# Patient Record
Sex: MALE | Race: WHITE | NOT HISPANIC OR LATINO | Employment: OTHER | URBAN - METROPOLITAN AREA
[De-identification: names, ages, dates, MRNs, and addresses within clinical notes are randomized per-mention and may not be internally consistent; named-entity substitution may affect disease eponyms.]

---

## 2017-01-19 ENCOUNTER — LAB VISIT (OUTPATIENT)
Dept: LAB | Facility: HOSPITAL | Age: 67
End: 2017-01-19
Attending: INTERNAL MEDICINE
Payer: COMMERCIAL

## 2017-01-19 DIAGNOSIS — N18.2 CHRONIC KIDNEY DISEASE, STAGE II (MILD): Primary | ICD-10-CM

## 2017-01-19 DIAGNOSIS — Z79.899 ENCOUNTER FOR LONG-TERM (CURRENT) USE OF OTHER MEDICATIONS: ICD-10-CM

## 2017-01-19 LAB
ALBUMIN SERPL BCP-MCNC: 3.6 G/DL
ANION GAP SERPL CALC-SCNC: 13 MMOL/L
BASOPHILS # BLD AUTO: 0.04 K/UL
BASOPHILS NFR BLD: 0.7 %
BUN SERPL-MCNC: 17 MG/DL
CALCIUM SERPL-MCNC: 9.1 MG/DL
CHLORIDE SERPL-SCNC: 108 MMOL/L
CO2 SERPL-SCNC: 18 MMOL/L
CREAT SERPL-MCNC: 1.6 MG/DL
DIFFERENTIAL METHOD: NORMAL
EOSINOPHIL # BLD AUTO: 0.1 K/UL
EOSINOPHIL NFR BLD: 1.9 %
ERYTHROCYTE [DISTWIDTH] IN BLOOD BY AUTOMATED COUNT: 14.4 %
EST. GFR  (AFRICAN AMERICAN): 50.1 ML/MIN/1.73 M^2
EST. GFR  (NON AFRICAN AMERICAN): 43.3 ML/MIN/1.73 M^2
GLUCOSE SERPL-MCNC: 96 MG/DL
HCT VFR BLD AUTO: 46.4 %
HGB BLD-MCNC: 15.3 G/DL
LYMPHOCYTES # BLD AUTO: 1.6 K/UL
LYMPHOCYTES NFR BLD: 28.4 %
MAGNESIUM SERPL-MCNC: 1.7 MG/DL
MCH RBC QN AUTO: 29.9 PG
MCHC RBC AUTO-ENTMCNC: 33 %
MCV RBC AUTO: 91 FL
MONOCYTES # BLD AUTO: 0.3 K/UL
MONOCYTES NFR BLD: 5.6 %
NEUTROPHILS # BLD AUTO: 3.6 K/UL
NEUTROPHILS NFR BLD: 63.4 %
PHOSPHATE SERPL-MCNC: 2.6 MG/DL
PLATELET # BLD AUTO: 158 K/UL
PMV BLD AUTO: 12.8 FL
POTASSIUM SERPL-SCNC: 4.4 MMOL/L
RBC # BLD AUTO: 5.12 M/UL
SODIUM SERPL-SCNC: 139 MMOL/L
WBC # BLD AUTO: 5.71 K/UL

## 2017-01-19 PROCEDURE — 36415 COLL VENOUS BLD VENIPUNCTURE: CPT | Mod: PO

## 2017-01-19 PROCEDURE — 85025 COMPLETE CBC W/AUTO DIFF WBC: CPT

## 2017-01-19 PROCEDURE — 80069 RENAL FUNCTION PANEL: CPT

## 2017-01-19 PROCEDURE — 83735 ASSAY OF MAGNESIUM: CPT

## 2017-01-20 ENCOUNTER — TELEPHONE (OUTPATIENT)
Dept: FAMILY MEDICINE | Facility: CLINIC | Age: 67
End: 2017-01-20

## 2017-04-18 NOTE — TELEPHONE ENCOUNTER
contact patient, looks like patient had labs in January but has not had his physical since 2015. Has not had his blood pressure checked here since that time and he really should  make an appointment

## 2017-05-08 ENCOUNTER — LAB VISIT (OUTPATIENT)
Dept: LAB | Facility: HOSPITAL | Age: 67
End: 2017-05-08
Attending: INTERNAL MEDICINE
Payer: COMMERCIAL

## 2017-05-08 DIAGNOSIS — Z79.899 ENCOUNTER FOR LONG-TERM (CURRENT) USE OF OTHER MEDICATIONS: Primary | ICD-10-CM

## 2017-05-08 DIAGNOSIS — N18.2 CHRONIC RENAL DISEASE, STAGE II: ICD-10-CM

## 2017-05-08 LAB
ALBUMIN SERPL BCP-MCNC: 3.6 G/DL
ANION GAP SERPL CALC-SCNC: 9 MMOL/L
BUN SERPL-MCNC: 22 MG/DL
CALCIUM SERPL-MCNC: 9.2 MG/DL
CHLORIDE SERPL-SCNC: 109 MMOL/L
CO2 SERPL-SCNC: 22 MMOL/L
CREAT SERPL-MCNC: 1.4 MG/DL
EST. GFR  (AFRICAN AMERICAN): 58.8 ML/MIN/1.73 M^2
EST. GFR  (NON AFRICAN AMERICAN): 50.9 ML/MIN/1.73 M^2
GLUCOSE SERPL-MCNC: 100 MG/DL
MAGNESIUM SERPL-MCNC: 1.7 MG/DL
PHOSPHATE SERPL-MCNC: 3.3 MG/DL
POTASSIUM SERPL-SCNC: 4.2 MMOL/L
SODIUM SERPL-SCNC: 140 MMOL/L

## 2017-05-08 PROCEDURE — 80069 RENAL FUNCTION PANEL: CPT

## 2017-05-08 PROCEDURE — 83735 ASSAY OF MAGNESIUM: CPT

## 2017-05-08 PROCEDURE — 36415 COLL VENOUS BLD VENIPUNCTURE: CPT | Mod: PO

## 2017-05-29 ENCOUNTER — PATIENT OUTREACH (OUTPATIENT)
Dept: ADMINISTRATIVE | Facility: HOSPITAL | Age: 67
End: 2017-05-29

## 2017-05-29 NOTE — PROGRESS NOTES
Sent a letter per mail for patient to call back and schedule appointment for labs, office visit, and update health maintenance.

## 2017-06-06 ENCOUNTER — TELEPHONE (OUTPATIENT)
Dept: FAMILY MEDICINE | Facility: CLINIC | Age: 67
End: 2017-06-06

## 2017-06-07 DIAGNOSIS — Z11.59 NEED FOR HEPATITIS C SCREENING TEST: Primary | ICD-10-CM

## 2017-06-07 NOTE — PROGRESS NOTES
Patient called back after receiving letter to schedule for fasting labs, office visit with Dr. You, and update health maintenance.    Patient scheduled for fasting labs 6/14/2017 at [facility] and office visit with Dr. You 6/21/2017 asked patient to arrive at 1040 for a 1100 appointment.  Confirmed dates/times and facilities for appointments.  Sent appointment reminder letters.

## 2017-06-07 NOTE — ADDENDUM NOTE
Encounter addended by: Vicotrina Smith LPN on: 6/7/2017 12:20 PM<BR>    Actions taken: Contacts section saved, Sign clinical note

## 2017-06-14 ENCOUNTER — LAB VISIT (OUTPATIENT)
Dept: LAB | Facility: HOSPITAL | Age: 67
End: 2017-06-14
Attending: INTERNAL MEDICINE
Payer: COMMERCIAL

## 2017-06-14 DIAGNOSIS — Z11.59 NEED FOR HEPATITIS C SCREENING TEST: ICD-10-CM

## 2017-06-14 PROCEDURE — 36415 COLL VENOUS BLD VENIPUNCTURE: CPT | Mod: PO

## 2017-06-14 PROCEDURE — 86803 HEPATITIS C AB TEST: CPT

## 2017-06-15 LAB — HCV AB SERPL QL IA: NEGATIVE

## 2017-06-21 ENCOUNTER — OFFICE VISIT (OUTPATIENT)
Dept: FAMILY MEDICINE | Facility: CLINIC | Age: 67
End: 2017-06-21
Payer: COMMERCIAL

## 2017-06-21 VITALS
TEMPERATURE: 99 F | DIASTOLIC BLOOD PRESSURE: 76 MMHG | WEIGHT: 217.13 LBS | SYSTOLIC BLOOD PRESSURE: 130 MMHG | HEART RATE: 65 BPM | BODY MASS INDEX: 32.91 KG/M2 | OXYGEN SATURATION: 97 % | HEIGHT: 68 IN

## 2017-06-21 DIAGNOSIS — N52.9 ERECTILE DYSFUNCTION, UNSPECIFIED ERECTILE DYSFUNCTION TYPE: ICD-10-CM

## 2017-06-21 DIAGNOSIS — I25.10 CORONARY ARTERY DISEASE INVOLVING NATIVE CORONARY ARTERY OF NATIVE HEART WITHOUT ANGINA PECTORIS: ICD-10-CM

## 2017-06-21 DIAGNOSIS — Z12.5 SCREENING FOR PROSTATE CANCER: ICD-10-CM

## 2017-06-21 DIAGNOSIS — Z00.00 ROUTINE MEDICAL EXAM: Primary | ICD-10-CM

## 2017-06-21 DIAGNOSIS — E78.6 HIGH-DENSITY LIPOPROTEIN DEFICIENCY: ICD-10-CM

## 2017-06-21 DIAGNOSIS — N18.30 CHRONIC KIDNEY DISEASE, STAGE III (MODERATE): ICD-10-CM

## 2017-06-21 DIAGNOSIS — I73.9 PVD (PERIPHERAL VASCULAR DISEASE): ICD-10-CM

## 2017-06-21 DIAGNOSIS — I10 ESSENTIAL HYPERTENSION: ICD-10-CM

## 2017-06-21 DIAGNOSIS — R79.89 ABNORMAL THYROID BLOOD TEST: ICD-10-CM

## 2017-06-21 DIAGNOSIS — F52.4 PREMATURE EJACULATION: ICD-10-CM

## 2017-06-21 PROCEDURE — 99999 PR PBB SHADOW E&M-EST. PATIENT-LVL III: CPT | Mod: PBBFAC,,, | Performed by: INTERNAL MEDICINE

## 2017-06-21 PROCEDURE — 1159F MED LIST DOCD IN RCRD: CPT | Mod: S$GLB,,, | Performed by: INTERNAL MEDICINE

## 2017-06-21 PROCEDURE — 1126F AMNT PAIN NOTED NONE PRSNT: CPT | Mod: S$GLB,,, | Performed by: INTERNAL MEDICINE

## 2017-06-21 PROCEDURE — 90670 PCV13 VACCINE IM: CPT | Mod: S$GLB,,, | Performed by: INTERNAL MEDICINE

## 2017-06-21 PROCEDURE — 99214 OFFICE O/P EST MOD 30 MIN: CPT | Mod: 25,S$GLB,, | Performed by: INTERNAL MEDICINE

## 2017-06-21 PROCEDURE — 99397 PER PM REEVAL EST PAT 65+ YR: CPT | Mod: 25,S$GLB,, | Performed by: INTERNAL MEDICINE

## 2017-06-21 PROCEDURE — 90471 IMMUNIZATION ADMIN: CPT | Mod: S$GLB,,, | Performed by: INTERNAL MEDICINE

## 2017-06-21 RX ORDER — TADALAFIL 20 MG/1
20 TABLET ORAL
Qty: 3 TABLET | Refills: 11 | Status: SHIPPED | OUTPATIENT
Start: 2017-06-21 | End: 2018-08-29

## 2017-06-21 RX ORDER — SILDENAFIL CITRATE 20 MG/1
TABLET ORAL
Qty: 30 TABLET | Refills: 11 | Status: SHIPPED | OUTPATIENT
Start: 2017-06-21 | End: 2018-08-29

## 2017-06-21 RX ORDER — PAROXETINE 10 MG/1
TABLET, FILM COATED ORAL
Qty: 50 TABLET | Refills: 11 | Status: SHIPPED | OUTPATIENT
Start: 2017-06-21 | End: 2018-08-29

## 2017-06-21 RX ORDER — NEBIVOLOL HYDROCHLORIDE 5 MG/1
5 TABLET ORAL DAILY
COMMUNITY
Start: 2017-06-20 | End: 2021-05-27
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
## 2017-06-21 NOTE — PROGRESS NOTES
Pneumococcal vaccine 13 given to patient & tolerated well.  Pt advised to wait 15 minutes for monitoring.

## 2017-07-13 ENCOUNTER — LAB VISIT (OUTPATIENT)
Dept: LAB | Facility: HOSPITAL | Age: 67
End: 2017-07-13
Attending: INTERNAL MEDICINE
Payer: COMMERCIAL

## 2017-07-13 DIAGNOSIS — I10 HTN (HYPERTENSION): Primary | ICD-10-CM

## 2017-07-13 DIAGNOSIS — I25.10 CAD (CORONARY ARTERY DISEASE): ICD-10-CM

## 2017-07-13 LAB
ALBUMIN SERPL BCP-MCNC: 3.6 G/DL
ALP SERPL-CCNC: 53 U/L
ALT SERPL W/O P-5'-P-CCNC: 21 U/L
ANION GAP SERPL CALC-SCNC: 9 MMOL/L
AST SERPL-CCNC: 21 U/L
BILIRUB SERPL-MCNC: 0.4 MG/DL
BUN SERPL-MCNC: 18 MG/DL
CALCIUM SERPL-MCNC: 9.3 MG/DL
CHLORIDE SERPL-SCNC: 107 MMOL/L
CHOLEST/HDLC SERPL: 5.3 {RATIO}
CO2 SERPL-SCNC: 21 MMOL/L
CREAT SERPL-MCNC: 1.3 MG/DL
EST. GFR  (AFRICAN AMERICAN): >60 ML/MIN/1.73 M^2
EST. GFR  (NON AFRICAN AMERICAN): 55.7 ML/MIN/1.73 M^2
GLUCOSE SERPL-MCNC: 91 MG/DL
HDL/CHOLESTEROL RATIO: 19 %
HDLC SERPL-MCNC: 137 MG/DL
HDLC SERPL-MCNC: 26 MG/DL
LDLC SERPL CALC-MCNC: 78.6 MG/DL
NONHDLC SERPL-MCNC: 111 MG/DL
POTASSIUM SERPL-SCNC: 4.2 MMOL/L
PROT SERPL-MCNC: 7.2 G/DL
SODIUM SERPL-SCNC: 137 MMOL/L
TRIGL SERPL-MCNC: 162 MG/DL
TSH SERPL DL<=0.005 MIU/L-ACNC: 2.8 UIU/ML

## 2017-07-13 PROCEDURE — 84443 ASSAY THYROID STIM HORMONE: CPT

## 2017-07-13 PROCEDURE — 36415 COLL VENOUS BLD VENIPUNCTURE: CPT | Mod: PO

## 2017-07-13 PROCEDURE — 80061 LIPID PANEL: CPT

## 2017-07-13 PROCEDURE — 80053 COMPREHEN METABOLIC PANEL: CPT

## 2017-11-10 ENCOUNTER — LAB VISIT (OUTPATIENT)
Dept: LAB | Facility: HOSPITAL | Age: 67
End: 2017-11-10
Attending: INTERNAL MEDICINE
Payer: COMMERCIAL

## 2017-11-10 DIAGNOSIS — Z12.5 SCREENING FOR PROSTATE CANCER: ICD-10-CM

## 2017-11-10 DIAGNOSIS — R79.89 ABNORMAL THYROID BLOOD TEST: ICD-10-CM

## 2017-11-10 LAB
COMPLEXED PSA SERPL-MCNC: 0.95 NG/ML
T4 FREE SERPL-MCNC: 1.05 NG/DL
TSH SERPL DL<=0.005 MIU/L-ACNC: 2.82 UIU/ML

## 2017-11-10 PROCEDURE — 84439 ASSAY OF FREE THYROXINE: CPT

## 2017-11-10 PROCEDURE — 84443 ASSAY THYROID STIM HORMONE: CPT

## 2017-11-10 PROCEDURE — 84153 ASSAY OF PSA TOTAL: CPT

## 2017-11-10 PROCEDURE — 36415 COLL VENOUS BLD VENIPUNCTURE: CPT | Mod: PO

## 2017-11-12 ENCOUNTER — TELEPHONE (OUTPATIENT)
Dept: FAMILY MEDICINE | Facility: CLINIC | Age: 67
End: 2017-11-12

## 2017-11-14 ENCOUNTER — LAB VISIT (OUTPATIENT)
Dept: LAB | Facility: HOSPITAL | Age: 67
End: 2017-11-14
Attending: INTERNAL MEDICINE
Payer: COMMERCIAL

## 2017-11-14 DIAGNOSIS — I10 ESSENTIAL HYPERTENSION, MALIGNANT: ICD-10-CM

## 2017-11-14 DIAGNOSIS — N18.30 CHRONIC KIDNEY DISEASE, STAGE III (MODERATE): Primary | ICD-10-CM

## 2017-11-14 LAB
25(OH)D3+25(OH)D2 SERPL-MCNC: 9 NG/ML
ALBUMIN SERPL BCP-MCNC: 3.6 G/DL
ANION GAP SERPL CALC-SCNC: 9 MMOL/L
BASOPHILS # BLD AUTO: 0.06 K/UL
BASOPHILS NFR BLD: 1 %
BUN SERPL-MCNC: 17 MG/DL
CALCIUM SERPL-MCNC: 9.5 MG/DL
CHLORIDE SERPL-SCNC: 106 MMOL/L
CO2 SERPL-SCNC: 24 MMOL/L
CREAT SERPL-MCNC: 1.3 MG/DL
DIFFERENTIAL METHOD: NORMAL
EOSINOPHIL # BLD AUTO: 0.2 K/UL
EOSINOPHIL NFR BLD: 3.1 %
ERYTHROCYTE [DISTWIDTH] IN BLOOD BY AUTOMATED COUNT: 14.4 %
EST. GFR  (AFRICAN AMERICAN): >60 ML/MIN/1.73 M^2
EST. GFR  (NON AFRICAN AMERICAN): 55.7 ML/MIN/1.73 M^2
GLUCOSE SERPL-MCNC: 110 MG/DL
HCT VFR BLD AUTO: 45.9 %
HGB BLD-MCNC: 14.9 G/DL
IMM GRANULOCYTES # BLD AUTO: 0.02 K/UL
IMM GRANULOCYTES NFR BLD AUTO: 0.3 %
LYMPHOCYTES # BLD AUTO: 1.7 K/UL
LYMPHOCYTES NFR BLD: 28 %
MAGNESIUM SERPL-MCNC: 1.8 MG/DL
MCH RBC QN AUTO: 30 PG
MCHC RBC AUTO-ENTMCNC: 32.5 G/DL
MCV RBC AUTO: 92 FL
MONOCYTES # BLD AUTO: 0.5 K/UL
MONOCYTES NFR BLD: 7.2 %
NEUTROPHILS # BLD AUTO: 3.8 K/UL
NEUTROPHILS NFR BLD: 60.4 %
NRBC BLD-RTO: 0 /100 WBC
PHOSPHATE SERPL-MCNC: 3.2 MG/DL
PLATELET # BLD AUTO: 163 K/UL
PMV BLD AUTO: 12 FL
POTASSIUM SERPL-SCNC: 4.3 MMOL/L
RBC # BLD AUTO: 4.97 M/UL
SODIUM SERPL-SCNC: 139 MMOL/L
WBC # BLD AUTO: 6.22 K/UL

## 2017-11-14 PROCEDURE — 82306 VITAMIN D 25 HYDROXY: CPT

## 2017-11-14 PROCEDURE — 83970 ASSAY OF PARATHORMONE: CPT

## 2017-11-14 PROCEDURE — 36415 COLL VENOUS BLD VENIPUNCTURE: CPT | Mod: PO

## 2017-11-14 PROCEDURE — 80069 RENAL FUNCTION PANEL: CPT

## 2017-11-14 PROCEDURE — 83735 ASSAY OF MAGNESIUM: CPT

## 2017-11-14 PROCEDURE — 85025 COMPLETE CBC W/AUTO DIFF WBC: CPT

## 2017-11-15 LAB — PTH-INTACT SERPL-MCNC: 47 PG/ML

## 2018-01-05 ENCOUNTER — LAB VISIT (OUTPATIENT)
Dept: LAB | Facility: HOSPITAL | Age: 68
End: 2018-01-05
Attending: INTERNAL MEDICINE
Payer: MEDICARE

## 2018-01-05 DIAGNOSIS — I10 ESSENTIAL HYPERTENSION, MALIGNANT: Primary | ICD-10-CM

## 2018-01-05 DIAGNOSIS — F32.9 PROLONGED DEPRESSIVE REACTION: ICD-10-CM

## 2018-01-05 DIAGNOSIS — I25.10 DISEASE OF CARDIOVASCULAR SYSTEM: ICD-10-CM

## 2018-01-05 LAB
ALBUMIN SERPL BCP-MCNC: 3.6 G/DL
ALP SERPL-CCNC: 44 U/L
ALT SERPL W/O P-5'-P-CCNC: 12 U/L
ANION GAP SERPL CALC-SCNC: 10 MMOL/L
AST SERPL-CCNC: 18 U/L
BILIRUB SERPL-MCNC: 0.5 MG/DL
BUN SERPL-MCNC: 24 MG/DL
CALCIUM SERPL-MCNC: 9.6 MG/DL
CHLORIDE SERPL-SCNC: 108 MMOL/L
CHOLEST SERPL-MCNC: 140 MG/DL
CHOLEST/HDLC SERPL: 4.1 {RATIO}
CO2 SERPL-SCNC: 23 MMOL/L
CREAT SERPL-MCNC: 1.6 MG/DL
EST. GFR  (AFRICAN AMERICAN): 50.1 ML/MIN/1.73 M^2
EST. GFR  (NON AFRICAN AMERICAN): 43.3 ML/MIN/1.73 M^2
GLUCOSE SERPL-MCNC: 113 MG/DL
HDLC SERPL-MCNC: 34 MG/DL
HDLC SERPL: 24.3 %
LDLC SERPL CALC-MCNC: 76.6 MG/DL
NONHDLC SERPL-MCNC: 106 MG/DL
POTASSIUM SERPL-SCNC: 4.5 MMOL/L
PROT SERPL-MCNC: 7.4 G/DL
SODIUM SERPL-SCNC: 141 MMOL/L
TRIGL SERPL-MCNC: 147 MG/DL
TSH SERPL DL<=0.005 MIU/L-ACNC: 3.08 UIU/ML

## 2018-01-05 PROCEDURE — 80061 LIPID PANEL: CPT

## 2018-01-05 PROCEDURE — 84443 ASSAY THYROID STIM HORMONE: CPT

## 2018-01-05 PROCEDURE — 80053 COMPREHEN METABOLIC PANEL: CPT

## 2018-01-05 PROCEDURE — 36415 COLL VENOUS BLD VENIPUNCTURE: CPT | Mod: PO

## 2018-01-09 ENCOUNTER — PES CALL (OUTPATIENT)
Dept: ADMINISTRATIVE | Facility: CLINIC | Age: 68
End: 2018-01-09

## 2018-01-11 ENCOUNTER — TELEPHONE (OUTPATIENT)
Dept: FAMILY MEDICINE | Facility: CLINIC | Age: 68
End: 2018-01-11

## 2018-01-11 NOTE — TELEPHONE ENCOUNTER
----- Message from Rosa Frey sent at 1/9/2018 11:40 AM CST -----  Contact: self  Pt states he has been having chest pains all last week and yesterday. He states he is not having Chest Pains right now and he has already had an EKG with his Cardiologist. He is asking for an appointment with only Dr. You because he really trusts him. There are no available appts until 01/24. Pt is asking for a call back at  [phone].

## 2018-01-15 ENCOUNTER — TELEPHONE (OUTPATIENT)
Dept: FAMILY MEDICINE | Facility: CLINIC | Age: 68
End: 2018-01-15

## 2018-01-15 NOTE — TELEPHONE ENCOUNTER
Patient states he's been having some discomfort in his chest for over a week.  The pain doesn't seem to be brought on by anything in particular.  No trouble breathing and reports that he checks his bp often and it has been normal.  Schedule patient an appointment on tomorrow (1/16/18).

## 2018-01-15 NOTE — TELEPHONE ENCOUNTER
----- Message from Roas evertgail sent at 1/15/2018 11:56 AM CST -----  Contact: self  Pt returning staff's call. He is asking for an urgent appt for on and off chest pains. No available until 01/29.   [phone].

## 2018-01-16 ENCOUNTER — OFFICE VISIT (OUTPATIENT)
Dept: FAMILY MEDICINE | Facility: CLINIC | Age: 68
End: 2018-01-16
Payer: MEDICARE

## 2018-01-16 VITALS
SYSTOLIC BLOOD PRESSURE: 126 MMHG | DIASTOLIC BLOOD PRESSURE: 70 MMHG | TEMPERATURE: 98 F | WEIGHT: 221.13 LBS | BODY MASS INDEX: 32.75 KG/M2 | OXYGEN SATURATION: 98 % | HEART RATE: 62 BPM | HEIGHT: 69 IN

## 2018-01-16 DIAGNOSIS — K21.9 GASTROESOPHAGEAL REFLUX DISEASE, ESOPHAGITIS PRESENCE NOT SPECIFIED: ICD-10-CM

## 2018-01-16 DIAGNOSIS — R07.9 CHEST PAIN, UNSPECIFIED TYPE: Primary | ICD-10-CM

## 2018-01-16 PROBLEM — F32.9 MAJOR DEPRESSIVE DISORDER WITH SINGLE EPISODE: Status: ACTIVE | Noted: 2018-01-16

## 2018-01-16 PROBLEM — F52.4 PREMATURE EJACULATION: Status: RESOLVED | Noted: 2017-06-21 | Resolved: 2018-01-16

## 2018-01-16 PROBLEM — E66.01 SEVERE OBESITY (BMI 35.0-35.9 WITH COMORBIDITY): Status: ACTIVE | Noted: 2018-01-16

## 2018-01-16 PROCEDURE — 99214 OFFICE O/P EST MOD 30 MIN: CPT | Mod: S$GLB,,, | Performed by: NURSE PRACTITIONER

## 2018-01-16 PROCEDURE — 99999 PR PBB SHADOW E&M-EST. PATIENT-LVL IV: CPT | Mod: PBBFAC,,, | Performed by: NURSE PRACTITIONER

## 2018-01-16 PROCEDURE — 93010 ELECTROCARDIOGRAM REPORT: CPT | Mod: S$GLB,,, | Performed by: INTERNAL MEDICINE

## 2018-01-16 PROCEDURE — 93005 ELECTROCARDIOGRAM TRACING: CPT | Mod: S$GLB,,, | Performed by: NURSE PRACTITIONER

## 2018-01-16 RX ORDER — SUCRALFATE 1 G/1
1 TABLET ORAL 4 TIMES DAILY
Qty: 60 TABLET | Refills: 1 | Status: SHIPPED | OUTPATIENT
Start: 2018-01-16 | End: 2018-08-29

## 2018-01-16 NOTE — PROGRESS NOTES
This dictation has been generated using Dragon Dictation some phonetic errors may occur.     Lonnie was seen today for chest pain and abdominal pain.    Diagnoses and all orders for this visit:    Chest pain, unspecified type  -     EKG 12-lead    Gastroesophageal reflux disease, esophagitis presence not specified    Other orders  -     sucralfate (CARAFATE) 1 gram tablet; Take 1 tablet (1 g total) by mouth 4 (four) times daily.      Chest pain check chest x-ray as above and rule out cardiac involvement.  No EKG changes noted on today's EKG per my interpretation to suggest cardiac involvement.  No prior for comparison.  He did see cardiology recently and was reassured of same.  GERD.  Discussed dietary modifications.  Discussed appropriate dosing of omeprazole and timing before a meal.  Add Carafate as above.  Patient Instructions   Avoid reflux triggers: fried foods, caffeine, chocolate, spicy foods, tomato, acidic foods(citrus), food with high fat content, carbonated beverages, peppermint, ibuprofen, aleve, Onion, and Garlic.   Take your omeprazole and then eat 30 minutes later.        Follow-up if symptoms worsen or fail to improve.      ________________________________________________________________  ________________________________________________________________        Chief Complaint   Patient presents with    Chest Pain    Abdominal Pain     after eating, ulcer     History of present illness  This 67 y.o. presents today for complaint of chest pain.  Symptoms have been going on for 3 weeks. Patient does cry but is periodic chest discomfort.  It does happen throughout the day but it happens more frequently at night.  He also notes some abdominal discomfort.  Patient notes discomfort afg.  He has checked his blood pressure after having chest pain and blood pressure was normal. He did see his cardiologist with EKG and reassured him.  Patient does note chest pressure.  Patient notes GERD-like symptoms.  He  does have burning in his chest.  He does have abdominal burning.  He does have relief after vomiting.  He does have burning when vomiting.  He does note consumption of diet Cokes and coffee.  Patient also notes consumption of garlic chocolate and peppermint.  He took about 6 Tums which seemed at home it down some.  He does take omeprazole routinely but doesn't eat 30 minutes later.  Review of systems  No shortness of breath or dyspnea on exertion  No change in bowel habits.  No change in color or consistency.  Current every day smoker      Past Medical History:   Diagnosis Date    CAD (coronary artery disease)     Dr Martinez    Chronic kidney disease, stage III (moderate)     Depression     High-density lipoprotein deficiency     HTN (hypertension)     Insomnia     PVD (peripheral vascular disease)     stented    S/P AAA repair        Past Surgical History:   Procedure Laterality Date    ABDOMINAL AORTIC ANEURYSM REPAIR      COLONOSCOPY N/A 10/7/2015    Procedure: COLONOSCOPY;  Surgeon: Genaro You MD;  Location: [facility] ([address] [place] [address]);  Service: Endoscopy;  Laterality: N/A;    LAMINECTOMY      PATELLA SURGERY      VASECTOMY         History reviewed. No pertinent family history.    Social History     Social History    Marital status:      Spouse name: N/A    Number of children: N/A    Years of education: N/A     Social History Main Topics    Smoking status: Current Every Day Smoker     Packs/day: 1.00     Years: 50.00    Smokeless tobacco: Never Used    Alcohol use No    Drug use: Yes     Frequency: 7.0 times per week     Types: Marijuana    Sexual activity: Not Asked     Other Topics Concern    None     Social History Narrative    None       Current Outpatient Prescriptions   Medication Sig Dispense Refill    aspirin 325 MG tablet Take 325 mg by mouth once daily.        BYSTOLIC 5 mg Tab Take 5 mg by mouth once daily.       CRESTOR 20 mg tablet Take 20 mg by mouth once daily.        doxazosin (CARDURA) 4 MG tablet Take 4 mg by mouth once daily.       nifedipine (PROCARDIA-XL) 60 MG (OSM) 24 hr tablet       omeprazole (PRILOSEC) 40 MG capsule Take 40 mg by mouth every morning.       paroxetine (PAXIL) 10 MG tablet Try 1-3 a day as needed 50 tablet 11    sildenafil (REVATIO) 20 mg Tab 1-5 pills at a time per day prn 30 tablet 11    tadalafil (CIALIS) 20 MG Tab Take 1 tablet (20 mg total) by mouth every 36 (thirty-six) hours. 3 tablet 11    TRICOR 145 mg tablet       ZETIA 10 mg tablet Take 10 mg by mouth once daily.       sucralfate (CARAFATE) 1 gram tablet Take 1 tablet (1 g total) by mouth 4 (four) times daily. 60 tablet 1     No current facility-administered medications for this visit.        Review of patient's allergies indicates:  No Known Allergies    Physical examination  Vitals Reviewed  Gen. Well-dressed well-nourished no apparent distress  Skin warm dry and intact.  No rashes noted.  HEENT.  TM intact bilateral with normal light reflex.  No mastoid tenderness during percussion.  Nares patent bilateral.  Pharynx is unremarkable.  No maxillary or frontal sinus tenderness when percussed.    Neck is supple without adenopathy  Chest.  Respirations are even unlabored.  Lungs are clear to auscultation.  Cardiac regular rate and rhythm.  No chest wall adenopathy noted.  Abdomen is soft and not distended.  Bowel sounds are present.  No tenderness during palpation of the abdomen.  No Hepatosplenomegaly noted.  No hernia noted.  No CVA tenderness to percussion.    Neuro. Awake alert oriented x4.  Normal judgment and cognition noted.  Extremities no clubbing cyanosis or edema noted.     Call or return to clinic prn if these symptoms worsen or fail to improve as anticipated.

## 2018-02-05 ENCOUNTER — TELEPHONE (OUTPATIENT)
Dept: FAMILY MEDICINE | Facility: CLINIC | Age: 68
End: 2018-02-05

## 2018-02-05 NOTE — TELEPHONE ENCOUNTER
Called patient no answer left message to call our office . Patient has appointment with Dr. RENZO Vaca on tomorrow.

## 2018-02-05 NOTE — TELEPHONE ENCOUNTER
----- Message from Belen Laguerre sent at 2/5/2018 10:10 AM CST -----  Contact: Mrs. Taylor  Need to talk with someone regarding an order for an endoscope? Mrs. Taylor can be reached at [phone].     Thanks,

## 2018-02-05 NOTE — TELEPHONE ENCOUNTER
----- Message from Fermin Gonzlaes sent at 2/1/2018 12:51 PM CST -----  Contact: Self/[phone]  Patient called stating that he has stomach issues. He would like a sooner appointment with Dr. You. Thank you.

## 2018-03-16 DIAGNOSIS — Z13.6 SCREENING FOR AAA (AORTIC ABDOMINAL ANEURYSM): ICD-10-CM

## 2018-03-26 ENCOUNTER — LAB VISIT (OUTPATIENT)
Dept: LAB | Facility: HOSPITAL | Age: 68
End: 2018-03-26
Attending: INTERNAL MEDICINE
Payer: MEDICARE

## 2018-03-26 DIAGNOSIS — I25.10 DISEASE OF CARDIOVASCULAR SYSTEM: Primary | ICD-10-CM

## 2018-03-26 DIAGNOSIS — R04.2 COUGHING UP BLOOD: ICD-10-CM

## 2018-03-26 LAB
ANION GAP SERPL CALC-SCNC: 11 MMOL/L
BASOPHILS # BLD AUTO: 0.04 K/UL
BASOPHILS NFR BLD: 0.6 %
BUN SERPL-MCNC: 17 MG/DL
CALCIUM SERPL-MCNC: 9.6 MG/DL
CHLORIDE SERPL-SCNC: 108 MMOL/L
CO2 SERPL-SCNC: 21 MMOL/L
CREAT SERPL-MCNC: 1.4 MG/DL
DIFFERENTIAL METHOD: NORMAL
EOSINOPHIL # BLD AUTO: 0.2 K/UL
EOSINOPHIL NFR BLD: 2.6 %
ERYTHROCYTE [DISTWIDTH] IN BLOOD BY AUTOMATED COUNT: 14 %
EST. GFR  (AFRICAN AMERICAN): 58.4 ML/MIN/1.73 M^2
EST. GFR  (NON AFRICAN AMERICAN): 50.5 ML/MIN/1.73 M^2
GLUCOSE SERPL-MCNC: 103 MG/DL
HCT VFR BLD AUTO: 48.6 %
HGB BLD-MCNC: 15.7 G/DL
IMM GRANULOCYTES # BLD AUTO: 0.02 K/UL
IMM GRANULOCYTES NFR BLD AUTO: 0.3 %
LYMPHOCYTES # BLD AUTO: 1.8 K/UL
LYMPHOCYTES NFR BLD: 28.6 %
MCH RBC QN AUTO: 30.5 PG
MCHC RBC AUTO-ENTMCNC: 32.3 G/DL
MCV RBC AUTO: 95 FL
MONOCYTES # BLD AUTO: 0.5 K/UL
MONOCYTES NFR BLD: 7.8 %
NEUTROPHILS # BLD AUTO: 3.9 K/UL
NEUTROPHILS NFR BLD: 60.1 %
NRBC BLD-RTO: 0 /100 WBC
PLATELET # BLD AUTO: 164 K/UL
PMV BLD AUTO: 12.2 FL
POTASSIUM SERPL-SCNC: 4.2 MMOL/L
RBC # BLD AUTO: 5.14 M/UL
SODIUM SERPL-SCNC: 140 MMOL/L
WBC # BLD AUTO: 6.43 K/UL

## 2018-03-26 PROCEDURE — 36415 COLL VENOUS BLD VENIPUNCTURE: CPT | Mod: PO

## 2018-03-26 PROCEDURE — 85025 COMPLETE CBC W/AUTO DIFF WBC: CPT

## 2018-03-26 PROCEDURE — 80048 BASIC METABOLIC PNL TOTAL CA: CPT

## 2018-05-14 ENCOUNTER — LAB VISIT (OUTPATIENT)
Dept: LAB | Facility: HOSPITAL | Age: 68
End: 2018-05-14
Attending: INTERNAL MEDICINE
Payer: MEDICARE

## 2018-05-14 DIAGNOSIS — N25.81 SECONDARY HYPERPARATHYROIDISM OF RENAL ORIGIN: ICD-10-CM

## 2018-05-14 DIAGNOSIS — N18.30 CHRONIC KIDNEY DISEASE, STAGE III (MODERATE): Primary | ICD-10-CM

## 2018-05-14 DIAGNOSIS — I10 ESSENTIAL HYPERTENSION, MALIGNANT: ICD-10-CM

## 2018-05-14 DIAGNOSIS — E55.9 VITAMIN D DEFICIENCY DISEASE: ICD-10-CM

## 2018-05-14 LAB
25(OH)D3+25(OH)D2 SERPL-MCNC: 17 NG/ML
ALBUMIN SERPL BCP-MCNC: 3.7 G/DL
ANION GAP SERPL CALC-SCNC: 11 MMOL/L
BUN SERPL-MCNC: 21 MG/DL
CALCIUM SERPL-MCNC: 10 MG/DL
CHLORIDE SERPL-SCNC: 108 MMOL/L
CO2 SERPL-SCNC: 22 MMOL/L
CREAT SERPL-MCNC: 1.4 MG/DL
EST. GFR  (AFRICAN AMERICAN): 58.4 ML/MIN/1.73 M^2
EST. GFR  (NON AFRICAN AMERICAN): 50.5 ML/MIN/1.73 M^2
GLUCOSE SERPL-MCNC: 103 MG/DL
MAGNESIUM SERPL-MCNC: 1.8 MG/DL
PHOSPHATE SERPL-MCNC: 3.1 MG/DL
POTASSIUM SERPL-SCNC: 4.6 MMOL/L
PTH-INTACT SERPL-MCNC: 34 PG/ML
SODIUM SERPL-SCNC: 141 MMOL/L

## 2018-05-14 PROCEDURE — 83735 ASSAY OF MAGNESIUM: CPT

## 2018-05-14 PROCEDURE — 82306 VITAMIN D 25 HYDROXY: CPT

## 2018-05-14 PROCEDURE — 36415 COLL VENOUS BLD VENIPUNCTURE: CPT | Mod: PO

## 2018-05-14 PROCEDURE — 80069 RENAL FUNCTION PANEL: CPT

## 2018-05-14 PROCEDURE — 83970 ASSAY OF PARATHORMONE: CPT

## 2018-09-18 PROBLEM — Z86.79 S/P AAA REPAIR: Status: ACTIVE | Noted: 2018-09-18

## 2018-09-18 PROBLEM — I70.213 ATHEROSCLEROSIS OF NATIVE ARTERY OF BOTH LOWER EXTREMITIES WITH INTERMITTENT CLAUDICATION: Status: ACTIVE | Noted: 2018-09-18

## 2018-09-18 PROBLEM — E78.5 DYSLIPIDEMIA: Status: ACTIVE | Noted: 2018-09-18

## 2018-09-18 PROBLEM — Z72.0 TOBACCO ABUSE: Status: ACTIVE | Noted: 2018-09-18

## 2018-09-18 PROBLEM — Z98.890 S/P AAA REPAIR: Status: ACTIVE | Noted: 2018-09-18

## 2018-11-21 DIAGNOSIS — E55.9 VITAMIN D DEFICIENCY: ICD-10-CM

## 2018-11-21 DIAGNOSIS — N18.30 CHRONIC KIDNEY DISEASE, STAGE III (MODERATE): Primary | ICD-10-CM

## 2018-11-21 DIAGNOSIS — I10 HYPERTENSION, ESSENTIAL: ICD-10-CM

## 2018-11-23 ENCOUNTER — LAB VISIT (OUTPATIENT)
Dept: LAB | Facility: HOSPITAL | Age: 68
End: 2018-11-23
Attending: INTERNAL MEDICINE
Payer: MEDICARE

## 2018-11-23 DIAGNOSIS — N18.30 CHRONIC KIDNEY DISEASE, STAGE III (MODERATE): ICD-10-CM

## 2018-11-23 DIAGNOSIS — I10 HYPERTENSION, ESSENTIAL: ICD-10-CM

## 2018-11-23 DIAGNOSIS — E55.9 VITAMIN D DEFICIENCY: ICD-10-CM

## 2018-11-23 LAB
ALBUMIN SERPL BCP-MCNC: 3.7 G/DL
ANION GAP SERPL CALC-SCNC: 14 MMOL/L
BASOPHILS # BLD AUTO: 0.05 K/UL
BASOPHILS NFR BLD: 0.7 %
BILIRUB UR QL STRIP: NEGATIVE
BUN SERPL-MCNC: 16 MG/DL
CALCIUM SERPL-MCNC: 9.7 MG/DL
CHLORIDE SERPL-SCNC: 105 MMOL/L
CLARITY UR REFRACT.AUTO: CLEAR
CO2 SERPL-SCNC: 21 MMOL/L
COLOR UR AUTO: YELLOW
CREAT SERPL-MCNC: 1.3 MG/DL
CREAT UR-MCNC: 148 MG/DL
DIFFERENTIAL METHOD: ABNORMAL
EOSINOPHIL # BLD AUTO: 0.3 K/UL
EOSINOPHIL NFR BLD: 4 %
ERYTHROCYTE [DISTWIDTH] IN BLOOD BY AUTOMATED COUNT: 13.8 %
EST. GFR  (AFRICAN AMERICAN): >60 ML/MIN/1.73 M^2
EST. GFR  (NON AFRICAN AMERICAN): 55.3 ML/MIN/1.73 M^2
GLUCOSE SERPL-MCNC: 97 MG/DL
GLUCOSE UR QL STRIP: NEGATIVE
HCT VFR BLD AUTO: 48.8 %
HGB BLD-MCNC: 15.7 G/DL
HGB UR QL STRIP: NEGATIVE
IMM GRANULOCYTES # BLD AUTO: 0.02 K/UL
IMM GRANULOCYTES NFR BLD AUTO: 0.3 %
KETONES UR QL STRIP: NEGATIVE
LEUKOCYTE ESTERASE UR QL STRIP: NEGATIVE
LYMPHOCYTES # BLD AUTO: 2.2 K/UL
LYMPHOCYTES NFR BLD: 30.7 %
MAGNESIUM SERPL-MCNC: 2.1 MG/DL
MCH RBC QN AUTO: 30.5 PG
MCHC RBC AUTO-ENTMCNC: 32.2 G/DL
MCV RBC AUTO: 95 FL
MONOCYTES # BLD AUTO: 0.5 K/UL
MONOCYTES NFR BLD: 7.5 %
NEUTROPHILS # BLD AUTO: 4 K/UL
NEUTROPHILS NFR BLD: 56.8 %
NITRITE UR QL STRIP: NEGATIVE
NRBC BLD-RTO: 0 /100 WBC
PH UR STRIP: 6 [PH] (ref 5–8)
PHOSPHATE SERPL-MCNC: 3.7 MG/DL
PLATELET # BLD AUTO: 133 K/UL
PMV BLD AUTO: 12.6 FL
POTASSIUM SERPL-SCNC: 4 MMOL/L
PROT UR QL STRIP: NEGATIVE
PROT UR-MCNC: 10 MG/DL
PROT/CREAT UR: 0.07 MG/G{CREAT}
PTH-INTACT SERPL-MCNC: 58 PG/ML
RBC # BLD AUTO: 5.15 M/UL
SODIUM SERPL-SCNC: 140 MMOL/L
SP GR UR STRIP: 1.01 (ref 1–1.03)
URN SPEC COLLECT METH UR: NORMAL
WBC # BLD AUTO: 7.03 K/UL

## 2018-11-23 PROCEDURE — 81003 URINALYSIS AUTO W/O SCOPE: CPT | Mod: HCNC

## 2018-11-23 PROCEDURE — 36415 COLL VENOUS BLD VENIPUNCTURE: CPT | Mod: HCNC,PO

## 2018-11-23 PROCEDURE — 85025 COMPLETE CBC W/AUTO DIFF WBC: CPT | Mod: HCNC

## 2018-11-23 PROCEDURE — 80069 RENAL FUNCTION PANEL: CPT | Mod: HCNC

## 2018-11-23 PROCEDURE — 83735 ASSAY OF MAGNESIUM: CPT | Mod: HCNC

## 2018-11-23 PROCEDURE — 82652 VIT D 1 25-DIHYDROXY: CPT | Mod: HCNC

## 2018-11-23 PROCEDURE — 82570 ASSAY OF URINE CREATININE: CPT | Mod: HCNC

## 2018-11-23 PROCEDURE — 83970 ASSAY OF PARATHORMONE: CPT | Mod: HCNC

## 2018-11-26 ENCOUNTER — TELEPHONE (OUTPATIENT)
Dept: FAMILY MEDICINE | Facility: CLINIC | Age: 68
End: 2018-11-26

## 2018-11-26 NOTE — TELEPHONE ENCOUNTER
----- Message from Ana Cary sent at 11/26/2018 10:10 AM CST -----  Contact: Self [phone]  Pt will like an copy of last lab report and would like it to be fax over to [facility] phone [phone] Fax [phone]

## 2018-11-29 LAB — 1,25(OH)2D3 SERPL-MCNC: 48 PG/ML

## 2019-01-08 ENCOUNTER — LAB VISIT (OUTPATIENT)
Dept: LAB | Facility: HOSPITAL | Age: 69
End: 2019-01-08
Attending: INTERNAL MEDICINE
Payer: MEDICARE

## 2019-01-08 DIAGNOSIS — E78.5 HYPERLIPEMIA: ICD-10-CM

## 2019-01-08 DIAGNOSIS — I70.213 ATHEROSCLEROSIS OF NATIVE ARTERY OF BOTH LOWER EXTREMITIES WITH INTERMITTENT CLAUDICATION: ICD-10-CM

## 2019-01-08 DIAGNOSIS — I25.10 CAD (CORONARY ATHEROSCLEROTIC DISEASE): Primary | ICD-10-CM

## 2019-01-08 LAB
ALBUMIN SERPL BCP-MCNC: 3.4 G/DL
ALP SERPL-CCNC: 45 U/L
ALT SERPL W/O P-5'-P-CCNC: 12 U/L
ANION GAP SERPL CALC-SCNC: 8 MMOL/L
AST SERPL-CCNC: 13 U/L
BILIRUB SERPL-MCNC: 0.4 MG/DL
BUN SERPL-MCNC: 16 MG/DL
CALCIUM SERPL-MCNC: 9.4 MG/DL
CHLORIDE SERPL-SCNC: 107 MMOL/L
CO2 SERPL-SCNC: 25 MMOL/L
CREAT SERPL-MCNC: 1.3 MG/DL
EST. GFR  (AFRICAN AMERICAN): >60 ML/MIN/1.73 M^2
EST. GFR  (NON AFRICAN AMERICAN): 55.3 ML/MIN/1.73 M^2
GLUCOSE SERPL-MCNC: 113 MG/DL
POTASSIUM SERPL-SCNC: 4.3 MMOL/L
PROT SERPL-MCNC: 6.8 G/DL
SODIUM SERPL-SCNC: 140 MMOL/L
TSH SERPL DL<=0.005 MIU/L-ACNC: 3.4 UIU/ML

## 2019-01-08 PROCEDURE — 36415 COLL VENOUS BLD VENIPUNCTURE: CPT | Mod: HCNC,PO

## 2019-01-08 PROCEDURE — 84478 ASSAY OF TRIGLYCERIDES: CPT | Mod: HCNC

## 2019-01-08 PROCEDURE — 84443 ASSAY THYROID STIM HORMONE: CPT | Mod: HCNC

## 2019-01-08 PROCEDURE — 80053 COMPREHEN METABOLIC PANEL: CPT | Mod: HCNC

## 2019-01-13 LAB
CHOLEST SERPL-MCNC: 114 MG/DL (ref 100–199)
HDL SERPL QN: 8.4 NM
HDL SERPL-SCNC: 25.3 UMOL/L
HDLC SERPL-MCNC: 33 MG/DL
HLD.LARGE SERPL-SCNC: 1.9 UMOL/L
LDL SERPL QN: 20.5 NM
LDL SERPL QN: 20.5 NM
LDL SERPL-SCNC: 855 NMOL/L
LDL SMALL SERPL-SCNC: 418 NMOL/L
LDL SMALL SERPL-SCNC: 418 NMOL/L
LDLC SERPL CALC-MCNC: 64 MG/DL (ref 0–99)
LP-IR SCORE SERPL: 67
TRIGL SERPL-MCNC: 85 MG/DL (ref 0–149)
VLDL LARGE SERPL-SCNC: 2.2 NMOL/L
VLDL SERPL QN: 47.3 NM

## 2019-02-15 NOTE — TELEPHONE ENCOUNTER
----- Message from Anuradha Lyons sent at 6/5/2017  3:16 PM CDT -----  Contact: self  Pt called to schedule a tb screening, shingles, hep c screening & a triple a screening. Please advise. [phone]  
Left message to call office  
Pt confused. Will not hold phone. Son gave information to ED doctor, but not present at this time.

## 2019-05-15 ENCOUNTER — HOSPITAL ENCOUNTER (OUTPATIENT)
Dept: RADIOLOGY | Facility: HOSPITAL | Age: 69
Discharge: HOME | End: 2019-05-15
Attending: INTERNAL MEDICINE
Payer: MEDICARE

## 2019-05-15 ENCOUNTER — OFFICE VISIT (OUTPATIENT)
Dept: FAMILY MEDICINE | Facility: CLINIC | Age: 69
End: 2019-05-15
Payer: MEDICARE

## 2019-05-15 VITALS
TEMPERATURE: 99 F | HEART RATE: 56 BPM | HEIGHT: 69 IN | WEIGHT: 222.44 LBS | DIASTOLIC BLOOD PRESSURE: 68 MMHG | OXYGEN SATURATION: 96 % | BODY MASS INDEX: 32.95 KG/M2 | SYSTOLIC BLOOD PRESSURE: 120 MMHG

## 2019-05-15 DIAGNOSIS — Z12.5 SCREENING FOR PROSTATE CANCER: ICD-10-CM

## 2019-05-15 DIAGNOSIS — R06.02 SOB (SHORTNESS OF BREATH): ICD-10-CM

## 2019-05-15 DIAGNOSIS — I25.10 CORONARY ARTERY DISEASE INVOLVING NATIVE CORONARY ARTERY OF NATIVE HEART WITHOUT ANGINA PECTORIS: ICD-10-CM

## 2019-05-15 DIAGNOSIS — I70.213 ATHEROSCLEROSIS OF NATIVE ARTERY OF BOTH LOWER EXTREMITIES WITH INTERMITTENT CLAUDICATION: ICD-10-CM

## 2019-05-15 DIAGNOSIS — E66.01 SEVERE OBESITY (BMI 35.0-35.9 WITH COMORBIDITY): ICD-10-CM

## 2019-05-15 DIAGNOSIS — R79.89 ABNORMAL THYROID BLOOD TEST: ICD-10-CM

## 2019-05-15 DIAGNOSIS — N18.30 CHRONIC KIDNEY DISEASE, STAGE III (MODERATE): ICD-10-CM

## 2019-05-15 DIAGNOSIS — I73.9 PVD (PERIPHERAL VASCULAR DISEASE): ICD-10-CM

## 2019-05-15 DIAGNOSIS — Z00.00 ROUTINE MEDICAL EXAM: Primary | ICD-10-CM

## 2019-05-15 DIAGNOSIS — F52.4 PREMATURE EJACULATION: ICD-10-CM

## 2019-05-15 DIAGNOSIS — I10 ESSENTIAL HYPERTENSION: ICD-10-CM

## 2019-05-15 DIAGNOSIS — Z86.010 HISTORY OF COLONIC POLYPS: ICD-10-CM

## 2019-05-15 DIAGNOSIS — R68.82 DECREASED LIBIDO: ICD-10-CM

## 2019-05-15 DIAGNOSIS — F17.200 TOBACCO USE DISORDER: ICD-10-CM

## 2019-05-15 DIAGNOSIS — Z86.79 S/P AAA REPAIR: ICD-10-CM

## 2019-05-15 DIAGNOSIS — F32.A DEPRESSION, UNSPECIFIED DEPRESSION TYPE: ICD-10-CM

## 2019-05-15 DIAGNOSIS — E55.9 VITAMIN D DEFICIENCY DISEASE: ICD-10-CM

## 2019-05-15 DIAGNOSIS — N40.0 BENIGN PROSTATIC HYPERPLASIA, UNSPECIFIED WHETHER LOWER URINARY TRACT SYMPTOMS PRESENT: ICD-10-CM

## 2019-05-15 DIAGNOSIS — N52.9 ERECTILE DYSFUNCTION, UNSPECIFIED ERECTILE DYSFUNCTION TYPE: ICD-10-CM

## 2019-05-15 DIAGNOSIS — E78.6 HIGH-DENSITY LIPOPROTEIN DEFICIENCY: ICD-10-CM

## 2019-05-15 DIAGNOSIS — Z98.890 S/P AAA REPAIR: ICD-10-CM

## 2019-05-15 PROCEDURE — 3078F DIAST BP <80 MM HG: CPT | Mod: HCNC,CPTII,S$GLB, | Performed by: INTERNAL MEDICINE

## 2019-05-15 PROCEDURE — 99397 PER PM REEVAL EST PAT 65+ YR: CPT | Mod: HCNC,S$GLB,, | Performed by: INTERNAL MEDICINE

## 2019-05-15 PROCEDURE — 3078F PR MOST RECENT DIASTOLIC BLOOD PRESSURE < 80 MM HG: ICD-10-PCS | Mod: HCNC,CPTII,S$GLB, | Performed by: INTERNAL MEDICINE

## 2019-05-15 PROCEDURE — 1101F PT FALLS ASSESS-DOCD LE1/YR: CPT | Mod: HCNC,CPTII,S$GLB, | Performed by: INTERNAL MEDICINE

## 2019-05-15 PROCEDURE — 99499 RISK ADDL DX/OHS AUDIT: ICD-10-PCS | Mod: HCNC,S$GLB,, | Performed by: INTERNAL MEDICINE

## 2019-05-15 PROCEDURE — 99999 PR PBB SHADOW E&M-EST. PATIENT-LVL IV: ICD-10-PCS | Mod: PBBFAC,HCNC,, | Performed by: INTERNAL MEDICINE

## 2019-05-15 PROCEDURE — 3074F SYST BP LT 130 MM HG: CPT | Mod: HCNC,CPTII,S$GLB, | Performed by: INTERNAL MEDICINE

## 2019-05-15 PROCEDURE — 3074F PR MOST RECENT SYSTOLIC BLOOD PRESSURE < 130 MM HG: ICD-10-PCS | Mod: HCNC,CPTII,S$GLB, | Performed by: INTERNAL MEDICINE

## 2019-05-15 PROCEDURE — 71046 XR CHEST PA AND LATERAL: ICD-10-PCS | Mod: 26,HCNC,, | Performed by: RADIOLOGY

## 2019-05-15 PROCEDURE — 1101F PR PT FALLS ASSESS DOC 0-1 FALLS W/OUT INJ PAST YR: ICD-10-PCS | Mod: HCNC,CPTII,S$GLB, | Performed by: INTERNAL MEDICINE

## 2019-05-15 PROCEDURE — 99214 PR OFFICE/OUTPT VISIT, EST, LEVL IV, 30-39 MIN: ICD-10-PCS | Mod: 25,HCNC,S$GLB, | Performed by: INTERNAL MEDICINE

## 2019-05-15 PROCEDURE — 99999 PR PBB SHADOW E&M-EST. PATIENT-LVL IV: CPT | Mod: PBBFAC,HCNC,, | Performed by: INTERNAL MEDICINE

## 2019-05-15 PROCEDURE — 71046 X-RAY EXAM CHEST 2 VIEWS: CPT | Mod: 26,HCNC,, | Performed by: RADIOLOGY

## 2019-05-15 PROCEDURE — 99214 OFFICE O/P EST MOD 30 MIN: CPT | Mod: 25,HCNC,S$GLB, | Performed by: INTERNAL MEDICINE

## 2019-05-15 PROCEDURE — 71046 X-RAY EXAM CHEST 2 VIEWS: CPT | Mod: TC,HCNC,FY,PO

## 2019-05-15 PROCEDURE — 99499 UNLISTED E&M SERVICE: CPT | Mod: HCNC,S$GLB,, | Performed by: INTERNAL MEDICINE

## 2019-05-15 PROCEDURE — 99397 PR PREVENTIVE VISIT,EST,65 & OVER: ICD-10-PCS | Mod: HCNC,S$GLB,, | Performed by: INTERNAL MEDICINE

## 2019-05-15 RX ORDER — BUPROPION HYDROCHLORIDE 150 MG/1
150 TABLET ORAL DAILY
Qty: 30 TABLET | Refills: 11 | Status: SHIPPED | OUTPATIENT
Start: 2019-05-15 | End: 2019-12-02
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
## 2019-05-16 NOTE — PROGRESS NOTES
Patient, Lonnie Taylor (MRN #[id number]), presented with a recent Platelet count less than 150 K/uL consistent with the definition of thrombocytopenia (ICD10 - D69.6).    Platelets   Date Value Ref Range Status   05/15/2019 142 (L) 150 - 350 K/uL Final     The patient's thrombocytopenia was monitored, evaluated, addressed and/or treated. This addendum to the medical record is made on 05/16/2019.

## 2019-05-17 ENCOUNTER — TELEPHONE (OUTPATIENT)
Dept: FAMILY MEDICINE | Facility: CLINIC | Age: 69
End: 2019-05-17

## 2019-05-17 DIAGNOSIS — N18.30 CHRONIC KIDNEY DISEASE, STAGE III (MODERATE): Primary | ICD-10-CM

## 2019-05-17 RX ORDER — ALBUTEROL SULFATE 90 UG/1
2 AEROSOL, METERED RESPIRATORY (INHALATION) EVERY 6 HOURS PRN
Qty: 1 INHALER | Refills: 12 | Status: SHIPPED | OUTPATIENT
Start: 2019-05-17 | End: 2022-10-20 | Stop reason: SDUPTHER

## 2019-05-17 NOTE — TELEPHONE ENCOUNTER
Albuterol sent to the pharmacy, I presume pharmacy will call him that it is ready but probably also good to give him a call since sometimes they do not give notification

## 2019-05-17 NOTE — TELEPHONE ENCOUNTER
Patient had labs and a chest x-ray and will be anxious about his results    Dr. CLARK says the chest x-ray was all clear    Labs look okay except the kidney test was up slightly which could indicate a mild degree of dehydration so he recommends trying to drink a little bit more water and then probably need to repeat that blood test in the middle of next week.  The creatinine kidney test was 1.6 and about a year ago was 1.3 but Dr. CLARK does not know if your kidney doctor has repeated in the interval.  Do you remember the creatinine level if it is been checked since then?    The testosterone level was very normal.  The PSA prostate test was normal.  The blood test for heart congestion was essentially normal.    The blood count, electrolytes and liver function were all normal.              Repeat labs in the middle of next week or later next week

## 2019-05-17 NOTE — TELEPHONE ENCOUNTER
Informed patient of information below. Verbalized understanding. Creatinine levels has not been check. Stated, PCP agreed to send him a rescue inhaler until he can make it to the Pulmonary appointment, in June, to treat his SOB on exertion. Patient admitted to smoking and was told he may have COPD. Reported still having SOB. Still waiting for medication to go to his Pharmacy. Please advise.

## 2019-05-17 NOTE — TELEPHONE ENCOUNTER
----- Message from Vijaya Carolinawesarlen sent at 5/17/2019 11:40 AM CDT -----  Contact: Self   Type: RX Refill Request    Who Called: Self     Refill or New Rx: refill     RX Name and Strength:nebulizer     How is the patient currently taking it? (ex. 1XDay):     Is this a 30 day or 90 day RX: 90    Preferred Pharmacy with phone number:[facility] [address] [facility] [phone] (Phone)  [phone] (Fax)      Local or Mail Order: local     Ordering Provider: ehrensing     Would the patient rather a call back or a response via [facility]?      Best Call Back Number: [phone]    Additional Information: Patient is calling to make sure the office didn't forget his nebulizer. He is asking to order it.Says he has spoken with the office previously in ref to his nebulizer being refilled

## 2019-05-31 DIAGNOSIS — R06.02 SOB (SHORTNESS OF BREATH): Primary | ICD-10-CM

## 2019-06-03 ENCOUNTER — LAB VISIT (OUTPATIENT)
Dept: LAB | Facility: HOSPITAL | Age: 69
End: 2019-06-03
Attending: INTERNAL MEDICINE
Payer: MEDICARE

## 2019-06-03 DIAGNOSIS — N25.81 SECONDARY HYPERPARATHYROIDISM OF RENAL ORIGIN: ICD-10-CM

## 2019-06-03 DIAGNOSIS — N18.30 CHRONIC KIDNEY DISEASE, STAGE III (MODERATE): ICD-10-CM

## 2019-06-03 DIAGNOSIS — N18.30 CHRONIC KIDNEY DISEASE, STAGE III (MODERATE): Primary | ICD-10-CM

## 2019-06-03 LAB
25(OH)D3+25(OH)D2 SERPL-MCNC: 15 NG/ML (ref 30–96)
ALBUMIN SERPL BCP-MCNC: 3.7 G/DL (ref 3.5–5.2)
ANION GAP SERPL CALC-SCNC: 8 MMOL/L (ref 8–16)
ANION GAP SERPL CALC-SCNC: 8 MMOL/L (ref 8–16)
BUN SERPL-MCNC: 17 MG/DL (ref 8–23)
BUN SERPL-MCNC: 17 MG/DL (ref 8–23)
CALCIUM SERPL-MCNC: 9.4 MG/DL (ref 8.7–10.5)
CALCIUM SERPL-MCNC: 9.4 MG/DL (ref 8.7–10.5)
CHLORIDE SERPL-SCNC: 107 MMOL/L (ref 95–110)
CHLORIDE SERPL-SCNC: 107 MMOL/L (ref 95–110)
CO2 SERPL-SCNC: 24 MMOL/L (ref 23–29)
CO2 SERPL-SCNC: 24 MMOL/L (ref 23–29)
CREAT SERPL-MCNC: 1.4 MG/DL (ref 0.5–1.4)
CREAT SERPL-MCNC: 1.4 MG/DL (ref 0.5–1.4)
EST. GFR  (AFRICAN AMERICAN): 58 ML/MIN/1.73 M^2
EST. GFR  (AFRICAN AMERICAN): 58 ML/MIN/1.73 M^2
EST. GFR  (NON AFRICAN AMERICAN): 50.2 ML/MIN/1.73 M^2
EST. GFR  (NON AFRICAN AMERICAN): 50.2 ML/MIN/1.73 M^2
GLUCOSE SERPL-MCNC: 104 MG/DL (ref 70–110)
GLUCOSE SERPL-MCNC: 104 MG/DL (ref 70–110)
MAGNESIUM SERPL-MCNC: 1.8 MG/DL (ref 1.6–2.6)
PHOSPHATE SERPL-MCNC: 2.7 MG/DL (ref 2.7–4.5)
POTASSIUM SERPL-SCNC: 4 MMOL/L (ref 3.5–5.1)
POTASSIUM SERPL-SCNC: 4 MMOL/L (ref 3.5–5.1)
SODIUM SERPL-SCNC: 139 MMOL/L (ref 136–145)
SODIUM SERPL-SCNC: 139 MMOL/L (ref 136–145)

## 2019-06-03 PROCEDURE — 82306 VITAMIN D 25 HYDROXY: CPT | Mod: HCNC

## 2019-06-03 PROCEDURE — 36415 COLL VENOUS BLD VENIPUNCTURE: CPT | Mod: HCNC,PO

## 2019-06-03 PROCEDURE — 83735 ASSAY OF MAGNESIUM: CPT | Mod: HCNC

## 2019-06-03 PROCEDURE — 80069 RENAL FUNCTION PANEL: CPT | Mod: HCNC

## 2019-06-09 ENCOUNTER — TELEPHONE (OUTPATIENT)
Dept: FAMILY MEDICINE | Facility: CLINIC | Age: 69
End: 2019-06-09

## 2019-06-10 NOTE — TELEPHONE ENCOUNTER
Call - had repeat kidney labs    The repeat kidney function IS BETTER and near normal.     1.6 down to 1.4 -keep f/u w kidney doctor and keep hydrated

## 2019-06-25 ENCOUNTER — HOSPITAL ENCOUNTER (OUTPATIENT)
Dept: PULMONOLOGY | Facility: CLINIC | Age: 69
Discharge: HOME | End: 2019-06-25
Payer: MEDICARE

## 2019-06-25 ENCOUNTER — OFFICE VISIT (OUTPATIENT)
Dept: PULMONOLOGY | Facility: CLINIC | Age: 69
End: 2019-06-25
Payer: MEDICARE

## 2019-06-25 VITALS
SYSTOLIC BLOOD PRESSURE: 128 MMHG | WEIGHT: 224 LBS | OXYGEN SATURATION: 94 % | HEART RATE: 59 BPM | BODY MASS INDEX: 35.16 KG/M2 | DIASTOLIC BLOOD PRESSURE: 76 MMHG | HEIGHT: 67 IN

## 2019-06-25 DIAGNOSIS — R06.02 SOB (SHORTNESS OF BREATH): ICD-10-CM

## 2019-06-25 DIAGNOSIS — R06.02 SOB (SHORTNESS OF BREATH): Primary | ICD-10-CM

## 2019-06-25 DIAGNOSIS — Z95.1 S/P CABG X 3: Primary | ICD-10-CM

## 2019-06-25 DIAGNOSIS — F17.200 SMOKER: ICD-10-CM

## 2019-06-25 LAB
PRE FEV1 FVC: 70
PRE FEV1: 2.11
PRE FVC: 3.01
PREDICTED FEV1 FVC: 79
PREDICTED FEV1: 2.74
PREDICTED FVC: 3.45

## 2019-06-25 PROCEDURE — 99999 PR PBB SHADOW E&M-EST. PATIENT-LVL III: ICD-10-PCS | Mod: PBBFAC,HCNC,, | Performed by: INTERNAL MEDICINE

## 2019-06-25 PROCEDURE — 3074F SYST BP LT 130 MM HG: CPT | Mod: HCNC,CPTII,S$GLB, | Performed by: INTERNAL MEDICINE

## 2019-06-25 PROCEDURE — 94729 DIFFUSING CAPACITY: CPT | Mod: HCNC,S$GLB,, | Performed by: INTERNAL MEDICINE

## 2019-06-25 PROCEDURE — 3078F DIAST BP <80 MM HG: CPT | Mod: HCNC,CPTII,S$GLB, | Performed by: INTERNAL MEDICINE

## 2019-06-25 PROCEDURE — 99204 PR OFFICE/OUTPT VISIT, NEW, LEVL IV, 45-59 MIN: ICD-10-PCS | Mod: 25,HCNC,S$GLB, | Performed by: INTERNAL MEDICINE

## 2019-06-25 PROCEDURE — 1101F PR PT FALLS ASSESS DOC 0-1 FALLS W/OUT INJ PAST YR: ICD-10-PCS | Mod: HCNC,CPTII,S$GLB, | Performed by: INTERNAL MEDICINE

## 2019-06-25 PROCEDURE — 3074F PR MOST RECENT SYSTOLIC BLOOD PRESSURE < 130 MM HG: ICD-10-PCS | Mod: HCNC,CPTII,S$GLB, | Performed by: INTERNAL MEDICINE

## 2019-06-25 PROCEDURE — 1101F PT FALLS ASSESS-DOCD LE1/YR: CPT | Mod: HCNC,CPTII,S$GLB, | Performed by: INTERNAL MEDICINE

## 2019-06-25 PROCEDURE — 99204 OFFICE O/P NEW MOD 45 MIN: CPT | Mod: 25,HCNC,S$GLB, | Performed by: INTERNAL MEDICINE

## 2019-06-25 PROCEDURE — 3078F PR MOST RECENT DIASTOLIC BLOOD PRESSURE < 80 MM HG: ICD-10-PCS | Mod: HCNC,CPTII,S$GLB, | Performed by: INTERNAL MEDICINE

## 2019-06-25 PROCEDURE — 94010 BREATHING CAPACITY TEST: CPT | Mod: HCNC,S$GLB,, | Performed by: INTERNAL MEDICINE

## 2019-06-25 PROCEDURE — 99999 PR PBB SHADOW E&M-EST. PATIENT-LVL III: CPT | Mod: PBBFAC,HCNC,, | Performed by: INTERNAL MEDICINE

## 2019-06-25 PROCEDURE — 94010 BREATHING CAPACITY TEST: ICD-10-PCS | Mod: HCNC,S$GLB,, | Performed by: INTERNAL MEDICINE

## 2019-06-25 PROCEDURE — 94729 PR C02/MEMBANE DIFFUSE CAPACITY: ICD-10-PCS | Mod: HCNC,S$GLB,, | Performed by: INTERNAL MEDICINE

## 2019-06-27 NOTE — ASSESSMENT & PLAN NOTE
As I told him: His PFT's are better than he deserves for smoking since age 13. FEV-1: 2.11 liters 70% and Normal FVC: 3.01 liters 87% and DLCO:67%. Chest x-ray is clear with changes of CABG.  His dyspnea is secondary to de conditioning. Weight, BMI:32 and bad vascular disease. His best organ system is his respiratory tree.

## 2019-06-27 NOTE — PROGRESS NOTES
Subjective:      Patient ID: Lonnie Taylor is a 69 y.o. male.    Chief Complaint: Shortness of Breath    HPI 70 yo male actively smoking with severe vascular  Disease. He has had a CABG and a stent in March, has bad claudication and has had surgery and is scheduled for surgery next month.    Has exertional  Dyspnea. Limitation is claudication not dyspnea.   Review of Systems   Constitutional: Negative.    HENT: Negative.    Eyes: Negative.    Respiratory: Negative.         Active smoker   Cardiovascular: Negative.         S/P CABG by Dr. Evert Buchanan in [place]    Bilateral surgery for leg claudication    S/P AAA surgery   Genitourinary: Negative.         Decrease GFR of 50 probably secondary to renal arterial insufficiency   Musculoskeletal: Negative.    Skin: Negative.    Gastrointestinal: Negative.    Neurological: Negative.    Psychiatric/Behavioral: Negative.      Objective:     Physical Exam   Constitutional: He is oriented to person, place, and time. He appears well-developed and well-nourished.   Obese: BMI 34   HENT:   Head: Normocephalic and atraumatic.   Right Ear: External ear normal.   Left Ear: External ear normal.   Eyes: Pupils are equal, round, and reactive to light. Conjunctivae and EOM are normal.   Neck: Normal range of motion. Neck supple.   Cardiovascular: Normal rate, regular rhythm and normal heart sounds.   Sternotomy incision   Pulmonary/Chest: Effort normal and breath sounds normal.   Clear:    Abdominal: Soft. Bowel sounds are normal.   Mid line surgery scar from AAA complicated by a post op bleed   Musculoskeletal: Normal range of motion.   Neurological: He is alert and oriented to person, place, and time. He has normal reflexes.   Skin: Skin is warm and dry.   Psychiatric: He has a normal mood and affect. His behavior is normal. Judgment and thought content normal.       Assessment:     1. SOB (shortness of breath)      Outpatient Encounter Medications as of 6/25/2019   Medication Sig  Dispense Refill    albuterol (VENTOLIN HFA) 90 mcg/actuation inhaler Inhale 2 puffs into the lungs every 6 (six) hours as needed for Wheezing. Rescue 1 Inhaler 12    amLODIPine (NORVASC) 5 MG tablet Take 5 mg by mouth 2 (two) times daily.       aspirin (ECOTRIN) 81 MG EC tablet Take 81 mg by mouth once daily.      buPROPion (WELLBUTRIN XL) 150 MG TB24 tablet Take 1 tablet (150 mg total) by mouth once daily. 30 tablet 11    BYSTOLIC 5 mg Tab Take 5 mg by mouth once daily.       clopidogrel (PLAVIX) 75 mg tablet Take 75 mg by mouth once daily.      CRESTOR 20 mg tablet Take 20 mg by mouth once daily.       doxazosin (CARDURA) 4 MG tablet Take 4 mg by mouth every 12 (twelve) hours.       fenofibrate (TRICOR) 145 MG tablet Take 1 tablet (145 mg total) by mouth once daily.      omeprazole (PRILOSEC) 40 MG capsule Take 40 mg by mouth every morning.       ZETIA 10 mg tablet Take 10 mg by mouth once daily.        No facility-administered encounter medications on file as of 6/25/2019.      No orders of the defined types were placed in this encounter.    Plan:   Patient has minimal obstructive lung  Disease, has extensive  Vascular disease. Has  Enrolled in the free non smoking clinic  Problem List Items Addressed This Visit     SOB (shortness of breath)    Overview     70 yo active smoker is referred for COPD and Dyspnea. He has a hx of a CABG and a stent in March, He has had vascular surgery for claudication in the past and has additional surgery next month. He is a former [employer] and [employer]. He was a [employer] and he has not accepted the fact that smoking is bad for him.

## 2019-07-09 ENCOUNTER — CLINICAL SUPPORT (OUTPATIENT)
Dept: SMOKING CESSATION | Facility: CLINIC | Age: 69
End: 2019-07-09
Payer: COMMERCIAL

## 2019-07-09 DIAGNOSIS — F17.210 MODERATE CIGARETTE SMOKER (10-19 PER DAY): Primary | ICD-10-CM

## 2019-07-09 PROCEDURE — 99404 PREV MED CNSL INDIV APPRX 60: CPT | Mod: S$GLB,,,

## 2019-07-09 PROCEDURE — 99999 PR PBB SHADOW E&M-EST. PATIENT-LVL I: ICD-10-PCS | Mod: PBBFAC,,,

## 2019-07-09 PROCEDURE — 99999 PR PBB SHADOW E&M-EST. PATIENT-LVL I: CPT | Mod: PBBFAC,,,

## 2019-07-09 PROCEDURE — 99404 PR PREVENT COUNSEL,INDIV,60 MIN: ICD-10-PCS | Mod: S$GLB,,,

## 2019-07-09 RX ORDER — IBUPROFEN 200 MG
1 TABLET ORAL DAILY
Qty: 14 PATCH | Refills: 0 | Status: SHIPPED | OUTPATIENT
Start: 2019-07-09 | End: 2020-06-22 | Stop reason: ALTCHOICE

## 2019-07-09 NOTE — Clinical Note
Patient remains on prescribed tobacco cessation medication regimen of 21 mg patch without any negative side effects at this time. FTND of 3 indicates a moderately low dependence to tobacco but patient has never attempted a quit before. RAAD-D of 2 is perceived as no mental distress or depression at this time. The patient is being medicated and monitored by Dr. You.

## 2019-07-25 PROBLEM — I73.9 PAD (PERIPHERAL ARTERY DISEASE): Status: ACTIVE | Noted: 2019-07-25

## 2019-08-07 ENCOUNTER — TELEPHONE (OUTPATIENT)
Dept: SMOKING CESSATION | Facility: CLINIC | Age: 69
End: 2019-08-07

## 2019-08-07 NOTE — TELEPHONE ENCOUNTER
Patient forgot about the appointment and advised me to contact his wife to set up another appointment. Called wife and left a message.

## 2019-08-21 ENCOUNTER — PATIENT MESSAGE (OUTPATIENT)
Dept: FAMILY MEDICINE | Facility: CLINIC | Age: 69
End: 2019-08-21

## 2019-08-21 NOTE — TELEPHONE ENCOUNTER
Called Pt he stated that the WELLBUTRIN XL was making him more depressed and making him look like a zombie. Pt stopped taking it 2 days ago, pt wants to know what else he can take.

## 2019-12-02 ENCOUNTER — HOSPITAL ENCOUNTER (INPATIENT)
Facility: HOSPITAL | Age: 69
LOS: 3 days | Discharge: HOME | DRG: 280 | End: 2019-12-05
Attending: EMERGENCY MEDICINE | Admitting: HOSPITALIST
Payer: MEDICARE

## 2019-12-02 DIAGNOSIS — J44.1 COPD EXACERBATION: ICD-10-CM

## 2019-12-02 DIAGNOSIS — N18.9 ACUTE KIDNEY INJURY SUPERIMPOSED ON CHRONIC KIDNEY DISEASE: ICD-10-CM

## 2019-12-02 DIAGNOSIS — R06.02 SHORTNESS OF BREATH: ICD-10-CM

## 2019-12-02 DIAGNOSIS — R07.9 CHEST PAIN AT REST: ICD-10-CM

## 2019-12-02 DIAGNOSIS — R79.89 ELEVATED TROPONIN: ICD-10-CM

## 2019-12-02 DIAGNOSIS — I10 ESSENTIAL HYPERTENSION: ICD-10-CM

## 2019-12-02 DIAGNOSIS — R07.9 CHEST PAIN: ICD-10-CM

## 2019-12-02 DIAGNOSIS — N17.9 ACUTE KIDNEY INJURY SUPERIMPOSED ON CHRONIC KIDNEY DISEASE: ICD-10-CM

## 2019-12-02 DIAGNOSIS — I50.31 ACUTE DIASTOLIC HEART FAILURE: ICD-10-CM

## 2019-12-02 DIAGNOSIS — I21.4 NSTEMI (NON-ST ELEVATED MYOCARDIAL INFARCTION): Primary | ICD-10-CM

## 2019-12-02 LAB
ALBUMIN SERPL BCP-MCNC: 3.9 G/DL (ref 3.5–5.2)
ALLENS TEST: ABNORMAL
ALP SERPL-CCNC: 43 U/L (ref 55–135)
ALT SERPL W/O P-5'-P-CCNC: 23 U/L (ref 10–44)
ANION GAP SERPL CALC-SCNC: 14 MMOL/L (ref 8–16)
APTT BLDCRRT: 29.9 SEC (ref 21–32)
APTT BLDCRRT: <21 SEC (ref 21–32)
ASCENDING AORTA: 4.32 CM
AST SERPL-CCNC: 15 U/L (ref 10–40)
AV INDEX (PROSTH): 0.77
AV MEAN GRADIENT: 8 MMHG
AV PEAK GRADIENT: 18 MMHG
AV VALVE AREA: 3.02 CM2
AV VELOCITY RATIO: 0.66
BASOPHILS # BLD AUTO: 0.02 K/UL (ref 0–0.2)
BASOPHILS NFR BLD: 0.2 % (ref 0–1.9)
BILIRUB SERPL-MCNC: 0.4 MG/DL (ref 0.1–1)
BNP SERPL-MCNC: 36 PG/ML (ref 0–99)
BSA FOR ECHO PROCEDURE: 2.25 M2
BUN SERPL-MCNC: 29 MG/DL (ref 8–23)
CALCIUM SERPL-MCNC: 9.9 MG/DL (ref 8.7–10.5)
CHLORIDE SERPL-SCNC: 106 MMOL/L (ref 95–110)
CO2 SERPL-SCNC: 19 MMOL/L (ref 23–29)
CREAT SERPL-MCNC: 1.7 MG/DL (ref 0.5–1.4)
CV ECHO LV RWT: 0.26 CM
DELSYS: ABNORMAL
DIFFERENTIAL METHOD: ABNORMAL
DOP CALC AO PEAK VEL: 2.11 M/S
DOP CALC AO VTI: 36.99 CM
DOP CALC LVOT AREA: 3.9 CM2
DOP CALC LVOT DIAMETER: 2.24 CM
DOP CALC LVOT PEAK VEL: 1.39 M/S
DOP CALC LVOT STROKE VOLUME: 111.63 CM3
DOP CALCLVOT PEAK VEL VTI: 28.34 CM
E WAVE DECELERATION TIME: 120.46 MSEC
E/A RATIO: 1.23
E/E' RATIO: 10.29 M/S
ECHO LV POSTERIOR WALL: 0.73 CM (ref 0.6–1.1)
EOSINOPHIL # BLD AUTO: 0 K/UL (ref 0–0.5)
EOSINOPHIL NFR BLD: 0 % (ref 0–8)
ERYTHROCYTE [DISTWIDTH] IN BLOOD BY AUTOMATED COUNT: 14 % (ref 11.5–14.5)
EST. GFR  (AFRICAN AMERICAN): 45.9 ML/MIN/1.73 M^2
EST. GFR  (NON AFRICAN AMERICAN): 39.7 ML/MIN/1.73 M^2
FRACTIONAL SHORTENING: 21 % (ref 28–44)
GLUCOSE SERPL-MCNC: 121 MG/DL (ref 70–110)
HCO3 UR-SCNC: 18 MMOL/L (ref 24–28)
HCT VFR BLD AUTO: 42.4 % (ref 40–54)
HGB BLD-MCNC: 14 G/DL (ref 14–18)
IMM GRANULOCYTES # BLD AUTO: 0.04 K/UL (ref 0–0.04)
IMM GRANULOCYTES NFR BLD AUTO: 0.4 % (ref 0–0.5)
INR PPP: 1 (ref 0.8–1.2)
INTERVENTRICULAR SEPTUM: 0.66 CM (ref 0.6–1.1)
LA MAJOR: 7.12 CM
LA MINOR: 6.66 CM
LA WIDTH: 5.21 CM
LEFT ATRIUM SIZE: 4.75 CM
LEFT ATRIUM VOLUME INDEX: 66.4 ML/M2
LEFT ATRIUM VOLUME: 144.77 CM3
LEFT INTERNAL DIMENSION IN SYSTOLE: 4.4 CM (ref 2.1–4)
LEFT VENTRICLE DIASTOLIC VOLUME INDEX: 69.16 ML/M2
LEFT VENTRICLE DIASTOLIC VOLUME: 150.81 ML
LEFT VENTRICLE MASS INDEX: 63 G/M2
LEFT VENTRICLE SYSTOLIC VOLUME INDEX: 40.2 ML/M2
LEFT VENTRICLE SYSTOLIC VOLUME: 87.69 ML
LEFT VENTRICULAR INTERNAL DIMENSION IN DIASTOLE: 5.55 CM (ref 3.5–6)
LEFT VENTRICULAR MASS: 136.47 G
LV LATERAL E/E' RATIO: 9 M/S
LV SEPTAL E/E' RATIO: 12 M/S
LYMPHOCYTES # BLD AUTO: 0.9 K/UL (ref 1–4.8)
LYMPHOCYTES NFR BLD: 8.7 % (ref 18–48)
MCH RBC QN AUTO: 31.1 PG (ref 27–31)
MCHC RBC AUTO-ENTMCNC: 33 G/DL (ref 32–36)
MCV RBC AUTO: 94 FL (ref 82–98)
MONOCYTES # BLD AUTO: 0.4 K/UL (ref 0.3–1)
MONOCYTES NFR BLD: 4.1 % (ref 4–15)
MV PEAK A VEL: 0.88 M/S
MV PEAK E VEL: 1.08 M/S
NEUTROPHILS # BLD AUTO: 9 K/UL (ref 1.8–7.7)
NEUTROPHILS NFR BLD: 86.6 % (ref 38–73)
NRBC BLD-RTO: 0 /100 WBC
PCO2 BLDA: 27.9 MMHG (ref 35–45)
PH SMN: 7.42 [PH] (ref 7.35–7.45)
PISA TR MAX VEL: 3.02 M/S
PLATELET # BLD AUTO: 174 K/UL (ref 150–350)
PMV BLD AUTO: 11.1 FL (ref 9.2–12.9)
PO2 BLDA: 62 MMHG (ref 40–60)
POC BE: -7 MMOL/L
POC SATURATED O2: 92 % (ref 95–100)
POC TCO2: 19 MMOL/L (ref 24–29)
POTASSIUM SERPL-SCNC: 3.7 MMOL/L (ref 3.5–5.1)
PROT SERPL-MCNC: 7.4 G/DL (ref 6–8.4)
PROTHROMBIN TIME: 10.4 SEC (ref 9–12.5)
PULM VEIN S/D RATIO: 0.84
PV PEAK D VEL: 0.88 M/S
PV PEAK S VEL: 0.74 M/S
RA MAJOR: 6.24 CM
RA PRESSURE: 3 MMHG
RA WIDTH: 4.27 CM
RBC # BLD AUTO: 4.5 M/UL (ref 4.6–6.2)
RIGHT VENTRICULAR END-DIASTOLIC DIMENSION: 4.14 CM
SAMPLE: ABNORMAL
SINUS: 4.25 CM
SITE: ABNORMAL
SODIUM SERPL-SCNC: 139 MMOL/L (ref 136–145)
STJ: 3.89 CM
TDI LATERAL: 0.12 M/S
TDI SEPTAL: 0.09 M/S
TDI: 0.11 M/S
TR MAX PG: 36 MMHG
TRICUSPID ANNULAR PLANE SYSTOLIC EXCURSION: 2.29 CM
TROPONIN I SERPL DL<=0.01 NG/ML-MCNC: 0.03 NG/ML (ref 0–0.03)
TROPONIN I SERPL DL<=0.01 NG/ML-MCNC: 0.09 NG/ML (ref 0–0.03)
TROPONIN I SERPL DL<=0.01 NG/ML-MCNC: 0.14 NG/ML (ref 0–0.03)
TROPONIN I SERPL DL<=0.01 NG/ML-MCNC: 0.17 NG/ML (ref 0–0.03)
TV REST PULMONARY ARTERY PRESSURE: 39 MMHG
WBC # BLD AUTO: 10.42 K/UL (ref 3.9–12.7)

## 2019-12-02 PROCEDURE — 25000003 PHARM REV CODE 250: Mod: HCNC | Performed by: PHYSICIAN ASSISTANT

## 2019-12-02 PROCEDURE — 25000242 PHARM REV CODE 250 ALT 637 W/ HCPCS: Mod: HCNC | Performed by: PHYSICIAN ASSISTANT

## 2019-12-02 PROCEDURE — 85730 THROMBOPLASTIN TIME PARTIAL: CPT | Mod: HCNC

## 2019-12-02 PROCEDURE — 99285 PR EMERGENCY DEPT VISIT,LEVEL V: ICD-10-PCS | Mod: HCNC,,, | Performed by: EMERGENCY MEDICINE

## 2019-12-02 PROCEDURE — 84484 ASSAY OF TROPONIN QUANT: CPT | Mod: HCNC

## 2019-12-02 PROCEDURE — 63600175 PHARM REV CODE 636 W HCPCS: Mod: HCNC

## 2019-12-02 PROCEDURE — 94640 AIRWAY INHALATION TREATMENT: CPT | Mod: HCNC

## 2019-12-02 PROCEDURE — 96375 TX/PRO/DX INJ NEW DRUG ADDON: CPT

## 2019-12-02 PROCEDURE — 36415 COLL VENOUS BLD VENIPUNCTURE: CPT | Mod: HCNC

## 2019-12-02 PROCEDURE — 99900035 HC TECH TIME PER 15 MIN (STAT): Mod: HCNC

## 2019-12-02 PROCEDURE — 93010 EKG 12-LEAD: ICD-10-PCS | Mod: HCNC,,, | Performed by: INTERNAL MEDICINE

## 2019-12-02 PROCEDURE — 84484 ASSAY OF TROPONIN QUANT: CPT | Mod: 91,HCNC

## 2019-12-02 PROCEDURE — 99222 PR INITIAL HOSPITAL CARE,LEVL II: ICD-10-PCS | Mod: HCNC,AI,, | Performed by: PHYSICIAN ASSISTANT

## 2019-12-02 PROCEDURE — 25000242 PHARM REV CODE 250 ALT 637 W/ HCPCS: Mod: HCNC | Performed by: EMERGENCY MEDICINE

## 2019-12-02 PROCEDURE — S4991 NICOTINE PATCH NONLEGEND: HCPCS | Mod: HCNC | Performed by: PHYSICIAN ASSISTANT

## 2019-12-02 PROCEDURE — 99222 1ST HOSP IP/OBS MODERATE 55: CPT | Mod: HCNC,AI,, | Performed by: PHYSICIAN ASSISTANT

## 2019-12-02 PROCEDURE — 11000001 HC ACUTE MED/SURG PRIVATE ROOM: Mod: HCNC

## 2019-12-02 PROCEDURE — 85025 COMPLETE CBC W/AUTO DIFF WBC: CPT | Mod: HCNC

## 2019-12-02 PROCEDURE — 25000003 PHARM REV CODE 250: Mod: HCNC | Performed by: STUDENT IN AN ORGANIZED HEALTH CARE EDUCATION/TRAINING PROGRAM

## 2019-12-02 PROCEDURE — 63600175 PHARM REV CODE 636 W HCPCS: Mod: HCNC | Performed by: PHYSICIAN ASSISTANT

## 2019-12-02 PROCEDURE — 96374 THER/PROPH/DIAG INJ IV PUSH: CPT

## 2019-12-02 PROCEDURE — 25000003 PHARM REV CODE 250: Mod: HCNC | Performed by: INTERNAL MEDICINE

## 2019-12-02 PROCEDURE — 93005 ELECTROCARDIOGRAM TRACING: CPT | Mod: HCNC

## 2019-12-02 PROCEDURE — 63600175 PHARM REV CODE 636 W HCPCS: Mod: HCNC | Performed by: EMERGENCY MEDICINE

## 2019-12-02 PROCEDURE — 27000221 HC OXYGEN, UP TO 24 HOURS: Mod: HCNC

## 2019-12-02 PROCEDURE — 85610 PROTHROMBIN TIME: CPT | Mod: HCNC

## 2019-12-02 PROCEDURE — 94761 N-INVAS EAR/PLS OXIMETRY MLT: CPT | Mod: HCNC

## 2019-12-02 PROCEDURE — A4216 STERILE WATER/SALINE, 10 ML: HCPCS | Mod: HCNC | Performed by: PHYSICIAN ASSISTANT

## 2019-12-02 PROCEDURE — 80053 COMPREHEN METABOLIC PANEL: CPT | Mod: HCNC

## 2019-12-02 PROCEDURE — 99222 PR INITIAL HOSPITAL CARE,LEVL II: ICD-10-PCS | Mod: 25,HCNC,GC, | Performed by: INTERNAL MEDICINE

## 2019-12-02 PROCEDURE — 82803 BLOOD GASES ANY COMBINATION: CPT | Mod: HCNC

## 2019-12-02 PROCEDURE — 85730 THROMBOPLASTIN TIME PARTIAL: CPT | Mod: 91,HCNC

## 2019-12-02 PROCEDURE — 99285 EMERGENCY DEPT VISIT HI MDM: CPT | Mod: HCNC,,, | Performed by: EMERGENCY MEDICINE

## 2019-12-02 PROCEDURE — 83880 ASSAY OF NATRIURETIC PEPTIDE: CPT | Mod: HCNC

## 2019-12-02 PROCEDURE — 99222 1ST HOSP IP/OBS MODERATE 55: CPT | Mod: 25,HCNC,GC, | Performed by: INTERNAL MEDICINE

## 2019-12-02 PROCEDURE — 93010 ELECTROCARDIOGRAM REPORT: CPT | Mod: HCNC,,, | Performed by: INTERNAL MEDICINE

## 2019-12-02 RX ORDER — IPRATROPIUM BROMIDE AND ALBUTEROL SULFATE 2.5; .5 MG/3ML; MG/3ML
3 SOLUTION RESPIRATORY (INHALATION) EVERY 4 HOURS PRN
Status: DISCONTINUED | OUTPATIENT
Start: 2019-12-02 | End: 2019-12-05 | Stop reason: HOSPADM

## 2019-12-02 RX ORDER — FENOFIBRATE 145 MG/1
145 TABLET, FILM COATED ORAL DAILY
Status: DISCONTINUED | OUTPATIENT
Start: 2019-12-02 | End: 2019-12-05 | Stop reason: HOSPADM

## 2019-12-02 RX ORDER — DEXTROSE MONOHYDRATE 100 MG/ML
12.5 INJECTION, SOLUTION INTRAVENOUS
Status: DISCONTINUED | OUTPATIENT
Start: 2019-12-02 | End: 2019-12-05 | Stop reason: HOSPADM

## 2019-12-02 RX ORDER — NITROGLYCERIN 0.4 MG/1
TABLET SUBLINGUAL
Status: DISPENSED
Start: 2019-12-02 | End: 2019-12-02

## 2019-12-02 RX ORDER — SODIUM CHLORIDE 0.9 % (FLUSH) 0.9 %
3 SYRINGE (ML) INJECTION
Status: DISCONTINUED | OUTPATIENT
Start: 2019-12-02 | End: 2019-12-05 | Stop reason: HOSPADM

## 2019-12-02 RX ORDER — GLUCAGON 1 MG
1 KIT INJECTION
Status: DISCONTINUED | OUTPATIENT
Start: 2019-12-02 | End: 2019-12-05 | Stop reason: HOSPADM

## 2019-12-02 RX ORDER — AZITHROMYCIN 250 MG/1
500 TABLET, FILM COATED ORAL
Status: COMPLETED | OUTPATIENT
Start: 2019-12-02 | End: 2019-12-02

## 2019-12-02 RX ORDER — NITROGLYCERIN 0.4 MG/1
0.4 TABLET SUBLINGUAL
Status: COMPLETED | OUTPATIENT
Start: 2019-12-02 | End: 2019-12-02

## 2019-12-02 RX ORDER — AMLODIPINE BESYLATE 5 MG/1
5 TABLET ORAL 2 TIMES DAILY
Status: DISCONTINUED | OUTPATIENT
Start: 2019-12-02 | End: 2019-12-05 | Stop reason: HOSPADM

## 2019-12-02 RX ORDER — EZETIMIBE 10 MG/1
10 TABLET ORAL DAILY
Status: DISCONTINUED | OUTPATIENT
Start: 2019-12-02 | End: 2019-12-05 | Stop reason: HOSPADM

## 2019-12-02 RX ORDER — IPRATROPIUM BROMIDE AND ALBUTEROL SULFATE 2.5; .5 MG/3ML; MG/3ML
3 SOLUTION RESPIRATORY (INHALATION)
Status: DISCONTINUED | OUTPATIENT
Start: 2019-12-02 | End: 2019-12-05 | Stop reason: HOSPADM

## 2019-12-02 RX ORDER — IBUPROFEN 200 MG
1 TABLET ORAL DAILY
Status: DISCONTINUED | OUTPATIENT
Start: 2019-12-02 | End: 2019-12-05 | Stop reason: HOSPADM

## 2019-12-02 RX ORDER — ACETAMINOPHEN 325 MG/1
650 TABLET ORAL EVERY 4 HOURS PRN
Status: DISCONTINUED | OUTPATIENT
Start: 2019-12-02 | End: 2019-12-05 | Stop reason: HOSPADM

## 2019-12-02 RX ORDER — IBUPROFEN 200 MG
24 TABLET ORAL
Status: DISCONTINUED | OUTPATIENT
Start: 2019-12-02 | End: 2019-12-05 | Stop reason: HOSPADM

## 2019-12-02 RX ORDER — AZITHROMYCIN 250 MG/1
250 TABLET, FILM COATED ORAL DAILY
Status: DISCONTINUED | OUTPATIENT
Start: 2019-12-03 | End: 2019-12-05 | Stop reason: HOSPADM

## 2019-12-02 RX ORDER — ONDANSETRON 2 MG/ML
4 INJECTION INTRAMUSCULAR; INTRAVENOUS EVERY 12 HOURS PRN
Status: DISCONTINUED | OUTPATIENT
Start: 2019-12-02 | End: 2019-12-05 | Stop reason: HOSPADM

## 2019-12-02 RX ORDER — ISOSORBIDE MONONITRATE 30 MG/1
30 TABLET, EXTENDED RELEASE ORAL DAILY
Status: DISCONTINUED | OUTPATIENT
Start: 2019-12-02 | End: 2019-12-03

## 2019-12-02 RX ORDER — TALC
3 POWDER (GRAM) TOPICAL NIGHTLY PRN
Status: DISCONTINUED | OUTPATIENT
Start: 2019-12-02 | End: 2019-12-05 | Stop reason: HOSPADM

## 2019-12-02 RX ORDER — TIOTROPIUM BROMIDE 18 UG/1
1 CAPSULE ORAL; RESPIRATORY (INHALATION) DAILY
Status: DISCONTINUED | OUTPATIENT
Start: 2019-12-02 | End: 2019-12-05 | Stop reason: HOSPADM

## 2019-12-02 RX ORDER — BENZONATATE 100 MG/1
100 CAPSULE ORAL 3 TIMES DAILY PRN
Status: DISCONTINUED | OUTPATIENT
Start: 2019-12-02 | End: 2019-12-05 | Stop reason: HOSPADM

## 2019-12-02 RX ORDER — FUROSEMIDE 10 MG/ML
40 INJECTION INTRAMUSCULAR; INTRAVENOUS ONCE
Status: COMPLETED | OUTPATIENT
Start: 2019-12-02 | End: 2019-12-02

## 2019-12-02 RX ORDER — NAPROXEN SODIUM 220 MG/1
TABLET, FILM COATED ORAL
Status: DISPENSED
Start: 2019-12-02 | End: 2019-12-02

## 2019-12-02 RX ORDER — ROSUVASTATIN CALCIUM 20 MG/1
20 TABLET, COATED ORAL DAILY
Status: DISCONTINUED | OUTPATIENT
Start: 2019-12-02 | End: 2019-12-05 | Stop reason: HOSPADM

## 2019-12-02 RX ORDER — ASPIRIN 81 MG/1
81 TABLET ORAL DAILY
Status: DISCONTINUED | OUTPATIENT
Start: 2019-12-02 | End: 2019-12-05 | Stop reason: HOSPADM

## 2019-12-02 RX ORDER — NITROGLYCERIN 0.4 MG/1
TABLET SUBLINGUAL
Status: COMPLETED
Start: 2019-12-02 | End: 2019-12-02

## 2019-12-02 RX ORDER — PANTOPRAZOLE SODIUM 40 MG/1
40 TABLET, DELAYED RELEASE ORAL DAILY
Status: DISCONTINUED | OUTPATIENT
Start: 2019-12-02 | End: 2019-12-05 | Stop reason: HOSPADM

## 2019-12-02 RX ORDER — ONDANSETRON 8 MG/1
8 TABLET, ORALLY DISINTEGRATING ORAL EVERY 8 HOURS PRN
Status: DISCONTINUED | OUTPATIENT
Start: 2019-12-02 | End: 2019-12-05 | Stop reason: HOSPADM

## 2019-12-02 RX ORDER — BISACODYL 10 MG
10 SUPPOSITORY, RECTAL RECTAL DAILY PRN
Status: DISCONTINUED | OUTPATIENT
Start: 2019-12-02 | End: 2019-12-05 | Stop reason: HOSPADM

## 2019-12-02 RX ORDER — DOXAZOSIN 2 MG/1
8 TABLET ORAL NIGHTLY
Status: DISCONTINUED | OUTPATIENT
Start: 2019-12-02 | End: 2019-12-05 | Stop reason: HOSPADM

## 2019-12-02 RX ORDER — PREDNISONE 20 MG/1
40 TABLET ORAL DAILY
Status: DISCONTINUED | OUTPATIENT
Start: 2019-12-03 | End: 2019-12-05 | Stop reason: HOSPADM

## 2019-12-02 RX ORDER — METHYLPREDNISOLONE SOD SUCC 125 MG
125 VIAL (EA) INJECTION
Status: COMPLETED | OUTPATIENT
Start: 2019-12-02 | End: 2019-12-02

## 2019-12-02 RX ORDER — NEBIVOLOL 5 MG/1
5 TABLET ORAL DAILY
Status: DISCONTINUED | OUTPATIENT
Start: 2019-12-02 | End: 2019-12-02

## 2019-12-02 RX ORDER — ASPIRIN 81 MG/1
TABLET ORAL
Status: DISCONTINUED
Start: 2019-12-02 | End: 2019-12-02 | Stop reason: WASHOUT

## 2019-12-02 RX ORDER — NITROGLYCERIN 0.4 MG/1
0.4 TABLET SUBLINGUAL EVERY 5 MIN PRN
Status: DISCONTINUED | OUTPATIENT
Start: 2019-12-02 | End: 2019-12-05 | Stop reason: HOSPADM

## 2019-12-02 RX ORDER — HEPARIN SODIUM,PORCINE/D5W 25000/250
12 INTRAVENOUS SOLUTION INTRAVENOUS CONTINUOUS
Status: DISPENSED | OUTPATIENT
Start: 2019-12-02 | End: 2019-12-04

## 2019-12-02 RX ORDER — IBUPROFEN 200 MG
16 TABLET ORAL
Status: DISCONTINUED | OUTPATIENT
Start: 2019-12-02 | End: 2019-12-05 | Stop reason: HOSPADM

## 2019-12-02 RX ORDER — CLOPIDOGREL BISULFATE 75 MG/1
75 TABLET ORAL DAILY
Status: DISCONTINUED | OUTPATIENT
Start: 2019-12-02 | End: 2019-12-05 | Stop reason: HOSPADM

## 2019-12-02 RX ORDER — DEXTROSE MONOHYDRATE 100 MG/ML
25 INJECTION, SOLUTION INTRAVENOUS
Status: DISCONTINUED | OUTPATIENT
Start: 2019-12-02 | End: 2019-12-05 | Stop reason: HOSPADM

## 2019-12-02 RX ORDER — NAPROXEN SODIUM 220 MG/1
244 TABLET, FILM COATED ORAL DAILY
Status: COMPLETED | OUTPATIENT
Start: 2019-12-02 | End: 2019-12-02

## 2019-12-02 RX ORDER — AZITHROMYCIN 250 MG/1
500 TABLET, FILM COATED ORAL ONCE
Status: DISCONTINUED | OUTPATIENT
Start: 2019-12-02 | End: 2019-12-05 | Stop reason: HOSPADM

## 2019-12-02 RX ORDER — IPRATROPIUM BROMIDE AND ALBUTEROL SULFATE 2.5; .5 MG/3ML; MG/3ML
3 SOLUTION RESPIRATORY (INHALATION)
Status: COMPLETED | OUTPATIENT
Start: 2019-12-02 | End: 2019-12-02

## 2019-12-02 RX ADMIN — FENOFIBRATE 145 MG: 145 TABLET, FILM COATED ORAL at 10:12

## 2019-12-02 RX ADMIN — AMLODIPINE BESYLATE 5 MG: 5 TABLET ORAL at 09:12

## 2019-12-02 RX ADMIN — EZETIMIBE 10 MG: 10 TABLET ORAL at 09:12

## 2019-12-02 RX ADMIN — IPRATROPIUM BROMIDE AND ALBUTEROL SULFATE 3 ML: .5; 3 SOLUTION RESPIRATORY (INHALATION) at 05:12

## 2019-12-02 RX ADMIN — ASPIRIN 81 MG: 81 TABLET, COATED ORAL at 09:12

## 2019-12-02 RX ADMIN — CLOPIDOGREL BISULFATE 75 MG: 75 TABLET ORAL at 09:12

## 2019-12-02 RX ADMIN — ISOSORBIDE MONONITRATE 30 MG: 30 TABLET, EXTENDED RELEASE ORAL at 06:12

## 2019-12-02 RX ADMIN — NITROGLYCERIN 0.4 MG: 0.4 TABLET SUBLINGUAL at 12:12

## 2019-12-02 RX ADMIN — Medication 3 ML: at 04:12

## 2019-12-02 RX ADMIN — DOXAZOSIN 8 MG: 2 TABLET ORAL at 08:12

## 2019-12-02 RX ADMIN — HEPARIN SODIUM 12 UNITS/KG/HR: 10000 INJECTION, SOLUTION INTRAVENOUS at 05:12

## 2019-12-02 RX ADMIN — IPRATROPIUM BROMIDE AND ALBUTEROL SULFATE 3 ML: .5; 3 SOLUTION RESPIRATORY (INHALATION) at 06:12

## 2019-12-02 RX ADMIN — AMLODIPINE BESYLATE 5 MG: 5 TABLET ORAL at 08:12

## 2019-12-02 RX ADMIN — NICOTINE 1 PATCH: 21 PATCH, EXTENDED RELEASE TRANSDERMAL at 09:12

## 2019-12-02 RX ADMIN — METHYLPREDNISOLONE SODIUM SUCCINATE 125 MG: 125 INJECTION, POWDER, FOR SOLUTION INTRAMUSCULAR; INTRAVENOUS at 06:12

## 2019-12-02 RX ADMIN — IPRATROPIUM BROMIDE AND ALBUTEROL SULFATE 3 ML: .5; 3 SOLUTION RESPIRATORY (INHALATION) at 09:12

## 2019-12-02 RX ADMIN — FUROSEMIDE 40 MG: 10 INJECTION, SOLUTION INTRAMUSCULAR; INTRAVENOUS at 04:12

## 2019-12-02 RX ADMIN — SODIUM CHLORIDE 250 ML: 0.9 INJECTION, SOLUTION INTRAVENOUS at 10:12

## 2019-12-02 RX ADMIN — PANTOPRAZOLE SODIUM 40 MG: 40 TABLET, DELAYED RELEASE ORAL at 09:12

## 2019-12-02 RX ADMIN — NITROGLYCERIN 0.4 MG: 0.4 TABLET SUBLINGUAL at 10:12

## 2019-12-02 RX ADMIN — TIOTROPIUM BROMIDE 18 MCG: 18 CAPSULE ORAL; RESPIRATORY (INHALATION) at 10:12

## 2019-12-02 RX ADMIN — ROSUVASTATIN CALCIUM 20 MG: 20 TABLET, FILM COATED ORAL at 09:12

## 2019-12-02 RX ADMIN — NEBIVOLOL HYDROCHLORIDE 5 MG: 2.5 TABLET ORAL at 09:12

## 2019-12-02 RX ADMIN — AZITHROMYCIN 500 MG: 250 TABLET, FILM COATED ORAL at 08:12

## 2019-12-02 RX ADMIN — ASPIRIN 81 MG CHEWABLE TABLET 243 MG: 81 TABLET CHEWABLE at 10:12

## 2019-12-02 RX ADMIN — Medication 3 MG: at 09:12

## 2019-12-02 NOTE — ED NOTES
Tele box [id number] applied to pt. Mechelle in war room states able to see pt on monitor, rhythm NS with HR 87.

## 2019-12-02 NOTE — ED PROVIDER NOTES
Encounter Date: 12/2/2019       History     Chief Complaint   Patient presents with    Shortness of Breath     dx with bronchitis on wed.  reports worsening shortness of breath. audible wheezing, tachypneic in triage     Mr. Taylor is a 69 y.o. Male with history of COPD, CAD s/p CABG, PVD with bilateral LE stents, s/p AAA repair who presents to the ED after being discharged from ED yesterday with complaints of 6 days of increased shortness of breath in the setting of productive cough of initially clear sputum that transitioned to green two days ago with no blood, nasal congestion but denies any fever/chills, abdominal pain, nausea/vomiting, chest pain, dizziness/lightheadedness, or leg swelling. Patient states that he went to the urgent care on Wednesday for the same and was given a steroid shot, cough medicine and a 10- day course of cefdinir with no improvement (patient now on day 6 of 10) . States yesterday evening (12/1/19) he got increasingly short of breath after he left the ED and could not sleep as he was persistently anxious. Has been taking his albuterol inhaler with no significant improvement. Patient continues to smoke 1ppd; however, has not smoked since being discharged yesterday (12/1/19) after being given solumedrol, duonebs for SOB with concern for possible COPD exacerbation, neg troponin, BNP, CBC, CMP.     Patient denies any history of recent surgeries with last angiogram and stent placement in July. Patient has been on chronic Antiplatelet therapy (clopidogrel, ASA). Patient denies any long travel or recent surgeries.    In the ED, patient was given duonebs X2, 125 mg IV methylprednisolone,  ISTAT , CBC, CMP, Troponin 0.032 and BNP 36. Patient was given a dose of 500 mg Azithromycin in the ED.        Review of patient's allergies indicates:   Allergen Reactions    Aggrastat concentrate [tirofiban] Shortness Of Breath and Other (See Comments)     Fever and chills    Brilinta [ticagrelor]  Shortness Of Breath    Cymbalta [duloxetine] Nausea Only     Past Medical History:   Diagnosis Date    CAD (coronary artery disease)     Dr Martinez    Carotid artery disease     Chronic kidney disease, stage III (moderate)     Depression     Hepatic cyst     Hepatic cyst     High-density lipoprotein deficiency     HTN (hypertension)     Insomnia     PVD (peripheral vascular disease)     stented    S/P AAA repair      Past Surgical History:   Procedure Laterality Date    ABDOMINAL AORTIC ANEURYSM REPAIR      ABDOMINAL AORTIC ANEURYSM REPAIR      ANGIOGRAPHY OF LOWER EXTREMITY Left 10/23/2018    Procedure: Angiogram Extremity Unilateral;  Surgeon: Gregor Cunha MD;  Location: [facility] CATH;  Service: Cardiology;  Laterality: Left;  LLE intervention-Will start with 4/5 slender sheath left radial and take diagnostic angio of LLE to determine whether brachial access or tibial access will be required    CATARACT EXTRACTION W/ INTRAOCULAR LENS  IMPLANT, BILATERAL      CATARACT SX Bilateral     CHOLECYSTECTOMY      COLONOSCOPY N/A 10/7/2015    Procedure: COLONOSCOPY;  Surgeon: Genaro You MD;  [facility]  Service: Endoscopy;  Laterality: N/A;    CORONARY ANGIOPLASTY WITH STENT PLACEMENT      CORONARY ARTERY BYPASS GRAFT      HERNIA REPAIR      LAMINECTOMY      PATELLA SURGERY      PERCUTANEOUS TRANSLUMINAL ANGIOPLASTY (PTA) OF PERIPHERAL VESSEL N/A 9/18/2018    Procedure: PTA, PERIPHERAL BLOOD VESSEL;  Surgeon: Gregor Cunha MD;  Location: [facility] CATH;  Service: Cardiology;  Laterality: N/A;  Site change:  right popliteal artery access using US guidance.    PERCUTANEOUS TRANSLUMINAL ANGIOPLASTY (PTA) OF PERIPHERAL VESSEL Left 7/25/2019    Procedure: PTA, PERIPHERAL VESSEL;  Surgeon: Gregor Cunha MD;  Location: [facility] CATH;  Service: Cardiology;  Laterality: Left;    VASECTOMY       Family History   Problem Relation Age of Onset    Heart disease Mother     Heart disease  Father      Social History     Tobacco Use    Smoking status: Current Every Day Smoker     Packs/day: 1.00     Years: 50.00     Pack years: 50.00    Smokeless tobacco: Never Used   Substance Use Topics    Alcohol use: No    Drug use: Yes     Types: Marijuana     Comment: OCCASSIONALLY     Review of Systems   Constitutional: Negative for chills and fever.   HENT: Positive for congestion. Negative for nosebleeds.    Eyes: Negative for visual disturbance.   Respiratory: Positive for cough, shortness of breath and wheezing.    Cardiovascular: Negative for chest pain, palpitations and leg swelling.   Gastrointestinal: Negative for abdominal pain, constipation, diarrhea, nausea and vomiting.   Genitourinary: Negative for dysuria and hematuria.   Musculoskeletal: Negative for back pain and neck pain.   Skin: Negative for rash and wound.   Neurological: Negative for dizziness, weakness, light-headedness, numbness and headaches.   Psychiatric/Behavioral: Negative for confusion.       Physical Exam     Initial Vitals [12/02/19 0529]   BP Pulse Resp Temp SpO2   (!) 144/67 92 (!) 28 98.3 °F (36.8 °C) (!) 92 %      MAP       --         Physical Exam    Nursing note and vitals reviewed.  Constitutional: He appears well-developed and well-nourished.   HENT:   Head: Normocephalic and atraumatic.   Mouth/Throat: Oropharynx is clear and moist.   Eyes: EOM are normal.   Neck: Normal range of motion. Neck supple. No JVD present.   Cardiovascular: Normal rate, regular rhythm, normal heart sounds and intact distal pulses.   No murmur heard.  Pulmonary/Chest: He has decreased breath sounds (diffusely). He has wheezes (diffuse posterior chest wall) in the right upper field, the right middle field, the right lower field, the left upper field, the left middle field and the left lower field. He has no rhonchi. He has no rales. He exhibits no tenderness, no bony tenderness and no retraction.   Abdominal: Soft. Bowel sounds are normal. He  exhibits no distension. There is no tenderness. There is no rebound and no guarding.   Musculoskeletal: Normal range of motion.        Right lower leg: He exhibits edema (trace pitting up to calf).        Left lower leg: He exhibits edema (trace pitting edema up to mid calf).   Neurological: He is alert and oriented to person, place, and time.   Moving all extremities spontaneously   Skin: Skin is warm and dry. Capillary refill takes less than 2 seconds.   Psychiatric: He has a normal mood and affect. His behavior is normal.         ED Course   Procedures  Labs Reviewed   CBC W/ AUTO DIFFERENTIAL - Abnormal; Notable for the following components:       Result Value    RBC 4.50 (*)     Mean Corpuscular Hemoglobin 31.1 (*)     Gran # (ANC) 9.0 (*)     Lymph # 0.9 (*)     Gran% 86.6 (*)     Lymph% 8.7 (*)     All other components within normal limits   COMPREHENSIVE METABOLIC PANEL - Abnormal; Notable for the following components:    CO2 19 (*)     Glucose 121 (*)     BUN, Bld 29 (*)     Creatinine 1.7 (*)     Alkaline Phosphatase 43 (*)     eGFR if  45.9 (*)     eGFR if non  39.7 (*)     All other components within normal limits   TROPONIN I - Abnormal; Notable for the following components:    Troponin I 0.032 (*)     All other components within normal limits   TROPONIN I - Abnormal; Notable for the following components:    Troponin I 0.088 (*)     All other components within normal limits   ISTAT PROCEDURE - Abnormal; Notable for the following components:    POC PCO2 27.9 (*)     POC PO2 62 (*)     POC HCO3 18.0 (*)     POC SATURATED O2 92 (*)     POC TCO2 19 (*)     All other components within normal limits   B-TYPE NATRIURETIC PEPTIDE          Imaging Results          X-Ray Chest AP Portable (Final result)  Result time 12/02/19 06:02:51    Final result by Mauricio Sosa MD (12/02/19 06:02:51)                 Impression:      Bibasilar increased attenuation in the lungs possibly  representing developing pulmonary edema.    Electronically signed by resident: Adarsh Jordan  Date:    12/02/2019  Time:    05:48    Electronically signed by: Mauricio Sosa  Date:    12/02/2019  Time:    06:02             Narrative:    EXAMINATION:  XR CHEST AP PORTABLE    CLINICAL HISTORY:  shortness of breath;    TECHNIQUE:  Single frontal view of the chest was performed.    COMPARISON:  12/01/2019    FINDINGS:  Monitoring leads overlie the chest.  Median sternotomy wires are intact.    Bibasilar increased attenuation which may represent edema.  Aspiration and developing pneumonia considered less likely.  No pleural effusions.  No pneumothorax.  No solid airspace opacity.    The cardiac silhouette is normal in size and stable.  The hilar and mediastinal contours are unremarkable.    Bones are intact.                                 Medical Decision Making:   History:   Old Medical Records: I decided to obtain old medical records.  Initial Assessment:   Patient is a 68 yo m w/ PMHx of  COPD w/ 50py hx , CAD s/p CABG, PVD with bilateral LE stents, s/p AAA repair who presents for 6 day history of worsening SOB w/ productive cough. Patient was given 2X duoneb treatment in the ED, one dose IV  methylprednisolone with no improvement of symptoms; given one dose of 500mg Azithromycin in the ED.    Differential Diagnosis includes but is not limited to : COPD exacerbation given extensive 50py hx and SOB w/ possible developing RLL pneumonia. Would also consider bronchitis due to patient's diffuse bilateral wheezing throughout lung fields; however, patient afebrile, no WBC, vitals WNL. Consider developing RLL pneumonia as CXR shows increased opacification of both RLL and LLL, no WBC, no fever, started on 500 mg Azithromycin. NSTEMI less likely due to normal EKG with slight elevation in troponin, but would re-check troponin in 4 hours per primary team.   Independently Interpreted Test(s):   I have ordered and independently  interpreted X-rays - see prior notes.  Clinical Tests:   Lab Tests: Ordered and Reviewed  The following lab test(s) were unremarkable: CBC, BNP, CMP and Troponin       <> Summary of Lab: Troponin slightly elevated at 0.032  BNP WNL 36  CMP shows CHAUNCEY w/ Cr trending upward from 1.6 (12/1/19) to 1.7 (12/2/19)  CBC shows WBC stable  Radiological Study: Ordered and Reviewed  Medical Tests: Ordered and Reviewed  ED Management:  Given duoneb treatments x2, one dose of IV methylprednisolone, ordered BNP, CBC, CMP, ISTAT VBG, troponin.  Patient on day 6 of 10, of Cefdinir ; will leave Abx management per inpatient primary team however would've considered doxycycline for better coverage.      Additional MDM:     Well's Criteria Score:  -Clinical symptoms of DVT (leg swelling, pain with palpation) = 0.0  -Other diagnosis less likely than pulmonary embolism =            0.0  -Heart Rate >100 =   0.0  -Immobilization (= or > than 3 days) or surgery in the previous 4 weeks = 0.0  -Hemoptysis =          0.0                   Attending Attestation:   Physician Attestation Statement for Resident:  As the supervising MD   Physician Attestation Statement: I have personally seen and examined this patient.   I agree with the above history. -:   69-year-old man with comorbidities of CAD, CKD, ongoing tobacco use, and recent emergency department evaluation for shortness of breath presents to the emergency department for evaluation of worsening shortness of breath and wheezing despite outpatient steroid therapy and MDI utilization.  At the time of my exam, the patient describes significant shortness of breath without associated chest pain or fever.         As the supervising MD I agree with the above PE.    As the supervising MD I agree with the above treatment, course, plan, and disposition.  I have reviewed and agree with the residents interpretation of the following: lab data, x-rays and EKG.                          Clinical Impression:        ICD-10-CM ICD-9-CM   1. Shortness of breath R06.02 786.05   2. COPD exacerbation J44.1 491.21         Disposition:   Disposition: Placed in Observation  Condition: Fair  IM                     Yulia Cuevas MD  Resident  12/02/19 0742       Ricardo Lawson MD  12/02/19 [id number]

## 2019-12-02 NOTE — PROGRESS NOTES
Patient refused BiPAP. Dr. Lawson notified. Patient placed back on 5L NC. Will continue to monitor.

## 2019-12-02 NOTE — ASSESSMENT & PLAN NOTE
Elevated troponin  - HDS on admission, stable on 2L NC  - Afebrile without leukocytosis  - Labs significant for CHAUNCEY (see below)   - Initial troponin 0.032 likely demand in setting of CKD; BNP WNL  - Patient denies CP; will trend troponin  - EKG shows NSR (HR 75), no changes noted  - CXR shows bibasilar increased attenuation; ?early pulmonary edema  - Received IV methylprednisolone and azithro in the ER  - Continue azithro, PO prednisone, and duonebs  - Duonebs PRN, wean O2 as tolerated, supportive care  - Pulm follow up as outpatient  - Telemetry

## 2019-12-02 NOTE — H&P
"[facility] Hospital Medicine  History & Physical    Patient Name: Lonnie Taylor  MRN: [id number]  Admission Date: 12/2/2019  Attending Physician: Rigoberto Johns MD   Primary Care Provider: Isiah You MD    [facility] Medicine Team: [facility]OSP MED F Leah Aj PA-C     Patient information was obtained from patient, spouse/SO, past medical records and ER records.     Subjective:     Principal Problem:COPD exacerbation    Chief Complaint:   Chief Complaint   Patient presents with    Shortness of Breath     dx with bronchitis on wed.  reports worsening shortness of breath. audible wheezing, tachypneic in triage        HPI: 69 year old male with a PMHx of HTN, HLD, CAD s/p CABG, PVD s/p BLE stents, COPD, and tobacco abuse (55 year pack history) presenting to the ER with wife at bedside for shortness of breath. Patient reports he "started getting sick on Monday." He reports he initially developed a productive cough of green sputum, SOB, and wheezing. He presented to urgent care on Wednesday and was diagnosed with bronchitis; he was prescribed Cefdinir. He reports compliance with antibiotics. He reported to the ER Sunday due to persistent complaints. He reports improvement with breathing treatments and was discharged with PO prednisone. Patient reports he presented back to the ER today due to persistent SOB. At the time of my exam, patient has no new complaints. Patient reports he has now a dry cough. Improved SOB with breathing treatments. Denies CP, abdominal pain, N/V/D, fever/chills, palpitations, focal weakness, headache, myalgias, dysuria/frequency.       HDS on admission, stable on 2L NC. Afebrile without leukocytosis. Labs significant for CHAUNCEY and troponin 0.032. BNP WNL. EKG shows NSR (HR 75), no changes noted. CXR shows bibasilar increased attenuation; ?early pulmonary edema. Received IV methylprednisolone, breathing treatments, and azithro in the ER.    Past Medical History: "   Diagnosis Date    CAD (coronary artery disease)     Dr Martinez    Carotid artery disease     Chronic kidney disease, stage III (moderate)     Depression     Hepatic cyst     Hepatic cyst     High-density lipoprotein deficiency     HTN (hypertension)     Insomnia     PVD (peripheral vascular disease)     stented    S/P AAA repair        Past Surgical History:   Procedure Laterality Date    ABDOMINAL AORTIC ANEURYSM REPAIR      ABDOMINAL AORTIC ANEURYSM REPAIR      ANGIOGRAPHY OF LOWER EXTREMITY Left 10/23/2018    Procedure: Angiogram Extremity Unilateral;  Surgeon: Gregor Cunha MD;  Location: [facility] CATH;  Service: Cardiology;  Laterality: Left;  LLE intervention-Will start with 4/5 slender sheath left radial and take diagnostic angio of LLE to determine whether brachial access or tibial access will be required    CATARACT EXTRACTION W/ INTRAOCULAR LENS  IMPLANT, BILATERAL      CATARACT SX Bilateral     CHOLECYSTECTOMY      COLONOSCOPY N/A 10/7/2015    Procedure: COLONOSCOPY;  Surgeon: Genaro You MD;  Location: [facility] ENDO ([address] [place] [address]);  Service: Endoscopy;  Laterality: N/A;    CORONARY ANGIOPLASTY WITH STENT PLACEMENT      CORONARY ARTERY BYPASS GRAFT      HERNIA REPAIR      LAMINECTOMY      PATELLA SURGERY      PERCUTANEOUS TRANSLUMINAL ANGIOPLASTY (PTA) OF PERIPHERAL VESSEL N/A 9/18/2018    Procedure: PTA, PERIPHERAL BLOOD VESSEL;  Surgeon: Gregor Cunha MD;  Location: [facility] CATH;  Service: Cardiology;  Laterality: N/A;  Site change:  right popliteal artery access using US guidance.    PERCUTANEOUS TRANSLUMINAL ANGIOPLASTY (PTA) OF PERIPHERAL VESSEL Left 7/25/2019    Procedure: PTA, PERIPHERAL VESSEL;  Surgeon: Gregor Cunha MD;  Location: [facility] CATH;  Service: Cardiology;  Laterality: Left;    VASECTOMY         Review of patient's allergies indicates:   Allergen Reactions    Aggrastat concentrate [tirofiban] Shortness Of Breath and Other (See Comments)     Fever and chills     Brilinta [ticagrelor] Shortness Of Breath    Cymbalta [duloxetine] Nausea Only       Current Facility-Administered Medications on File Prior to Encounter   Medication    [COMPLETED] albuterol-ipratropium 2.5 mg-0.5 mg/3 mL nebulizer solution 3 mL    [COMPLETED] methylPREDNISolone sodium succinate injection 125 mg    [DISCONTINUED] methylPREDNISolone sodium succinate (SOLU-MEDROL) 125 mg in dextrose 5 % 100 mL IVPB    [DISCONTINUED] methylPREDNISolone sodium succinate (SOLU-MEDROL) 500 mg in dextrose 5 % 100 mL IVPB     Current Outpatient Medications on File Prior to Encounter   Medication Sig    albuterol (VENTOLIN HFA) 90 mcg/actuation inhaler Inhale 2 puffs into the lungs every 6 (six) hours as needed for Wheezing. Rescue    amLODIPine (NORVASC) 5 MG tablet Take 5 mg by mouth 2 (two) times daily.     aspirin (ECOTRIN) 81 MG EC tablet Take 81 mg by mouth once daily.    BYSTOLIC 5 mg Tab Take 5 mg by mouth once daily.     clopidogrel (PLAVIX) 75 mg tablet Take 75 mg by mouth once daily.    CRESTOR 20 mg tablet Take 20 mg by mouth once daily.     doxazosin (CARDURA) 4 MG tablet Take 4 mg by mouth every 12 (twelve) hours.     fenofibrate (TRICOR) 145 MG tablet Take 1 tablet (145 mg total) by mouth once daily.    nicotine (NICODERM CQ) 21 mg/24 hr Place 1 patch onto the skin once daily.    omeprazole (PRILOSEC) 40 MG capsule Take 40 mg by mouth every morning.     predniSONE (DELTASONE) 20 MG tablet Take 2 tablets (40 mg total) by mouth once daily. for 5 days    rivaroxaban (XARELTO) 2.5 mg Tab Take 1 tablet (2.5 mg total) by mouth 2 (two) times daily with meals.    ZETIA 10 mg tablet Take 10 mg by mouth once daily.     [DISCONTINUED] buPROPion (WELLBUTRIN XL) 150 MG TB24 tablet Take 1 tablet (150 mg total) by mouth once daily.     Family History     Problem Relation (Age of Onset)    Heart disease Mother, Father        Tobacco Use    Smoking status: Current Every Day Smoker     Packs/day: 1.00      Years: 50.00     Pack years: 50.00    Smokeless tobacco: Never Used   Substance and Sexual Activity    Alcohol use: No    Drug use: Yes     Types: Marijuana     Comment: OCCASSIONALLY    Sexual activity: Not on file     Review of Systems   Constitutional: Positive for fatigue. Negative for chills, diaphoresis and fever.   HENT: Negative for hearing loss, sinus pressure, sore throat and trouble swallowing.    Respiratory: Positive for cough, shortness of breath and wheezing.    Cardiovascular: Negative for chest pain, palpitations and leg swelling.   Gastrointestinal: Negative for abdominal distention, abdominal pain, diarrhea, nausea and vomiting.   Genitourinary: Negative for difficulty urinating, dysuria, flank pain, frequency and hematuria.   Musculoskeletal: Negative for back pain, gait problem, myalgias and neck pain.   Skin: Negative for rash and wound.   Neurological: Negative for dizziness, seizures, syncope, speech difficulty, weakness and headaches.   Psychiatric/Behavioral: Negative for agitation, behavioral problems, confusion and dysphoric mood. The patient is not nervous/anxious.      Objective:     Vital Signs (Most Recent):  Temp: 98.3 °F (36.8 °C) (12/02/19 0529)  Pulse: 83 (12/02/19 0800)  Resp: 19 (12/02/19 0800)  BP: (!) 157/68 (12/02/19 0800)  SpO2: (!) 92 % (12/02/19 0800) Vital Signs (24h Range):  Temp:  [98.2 °F (36.8 °C)-98.3 °F (36.8 °C)] 98.3 °F (36.8 °C)  Pulse:  [62-93] 83  Resp:  [15-28] 19  SpO2:  [92 %-100 %] 92 %  BP: (114-162)/(53-68) 157/68     Weight: 99.8 kg (220 lb)  Body mass index is 33.45 kg/m².    Physical Exam   Constitutional: He is oriented to person, place, and time. He appears well-developed. No distress. Nasal cannula in place.   HENT:   Head: Normocephalic and atraumatic.   Eyes: Conjunctivae and EOM are normal. Right eye exhibits no discharge. Left eye exhibits no discharge. No scleral icterus.   Neck: Normal range of motion. Neck supple. No tracheal deviation  present.   Cardiovascular: Normal rate, regular rhythm, normal heart sounds and intact distal pulses.   Pulmonary/Chest: Effort normal. No respiratory distress. He has wheezes (expiratory wheezes throughout).   Abdominal: Soft. Bowel sounds are normal. He exhibits no distension. There is no tenderness.   Musculoskeletal: Normal range of motion. He exhibits no edema or tenderness.   Neurological: He is alert and oriented to person, place, and time. No cranial nerve deficit.   Skin: Skin is warm and dry. He is not diaphoretic. No erythema.   Psychiatric: He has a normal mood and affect. His behavior is normal.         CRANIAL NERVES     CN III, IV, VI   Extraocular motions are normal.        Significant Labs: All pertinent labs within the past 24 hours have been reviewed.    Significant Imaging: I have reviewed all pertinent imaging results/findings within the past 24 hours.    Assessment/Plan:     * COPD exacerbation  Elevated troponin  - HDS on admission, stable on 2L NC  - Afebrile without leukocytosis  - Labs significant for CHAUNCEY (see below)   - Initial troponin 0.032 likely demand in setting of CKD; BNP WNL  - Patient denies CP; will trend troponin  - EKG shows NSR (HR 75), no changes noted  - CXR shows bibasilar increased attenuation; ?early pulmonary edema  - Received IV methylprednisolone and azithro in the ER  - Continue azithro, PO prednisone, and duonebs  - Duonebs PRN, wean O2 as tolerated, supportive care  - Pulm follow up as outpatient  - Telemetry    Acute kidney injury superimposed on chronic kidney disease  - Cr 1.7 on admit (baseline 1.4), likely 2/2 decreased PO intake  - Ordered bolus and encouraged PO intake  - Trend daily  - Avoid nephrotoxins    HTN (hypertension)  - Elevated on admit in setting of missed AM meds  - Resume home regimen  - Continue to monitor    CAD (coronary artery disease)  HLD  S/p CABG  - Denies CP  - EKG without changes  - Continue ASA, plavix, atorvastatin, fenofibrate, and  zetia    PVD (peripheral vascular disease)  S/p BLE stents  - Continue Xarelto    Tobacco abuse  55 year pack history  - Discussed importance of tobacco cessation  - Nicotine patch ordered      VTE Risk Mitigation (From admission, onward)         Ordered     Reason for No Pharmacological VTE Prophylaxis  Once     Question:  Reasons:  Answer:  Already adequately anticoagulated on oral Anticoagulants    12/02/19 0851     IP VTE HIGH RISK PATIENT  Once      12/02/19 0851                   Leah Aj PA-C  Department of Hospital Medicine   [facility]

## 2019-12-02 NOTE — PLAN OF CARE
12/02/19 0848   Post-Acute Status   Post-Acute Authorization Other   Other Status No Post-Acute Service Needs

## 2019-12-02 NOTE — ED TRIAGE NOTES
Pt presents to the ED c/o of SOB. Seen ED yesterday for same chief complaint. Endorses infrequent nonproductive cough. Denies chest pain, on blood thinners. Used home puffer but with no improvement in breathing. Pt O2 saturation 92% on RA in triage. Accompanied by wife. [id number]. Name and  checked and verified.

## 2019-12-02 NOTE — HPI
"69 year old male with a PMHx of HTN, HLD, CAD s/p CABG, PVD s/p BLE stents, COPD, and tobacco abuse (55 year pack history) presenting to the ER with wife at bedside for shortness of breath. Patient reports he "started getting sick on Monday." He reports he initially developed a productive cough of green sputum, SOB, and wheezing. He presented to urgent care on Wednesday and was diagnosed with bronchitis; he was prescribed Cefdinir. He reports compliance with antibiotics. He reported to the ER Sunday due to persistent complaints. He reports improvement with breathing treatments and was discharged with PO prednisone. Patient reports he presented back to the ER today due to persistent SOB. At the time of my exam, patient has no new complaints. Patient reports he has now a dry cough. Improved SOB with breathing treatments. Denies CP, abdominal pain, N/V/D, fever/chills, palpitations, focal weakness, headache, myalgias, dysuria/frequency.       HDS on admission, stable on 2L NC. Afebrile without leukocytosis. Labs significant for CHAUNCEY and troponin 0.032. BNP WNL. EKG shows NSR (HR 75), no changes noted. CXR shows bibasilar increased attenuation; ?early pulmonary edema. Received IV methylprednisolone, breathing treatments, and azithro in the ER.  "

## 2019-12-02 NOTE — ASSESSMENT & PLAN NOTE
- Cr 1.7 on admit (baseline 1.4), likely 2/2 decreased PO intake  - Ordered bolus and encouraged PO intake  - Trend daily  - Avoid nephrotoxins

## 2019-12-02 NOTE — ED NOTES
Patient walked from room to restroom with continuous pulse ox monitoring without oxygen patient ambulatory with steady gait O2 at resting position 95% on RA upon walking O2 sat 92%

## 2019-12-02 NOTE — HPI
"Lonnie Taylor is a 70 yo WM w/ hx of CAD (s/p CABG '97), HTN, HLD, CKD, PVD (s/p aortobifemoral bypass and BL LE PTAs), AAA s/p repair, multiple PCI (most recent in 2018 @ [facility]), and tobacco abuse who presents to [facility] ED c/o worsening SOB and wheezing. 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 He notes that he has experienced similar symptoms of SOB, LH, and wheezing once before after a similar car show years ago where he was exposed to a similar gas, however, his symptoms resolved spontaneously at that time. Since his symptoms developed on 11/25, patient notes progressive worsening w/ increased cough that is now productive of yellow-green mucus. Patient was seen at an Urgent Care on 11/27 and diagnosed w/ Bronchitis for which they prescribed him a short course of Cefdinir and discharged home. Patient's symptoms progressed over the next few days w/ increasing difficulty breathing prompting the patient to visit [facility] ED on 12/1. While in the ED, patient noted persistent SOB, but denied fever and CP. Albuterol nebs were administered which provided the patient w/ some relief and he was discharged home w/ albuterol and a 5 day course of prednisone 40mg daily. Patient returns to the ED today for persistent symptoms of SOB and wheezing not relieved by prescribed medications. He denied CP at this time. Patient w/ a 55 pack/year smoking hx, previous diagnosis of COPD, however, patient notes that he was recently told by pulmonologist that he does not have COPD. Patient to be admitted to Hospital Medicine for further evaluation of SOB and mild CHAUNCEY.      While in the ED today, patient reported an episode of CP approximately 1 hour after breakfast that felt like "a knee in my chest." The pain (6/10) did not " radiate, was non-exertional, lasted approximately 5-10 minutes, and was relieved by nitroglycerine x1. When questioned further, patient states that he had experienced this CP once before yesterday evening approximately 1 hour after he ate that resolved spontaneously after 20-30 mins. Troponin was 0.032, and follow up troponin was slightly increased further to 0.088. BNP wnl at 36. EKG showed normal sinus rhythm w/ HR 75 and left anterior fascicular block unchanged from previous EKG. Cardiology was consulted by HM at this time for evaluation of CP w/ elevated troponin. Patient seen and examined in ED today in no acute distress w/ wife at bedside. Patient was able to relay above history and notes no current CP at this time. He endorses fatigue, SOB, wheezing, cough, orthopnea, weakness, and sleep disturbance over the past week. He denies HA, n/v, f/c, leg swelling, palpitations, abdominal pain, constipation, diarrhea, lightheadedness, dizziness, recent travel, sick contacts, or changes in medications. CHADS-VASc 5. Current meds include Amlodipine, albuterol, asa 81mg, Bystolic 5mg, Plavix 75mg, Crestor 20mg, doxazosin, fenofibrate, omeprazole, prednisone, xarelto, and Zetia. Home meds continued by HM at this time. Will follow up repeat troponin.

## 2019-12-02 NOTE — NURSING
Pt denies any further chest pain. Pt states he was able to rest for a little while. Called dietary to order lunch for pt.

## 2019-12-02 NOTE — ED NOTES
Patient reporting chest pressure at this time. EKG obtained and shown to hospital medicine team. Team at bedside.

## 2019-12-02 NOTE — SUBJECTIVE & OBJECTIVE
Past Medical History:   Diagnosis Date    CAD (coronary artery disease)     Dr Martinez    Carotid artery disease     Chronic kidney disease, stage III (moderate)     Depression     Hepatic cyst     Hepatic cyst     High-density lipoprotein deficiency     HTN (hypertension)     Insomnia     PVD (peripheral vascular disease)     stented    S/P AAA repair        Past Surgical History:   Procedure Laterality Date    ABDOMINAL AORTIC ANEURYSM REPAIR      ABDOMINAL AORTIC ANEURYSM REPAIR      ANGIOGRAPHY OF LOWER EXTREMITY Left 10/23/2018    Procedure: Angiogram Extremity Unilateral;  Surgeon: Gregor Cunha MD;  Location: [facility] CATH;  Service: Cardiology;  Laterality: Left;  LLE intervention-Will start with 4/5 slender sheath left radial and take diagnostic angio of LLE to determine whether brachial access or tibial access will be required    CATARACT EXTRACTION W/ INTRAOCULAR LENS  IMPLANT, BILATERAL      CATARACT SX Bilateral     CHOLECYSTECTOMY      COLONOSCOPY N/A 10/7/2015    Procedure: COLONOSCOPY;  Surgeon: Genaro You MD;  Location: [facility] ENDO ([facility] FLR);  Service: Endoscopy;  Laterality: N/A;    CORONARY ANGIOPLASTY WITH STENT PLACEMENT      CORONARY ARTERY BYPASS GRAFT      HERNIA REPAIR      LAMINECTOMY      PATELLA SURGERY      PERCUTANEOUS TRANSLUMINAL ANGIOPLASTY (PTA) OF PERIPHERAL VESSEL N/A 9/18/2018    Procedure: PTA, PERIPHERAL BLOOD VESSEL;  Surgeon: Gregor Cunha MD;  Location: [facility] CATH;  Service: Cardiology;  Laterality: N/A;  Site change:  right popliteal artery access using US guidance.    PERCUTANEOUS TRANSLUMINAL ANGIOPLASTY (PTA) OF PERIPHERAL VESSEL Left 7/25/2019    Procedure: PTA, PERIPHERAL VESSEL;  Surgeon: Gregor Cunha MD;  Location: [facility] CATH;  Service: Cardiology;  Laterality: Left;    VASECTOMY         Review of patient's allergies indicates:   Allergen Reactions    Aggrastat concentrate [tirofiban] Shortness Of Breath and Other (See Comments)      Fever and chills    Brilinta [ticagrelor] Shortness Of Breath    Cymbalta [duloxetine] Nausea Only       Current Facility-Administered Medications on File Prior to Encounter   Medication    [COMPLETED] albuterol-ipratropium 2.5 mg-0.5 mg/3 mL nebulizer solution 3 mL    [COMPLETED] methylPREDNISolone sodium succinate injection 125 mg    [DISCONTINUED] methylPREDNISolone sodium succinate (SOLU-MEDROL) 125 mg in dextrose 5 % 100 mL IVPB    [DISCONTINUED] methylPREDNISolone sodium succinate (SOLU-MEDROL) 500 mg in dextrose 5 % 100 mL IVPB     Current Outpatient Medications on File Prior to Encounter   Medication Sig    albuterol (VENTOLIN HFA) 90 mcg/actuation inhaler Inhale 2 puffs into the lungs every 6 (six) hours as needed for Wheezing. Rescue    amLODIPine (NORVASC) 5 MG tablet Take 5 mg by mouth 2 (two) times daily.     aspirin (ECOTRIN) 81 MG EC tablet Take 81 mg by mouth once daily.    BYSTOLIC 5 mg Tab Take 5 mg by mouth once daily.     clopidogrel (PLAVIX) 75 mg tablet Take 75 mg by mouth once daily.    CRESTOR 20 mg tablet Take 20 mg by mouth once daily.     doxazosin (CARDURA) 4 MG tablet Take 4 mg by mouth every 12 (twelve) hours.     fenofibrate (TRICOR) 145 MG tablet Take 1 tablet (145 mg total) by mouth once daily.    nicotine (NICODERM CQ) 21 mg/24 hr Place 1 patch onto the skin once daily.    omeprazole (PRILOSEC) 40 MG capsule Take 40 mg by mouth every morning.     predniSONE (DELTASONE) 20 MG tablet Take 2 tablets (40 mg total) by mouth once daily. for 5 days    rivaroxaban (XARELTO) 2.5 mg Tab Take 1 tablet (2.5 mg total) by mouth 2 (two) times daily with meals.    ZETIA 10 mg tablet Take 10 mg by mouth once daily.     [DISCONTINUED] buPROPion (WELLBUTRIN XL) 150 MG TB24 tablet Take 1 tablet (150 mg total) by mouth once daily.     Family History     Problem Relation (Age of Onset)    Heart disease Mother, Father        Tobacco Use    Smoking status: Current Every Day Smoker      Packs/day: 1.00     Years: 50.00     Pack years: 50.00    Smokeless tobacco: Never Used   Substance and Sexual Activity    Alcohol use: No    Drug use: Yes     Types: Marijuana     Comment: OCCASSIONALLY    Sexual activity: Not on file     Review of Systems   Constitutional: Positive for fatigue. Negative for chills, diaphoresis and fever.   HENT: Negative for hearing loss, sinus pressure, sore throat and trouble swallowing.    Respiratory: Positive for cough, shortness of breath and wheezing.    Cardiovascular: Negative for chest pain, palpitations and leg swelling.   Gastrointestinal: Negative for abdominal distention, abdominal pain, diarrhea, nausea and vomiting.   Genitourinary: Negative for difficulty urinating, dysuria, flank pain, frequency and hematuria.   Musculoskeletal: Negative for back pain, gait problem, myalgias and neck pain.   Skin: Negative for rash and wound.   Neurological: Negative for dizziness, seizures, syncope, speech difficulty, weakness and headaches.   Psychiatric/Behavioral: Negative for agitation, behavioral problems, confusion and dysphoric mood. The patient is not nervous/anxious.      Objective:     Vital Signs (Most Recent):  Temp: 98.3 °F (36.8 °C) (12/02/19 0529)  Pulse: 83 (12/02/19 0800)  Resp: 19 (12/02/19 0800)  BP: (!) 157/68 (12/02/19 0800)  SpO2: (!) 92 % (12/02/19 0800) Vital Signs (24h Range):  Temp:  [98.2 °F (36.8 °C)-98.3 °F (36.8 °C)] 98.3 °F (36.8 °C)  Pulse:  [62-93] 83  Resp:  [15-28] 19  SpO2:  [92 %-100 %] 92 %  BP: (114-162)/(53-68) 157/68     Weight: 99.8 kg (220 lb)  Body mass index is 33.45 kg/m².    Physical Exam   Constitutional: He is oriented to person, place, and time. He appears well-developed. No distress. Nasal cannula in place.   HENT:   Head: Normocephalic and atraumatic.   Eyes: Conjunctivae and EOM are normal. Right eye exhibits no discharge. Left eye exhibits no discharge. No scleral icterus.   Neck: Normal range of motion. Neck supple. No  tracheal deviation present.   Cardiovascular: Normal rate, regular rhythm, normal heart sounds and intact distal pulses.   Pulmonary/Chest: Effort normal. No respiratory distress. He has wheezes (expiratory wheezes throughout).   Abdominal: Soft. Bowel sounds are normal. He exhibits no distension. There is no tenderness.   Musculoskeletal: Normal range of motion. He exhibits no edema or tenderness.   Neurological: He is alert and oriented to person, place, and time. No cranial nerve deficit.   Skin: Skin is warm and dry. He is not diaphoretic. No erythema.   Psychiatric: He has a normal mood and affect. His behavior is normal.         CRANIAL NERVES     CN III, IV, VI   Extraocular motions are normal.        Significant Labs: All pertinent labs within the past 24 hours have been reviewed.    Significant Imaging: I have reviewed all pertinent imaging results/findings within the past 24 hours.

## 2019-12-02 NOTE — ASSESSMENT & PLAN NOTE
Lonnie Taylor is a 70 yo WM w/ hx of CAD (s/p CABG '97), HTN, HLD, CKD, PVD (s/p BL stents), AAA, and tobacco abuse who presents to [facility] ED c/o worsening SOB and wheezing.  - Chest pain (6/10) did not radiate, was non-exertional, lasted approximately 20-30 minutes, and was relieved by nitroglycerine x1.   - Troponin 0.032 -> 0.088 -> 0.136  - BNP 36  - EKG showed normal sinus rhythm w/ HR 75 and left anterior fascicular block unchanged from previous EKG.     Plan:  - EDVIN score: 5  - Elevated troponin  - Concern for NSTEMI  - Continue current medications per primary team: asa 81mg, Plavix 75mg, and Crestor 20mg  - Hold Xarelto  - ECHO  - Start heparin gtt  - Start IMDUR 30mg  - Diurese w/ 40mg IV Lasix BID  - Continue to monitor creatinine, BMP daily  - Daily weights, strict I/Os  - Conservative management for now as patient presents w/ CHAUNCEY  - Will likely need cath prior to discharge  - Continue telemetry  - Patient will need close outpatient follow up w/ cardiologist, Dr. Martinez, as patient will likely need outpatient stress test (to be completed routinely every 5 years)

## 2019-12-02 NOTE — ED PROVIDER NOTES
Encounter Date: 12/2/2019       History     Chief Complaint   Patient presents with    Shortness of Breath     dx with bronchitis on wed.  reports worsening shortness of breath. audible wheezing, tachypneic in triage       69-year-old man with comorbidities of CAD, CKD, ongoing tobacco use, and recent emergency department evaluation for shortness of breath presents to the emergency department for evaluation of worsening shortness of breath and wheezing despite outpatient steroid therapy and MDI utilization.  At the time of my exam, the patient describes significant shortness of breath without associated chest pain or fever.        Review of patient's allergies indicates:   Allergen Reactions    Aggrastat concentrate [tirofiban] Shortness Of Breath and Other (See Comments)     Fever and chills    Brilinta [ticagrelor] Shortness Of Breath    Cymbalta [duloxetine] Nausea Only     Past Medical History:   Diagnosis Date    CAD (coronary artery disease)     Dr Martinez    Carotid artery disease     Chronic kidney disease, stage III (moderate)     Depression     Hepatic cyst     Hepatic cyst     High-density lipoprotein deficiency     HTN (hypertension)     Insomnia     PVD (peripheral vascular disease)     stented    S/P AAA repair      Past Surgical History:   Procedure Laterality Date    ABDOMINAL AORTIC ANEURYSM REPAIR      ABDOMINAL AORTIC ANEURYSM REPAIR      ANGIOGRAPHY OF LOWER EXTREMITY Left 10/23/2018    Procedure: Angiogram Extremity Unilateral;  Surgeon: Gregor Cunha MD;  Location: [facility] CATH;  Service: Cardiology;  Laterality: Left;  LLE intervention-Will start with 4/5 slender sheath left radial and take diagnostic angio of LLE to determine whether brachial access or tibial access will be required    CATARACT EXTRACTION W/ INTRAOCULAR LENS  IMPLANT, BILATERAL      CATARACT SX Bilateral     CHOLECYSTECTOMY      COLONOSCOPY N/A 10/7/2015    Procedure: COLONOSCOPY;  Surgeon: Genaro TERAN  MD Kenyatta;  Location: [facility] ENDO (4TH FLR);  Service: Endoscopy;  Laterality: N/A;    CORONARY ANGIOPLASTY WITH STENT PLACEMENT      CORONARY ARTERY BYPASS GRAFT      HERNIA REPAIR      LAMINECTOMY      PATELLA SURGERY      PERCUTANEOUS TRANSLUMINAL ANGIOPLASTY (PTA) OF PERIPHERAL VESSEL N/A 9/18/2018    Procedure: PTA, PERIPHERAL BLOOD VESSEL;  Surgeon: Gregor Cunha MD;  Location: [facility] CATH;  Service: Cardiology;  Laterality: N/A;  Site change:  right popliteal artery access using US guidance.    PERCUTANEOUS TRANSLUMINAL ANGIOPLASTY (PTA) OF PERIPHERAL VESSEL Left 7/25/2019    Procedure: PTA, PERIPHERAL VESSEL;  Surgeon: Gregor Cunha MD;  Location: [facility] CATH;  Service: Cardiology;  Laterality: Left;    VASECTOMY       Family History   Problem Relation Age of Onset    Heart disease Mother     Heart disease Father      Social History     Tobacco Use    Smoking status: Current Every Day Smoker     Packs/day: 1.00     Years: 50.00     Pack years: 50.00    Smokeless tobacco: Never Used   Substance Use Topics    Alcohol use: No    Drug use: Yes     Types: Marijuana     Comment: OCCASSIONALLY     Review of Systems   Constitutional: Negative for chills and fever.   HENT: Negative for facial swelling and sore throat.    Respiratory: Positive for cough and shortness of breath. Negative for chest tightness.    Cardiovascular: Negative for chest pain and leg swelling.   Gastrointestinal: Negative for nausea and vomiting.   Genitourinary: Negative for flank pain and hematuria.   Musculoskeletal: Negative for gait problem and neck pain.   Skin: Negative for rash and wound.   Neurological: Negative for seizures and speech difficulty.   Psychiatric/Behavioral: Negative for confusion and decreased concentration.       Physical Exam     Initial Vitals [12/02/19 0529]   BP Pulse Resp Temp SpO2   (!) 144/67 92 (!) 28 98.3 °F (36.8 °C) (!) 92 %      MAP       --         Physical Exam    Vitals  reviewed.  Constitutional:   69-year-old  man moderate respiratory distress noted   HENT:   Head: Normocephalic and atraumatic.   Mouth/Throat: Oropharynx is clear and moist.   Eyes: EOM are normal. Pupils are equal, round, and reactive to light.   Neck: No tracheal deviation present.   Cardiovascular: Normal rate, regular rhythm and intact distal pulses.   Pulmonary/Chest: No stridor.   Expiratory wheezes noted throughout with mild accessory muscle use   Abdominal: Soft. He exhibits no distension. There is no tenderness. There is no rebound.   Musculoskeletal: Normal range of motion. He exhibits no edema.   Neurological: He is alert and oriented to person, place, and time. GCS score is 15. GCS eye subscore is 4. GCS verbal subscore is 5. GCS motor subscore is 6.   Skin: Skin is warm and dry.   Psychiatric: He has a normal mood and affect. His behavior is normal. Thought content normal.         ED Course   Procedures  Labs Reviewed   ISTAT PROCEDURE - Abnormal; Notable for the following components:       Result Value    POC PCO2 27.9 (*)     POC PO2 62 (*)     POC HCO3 18.0 (*)     POC SATURATED O2 92 (*)     POC TCO2 19 (*)     All other components within normal limits   CBC W/ AUTO DIFFERENTIAL   COMPREHENSIVE METABOLIC PANEL   TROPONIN I   B-TYPE NATRIURETIC PEPTIDE     EKG Readings: (Independently Interpreted)   Initial Reading: No STEMI. Rhythm: Normal Sinus Rhythm. Heart Rate: 90. Ectopy: No Ectopy. Axis: Left Axis Deviation.     1 mm ST segment depression noted in V5/ V6       Imaging Results          X-Ray Chest AP Portable (Final result)  Result time 12/02/19 06:02:51    Final result by Mauricio Sosa MD (12/02/19 06:02:51)                 Impression:      Bibasilar increased attenuation in the lungs possibly representing developing pulmonary edema.    Electronically signed by resident: Adarsh Jordan  Date:    12/02/2019  Time:    05:48    Electronically signed by: Mauricio  Sheila  Date:    12/02/2019  Time:    06:02             Narrative:    EXAMINATION:  XR CHEST AP PORTABLE    CLINICAL HISTORY:  shortness of breath;    TECHNIQUE:  Single frontal view of the chest was performed.    COMPARISON:  12/01/2019    FINDINGS:  Monitoring leads overlie the chest.  Median sternotomy wires are intact.    Bibasilar increased attenuation which may represent edema.  Aspiration and developing pneumonia considered less likely.  No pleural effusions.  No pneumothorax.  No solid airspace opacity.    The cardiac silhouette is normal in size and stable.  The hilar and mediastinal contours are unremarkable.    Bones are intact.                                 Medical Decision Making:   History:   Old Medical Records: I decided to obtain old medical records.  Old Records Summarized: records from clinic visits and records from previous admission(s).  Differential Diagnosis:     Lethal arrhythmia, COPD exacerbation, pneumonia, CHF exacerbation  Clinical Tests:   Lab Tests: Ordered and Reviewed  Radiological Study: Ordered and Reviewed  Medical Tests: Ordered and Reviewed                                 Clinical Impression:   {Add your Clinical Impression here. If you haven't documented one yet, please pend the note, finalize a Clinical Impression, and refresh your note before signing.:[id number]}

## 2019-12-02 NOTE — CONSULTS
[facility]  Cardiology  Consult Note    Patient Name: Lonnie Taylor  MRN: [id number]  Admission Date: 12/2/2019  Hospital Length of Stay: 0 days  Code Status: Full Code   Attending Provider: Rigoberto Johns MD   Consulting Provider: Abe Domingo MD  Primary Care Physician: Isiah You MD  Principal Problem:COPD exacerbation    Patient information was obtained from patient, spouse/SO and ER records.     Inpatient consult to Cardiology  Consult performed by: Abe Domingo MD  Consult ordered by: Leah Aj PA-C        Subjective:     Chief Complaint:  SOB    HPI:   Lonnie Taylor is a 68 yo WM w/ hx of CAD (s/p CABG '97), HTN, HLD, CKD, PVD (s/p BL stents), AAA, PCI (w/ 2 stents in 2018), and tobacco abuse who presents to [facility] ED c/o worsening SOB and wheezing. 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 He notes that he has experienced similar symptoms of SOB, LH, and wheezing once before after a similar car show years ago where he was exposed to a similar gas, however, his symptoms resolved spontaneously at that time. Since his symptoms developed on 11/25, patient notes progressive worsening w/ increased cough that is now productive of yellow-green mucus. Patient was seen at an Urgent Care on 11/27 and diagnosed w/ Bronchitis for which they prescribed him a short course of Cefdinir and discharged home. Patient's symptoms progressed over the next few days w/ increasing difficulty breathing prompting the patient to visit [facility] ED on 12/1. While in the ED, patient noted persistent SOB, but denied fever and CP. Albuterol nebs were administered which provided the patient w/ some relief and he was discharged home w/ albuterol and a 5 day course of prednisone 40mg daily.  "Patient returns to the ED today for persistent symptoms of SOB and wheezing not relieved by prescribed medications. He denied CP at this time. Patient w/ a 55 pack/year smoking hx, previous diagnosis of COPD, however, patient notes that he was recently told by pulmonologist that he does not have COPD. Patient to be admitted to Hospital Medicine for further evaluation of SOB and mild CHAUNCEY.      While in the ED today, patient reported an episode of CP approximately 1 hour after breakfast that felt like "a knee in my chest." The pain (6/10) did not radiate, was non-exertional, lasted approximately 5-10 minutes, and was relieved by nitroglycerine x1. When questioned further, patient states that he had experienced this CP once before yesterday evening approximately 1 hour after he ate that resolved spontaneously after 20-30 mins. Troponin was 0.032, and follow up troponin was slightly increased further to 0.088. BNP wnl at 36. EKG showed normal sinus rhythm w/ HR 75 and left anterior fascicular block unchanged from previous EKG. Cardiology was consulted by  at this time for evaluation of CP w/ elevated troponin. Patient seen and examined in ED today in no acute distress w/ wife at bedside. Patient was able to relay above history and notes no current CP at this time. He endorses fatigue, SOB, wheezing, cough, orthopnea, weakness, and sleep disturbance over the past week. He denies HA, n/v, f/c, leg swelling, palpitations, abdominal pain, constipation, diarrhea, lightheadedness, dizziness, recent travel, sick contacts, or changes in medications. CHADS-VASc 5. Current meds include Amlodipine, albuterol, asa 81mg, Bystolic 5mg, Plavix 75mg, Crestor 20mg, doxazosin, fenofibrate, omeprazole, prednisone, xarelto, and Zetia. Home meds continued by HM at this time. Will follow up repeat troponin.    Past Medical History:   Diagnosis Date    CAD (coronary artery disease)     Dr Martinez    Carotid artery disease     Chronic " kidney disease, stage III (moderate)     Depression     Hepatic cyst     Hepatic cyst     High-density lipoprotein deficiency     HTN (hypertension)     Insomnia     PVD (peripheral vascular disease)     stented    S/P AAA repair        Past Surgical History:   Procedure Laterality Date    ABDOMINAL AORTIC ANEURYSM REPAIR      ABDOMINAL AORTIC ANEURYSM REPAIR      ANGIOGRAPHY OF LOWER EXTREMITY Left 10/23/2018    Procedure: Angiogram Extremity Unilateral;  Surgeon: Gregor Cunha MD;  Location: [facility] CATH;  Service: Cardiology;  Laterality: Left;  LLE intervention-Will start with 4/5 slender sheath left radial and take diagnostic angio of LLE to determine whether brachial access or tibial access will be required    CATARACT EXTRACTION W/ INTRAOCULAR LENS  IMPLANT, BILATERAL      CATARACT SX Bilateral     CHOLECYSTECTOMY      COLONOSCOPY N/A 10/7/2015    Procedure: COLONOSCOPY;  Surgeon: Genaro You MD;  Location: [facility] ENDO ([address] [place] [address]);  Service: Endoscopy;  Laterality: N/A;    CORONARY ANGIOPLASTY WITH STENT PLACEMENT      CORONARY ARTERY BYPASS GRAFT      HERNIA REPAIR      LAMINECTOMY      PATELLA SURGERY      PERCUTANEOUS TRANSLUMINAL ANGIOPLASTY (PTA) OF PERIPHERAL VESSEL N/A 9/18/2018    Procedure: PTA, PERIPHERAL BLOOD VESSEL;  Surgeon: Gregor Cunha MD;  Location: [facility] CATH;  Service: Cardiology;  Laterality: N/A;  Site change:  right popliteal artery access using US guidance.    PERCUTANEOUS TRANSLUMINAL ANGIOPLASTY (PTA) OF PERIPHERAL VESSEL Left 7/25/2019    Procedure: PTA, PERIPHERAL VESSEL;  Surgeon: Gregor Cunha MD;  Location: [facility] CATH;  Service: Cardiology;  Laterality: Left;    VASECTOMY         Review of patient's allergies indicates:   Allergen Reactions    Aggrastat concentrate [tirofiban] Shortness Of Breath and Other (See Comments)     Fever and chills    Brilinta [ticagrelor] Shortness Of Breath    Cymbalta [duloxetine] Nausea Only    Bupropion Other (See  "Comments)     "turns into zombie" withdrawn, not talking, outbursts       Current Facility-Administered Medications on File Prior to Encounter   Medication    [COMPLETED] albuterol-ipratropium 2.5 mg-0.5 mg/3 mL nebulizer solution 3 mL     Current Outpatient Medications on File Prior to Encounter   Medication Sig    albuterol (VENTOLIN HFA) 90 mcg/actuation inhaler Inhale 2 puffs into the lungs every 6 (six) hours as needed for Wheezing. Rescue    amLODIPine (NORVASC) 5 MG tablet Take 5 mg by mouth 2 (two) times daily.     aspirin (ECOTRIN) 81 MG EC tablet Take 81 mg by mouth once daily.    BYSTOLIC 5 mg Tab Take 5 mg by mouth once daily.     clopidogrel (PLAVIX) 75 mg tablet Take 75 mg by mouth once daily.    CRESTOR 20 mg tablet Take 20 mg by mouth once daily.     doxazosin (CARDURA) 4 MG tablet Take 4 mg by mouth every 12 (twelve) hours.     fenofibrate (TRICOR) 145 MG tablet Take 1 tablet (145 mg total) by mouth once daily.    nicotine (NICODERM CQ) 21 mg/24 hr Place 1 patch onto the skin once daily.    omeprazole (PRILOSEC) 40 MG capsule Take 40 mg by mouth every morning.     predniSONE (DELTASONE) 20 MG tablet Take 2 tablets (40 mg total) by mouth once daily. for 5 days    rivaroxaban (XARELTO) 2.5 mg Tab Take 1 tablet (2.5 mg total) by mouth 2 (two) times daily with meals.    ZETIA 10 mg tablet Take 10 mg by mouth once daily.     [DISCONTINUED] buPROPion (WELLBUTRIN XL) 150 MG TB24 tablet Take 1 tablet (150 mg total) by mouth once daily.     Family History     Problem Relation (Age of Onset)    Heart disease Mother, Father        Tobacco Use    Smoking status: Current Every Day Smoker     Packs/day: 1.00     Years: 50.00     Pack years: 50.00    Smokeless tobacco: Never Used   Substance and Sexual Activity    Alcohol use: No    Drug use: Yes     Types: Marijuana     Comment: OCCASSIONALLY    Sexual activity: Not on file     Review of Systems   Constitution: Positive for malaise/fatigue. " Negative for chills, fever and night sweats.   HENT: Negative for congestion and hearing loss.    Eyes: Negative for visual disturbance.   Cardiovascular: Positive for chest pain, dyspnea on exertion and orthopnea. Negative for irregular heartbeat, leg swelling, palpitations and syncope.   Respiratory: Positive for cough, shortness of breath, sleep disturbances due to breathing, sputum production and wheezing.    Skin: Negative for rash.   Musculoskeletal: Negative for back pain and myalgias.   Gastrointestinal: Positive for diarrhea. Negative for abdominal pain, change in bowel habit, constipation, nausea and vomiting.   Genitourinary: Negative for dysuria and hematuria.   Neurological: Positive for weakness. Negative for dizziness, headaches and light-headedness.   Psychiatric/Behavioral: Negative for altered mental status. The patient has insomnia. The patient is not nervous/anxious.      Objective:     Vital Signs (Most Recent):  Temp: 98.3 °F (36.8 °C) (12/02/19 0529)  Pulse: 83 (12/02/19 1046)  Resp: 16 (12/02/19 1046)  BP: 137/62 (12/02/19 1030)  SpO2: 97 % (12/02/19 1030) Vital Signs (24h Range):  Temp:  [98.2 °F (36.8 °C)-98.3 °F (36.8 °C)] 98.3 °F (36.8 °C)  Pulse:  [75-93] 83  Resp:  [15-28] 16  SpO2:  [92 %-98 %] 97 %  BP: (118-163)/(56-68) 137/62     Weight: 99.8 kg (220 lb)  Body mass index is 33.45 kg/m².    SpO2: 97 %  O2 Device (Oxygen Therapy): nasal cannula    No intake or output data in the 24 hours ending 12/02/19 1214    Lines/Drains/Airways     Peripheral Intravenous Line                 Peripheral IV - Single Lumen 12/02/19 0544 18 G Right Hand less than 1 day                Physical Exam   Constitutional: He is oriented to person, place, and time. He appears well-developed and well-nourished. No distress.   HENT:   Head: Normocephalic and atraumatic.   Eyes: Pupils are equal, round, and reactive to light. Conjunctivae and EOM are normal. No scleral icterus.   Neck: Normal range of motion.  Neck supple. JVD present.   Cardiovascular: Normal rate, regular rhythm, normal heart sounds and intact distal pulses.   No murmur heard.  Pulmonary/Chest: Effort normal. No respiratory distress. He has wheezes.   Abdominal: Soft. Bowel sounds are normal. He exhibits no distension. There is no tenderness. There is no rebound.   Musculoskeletal: Normal range of motion. He exhibits edema (1+ pitting edema in BL LE). He exhibits no tenderness.   Neurological: He is alert and oriented to person, place, and time.   Skin: Skin is warm and dry. He is not diaphoretic. No erythema.   Psychiatric: He has a normal mood and affect. His behavior is normal. Judgment and thought content normal.   Nursing note and vitals reviewed.      Significant Labs:   BMP:   Recent Labs   Lab 12/01/19  1101 12/02/19  0554   * 121*    139   K 3.7 3.7    106   CO2 24 19*   BUN 22 29*   CREATININE 1.6* 1.7*   CALCIUM 9.4 9.9   , CMP   Recent Labs   Lab 12/01/19  1101 12/02/19  0554    139   K 3.7 3.7    106   CO2 24 19*   * 121*   BUN 22 29*   CREATININE 1.6* 1.7*   CALCIUM 9.4 9.9   PROT 7.2 7.4   ALBUMIN 3.8 3.9   BILITOT 0.5 0.4   ALKPHOS 42* 43*   AST 21 15   ALT 26 23   ANIONGAP 10 14   ESTGFRAFRICA 49.4* 45.9*   EGFRNONAA 42.7* 39.7*   , CBC   Recent Labs   Lab 12/01/19  1101 12/02/19  0554   WBC 6.95 10.42   HGB 14.0 14.0   HCT 44.4 42.4   * 174   , INR No results for input(s): INR, PROTIME in the last 48 hours., Troponin   Recent Labs   Lab 12/01/19  1101 12/02/19  0554 12/02/19  0908   TROPONINI 0.013 0.032* 0.088*    and All pertinent lab results from the last 24 hours have been reviewed.    Significant Imaging: EKG: NSR, HR 75, L anterior fasiculuar block unchanged from previous EKG    Assessment and Plan:     Elevated troponin  Lonnie Taylor is a 70 yo WM w/ hx of CAD (s/p CABG '97), HTN, HLD, CKD, PVD (s/p BL stents), AAA, and tobacco abuse who presents to [facility] ED c/o worsening SOB and  wheezing.  - Chest pain (6/10) did not radiate, was non-exertional, lasted approximately 20-30 minutes, and was relieved by nitroglycerine x1.   - Troponin 0.032 -> 0.088 -> 0.136  - BNP 36  - EKG showed normal sinus rhythm w/ HR 75 and left anterior fascicular block unchanged from previous EKG.     Plan:  - EDVIN score: 5  - Elevated troponin  - Concern for NSTEMI  - Continue current medications per primary team: asa 81mg, Plavix 75mg,  and Crestor 20mg  - Hold Xarelto  - ECHO  - Start heparin gtt  - Start IMDUR 30mg  - Diurese w/ 40mg IV Lasix BID  - Continue to monitor creatinine, BMP daily  - Daily weights, strict I/Os  - Conservative management for now as patient presents w/ CHAUNCEY  - Will likely need cath prior to discharge  - Continue telemetry  - Patient will need close outpatient follow up w/ cardiologist, Dr. Martinez, as patient will likely need outpatient stress test (to be completed routinely every 5 years)        VTE Risk Mitigation (From admission, onward)         Ordered     heparin 25,000 units in dextrose 5% (100 units/ml) IV bolus from bag INITIAL BOLUS (max bolus 4000 units)  Once     Question:  Heparin Infusion Adjustment (DO NOT MODIFY ANSWER)  Answer:  [email or web address]\epic\Images\Pharmacy\HeparinInfusions\heparin LOW INTENSITY nomogram for [facility] [id number]    12/02/19 1529     heparin 25,000 units in dextrose 5% 250 mL (100 units/mL) infusion LOW INTENSITY nomogram - [facility]  Continuous     Question:  Heparin Infusion Adjustment (DO NOT MODIFY ANSWER)  Answer:  \[email or web address]\epic\Images\Pharmacy\HeparinInfusions\heparin LOW INTENSITY nomogram for [facility] [id number]    12/02/19 1529     heparin 25,000 units in dextrose 5% (100 units/ml) IV bolus from bag - ADDITIONAL PRN BOLUS - 30 units/kg (max bolus 4000 units)  As needed (PRN)     Question:  Heparin Infusion Adjustment (DO NOT MODIFY ANSWER)  Answer:  \[email or web address]\epic\Images\Pharmacy\HeparinInfusions\heparin LOW INTENSITY nomogram for [facility] [id number]     12/02/19 1529     heparin 25,000 units in dextrose 5% (100 units/ml) IV bolus from bag - ADDITIONAL PRN BOLUS - 60 units/kg (max bolus 4000 units)  As needed (PRN)     Question:  Heparin Infusion Adjustment (DO NOT MODIFY ANSWER)  Answer:  [email or web address]\Images\Pharmacy\HeparinInfusions\heparin LOW INTENSITY nomogram for [facility] [id number]    12/02/19 1529     Reason for No Pharmacological VTE Prophylaxis  Once     Question:  Reasons:  Answer:  Already adequately anticoagulated on oral Anticoagulants    12/02/19 0851     IP VTE HIGH RISK PATIENT  Once      12/02/19 0851                Thank you for your consult. I will follow-up with patient. Please contact us if you have any additional questions.    Abe Domingo MD  Cardiology   [facility]

## 2019-12-02 NOTE — NURSING
Pt c/o increased chest pain. Feels like pressure and slight burning in midsternal chest. Feels SOB but does not feel nauseated and skin is w/d. No other symptoms. Notified med team

## 2019-12-02 NOTE — ASSESSMENT & PLAN NOTE
HLD  S/p CABG  - Denies CP  - EKG without changes  - Continue ASA, plavix, atorvastatin, fenofibrate, and zetia

## 2019-12-02 NOTE — HOSPITAL COURSE
Consult acknowledged on 12/2. Patient seen and examined in ED. Follow up troponin 0.136. Concern for stuttering angina and NSTEMI. EDVIN 5. Hold Xarelto. Start heparin gtt. Continue asa, plavix, statin. Start IMDUR 30mg qd. Diurese w/ 40mg IV Lasix BID. Conservative management for now as patient presents w/ CHAUNCEY. (12/3) interval improvement in CHAUNCEY (1.7 ->1.5), patient still hypervolemic. Continue 40mg IV lasix at this time. Patient w/ 2 smaller episodes of chest pain overnight relieved by nitro, increase dose of IMDUR to 60mg qd. NPO @ midnight. Eval for heart cath in AM. (12/4) Patient reports no further episodes of CP overnight and SOB is stable. Patient w/ slight increase in creatinine overnight (1.5->1.7). Patient urinating well on diuresis, rec holding lasix today in setting of worsening CHAUNCEY. Continue ASA, plavix and IMDUR at current dosage. Stop heparin gtt 48 hours after initiation. MARE risk 5. Will continue to optimize patient for cath, reevaluate in AM and plan for intervention pending improvement in renal function. (12/5) Patient w/ slight bump in creatinine overnight (1.7-1.8). Patient reports good UOP overnight on current lasix regimen. Will recommend holding lasix at this time and will reevaluate in 1 week. Patient reports 1 episode of chest pain yesterday morning relieved by nitro. Continue ASA 81mg, Plavix 75mg, and IMDUR 120mg daily. Patient reports no further episodes of CP overnight and notes improvement in SOB. As the patient has had no further episodes of CP on current treatment and also has persistent CHAUNCEY, Cardiology will defer LHC at this time. Patient to follow up with general cardiology clinic in 1 week for eval for LHC vs PET.

## 2019-12-03 PROBLEM — I50.31 ACUTE DIASTOLIC HEART FAILURE: Status: ACTIVE | Noted: 2019-12-03

## 2019-12-03 LAB
ANION GAP SERPL CALC-SCNC: 8 MMOL/L (ref 8–16)
APTT BLDCRRT: 41.8 SEC (ref 21–32)
APTT BLDCRRT: 51.9 SEC (ref 21–32)
BASOPHILS # BLD AUTO: 0.02 K/UL (ref 0–0.2)
BASOPHILS NFR BLD: 0.2 % (ref 0–1.9)
BUN SERPL-MCNC: 33 MG/DL (ref 8–23)
CALCIUM SERPL-MCNC: 9.3 MG/DL (ref 8.7–10.5)
CHLORIDE SERPL-SCNC: 106 MMOL/L (ref 95–110)
CO2 SERPL-SCNC: 26 MMOL/L (ref 23–29)
CREAT SERPL-MCNC: 1.5 MG/DL (ref 0.5–1.4)
DIFFERENTIAL METHOD: ABNORMAL
EOSINOPHIL # BLD AUTO: 0 K/UL (ref 0–0.5)
EOSINOPHIL NFR BLD: 0 % (ref 0–8)
ERYTHROCYTE [DISTWIDTH] IN BLOOD BY AUTOMATED COUNT: 14 % (ref 11.5–14.5)
EST. GFR  (AFRICAN AMERICAN): 53.4 ML/MIN/1.73 M^2
EST. GFR  (NON AFRICAN AMERICAN): 46.2 ML/MIN/1.73 M^2
GLUCOSE SERPL-MCNC: 100 MG/DL (ref 70–110)
HCT VFR BLD AUTO: 39.5 % (ref 40–54)
HGB BLD-MCNC: 13.2 G/DL (ref 14–18)
IMM GRANULOCYTES # BLD AUTO: 0.07 K/UL (ref 0–0.04)
IMM GRANULOCYTES NFR BLD AUTO: 0.6 % (ref 0–0.5)
LYMPHOCYTES # BLD AUTO: 1.6 K/UL (ref 1–4.8)
LYMPHOCYTES NFR BLD: 13.7 % (ref 18–48)
MAGNESIUM SERPL-MCNC: 2.1 MG/DL (ref 1.6–2.6)
MCH RBC QN AUTO: 31.7 PG (ref 27–31)
MCHC RBC AUTO-ENTMCNC: 33.4 G/DL (ref 32–36)
MCV RBC AUTO: 95 FL (ref 82–98)
MONOCYTES # BLD AUTO: 0.7 K/UL (ref 0.3–1)
MONOCYTES NFR BLD: 6.3 % (ref 4–15)
NEUTROPHILS # BLD AUTO: 9 K/UL (ref 1.8–7.7)
NEUTROPHILS NFR BLD: 79.2 % (ref 38–73)
NRBC BLD-RTO: 0 /100 WBC
PHOSPHATE SERPL-MCNC: 4.8 MG/DL (ref 2.7–4.5)
PLATELET # BLD AUTO: 161 K/UL (ref 150–350)
PMV BLD AUTO: 11.5 FL (ref 9.2–12.9)
POTASSIUM SERPL-SCNC: 4 MMOL/L (ref 3.5–5.1)
RBC # BLD AUTO: 4.16 M/UL (ref 4.6–6.2)
SODIUM SERPL-SCNC: 140 MMOL/L (ref 136–145)
WBC # BLD AUTO: 11.37 K/UL (ref 3.9–12.7)

## 2019-12-03 PROCEDURE — 25000003 PHARM REV CODE 250: Mod: HCNC | Performed by: INTERNAL MEDICINE

## 2019-12-03 PROCEDURE — 27000221 HC OXYGEN, UP TO 24 HOURS: Mod: HCNC

## 2019-12-03 PROCEDURE — S4991 NICOTINE PATCH NONLEGEND: HCPCS | Mod: HCNC | Performed by: PHYSICIAN ASSISTANT

## 2019-12-03 PROCEDURE — 83735 ASSAY OF MAGNESIUM: CPT | Mod: HCNC

## 2019-12-03 PROCEDURE — 85730 THROMBOPLASTIN TIME PARTIAL: CPT | Mod: HCNC

## 2019-12-03 PROCEDURE — 80048 BASIC METABOLIC PNL TOTAL CA: CPT | Mod: HCNC

## 2019-12-03 PROCEDURE — 94640 AIRWAY INHALATION TREATMENT: CPT | Mod: HCNC

## 2019-12-03 PROCEDURE — 20600001 HC STEP DOWN PRIVATE ROOM: Mod: HCNC

## 2019-12-03 PROCEDURE — 63600175 PHARM REV CODE 636 W HCPCS: Mod: HCNC | Performed by: PHYSICIAN ASSISTANT

## 2019-12-03 PROCEDURE — 93010 ELECTROCARDIOGRAM REPORT: CPT | Mod: HCNC,,, | Performed by: INTERNAL MEDICINE

## 2019-12-03 PROCEDURE — 36415 COLL VENOUS BLD VENIPUNCTURE: CPT | Mod: HCNC

## 2019-12-03 PROCEDURE — 84100 ASSAY OF PHOSPHORUS: CPT | Mod: HCNC

## 2019-12-03 PROCEDURE — 99222 PR INITIAL HOSPITAL CARE,LEVL II: ICD-10-PCS | Mod: HCNC,GC,, | Performed by: INTERNAL MEDICINE

## 2019-12-03 PROCEDURE — 99233 PR SUBSEQUENT HOSPITAL CARE,LEVL III: ICD-10-PCS | Mod: HCNC,,, | Performed by: PHYSICIAN ASSISTANT

## 2019-12-03 PROCEDURE — 99222 1ST HOSP IP/OBS MODERATE 55: CPT | Mod: HCNC,GC,, | Performed by: INTERNAL MEDICINE

## 2019-12-03 PROCEDURE — 25000242 PHARM REV CODE 250 ALT 637 W/ HCPCS: Mod: HCNC | Performed by: PHYSICIAN ASSISTANT

## 2019-12-03 PROCEDURE — 93005 ELECTROCARDIOGRAM TRACING: CPT | Mod: HCNC

## 2019-12-03 PROCEDURE — 93010 EKG 12-LEAD: ICD-10-PCS | Mod: HCNC,,, | Performed by: INTERNAL MEDICINE

## 2019-12-03 PROCEDURE — 99233 SBSQ HOSP IP/OBS HIGH 50: CPT | Mod: HCNC,,, | Performed by: PHYSICIAN ASSISTANT

## 2019-12-03 PROCEDURE — 25000003 PHARM REV CODE 250: Mod: HCNC | Performed by: PHYSICIAN ASSISTANT

## 2019-12-03 PROCEDURE — 85025 COMPLETE CBC W/AUTO DIFF WBC: CPT | Mod: HCNC

## 2019-12-03 PROCEDURE — 85730 THROMBOPLASTIN TIME PARTIAL: CPT | Mod: 91,HCNC

## 2019-12-03 PROCEDURE — 94761 N-INVAS EAR/PLS OXIMETRY MLT: CPT | Mod: HCNC

## 2019-12-03 RX ORDER — SENNOSIDES 8.6 MG/1
8.6 TABLET ORAL DAILY PRN
Status: DISCONTINUED | OUTPATIENT
Start: 2019-12-03 | End: 2019-12-05 | Stop reason: HOSPADM

## 2019-12-03 RX ORDER — FUROSEMIDE 10 MG/ML
40 INJECTION INTRAMUSCULAR; INTRAVENOUS 2 TIMES DAILY
Status: DISCONTINUED | OUTPATIENT
Start: 2019-12-03 | End: 2019-12-04

## 2019-12-03 RX ORDER — FLUTICASONE PROPIONATE 50 MCG
2 SPRAY, SUSPENSION (ML) NASAL DAILY
Status: DISCONTINUED | OUTPATIENT
Start: 2019-12-03 | End: 2019-12-05 | Stop reason: HOSPADM

## 2019-12-03 RX ORDER — ISOSORBIDE MONONITRATE 30 MG/1
30 TABLET, EXTENDED RELEASE ORAL DAILY
Status: DISCONTINUED | OUTPATIENT
Start: 2019-12-03 | End: 2019-12-04

## 2019-12-03 RX ORDER — ISOSORBIDE MONONITRATE 60 MG/1
60 TABLET, EXTENDED RELEASE ORAL DAILY
Status: DISCONTINUED | OUTPATIENT
Start: 2019-12-04 | End: 2019-12-04

## 2019-12-03 RX ORDER — GUAIFENESIN 100 MG/5ML
200 SOLUTION ORAL EVERY 4 HOURS PRN
Status: DISCONTINUED | OUTPATIENT
Start: 2019-12-03 | End: 2019-12-05 | Stop reason: HOSPADM

## 2019-12-03 RX ADMIN — GUAIFENESIN AND DEXTROMETHORPHAN HYDROBROMIDE 1 TABLET: 600; 30 TABLET, EXTENDED RELEASE ORAL at 08:12

## 2019-12-03 RX ADMIN — ASPIRIN 81 MG: 81 TABLET, COATED ORAL at 08:12

## 2019-12-03 RX ADMIN — Medication 3 MG: at 08:12

## 2019-12-03 RX ADMIN — IPRATROPIUM BROMIDE AND ALBUTEROL SULFATE 3 ML: .5; 3 SOLUTION RESPIRATORY (INHALATION) at 04:12

## 2019-12-03 RX ADMIN — GUAIFENESIN 200 MG: 200 SOLUTION ORAL at 08:12

## 2019-12-03 RX ADMIN — GUAIFENESIN 200 MG: 200 SOLUTION ORAL at 05:12

## 2019-12-03 RX ADMIN — CLOPIDOGREL BISULFATE 75 MG: 75 TABLET ORAL at 08:12

## 2019-12-03 RX ADMIN — AMLODIPINE BESYLATE 5 MG: 5 TABLET ORAL at 08:12

## 2019-12-03 RX ADMIN — FUROSEMIDE 40 MG: 10 INJECTION, SOLUTION INTRAMUSCULAR; INTRAVENOUS at 08:12

## 2019-12-03 RX ADMIN — DOXAZOSIN 8 MG: 2 TABLET ORAL at 08:12

## 2019-12-03 RX ADMIN — PREDNISONE 40 MG: 20 TABLET ORAL at 08:12

## 2019-12-03 RX ADMIN — ISOSORBIDE MONONITRATE 30 MG: 30 TABLET, EXTENDED RELEASE ORAL at 08:12

## 2019-12-03 RX ADMIN — ISOSORBIDE MONONITRATE 30 MG: 30 TABLET, EXTENDED RELEASE ORAL at 10:12

## 2019-12-03 RX ADMIN — NITROGLYCERIN 0.4 MG: 0.4 TABLET SUBLINGUAL at 08:12

## 2019-12-03 RX ADMIN — AZITHROMYCIN 250 MG: 250 TABLET, FILM COATED ORAL at 08:12

## 2019-12-03 RX ADMIN — HEPARIN SODIUM 15 UNITS/KG/HR: 10000 INJECTION, SOLUTION INTRAVENOUS at 06:12

## 2019-12-03 RX ADMIN — NICOTINE 1 PATCH: 21 PATCH, EXTENDED RELEASE TRANSDERMAL at 08:12

## 2019-12-03 RX ADMIN — PANTOPRAZOLE SODIUM 40 MG: 40 TABLET, DELAYED RELEASE ORAL at 08:12

## 2019-12-03 RX ADMIN — GUAIFENESIN AND DEXTROMETHORPHAN HYDROBROMIDE 1 TABLET: 600; 30 TABLET, EXTENDED RELEASE ORAL at 12:12

## 2019-12-03 RX ADMIN — IPRATROPIUM BROMIDE AND ALBUTEROL SULFATE 3 ML: .5; 3 SOLUTION RESPIRATORY (INHALATION) at 08:12

## 2019-12-03 RX ADMIN — FLUTICASONE PROPIONATE 100 MCG: 50 SPRAY, METERED NASAL at 01:12

## 2019-12-03 RX ADMIN — TIOTROPIUM BROMIDE 18 MCG: 18 CAPSULE ORAL; RESPIRATORY (INHALATION) at 08:12

## 2019-12-03 RX ADMIN — FUROSEMIDE 40 MG: 10 INJECTION, SOLUTION INTRAMUSCULAR; INTRAVENOUS at 05:12

## 2019-12-03 RX ADMIN — ROSUVASTATIN CALCIUM 20 MG: 20 TABLET, FILM COATED ORAL at 08:12

## 2019-12-03 RX ADMIN — EZETIMIBE 10 MG: 10 TABLET ORAL at 08:12

## 2019-12-03 RX ADMIN — NITROGLYCERIN 0.4 MG: 0.4 TABLET SUBLINGUAL at 09:12

## 2019-12-03 RX ADMIN — FENOFIBRATE 145 MG: 145 TABLET, FILM COATED ORAL at 08:12

## 2019-12-03 RX ADMIN — PSYLLIUM HUSK 1 PACKET: 3.4 POWDER ORAL at 06:12

## 2019-12-03 RX ADMIN — IPRATROPIUM BROMIDE AND ALBUTEROL SULFATE 3 ML: .5; 3 SOLUTION RESPIRATORY (INHALATION) at 10:12

## 2019-12-03 NOTE — SUBJECTIVE & OBJECTIVE
Interval History: Patient reports improvement in SOB since admit, but still complaining of significant wheezing and SOB. Also complaining of intermittent chest heaviness, relieved with nitro. Increased imdur from 30 mg to 60 mg. Repeat EKG pending. Continue ACS protocol with heparin gtt for NSTEMI. Further cardiology recs pending. CHAUNCEY improving. Continue diuresis with lasix 40 mg BID.     Review of Systems   Constitutional: Positive for fatigue. Negative for chills and fever.   HENT: Negative for congestion and rhinorrhea.    Respiratory: Positive for cough, shortness of breath and wheezing.    Cardiovascular: Positive for chest pain and leg swelling. Negative for palpitations.   Gastrointestinal: Negative for abdominal distention and abdominal pain.   Genitourinary: Negative for difficulty urinating and hematuria.   Musculoskeletal: Negative for arthralgias and back pain.   Neurological: Negative for dizziness and light-headedness.   Psychiatric/Behavioral: Negative for agitation, behavioral problems, confusion and decreased concentration.     Objective:     Vital Signs (Most Recent):  Temp: 97.5 °F (36.4 °C) (12/03/19 0758)  Pulse: 66 (12/03/19 1000)  Resp: 16 (12/03/19 1000)  BP: 136/64 (12/03/19 0758)  SpO2: 97 % (12/03/19 1000) Vital Signs (24h Range):  Temp:  [96.4 °F (35.8 °C)-97.8 °F (36.6 °C)] 97.5 °F (36.4 °C)  Pulse:  [60-87] 66  Resp:  [16-24] 16  SpO2:  [92 %-97 %] 97 %  BP: (126-180)/(58-90) 136/64     Weight: 103.3 kg (227 lb 11.8 oz)  Body mass index is 34.63 kg/m².    Intake/Output Summary (Last 24 hours) at 12/3/2019 1015  Last data filed at 12/3/2019 0900  Gross per 24 hour   Intake 683.87 ml   Output 2400 ml   Net -1716.13 ml      Physical Exam   Constitutional: He is oriented to person, place, and time. He appears well-developed and well-nourished.   HENT:   Head: Normocephalic and atraumatic.   Eyes: Pupils are equal, round, and reactive to light. EOM are normal.   Neck: Normal range of motion.  Neck supple.   Cardiovascular: Normal rate and regular rhythm.   Pulmonary/Chest: Effort normal. He has wheezes (throughout).   Coarse breath sounds throughout   Abdominal: Soft. Bowel sounds are normal.   Musculoskeletal: Normal range of motion. He exhibits edema (1+ BLE). He exhibits no tenderness.   Neurological: He is alert and oriented to person, place, and time.   Skin: Skin is warm and dry. Capillary refill takes less than 2 seconds.   Psychiatric: He has a normal mood and affect. His behavior is normal.   Nursing note and vitals reviewed.      Significant Labs:   CBC:   Recent Labs   Lab 12/01/19  1101 12/02/19  0554 12/03/19  0646   WBC 6.95 10.42 11.37   HGB 14.0 14.0 13.2*   HCT 44.4 42.4 39.5*   * 174 161     CMP:   Recent Labs   Lab 12/01/19  1101 12/02/19  0554 12/03/19  0646    139 140   K 3.7 3.7 4.0    106 106   CO2 24 19* 26   * 121* 100   BUN 22 29* 33*   CREATININE 1.6* 1.7* 1.5*   CALCIUM 9.4 9.9 9.3   PROT 7.2 7.4  --    ALBUMIN 3.8 3.9  --    BILITOT 0.5 0.4  --    ALKPHOS 42* 43*  --    AST 21 15  --    ALT 26 23  --    ANIONGAP 10 14 8   EGFRNONAA 42.7* 39.7* 46.2*     Cardiac Markers:   Recent Labs   Lab 12/02/19  0554   BNP 36     Coagulation:   Recent Labs   Lab 12/02/19  1646  12/03/19  0646   INR 1.0  --   --    APTT <21.0   < > 51.9*    < > = values in this interval not displayed.     Magnesium:   Recent Labs   Lab 12/03/19  0646   MG 2.1       Significant Imaging: I have reviewed all pertinent imaging results/findings within the past 24 hours.

## 2019-12-03 NOTE — ASSESSMENT & PLAN NOTE
Elevated troponin  - HDS on admission, stable on 2L NC  - Afebrile without leukocytosis  - Labs significant for CHAUNCEY (see below)   - EKG shows NSR (HR 75), no changes noted  - CXR shows bibasilar increased attenuation; ?early pulmonary edema  - Received IV methylprednisolone and azithro in the ER  - Continue azithro, PO prednisone, and duonebs  - Duonebs PRN, wean O2 as tolerated, supportive care  - Pulm follow up as outpatient  - Telemetry

## 2019-12-03 NOTE — PLAN OF CARE
Pierre faxed release of records authorization form to [facility] at [address] to provide records once obtained.

## 2019-12-03 NOTE — PROGRESS NOTES
[facility]  Cardiology  Progress Note    Patient Name: Lonnie Taylor  MRN: [id number]  Admission Date: 12/2/2019  Hospital Length of Stay: 1 days  Code Status: Full Code   Attending Physician: Donal Holley MD   Primary Care Physician: Isiah You MD  Expected Discharge Date:   Principal Problem:COPD exacerbation    Subjective:     Hospital Course:   Consult acknowledged on 12/2. Patient seen and examined in ED. Follow up troponin 0.136. Concern for stuttering angina and NSTEMI. EDVIN 5. Hold Xarelto. Start heparin gtt. Continue asa, plavix, statin. Start IMDUR 30mg qd. Diurese w/ 40mg IV Lasix BID. Conservative management for now as patient presents w/ CHAUNCEY. (12/3) interval improvement in CHAUNCEY (1.7 ->1.5), patient still hypervolemic. Continue 40mg IV lasix at this time. Patient w/ 2 smaller episodes of chest pain overnight relieved by nitro, increase dose of IMDUR to 60mg qd. NPO @ midnight. Eval for heart cath in AM.    Interval History: Patient reports 2 smaller episodes of chest pain overnight relieved by nitro, increase dose of IMDUR to 60mg qd. SOB unchanged. Interval improvement in CHAUNECY (1.7 ->1.5), patient still hypervolemic. Continue diuresis w/ 40mg IV lasix at this time. NPO @ midnight. Eval for heart cath in AM.    Review of Systems   Constitution: Positive for malaise/fatigue. Negative for chills and fever.   HENT: Negative for congestion and sore throat.    Eyes: Negative for visual disturbance.   Cardiovascular: Positive for chest pain, dyspnea on exertion and orthopnea. Negative for irregular heartbeat, leg swelling and palpitations.   Respiratory: Positive for shortness of breath, sleep disturbances due to breathing and wheezing. Negative for cough.    Musculoskeletal: Negative for back pain and myalgias.   Gastrointestinal: Positive for diarrhea. Negative for abdominal pain, constipation, excessive appetite, hematemesis, melena, nausea and vomiting.   Genitourinary:  Negative for dysuria and hematuria.   Neurological: Negative for headaches and weakness.   Psychiatric/Behavioral: Negative for altered mental status. The patient has insomnia. The patient is not nervous/anxious.      Objective:     Vital Signs (Most Recent):  Temp: 96.3 °F (35.7 °C) (12/03/19 1115)  Pulse: 65 (12/03/19 1115)  Resp: 18 (12/03/19 1115)  BP: (!) 142/67 (12/03/19 1115)  SpO2: 95 % (12/03/19 1227) Vital Signs (24h Range):  Temp:  [96.3 °F (35.7 °C)-97.8 °F (36.6 °C)] 96.3 °F (35.7 °C)  Pulse:  [60-79] 65  Resp:  [16-20] 18  SpO2:  [92 %-97 %] 95 %  BP: (126-158)/(58-71) 142/67     Weight: 103.3 kg (227 lb 11.8 oz)  Body mass index is 34.63 kg/m².     SpO2: 95 %  O2 Device (Oxygen Therapy): room air      Intake/Output Summary (Last 24 hours) at 12/3/2019 1248  Last data filed at 12/3/2019 1228  Gross per 24 hour   Intake 503.87 ml   Output 2700 ml   Net -2196.13 ml       Lines/Drains/Airways     Peripheral Intravenous Line                 Peripheral IV - Single Lumen 18 G Anterior;Distal;Right Forearm -- days         Peripheral IV - Single Lumen 12/02/19 1906 20 G Left Antecubital less than 1 day                Physical Exam   Constitutional: He is oriented to person, place, and time. He appears well-developed and well-nourished. No distress.   HENT:   Head: Normocephalic and atraumatic.   Eyes: Pupils are equal, round, and reactive to light. Conjunctivae and EOM are normal. No scleral icterus.   Neck: Normal range of motion. Neck supple. JVD present.   Cardiovascular: Normal rate, regular rhythm and normal heart sounds.   No murmur heard.  Pulmonary/Chest: Effort normal. No respiratory distress. He has wheezes. He has no rales.   Abdominal: Soft. Bowel sounds are normal. He exhibits no distension. There is no tenderness.   Musculoskeletal: Normal range of motion. He exhibits edema (1+ BL LE). He exhibits no tenderness.   Neurological: He is alert and oriented to person, place, and time. No cranial nerve  deficit.   Skin: Skin is warm and dry. No rash noted. He is not diaphoretic. No erythema.   Psychiatric: He has a normal mood and affect. His behavior is normal. Judgment and thought content normal.   Nursing note and vitals reviewed.      Significant Labs:   BMP:   Recent Labs   Lab 12/02/19  0554 12/03/19  0646   * 100    140   K 3.7 4.0    106   CO2 19* 26   BUN 29* 33*   CREATININE 1.7* 1.5*   CALCIUM 9.9 9.3   MG  --  2.1   , CMP   Recent Labs   Lab 12/02/19  0554 12/03/19  0646    140   K 3.7 4.0    106   CO2 19* 26   * 100   BUN 29* 33*   CREATININE 1.7* 1.5*   CALCIUM 9.9 9.3   PROT 7.4  --    ALBUMIN 3.9  --    BILITOT 0.4  --    ALKPHOS 43*  --    AST 15  --    ALT 23  --    ANIONGAP 14 8   ESTGFRAFRICA 45.9* 53.4*   EGFRNONAA 39.7* 46.2*    and All pertinent lab results from the last 24 hours have been reviewed.    Significant Imaging: Echocardiogram:    Transthoracic echo (TTE) complete (Cupid Only):   Results for orders placed or performed during the hospital encounter of 12/02/19   Echo Color Flow Doppler? Yes   Result Value Ref Range    Ascending aorta 4.32 cm    STJ 3.89 cm    AV mean gradient 8 mmHg    Ao peak colby 2.11 m/s    Ao VTI 36.99 cm    IVS 0.66 0.6 - 1.1 cm    LA size 4.75 cm    Left Atrium Major Axis 7.12 cm    Left Atrium Minor Axis 6.66 cm    LVIDD 5.55 3.5 - 6.0 cm    LVIDS 4.40 (A) 2.1 - 4.0 cm    LVOT diameter 2.24 cm    LVOT peak VTI 28.34 cm    PW 0.73 0.6 - 1.1 cm    MV Peak A Colby 0.88 m/s    E wave decelartion time 120.46 msec    MV Peak E Colby 1.08 m/s    PV Peak D Colby 0.88 m/s    PV Peak S Colby 0.74 m/s    RA Major Axis 6.24 cm    RA Width 4.27 cm    RVDD 4.14 cm    Sinus 4.25 cm    TAPSE 2.29 cm    TR Max Colby 3.02 m/s    TDI LATERAL 0.12 m/s    TDI SEPTAL 0.09 m/s    LA WIDTH 5.21 cm    LV Diastolic Volume 150.81 mL    LV Systolic Volume 87.69 mL    LVOT peak colby 1.39 m/s    LV LATERAL E/E' RATIO 9.00 m/s    LV SEPTAL E/E' RATIO 12.00 m/s     FS 21 %    LA volume 144.77 cm3    LV mass 136.47 g    Left Ventricle Relative Wall Thickness 0.26 cm    AV valve area 3.02 cm2    AV Velocity Ratio 0.66     AV index (prosthetic) 0.77     E/A ratio 1.23     Mean e' 0.11 m/s    Pulm vein S/D ratio 0.84     LVOT area 3.9 cm2    LVOT stroke volume 111.63 cm3    AV peak gradient 18 mmHg    E/E' ratio 10.29 m/s    LV Systolic Volume Index 40.2 mL/m2    LV Diastolic Volume Index 69.16 mL/m2    LA Volume Index 66.4 mL/m2    LV Mass Index 63 g/m2    Triscuspid Valve Regurgitation Peak Gradient 36 mmHg    BSA 2.25 m2    Right Atrial Pressure (from IVC) 3 mmHg    TV rest pulmonary artery pressure 39 mmHg    Narrative    · Normal left ventricular systolic function. The estimated ejection   fraction is 55%  · Grade II (moderate) left ventricular diastolic dysfunction consistent   with pseudonormalization.  · Severe left atrial enlargement.  · Mild-to-moderate aortic regurgitation.  · Mild to moderate pulmonic regurgitation.  · Mild mitral regurgitation.  · Mild tricuspid regurgitation.  · Normal right ventricular systolic function.  · Moderate right atrial enlargement.  · Normal central venous pressure (3 mm Hg).  · The estimated PA systolic pressure is 39 mm Hg     CHallenging study. Poor endocardial definition       Assessment and Plan:     Brief HPI: Lonnie Taylor is a 70 yo WM w/ extensive cardiac hx that was consulted to Cardiology for stuttering angina and NSTEMI. Continue conservative management at this time (asa, plavix, heparin gtt, IMDUR) while treating CHAUNCEY (continue diuresis). Plan for heart cath on 12/4 or 12/5 pending improvement in kidney function.    NSTEMI (non-ST elevated myocardial infarction)  Lonnie Taylor is a 70 yo WM w/ hx of CAD (s/p CABG '97), HTN, HLD, CKD, PVD (s/p aortobifemoral bypass and BL LE PTAs), AAA s/p repair, multiple PCI (most recent in 2018 @ [facility]), and tobacco abuse who presents to [facility] ED c/o worsening SOB and wheezing.  - Chest pain  (6/10) did not radiate, was non-exertional, lasted approximately 20-30 minutes, and was relieved by nitroglycerine x1.   - Troponin 0.032 -> 0.088 -> 0.136  - BNP 36  - EKG showed normal sinus rhythm w/ HR 75 and left anterior fascicular block unchanged from previous EKG.     Plan:  - EDVIN score: 5  - Elevated troponin  - Stuttering angina  - Concern for NSTEMI  - Continue current medications per primary team: asa 81mg, Plavix 75mg, and Crestor 20mg  - Hold Xarelto  - Echo on 12/2 shows EF 55% and no wall motion abnormalities  - Continue heparin gtt  - Increase IMDUR to 60mg qd  - Continue diuresis w/ 40mg IV Lasix BID  - Conservative management for now as patient presents w/ CHAUNCEY  - Interval improvement in CHAUNCEY (1.7 -> 1.5), re-evaluate in AM  - Continue to monitor creatinine, BMP daily  - Daily weights, strict I/Os  - NPO @ midnight, eval for heart cath in AM  - Continue telemetry  - Patient will need close outpatient follow up w/ cardiologist, Dr. Martinez, as patient will likely need outpatient stress test (to be completed routinely every 5 years)    Elevated troponin  See NSTEMI        VTE Risk Mitigation (From admission, onward)         Ordered     heparin 25,000 units in dextrose 5% 250 mL (100 units/mL) infusion LOW INTENSITY nomogram - [facility]  Continuous     Question:  Heparin Infusion Adjustment (DO NOT MODIFY ANSWER)  Answer:  \[email or web address]\epic\Images\Pharmacy\HeparinInfusions\heparin LOW INTENSITY nomogram for [facility] [id number]    12/02/19 1529     heparin 25,000 units in dextrose 5% (100 units/ml) IV bolus from bag - ADDITIONAL PRN BOLUS - 30 units/kg (max bolus 4000 units)  As needed (PRN)     Question:  Heparin Infusion Adjustment (DO NOT MODIFY ANSWER)  Answer:  \[email or web address]\epic\Images\Pharmacy\HeparinInfusions\heparin LOW INTENSITY nomogram for [facility] [id number]    12/02/19 1529     heparin 25,000 units in dextrose 5% (100 units/ml) IV bolus from bag - ADDITIONAL PRN BOLUS - 60 units/kg (max bolus 4000 units)   As needed (PRN)     Question:  Heparin Infusion Adjustment (DO NOT MODIFY ANSWER)  Answer:  [email or web address]\epic\Images\Pharmacy\HeparinInfusions\heparin LOW INTENSITY nomogram for [facility] [id number]    12/02/19 1529     Reason for No Pharmacological VTE Prophylaxis  Once     Question:  Reasons:  Answer:  Already adequately anticoagulated on oral Anticoagulants    12/02/19 0851     IP VTE HIGH RISK PATIENT  Once      12/02/19 0851                Thank you for your consult. We will follow up w/ the patient. Please call with any questions.     Abe Domingo MD  Cardiology  [facility]

## 2019-12-03 NOTE — PROGRESS NOTES
"[facility] Hospital Medicine  Progress Note    Patient Name: Lonnie Taylor  MRN: [id number]  Patient Class: IP- Inpatient   Admission Date: 12/2/2019  Length of Stay: 1 days  Attending Physician: Donal Holley MD  Primary Care Provider: Isiah You MD    [facility] Medicine Team: [facility] F Hui Argueta PA-C    Subjective:     Principal Problem:COPD exacerbation        HPI:  69 year old male with a PMHx of HTN, HLD, CAD s/p CABG, PVD s/p BLE stents, COPD, and tobacco abuse (55 year pack history) presenting to the ER with wife at bedside for shortness of breath. Patient reports he "started getting sick on Monday." He reports he initially developed a productive cough of green sputum, SOB, and wheezing. He presented to urgent care on Wednesday and was diagnosed with bronchitis; he was prescribed Cefdinir. He reports compliance with antibiotics. He reported to the ER Sunday due to persistent complaints. He reports improvement with breathing treatments and was discharged with PO prednisone. Patient reports he presented back to the ER today due to persistent SOB. At the time of my exam, patient has no new complaints. Patient reports he has now a dry cough. Improved SOB with breathing treatments. Denies CP, abdominal pain, N/V/D, fever/chills, palpitations, focal weakness, headache, myalgias, dysuria/frequency.       HDS on admission, stable on 2L NC. Afebrile without leukocytosis. Labs significant for CHAUNCEY and troponin 0.032. BNP WNL. EKG shows NSR (HR 75), no changes noted. CXR shows bibasilar increased attenuation; ?early pulmonary edema. Received IV methylprednisolone, breathing treatments, and azithro in the ER.    Overview/Hospital Course:  Mr. Taylor was admitted to observation for COPD exacerbation. Started on azithromycin, prednisone, and duonebs. Patient with chest pain and elevated troponin to max .167. EKG without changes. Cardiology consulted, started on ACS protocol for " NSTEMI. Started on imdur 30 mg for angina and diuresing with lasix. TTE with EF 55%, grade II diastolic dysfunction, severe LAE.     Interval History: Patient reports improvement in SOB since admit, but still complaining of significant wheezing and SOB. Also complaining of intermittent chest heaviness, relieved with nitro. Increased imdur from 30 mg to 60 mg. Repeat EKG pending. Continue ACS protocol with heparin gtt for NSTEMI. Further cardiology recs pending. CHAUNCEY improving. Continue diuresis with lasix 40 mg BID.     Review of Systems   Constitutional: Positive for fatigue. Negative for chills and fever.   HENT: Negative for congestion and rhinorrhea.    Respiratory: Positive for cough, shortness of breath and wheezing.    Cardiovascular: Positive for chest pain and leg swelling. Negative for palpitations.   Gastrointestinal: Negative for abdominal distention and abdominal pain.   Genitourinary: Negative for difficulty urinating and hematuria.   Musculoskeletal: Negative for arthralgias and back pain.   Neurological: Negative for dizziness and light-headedness.   Psychiatric/Behavioral: Negative for agitation, behavioral problems, confusion and decreased concentration.     Objective:     Vital Signs (Most Recent):  Temp: 97.5 °F (36.4 °C) (12/03/19 0758)  Pulse: 66 (12/03/19 1000)  Resp: 16 (12/03/19 1000)  BP: 136/64 (12/03/19 0758)  SpO2: 97 % (12/03/19 1000) Vital Signs (24h Range):  Temp:  [96.4 °F (35.8 °C)-97.8 °F (36.6 °C)] 97.5 °F (36.4 °C)  Pulse:  [60-87] 66  Resp:  [16-24] 16  SpO2:  [92 %-97 %] 97 %  BP: (126-180)/(58-90) 136/64     Weight: 103.3 kg (227 lb 11.8 oz)  Body mass index is 34.63 kg/m².    Intake/Output Summary (Last 24 hours) at 12/3/2019 1015  Last data filed at 12/3/2019 0900  Gross per 24 hour   Intake 683.87 ml   Output 2400 ml   Net -1716.13 ml      Physical Exam   Constitutional: He is oriented to person, place, and time. He appears well-developed and well-nourished.   HENT:   Head:  Normocephalic and atraumatic.   Eyes: Pupils are equal, round, and reactive to light. EOM are normal.   Neck: Normal range of motion. Neck supple.   Cardiovascular: Normal rate and regular rhythm.   Pulmonary/Chest: Effort normal. He has wheezes (throughout).   Coarse breath sounds throughout   Abdominal: Soft. Bowel sounds are normal.   Musculoskeletal: Normal range of motion. He exhibits edema (1+ BLE). He exhibits no tenderness.   Neurological: He is alert and oriented to person, place, and time.   Skin: Skin is warm and dry. Capillary refill takes less than 2 seconds.   Psychiatric: He has a normal mood and affect. His behavior is normal.   Nursing note and vitals reviewed.      Significant Labs:   CBC:   Recent Labs   Lab 12/01/19  1101 12/02/19  0554 12/03/19  0646   WBC 6.95 10.42 11.37   HGB 14.0 14.0 13.2*   HCT 44.4 42.4 39.5*   * 174 161     CMP:   Recent Labs   Lab 12/01/19  1101 12/02/19  0554 12/03/19  0646    139 140   K 3.7 3.7 4.0    106 106   CO2 24 19* 26   * 121* 100   BUN 22 29* 33*   CREATININE 1.6* 1.7* 1.5*   CALCIUM 9.4 9.9 9.3   PROT 7.2 7.4  --    ALBUMIN 3.8 3.9  --    BILITOT 0.5 0.4  --    ALKPHOS 42* 43*  --    AST 21 15  --    ALT 26 23  --    ANIONGAP 10 14 8   EGFRNONAA 42.7* 39.7* 46.2*     Cardiac Markers:   Recent Labs   Lab 12/02/19  0554   BNP 36     Coagulation:   Recent Labs   Lab 12/02/19  1646  12/03/19  0646   INR 1.0  --   --    APTT <21.0   < > 51.9*    < > = values in this interval not displayed.     Magnesium:   Recent Labs   Lab 12/03/19  0646   MG 2.1       Significant Imaging: I have reviewed all pertinent imaging results/findings within the past 24 hours.      Assessment/Plan:      * COPD exacerbation  Elevated troponin  - HDS on admission, stable on 2L NC  - Afebrile without leukocytosis  - Labs significant for CHAUNCEY (see below)   - EKG shows NSR (HR 75), no changes noted  - CXR shows bibasilar increased attenuation; ?early pulmonary  edema  - Received IV methylprednisolone and azithro in the ER  - Continue azithro, PO prednisone, and duonebs  - Duonebs PRN, wean O2 as tolerated, supportive care  - Pulm follow up as outpatient  - Telemetry    NSTEMI (non-ST elevated myocardial infarction)  Elevated troponin  Acute diastolic heart failure  - trop .032--> .088 --> .137--> .167  - EKG without acute ischemic changes on admit  - cardiology consulted, appreciate recs  - started on ACS protocol with heparin gtt   - continue ASA 81 mg daily, Plavix 75 mg daily, and Crestor 20 mg daily  - xarelto held  - TTE with EF 55%, grade II diastolic dysfunction  - diuresing with Lasix 40 mg IV BID  - patient with additional episode of chest pain this am, relieved with nitro, repeat EKG unremarkable  - risk of contrast induced nephropathy 14%, will wait until tomorrow for cath, suspect Cr will be at baseline  - NPO midnight  - cath in am  - tele    Elevated troponin        Acute kidney injury superimposed on chronic kidney disease  - Cr 1.7 on admit (baseline 1.4), likely in setting of decreased PO intake  - Ordered bolus and encouraged PO intake- Cr increased  - Patient overloaded on exam, started on IV lasix 40 mg daily  - Cr 1.5   - Trend daily  - Avoid nephrotoxins    Tobacco abuse  55 year pack history  - Discussed importance of tobacco cessation  - Nicotine patch ordered    HTN (hypertension)  - Elevated on admit in setting of missed AM meds  - Resume home regimen amlodipine 5 mg daily  - added imdur 60 mg daily  - Continue to monitor    CAD (coronary artery disease)  HLD  S/p CABG  - Denies CP  - EKG without changes  - Continue ASA, plavix, atorvastatin, fenofibrate, and zetia    PVD (peripheral vascular disease)  S/p BLE stents  - Xarelto held for ACS protocol    VTE Risk Mitigation (From admission, onward)         Ordered     heparin 25,000 units in dextrose 5% 250 mL (100 units/mL) infusion LOW INTENSITY nomogram - [facility]  Continuous     Question:  Heparin  Infusion Adjustment (DO NOT MODIFY ANSWER)  Answer:  [email or web address]\epic\Images\Pharmacy\HeparinInfusions\heparin LOW INTENSITY nomogram for [facility] [id number]    12/02/19 1529     heparin 25,000 units in dextrose 5% (100 units/ml) IV bolus from bag - ADDITIONAL PRN BOLUS - 30 units/kg (max bolus 4000 units)  As needed (PRN)     Question:  Heparin Infusion Adjustment (DO NOT MODIFY ANSWER)  Answer:  [email or web address]\epic\Images\Pharmacy\HeparinInfusions\heparin LOW INTENSITY nomogram for [facility] [id number]    12/02/19 1529     heparin 25,000 units in dextrose 5% (100 units/ml) IV bolus from bag - ADDITIONAL PRN BOLUS - 60 units/kg (max bolus 4000 units)  As needed (PRN)     Question:  Heparin Infusion Adjustment (DO NOT MODIFY ANSWER)  Answer:  [email or web address]\epic\Images\Pharmacy\HeparinInfusions\heparin LOW INTENSITY nomogram for [facility] [id number]    12/02/19 1529     Reason for No Pharmacological VTE Prophylaxis  Once     Question:  Reasons:  Answer:  Already adequately anticoagulated on oral Anticoagulants    12/02/19 0851     IP VTE HIGH RISK PATIENT  Once      12/02/19 0851                      Hui Argueta PA-C  Department of Hospital Medicine   [facility]

## 2019-12-03 NOTE — ASSESSMENT & PLAN NOTE
Elevated troponin  Acute diastolic heart failure  - trop .032--> .088 --> .137--> .167  - EKG without acute ischemic changes on admit  - cardiology consulted, appreciate recs  - started on ACS protocol with heparin gtt   - continue ASA 81 mg daily, Plavix 75 mg daily, and Crestor 20 mg daily  - xarelto held  - TTE with EF 55%, grade II diastolic dysfunction  - diuresing with Lasix 40 mg IV BID  - patient with additional episode of chest pain this am, relieved with nitro  - repeat EKG pending  - likely need cath prior to discharge  - risk of contrast induced nephropathy 14%  - tele

## 2019-12-03 NOTE — NURSING
Patient c/o 1/10 chest pain requested 1 nitro tablet  Will continue to moniotr    Chest pain resolved within 5 minutes  JEFFY Argueta notified

## 2019-12-03 NOTE — ASSESSMENT & PLAN NOTE
Lonnie Taylor is a 68 yo WM w/ hx of CAD (s/p CABG '97), HTN, HLD, CKD, PVD (s/p aortobifemoral bypass and BL LE PTAs), AAA s/p repair, multiple PCI (most recent in 2018 @ [facility]), and tobacco abuse who presents to [facility] c/o worsening SOB and wheezing.  - Chest pain (6/10) did not radiate, was non-exertional, lasted approximately 20-30 minutes, and was relieved by nitroglycerine x1.   - Troponin 0.032 -> 0.088 -> 0.136  - BNP 36  - EKG showed normal sinus rhythm w/ HR 75 and left anterior fascicular block unchanged from previous EKG.     Plan:  - EDVIN score: 5  - Elevated troponin  - Stuttering angina  - Concern for NSTEMI  - Continue current medications per primary team: asa 81mg, Plavix 75mg, and Crestor 20mg  - Hold Xarelto  - Echo on 12/2 shows EF 55% and no wall motion abnormalities  - Continue heparin gtt  - Increase IMDUR to 60mg qd  - Continue diuresis w/ 40mg IV Lasix BID  - Conservative management for now as patient presents w/ CHAUNCEY  - Interval improvement in CHAUNCEY (1.7 -> 1.5), re-evaluate in AM  - Continue to monitor creatinine, BMP daily  - Daily weights, strict I/Os  - NPO @ midnight, eval for heart cath in AM  - Continue telemetry  - Patient will need close outpatient follow up w/ cardiologist, Dr. Martinez, as patient will likely need outpatient stress test (to be completed routinely every 5 years)

## 2019-12-03 NOTE — ASSESSMENT & PLAN NOTE
- Elevated on admit in setting of missed AM meds  - Resume home regimen amlodipine 5 mg daily  - added imdur 60 mg daily  - Continue to monitor

## 2019-12-03 NOTE — NURSING
MD instructed Nitro for chest pain 4/10, patient pain relieved after one tablet  [facility]

## 2019-12-03 NOTE — HOSPITAL COURSE
Mr. Taylor was admitted to observation for COPD exacerbation. Started on azithromycin, prednisone, and duonebs. Patient with chest pain and elevated troponin to max .167. EKG without changes. Cardiology consulted, started on ACS protocol for NSTEMI. Heparin drip X 48 completed. Started on imdur 30 mg for angina and diuresing with lasix. TTE with EF 55%, grade II diastolic dysfunction, severe LAE. Plan on LHC once Creatinine at baseline. Creatinine 1.5--> 1.7--> 1.8, lasix discontinued. Patient chest pain free.  Given persistent elevation in Creatinine, risk of MARE too great to attempt cath now. Cardiology recommending follow up in 1 week in clinic to discuss LHC vs nuclear test. Ambulatory referral to cardiology given. Patient discharged on imdur 120 mg daily, prednisone, azithromycin to complete 5 day course total, and continue ASA, plavix, statin, betablocker. Follow up in pulmonology clinic and PCP in 1 week. Patient verbalized understanding. All questions answered.

## 2019-12-03 NOTE — PLAN OF CARE
Plan of care reviewed with pt, verbalized understanding  Answered questions  Free from falls and injury  Afebrile  SR on tele  Hep 15units/kg  ANTB daily   RT seen  Will continue to monitor

## 2019-12-03 NOTE — PLAN OF CARE
POC reviewed with patient, who verbalized understanding. AAOx4. 3L NC. 94-96% SpO2.  Remains free of falls and injury.   VSS. Tolerating diet, NPO after midnight. No  Nausea. No Pain.   IV infusing heparin 15units/hr after PTT 29.9. Voiding per urinal, clear yellow. 0 X BM.   Up ad karen. Telemetry monitor NS with minimal PVCs. TEDs in place. No acute events. No distress noted. Patient to be moved to . Call bell in reach.   Will continue to monitor.

## 2019-12-03 NOTE — ASSESSMENT & PLAN NOTE
Elevated troponin  Acute diastolic heart failure  - trop .032--> .088 --> .137--> .167  - EKG without acute ischemic changes on admit  - cardiology consulted, appreciate recs  - started on ACS protocol with heparin gtt X 48 hours, will end tonight  - continue ASA 81 mg daily, Plavix 75 mg daily, and Crestor 20 mg daily  - xarelto held  - TTE with EF 55%, grade II diastolic dysfunction  - Lasix held, Cr 1.7  - patient with additional episode of chest pain this am, relieved with nitro, repeat EKG unremarkable  - risk of contrast induced nephropathy 14%, will wait until tomorrow for cath, suspect Cr will be at baseline  - NPO midnight  - cath in am  - tele

## 2019-12-03 NOTE — PLAN OF CARE
12/03/19 0829   Post-Acute Status   Post-Acute Authorization Other   Other Status No Post-Acute Service Needs

## 2019-12-03 NOTE — NURSING
Called CSU for report. Nurse is providing patient care and will return call for report. Attempt #1

## 2019-12-03 NOTE — ASSESSMENT & PLAN NOTE
- Cr 1.7 on admit (baseline 1.4), likely in setting of decreased PO intake  - Ordered bolus and encouraged PO intake- Cr increased  - Patient overloaded on exam, started on IV lasix 40 mg daily  - Cr 1.5   - Trend daily  - Avoid nephrotoxins

## 2019-12-03 NOTE — PLAN OF CARE
Isiah You MD       [id number] - [place], [place] - [address]  [address]  [place] [address]  Phone: [phone] Fax: [phone]    [facility] - [place], [place] - [address]  [address] [address]  Phone: [phone] Fax: [phone]    Payor: [employer] MEDICARE / Plan: [employer] MEDICARE HMO / Product Type: Capitation /       12/03/19 1005   Discharge Assessment   Assessment Type Discharge Planning Assessment   Confirmed/corrected address and phone number on facesheet? Yes   Assessment information obtained from? Patient   Expected Length of Stay (days) 1   Communicated expected length of stay with patient/caregiver yes   Prior to hospitilization cognitive status: Alert/Oriented   Prior to hospitalization functional status: Independent   Current cognitive status: Alert/Oriented   Current Functional Status: Independent   Lives With spouse  (Ginny Taylor)   Able to Return to Prior Arrangements yes   Is patient able to care for self after discharge? Yes   Who are your caregiver(s) and their phone number(s)? Ginny Taylor-Spouse    [phone]   Patient's perception of discharge disposition home or selfcare   Readmission Within the Last 30 Days no previous admission in last 30 days   Patient currently being followed by outpatient case management? No   Patient currently receives any other outside agency services? No   Equipment Currently Used at Home none   Do you have any problems affording any of your prescribed medications? No   Is the patient taking medications as prescribed? yes   Does the patient have transportation home? Yes   Transportation Anticipated family or friend will provide   Dialysis Name and Scheduled days N/A   Does the patient receive services at the Coumadin Clinic? No   Discharge Plan A Home with family   Discharge Plan B Home with family   DME  Needed Upon Discharge    (TBD)   Patient/Family in Agreement with Plan yes

## 2019-12-03 NOTE — SUBJECTIVE & OBJECTIVE
Interval History: Patient reports 2 smaller episodes of chest pain overnight relieved by nitro, increase dose of IMDUR to 60mg qd. SOB unchanged. Interval improvement in CHAUNCEY (1.7 ->1.5), patient still hypervolemic. Continue diuresis w/ 40mg IV lasix at this time. NPO @ midnight. Eval for heart cath in AM.    Review of Systems   Constitution: Positive for malaise/fatigue. Negative for chills and fever.   HENT: Negative for congestion and sore throat.    Eyes: Negative for visual disturbance.   Cardiovascular: Positive for chest pain, dyspnea on exertion and orthopnea. Negative for irregular heartbeat, leg swelling and palpitations.   Respiratory: Positive for shortness of breath, sleep disturbances due to breathing and wheezing. Negative for cough.    Musculoskeletal: Negative for back pain and myalgias.   Gastrointestinal: Positive for diarrhea. Negative for abdominal pain, constipation, excessive appetite, hematemesis, melena, nausea and vomiting.   Genitourinary: Negative for dysuria and hematuria.   Neurological: Negative for headaches and weakness.   Psychiatric/Behavioral: Negative for altered mental status. The patient has insomnia. The patient is not nervous/anxious.      Objective:     Vital Signs (Most Recent):  Temp: 96.3 °F (35.7 °C) (12/03/19 1115)  Pulse: 65 (12/03/19 1115)  Resp: 18 (12/03/19 1115)  BP: (!) 142/67 (12/03/19 1115)  SpO2: 95 % (12/03/19 1227) Vital Signs (24h Range):  Temp:  [96.3 °F (35.7 °C)-97.8 °F (36.6 °C)] 96.3 °F (35.7 °C)  Pulse:  [60-79] 65  Resp:  [16-20] 18  SpO2:  [92 %-97 %] 95 %  BP: (126-158)/(58-71) 142/67     Weight: 103.3 kg (227 lb 11.8 oz)  Body mass index is 34.63 kg/m².     SpO2: 95 %  O2 Device (Oxygen Therapy): room air      Intake/Output Summary (Last 24 hours) at 12/3/2019 1248  Last data filed at 12/3/2019 1228  Gross per 24 hour   Intake 503.87 ml   Output 2700 ml   Net -2196.13 ml       Lines/Drains/Airways     Peripheral Intravenous Line                  Peripheral IV - Single Lumen 18 G Anterior;Distal;Right Forearm -- days         Peripheral IV - Single Lumen 12/02/19 1906 20 G Left Antecubital less than 1 day                Physical Exam   Constitutional: He is oriented to person, place, and time. He appears well-developed and well-nourished. No distress.   HENT:   Head: Normocephalic and atraumatic.   Eyes: Pupils are equal, round, and reactive to light. Conjunctivae and EOM are normal. No scleral icterus.   Neck: Normal range of motion. Neck supple. JVD present.   Cardiovascular: Normal rate, regular rhythm and normal heart sounds.   No murmur heard.  Pulmonary/Chest: Effort normal. No respiratory distress. He has wheezes. He has no rales.   Abdominal: Soft. Bowel sounds are normal. He exhibits no distension. There is no tenderness.   Musculoskeletal: Normal range of motion. He exhibits edema (1+ BL LE). He exhibits no tenderness.   Neurological: He is alert and oriented to person, place, and time. No cranial nerve deficit.   Skin: Skin is warm and dry. No rash noted. He is not diaphoretic. No erythema.   Psychiatric: He has a normal mood and affect. His behavior is normal. Judgment and thought content normal.   Nursing note and vitals reviewed.      Significant Labs:   BMP:   Recent Labs   Lab 12/02/19  0554 12/03/19  0646   * 100    140   K 3.7 4.0    106   CO2 19* 26   BUN 29* 33*   CREATININE 1.7* 1.5*   CALCIUM 9.9 9.3   MG  --  2.1   , CMP   Recent Labs   Lab 12/02/19  0554 12/03/19  0646    140   K 3.7 4.0    106   CO2 19* 26   * 100   BUN 29* 33*   CREATININE 1.7* 1.5*   CALCIUM 9.9 9.3   PROT 7.4  --    ALBUMIN 3.9  --    BILITOT 0.4  --    ALKPHOS 43*  --    AST 15  --    ALT 23  --    ANIONGAP 14 8   ESTGFRAFRICA 45.9* 53.4*   EGFRNONAA 39.7* 46.2*    and All pertinent lab results from the last 24 hours have been reviewed.    Significant Imaging: Echocardiogram:    Transthoracic echo (TTE) complete (Cupid Only):    Results for orders placed or performed during the hospital encounter of 12/02/19   Echo Color Flow Doppler? Yes   Result Value Ref Range    Ascending aorta 4.32 cm    STJ 3.89 cm    AV mean gradient 8 mmHg    Ao peak colby 2.11 m/s    Ao VTI 36.99 cm    IVS 0.66 0.6 - 1.1 cm    LA size 4.75 cm    Left Atrium Major Axis 7.12 cm    Left Atrium Minor Axis 6.66 cm    LVIDD 5.55 3.5 - 6.0 cm    LVIDS 4.40 (A) 2.1 - 4.0 cm    LVOT diameter 2.24 cm    LVOT peak VTI 28.34 cm    PW 0.73 0.6 - 1.1 cm    MV Peak A Colby 0.88 m/s    E wave decelartion time 120.46 msec    MV Peak E Colby 1.08 m/s    PV Peak D Colby 0.88 m/s    PV Peak S Colby 0.74 m/s    RA Major Axis 6.24 cm    RA Width 4.27 cm    RVDD 4.14 cm    Sinus 4.25 cm    TAPSE 2.29 cm    TR Max Colby 3.02 m/s    TDI LATERAL 0.12 m/s    TDI SEPTAL 0.09 m/s    LA WIDTH 5.21 cm    LV Diastolic Volume 150.81 mL    LV Systolic Volume 87.69 mL    LVOT peak colby 1.39 m/s    LV LATERAL E/E' RATIO 9.00 m/s    LV SEPTAL E/E' RATIO 12.00 m/s    FS 21 %    LA volume 144.77 cm3    LV mass 136.47 g    Left Ventricle Relative Wall Thickness 0.26 cm    AV valve area 3.02 cm2    AV Velocity Ratio 0.66     AV index (prosthetic) 0.77     E/A ratio 1.23     Mean e' 0.11 m/s    Pulm vein S/D ratio 0.84     LVOT area 3.9 cm2    LVOT stroke volume 111.63 cm3    AV peak gradient 18 mmHg    E/E' ratio 10.29 m/s    LV Systolic Volume Index 40.2 mL/m2    LV Diastolic Volume Index 69.16 mL/m2    LA Volume Index 66.4 mL/m2    LV Mass Index 63 g/m2    Triscuspid Valve Regurgitation Peak Gradient 36 mmHg    BSA 2.25 m2    Right Atrial Pressure (from IVC) 3 mmHg    TV rest pulmonary artery pressure 39 mmHg    Narrative    · Normal left ventricular systolic function. The estimated ejection   fraction is 55%  · Grade II (moderate) left ventricular diastolic dysfunction consistent   with pseudonormalization.  · Severe left atrial enlargement.  · Mild-to-moderate aortic regurgitation.  · Mild to moderate pulmonic  regurgitation.  · Mild mitral regurgitation.  · Mild tricuspid regurgitation.  · Normal right ventricular systolic function.  · Moderate right atrial enlargement.  · Normal central venous pressure (3 mm Hg).  · The estimated PA systolic pressure is 39 mm Hg     CHallenging study. Poor endocardial definition

## 2019-12-04 LAB
ANION GAP SERPL CALC-SCNC: 11 MMOL/L (ref 8–16)
APTT BLDCRRT: 40.8 SEC (ref 21–32)
BASOPHILS # BLD AUTO: 0.01 K/UL (ref 0–0.2)
BASOPHILS NFR BLD: 0.1 % (ref 0–1.9)
BUN SERPL-MCNC: 37 MG/DL (ref 8–23)
CALCIUM SERPL-MCNC: 9.2 MG/DL (ref 8.7–10.5)
CHLORIDE SERPL-SCNC: 106 MMOL/L (ref 95–110)
CO2 SERPL-SCNC: 25 MMOL/L (ref 23–29)
CREAT SERPL-MCNC: 1.7 MG/DL (ref 0.5–1.4)
DIFFERENTIAL METHOD: ABNORMAL
EOSINOPHIL # BLD AUTO: 0 K/UL (ref 0–0.5)
EOSINOPHIL NFR BLD: 0.1 % (ref 0–8)
ERYTHROCYTE [DISTWIDTH] IN BLOOD BY AUTOMATED COUNT: 13.8 % (ref 11.5–14.5)
EST. GFR  (AFRICAN AMERICAN): 45.9 ML/MIN/1.73 M^2
EST. GFR  (NON AFRICAN AMERICAN): 39.7 ML/MIN/1.73 M^2
GLUCOSE SERPL-MCNC: 91 MG/DL (ref 70–110)
HCT VFR BLD AUTO: 41.6 % (ref 40–54)
HGB BLD-MCNC: 13 G/DL (ref 14–18)
IMM GRANULOCYTES # BLD AUTO: 0.03 K/UL (ref 0–0.04)
IMM GRANULOCYTES NFR BLD AUTO: 0.3 % (ref 0–0.5)
LYMPHOCYTES # BLD AUTO: 2.3 K/UL (ref 1–4.8)
LYMPHOCYTES NFR BLD: 25.2 % (ref 18–48)
MAGNESIUM SERPL-MCNC: 2.1 MG/DL (ref 1.6–2.6)
MCH RBC QN AUTO: 29.9 PG (ref 27–31)
MCHC RBC AUTO-ENTMCNC: 31.3 G/DL (ref 32–36)
MCV RBC AUTO: 96 FL (ref 82–98)
MONOCYTES # BLD AUTO: 0.7 K/UL (ref 0.3–1)
MONOCYTES NFR BLD: 7.7 % (ref 4–15)
NEUTROPHILS # BLD AUTO: 6 K/UL (ref 1.8–7.7)
NEUTROPHILS NFR BLD: 66.6 % (ref 38–73)
NRBC BLD-RTO: 0 /100 WBC
PHOSPHATE SERPL-MCNC: 4.6 MG/DL (ref 2.7–4.5)
PLATELET # BLD AUTO: 167 K/UL (ref 150–350)
PMV BLD AUTO: 11.1 FL (ref 9.2–12.9)
POTASSIUM SERPL-SCNC: 3.7 MMOL/L (ref 3.5–5.1)
RBC # BLD AUTO: 4.35 M/UL (ref 4.6–6.2)
SODIUM SERPL-SCNC: 142 MMOL/L (ref 136–145)
WBC # BLD AUTO: 9.07 K/UL (ref 3.9–12.7)

## 2019-12-04 PROCEDURE — 93005 ELECTROCARDIOGRAM TRACING: CPT | Mod: HCNC

## 2019-12-04 PROCEDURE — S4991 NICOTINE PATCH NONLEGEND: HCPCS | Mod: HCNC | Performed by: PHYSICIAN ASSISTANT

## 2019-12-04 PROCEDURE — 63600175 PHARM REV CODE 636 W HCPCS: Mod: HCNC | Performed by: PHYSICIAN ASSISTANT

## 2019-12-04 PROCEDURE — 20600001 HC STEP DOWN PRIVATE ROOM: Mod: HCNC

## 2019-12-04 PROCEDURE — 93010 ELECTROCARDIOGRAM REPORT: CPT | Mod: HCNC,,, | Performed by: INTERNAL MEDICINE

## 2019-12-04 PROCEDURE — 25000003 PHARM REV CODE 250: Mod: HCNC | Performed by: PHYSICIAN ASSISTANT

## 2019-12-04 PROCEDURE — 84100 ASSAY OF PHOSPHORUS: CPT | Mod: HCNC

## 2019-12-04 PROCEDURE — 99233 PR SUBSEQUENT HOSPITAL CARE,LEVL III: ICD-10-PCS | Mod: HCNC,,, | Performed by: PHYSICIAN ASSISTANT

## 2019-12-04 PROCEDURE — 99233 SBSQ HOSP IP/OBS HIGH 50: CPT | Mod: HCNC,,, | Performed by: PHYSICIAN ASSISTANT

## 2019-12-04 PROCEDURE — 80048 BASIC METABOLIC PNL TOTAL CA: CPT | Mod: HCNC

## 2019-12-04 PROCEDURE — 63600175 PHARM REV CODE 636 W HCPCS: Mod: HCNC | Performed by: STUDENT IN AN ORGANIZED HEALTH CARE EDUCATION/TRAINING PROGRAM

## 2019-12-04 PROCEDURE — 25000003 PHARM REV CODE 250: Mod: HCNC | Performed by: STUDENT IN AN ORGANIZED HEALTH CARE EDUCATION/TRAINING PROGRAM

## 2019-12-04 PROCEDURE — 36415 COLL VENOUS BLD VENIPUNCTURE: CPT | Mod: HCNC

## 2019-12-04 PROCEDURE — 25000242 PHARM REV CODE 250 ALT 637 W/ HCPCS: Mod: HCNC | Performed by: PHYSICIAN ASSISTANT

## 2019-12-04 PROCEDURE — 85025 COMPLETE CBC W/AUTO DIFF WBC: CPT | Mod: HCNC

## 2019-12-04 PROCEDURE — 83735 ASSAY OF MAGNESIUM: CPT | Mod: HCNC

## 2019-12-04 PROCEDURE — 93010 EKG 12-LEAD: ICD-10-PCS | Mod: HCNC,,, | Performed by: INTERNAL MEDICINE

## 2019-12-04 PROCEDURE — 85730 THROMBOPLASTIN TIME PARTIAL: CPT | Mod: HCNC

## 2019-12-04 PROCEDURE — 94640 AIRWAY INHALATION TREATMENT: CPT | Mod: HCNC

## 2019-12-04 PROCEDURE — 94761 N-INVAS EAR/PLS OXIMETRY MLT: CPT | Mod: HCNC

## 2019-12-04 PROCEDURE — 99232 PR SUBSEQUENT HOSPITAL CARE,LEVL II: ICD-10-PCS | Mod: HCNC,GC,, | Performed by: INTERNAL MEDICINE

## 2019-12-04 PROCEDURE — 99232 SBSQ HOSP IP/OBS MODERATE 35: CPT | Mod: HCNC,GC,, | Performed by: INTERNAL MEDICINE

## 2019-12-04 RX ORDER — FUROSEMIDE 10 MG/ML
40 INJECTION INTRAMUSCULAR; INTRAVENOUS 2 TIMES DAILY
Status: DISCONTINUED | OUTPATIENT
Start: 2019-12-04 | End: 2019-12-05

## 2019-12-04 RX ORDER — ISOSORBIDE MONONITRATE 30 MG/1
30 TABLET, EXTENDED RELEASE ORAL ONCE
Status: COMPLETED | OUTPATIENT
Start: 2019-12-04 | End: 2019-12-04

## 2019-12-04 RX ORDER — POTASSIUM CHLORIDE 20 MEQ/1
40 TABLET, EXTENDED RELEASE ORAL ONCE
Status: COMPLETED | OUTPATIENT
Start: 2019-12-04 | End: 2019-12-04

## 2019-12-04 RX ORDER — POLYETHYLENE GLYCOL 3350 17 G/17G
17 POWDER, FOR SOLUTION ORAL 2 TIMES DAILY PRN
Status: DISCONTINUED | OUTPATIENT
Start: 2019-12-04 | End: 2019-12-05 | Stop reason: HOSPADM

## 2019-12-04 RX ADMIN — GUAIFENESIN AND DEXTROMETHORPHAN HYDROBROMIDE 1 TABLET: 600; 30 TABLET, EXTENDED RELEASE ORAL at 08:12

## 2019-12-04 RX ADMIN — FLUTICASONE PROPIONATE 100 MCG: 50 SPRAY, METERED NASAL at 08:12

## 2019-12-04 RX ADMIN — NITROGLYCERIN 0.4 MG: 0.4 TABLET SUBLINGUAL at 08:12

## 2019-12-04 RX ADMIN — TIOTROPIUM BROMIDE 18 MCG: 18 CAPSULE ORAL; RESPIRATORY (INHALATION) at 08:12

## 2019-12-04 RX ADMIN — FUROSEMIDE 40 MG: 10 INJECTION, SOLUTION INTRAMUSCULAR; INTRAVENOUS at 02:12

## 2019-12-04 RX ADMIN — PANTOPRAZOLE SODIUM 40 MG: 40 TABLET, DELAYED RELEASE ORAL at 08:12

## 2019-12-04 RX ADMIN — GUAIFENESIN AND DEXTROMETHORPHAN HYDROBROMIDE 1 TABLET: 600; 30 TABLET, EXTENDED RELEASE ORAL at 09:12

## 2019-12-04 RX ADMIN — HEPARIN SODIUM 15 UNITS/KG/HR: 10000 INJECTION, SOLUTION INTRAVENOUS at 02:12

## 2019-12-04 RX ADMIN — ASPIRIN 81 MG: 81 TABLET, COATED ORAL at 08:12

## 2019-12-04 RX ADMIN — AMLODIPINE BESYLATE 5 MG: 5 TABLET ORAL at 08:12

## 2019-12-04 RX ADMIN — NICOTINE 1 PATCH: 21 PATCH, EXTENDED RELEASE TRANSDERMAL at 08:12

## 2019-12-04 RX ADMIN — PREDNISONE 40 MG: 20 TABLET ORAL at 08:12

## 2019-12-04 RX ADMIN — CLOPIDOGREL BISULFATE 75 MG: 75 TABLET ORAL at 08:12

## 2019-12-04 RX ADMIN — EZETIMIBE 10 MG: 10 TABLET ORAL at 08:12

## 2019-12-04 RX ADMIN — IPRATROPIUM BROMIDE AND ALBUTEROL SULFATE 3 ML: .5; 3 SOLUTION RESPIRATORY (INHALATION) at 08:12

## 2019-12-04 RX ADMIN — ISOSORBIDE MONONITRATE 30 MG: 30 TABLET, EXTENDED RELEASE ORAL at 02:12

## 2019-12-04 RX ADMIN — ROSUVASTATIN CALCIUM 20 MG: 20 TABLET, FILM COATED ORAL at 08:12

## 2019-12-04 RX ADMIN — POTASSIUM CHLORIDE 40 MEQ: 1500 TABLET, EXTENDED RELEASE ORAL at 08:12

## 2019-12-04 RX ADMIN — ACETAMINOPHEN 650 MG: 325 TABLET ORAL at 06:12

## 2019-12-04 RX ADMIN — AZITHROMYCIN 250 MG: 250 TABLET, FILM COATED ORAL at 08:12

## 2019-12-04 RX ADMIN — DOXAZOSIN 8 MG: 2 TABLET ORAL at 09:12

## 2019-12-04 RX ADMIN — BENZONATATE 100 MG: 100 CAPSULE ORAL at 11:12

## 2019-12-04 RX ADMIN — PSYLLIUM HUSK 1 PACKET: 3.4 POWDER ORAL at 08:12

## 2019-12-04 RX ADMIN — FUROSEMIDE 40 MG: 10 INJECTION, SOLUTION INTRAMUSCULAR; INTRAVENOUS at 09:12

## 2019-12-04 RX ADMIN — AMLODIPINE BESYLATE 5 MG: 5 TABLET ORAL at 09:12

## 2019-12-04 RX ADMIN — ISOSORBIDE MONONITRATE 30 MG: 30 TABLET, EXTENDED RELEASE ORAL at 08:12

## 2019-12-04 RX ADMIN — ISOSORBIDE MONONITRATE 60 MG: 60 TABLET, EXTENDED RELEASE ORAL at 08:12

## 2019-12-04 RX ADMIN — FENOFIBRATE 145 MG: 145 TABLET, FILM COATED ORAL at 08:12

## 2019-12-04 RX ADMIN — IPRATROPIUM BROMIDE AND ALBUTEROL SULFATE 3 ML: .5; 3 SOLUTION RESPIRATORY (INHALATION) at 01:12

## 2019-12-04 NOTE — SUBJECTIVE & OBJECTIVE
Interval History: NAEON.  Patient reports no further episodes of CP overnight and SOB is stable. Patient reports continued cough productive of mucus. Scattered wheezes on exam, interval improvement noted. Slight increase in creatinine overnight (1.5->1.7). Patient w/ appropriate urine output on diuresis, rec transitioning to PO lasix 40 mg BID today. Continue ASA, plavix and IMDUR at current dosage. Stop heparin gtt 48 hours after initiation. MARE risk 5. Will continue to optimize patient for cath, reevaluate in AM and plan for intervention pending improvement in renal function.    Review of Systems   Constitution: Positive for malaise/fatigue. Negative for chills and fever.   HENT: Negative for congestion and sore throat.    Eyes: Negative for visual disturbance.   Cardiovascular: Positive for orthopnea. Negative for chest pain, irregular heartbeat, leg swelling and palpitations.   Respiratory: Positive for cough, shortness of breath, sputum production and wheezing.    Musculoskeletal: Negative for back pain and myalgias.   Gastrointestinal: Positive for constipation. Negative for abdominal pain, diarrhea, nausea and vomiting.   Genitourinary: Negative for dysuria and hematuria.   Neurological: Negative for dizziness, headaches, light-headedness and weakness.   Psychiatric/Behavioral: Negative for altered mental status.     Objective:     Vital Signs (Most Recent):  Temp: 97.6 °F (36.4 °C) (12/04/19 0844)  Pulse: 63 (12/04/19 0850)  Resp: 20 (12/04/19 0850)  BP: (!) 151/69 (12/04/19 0844)  SpO2: 96 % (12/04/19 0844) Vital Signs (24h Range):  Temp:  [96.2 °F (35.7 °C)-98.6 °F (37 °C)] 97.6 °F (36.4 °C)  Pulse:  [57-86] 63  Resp:  [16-21] 20  SpO2:  [92 %-98 %] 96 %  BP: (121-151)/(59-69) 151/69     Weight: 102.1 kg (225 lb 1.6 oz)  Body mass index is 34.23 kg/m².     SpO2: 96 %  O2 Device (Oxygen Therapy): room air      Intake/Output Summary (Last 24 hours) at 12/4/2019 0935  Last data filed at 12/4/2019 0906  Gross  per 24 hour   Intake 1140 ml   Output 2675 ml   Net -1535 ml       Lines/Drains/Airways     Peripheral Intravenous Line                 Peripheral IV - Single Lumen 18 G Anterior;Distal;Right Forearm -- days         Peripheral IV - Single Lumen 12/02/19 1906 20 G Left Antecubital 1 day                Physical Exam   Constitutional: He is oriented to person, place, and time. He appears well-developed and well-nourished. No distress.   HENT:   Head: Normocephalic and atraumatic.   Eyes: Pupils are equal, round, and reactive to light. EOM are normal. No scleral icterus.   Neck: Normal range of motion. Neck supple. Hepatojugular reflux and JVD present.   Cardiovascular: Normal rate, regular rhythm, normal heart sounds and intact distal pulses.   No murmur heard.  Pulmonary/Chest: Effort normal. No respiratory distress. He has wheezes.   Abdominal: Soft. Bowel sounds are normal. He exhibits no distension. There is no tenderness.   Musculoskeletal: Normal range of motion. He exhibits no edema or tenderness.   Neurological: He is alert and oriented to person, place, and time.   Skin: Skin is warm and dry. He is not diaphoretic. No erythema.   Psychiatric: He has a normal mood and affect. His behavior is normal. Judgment and thought content normal.   Nursing note and vitals reviewed.      Significant Labs:   BMP:   Recent Labs   Lab 12/03/19 0646 12/04/19 0417    91    142   K 4.0 3.7    106   CO2 26 25   BUN 33* 37*   CREATININE 1.5* 1.7*   CALCIUM 9.3 9.2   MG 2.1 2.1   , CMP   Recent Labs   Lab 12/03/19  0646 12/04/19 0417    142   K 4.0 3.7    106   CO2 26 25    91   BUN 33* 37*   CREATININE 1.5* 1.7*   CALCIUM 9.3 9.2   ANIONGAP 8 11   ESTGFRAFRICA 53.4* 45.9*   EGFRNONAA 46.2* 39.7*   , CBC   Recent Labs   Lab 12/03/19 0646 12/04/19 0417   WBC 11.37 9.07   HGB 13.2* 13.0*   HCT 39.5* 41.6    167    and All pertinent lab results from the last 24 hours have been  reviewed.    Significant Imaging: Echocardiogram:   Transthoracic echo (TTE) complete (Cupid Only):   Results for orders placed or performed during the hospital encounter of 12/02/19   Echo Color Flow Doppler? Yes   Result Value Ref Range    Ascending aorta 4.32 cm    STJ 3.89 cm    AV mean gradient 8 mmHg    Ao peak colby 2.11 m/s    Ao VTI 36.99 cm    IVS 0.66 0.6 - 1.1 cm    LA size 4.75 cm    Left Atrium Major Axis 7.12 cm    Left Atrium Minor Axis 6.66 cm    LVIDD 5.55 3.5 - 6.0 cm    LVIDS 4.40 (A) 2.1 - 4.0 cm    LVOT diameter 2.24 cm    LVOT peak VTI 28.34 cm    PW 0.73 0.6 - 1.1 cm    MV Peak A Colby 0.88 m/s    E wave decelartion time 120.46 msec    MV Peak E Colby 1.08 m/s    PV Peak D Colby 0.88 m/s    PV Peak S Colby 0.74 m/s    RA Major Axis 6.24 cm    RA Width 4.27 cm    RVDD 4.14 cm    Sinus 4.25 cm    TAPSE 2.29 cm    TR Max Colby 3.02 m/s    TDI LATERAL 0.12 m/s    TDI SEPTAL 0.09 m/s    LA WIDTH 5.21 cm    LV Diastolic Volume 150.81 mL    LV Systolic Volume 87.69 mL    LVOT peak colby 1.39 m/s    LV LATERAL E/E' RATIO 9.00 m/s    LV SEPTAL E/E' RATIO 12.00 m/s    FS 21 %    LA volume 144.77 cm3    LV mass 136.47 g    Left Ventricle Relative Wall Thickness 0.26 cm    AV valve area 3.02 cm2    AV Velocity Ratio 0.66     AV index (prosthetic) 0.77     E/A ratio 1.23     Mean e' 0.11 m/s    Pulm vein S/D ratio 0.84     LVOT area 3.9 cm2    LVOT stroke volume 111.63 cm3    AV peak gradient 18 mmHg    E/E' ratio 10.29 m/s    LV Systolic Volume Index 40.2 mL/m2    LV Diastolic Volume Index 69.16 mL/m2    LA Volume Index 66.4 mL/m2    LV Mass Index 63 g/m2    Triscuspid Valve Regurgitation Peak Gradient 36 mmHg    BSA 2.25 m2    Right Atrial Pressure (from IVC) 3 mmHg    TV rest pulmonary artery pressure 39 mmHg    Narrative    · Normal left ventricular systolic function. The estimated ejection   fraction is 55%  · Grade II (moderate) left ventricular diastolic dysfunction consistent   with pseudonormalization.  ·  Severe left atrial enlargement.  · Mild-to-moderate aortic regurgitation.  · Mild to moderate pulmonic regurgitation.  · Mild mitral regurgitation.  · Mild tricuspid regurgitation.  · Normal right ventricular systolic function.  · Moderate right atrial enlargement.  · Normal central venous pressure (3 mm Hg).  · The estimated PA systolic pressure is 39 mm Hg     CHallenging study. Poor endocardial definition

## 2019-12-04 NOTE — ASSESSMENT & PLAN NOTE
- Cr 1.7 on admit (baseline 1.4), likely in setting of decreased PO intake  - Ordered bolus and encouraged PO intake- Cr increased  - Patient overloaded on exam, started on IV lasix 40 mg daily  - Cr 1.5 --> 1.7, patient dry on exam  - lasix held  - Trend daily  - Avoid nephrotoxins

## 2019-12-04 NOTE — PROGRESS NOTES
"[facility] Hospital Medicine  Progress Note    Patient Name: Lonnie Taylor  MRN: [id number]  Patient Class: IP- Inpatient   Admission Date: 12/2/2019  Length of Stay: 2 days  Attending Physician: Donal Holley MD  Primary Care Provider: Isiah You MD    [facility] Medicine Team: [facility]OS[facility] MED F Hui Argueta PA-C    Subjective:     Principal Problem:NSTEMI (non-ST elevated myocardial infarction)        HPI:  69 year old male with a PMHx of HTN, HLD, CAD s/p CABG, PVD s/p BLE stents, COPD, and tobacco abuse (55 year pack history) presenting to the ER with wife at bedside for shortness of breath. Patient reports he "started getting sick on Monday." He reports he initially developed a productive cough of green sputum, SOB, and wheezing. He presented to urgent care on Wednesday and was diagnosed with bronchitis; he was prescribed Cefdinir. He reports compliance with antibiotics. He reported to the ER Sunday due to persistent complaints. He reports improvement with breathing treatments and was discharged with PO prednisone. Patient reports he presented back to the ER today due to persistent SOB. At the time of my exam, patient has no new complaints. Patient reports he has now a dry cough. Improved SOB with breathing treatments. Denies CP, abdominal pain, N/V/D, fever/chills, palpitations, focal weakness, headache, myalgias, dysuria/frequency.       HDS on admission, stable on 2L NC. Afebrile without leukocytosis. Labs significant for CHAUNCEY and troponin 0.032. BNP WNL. EKG shows NSR (HR 75), no changes noted. CXR shows bibasilar increased attenuation; ?early pulmonary edema. Received IV methylprednisolone, breathing treatments, and azithro in the ER.    Overview/Hospital Course:  Mr. Taylor was admitted to observation for COPD exacerbation. Started on azithromycin, prednisone, and duonebs. Patient with chest pain and elevated troponin to max .167. EKG without changes. Cardiology consulted, " started on ACS protocol for NSTEMI. Started on imdur 30 mg for angina and diuresing with lasix. TTE with EF 55%, grade II diastolic dysfunction, severe LAE. Plan on LHC once Creatinine at baseline.     Interval History: Patient reports improvement in SOB, cough, mucous production, and wheezing. Patient with episode of chest pain after breathing treatment, resolved after third nitro. Repeat EKG unchanged from previous. Cr 1.7 this am, lasix discontinued. LHC postponed until creatinine at baseline (1.4).     Review of Systems   Constitutional: Positive for fatigue. Negative for chills and fever.   HENT: Negative for congestion and rhinorrhea.    Respiratory: Positive for cough (improving), shortness of breath (improving) and wheezing (improving).    Cardiovascular: Positive for chest pain. Negative for palpitations and leg swelling.   Gastrointestinal: Negative for abdominal distention and abdominal pain.   Genitourinary: Negative for difficulty urinating and hematuria.   Musculoskeletal: Negative for arthralgias and back pain.   Neurological: Negative for dizziness and light-headedness.   Psychiatric/Behavioral: Negative for agitation, behavioral problems, confusion and decreased concentration.     Objective:     Vital Signs (Most Recent):  Temp: 97.5 °F (36.4 °C) (12/04/19 1137)  Pulse: 64 (12/04/19 1137)  Resp: 17 (12/04/19 1137)  BP: (!) 119/58 (12/04/19 1137)  SpO2: 96 % (12/04/19 1137) Vital Signs (24h Range):  Temp:  [96.2 °F (35.7 °C)-98.6 °F (37 °C)] 97.5 °F (36.4 °C)  Pulse:  [57-86] 64  Resp:  [16-21] 17  SpO2:  [92 %-98 %] 96 %  BP: (119-151)/(58-69) 119/58     Weight: 102.1 kg (225 lb 1.6 oz)  Body mass index is 34.23 kg/m².    Intake/Output Summary (Last 24 hours) at 12/4/2019 1140  Last data filed at 12/4/2019 0911  Gross per 24 hour   Intake 1380 ml   Output 2675 ml   Net -1295 ml      Physical Exam   Constitutional: He is oriented to person, place, and time. He appears well-developed and  well-nourished.   HENT:   Head: Normocephalic and atraumatic.   Eyes: Pupils are equal, round, and reactive to light. EOM are normal.   Neck: Normal range of motion. Neck supple.   Cardiovascular: Normal rate and regular rhythm.   Pulmonary/Chest: Effort normal. He has wheezes (bibasilar, improving).   Coarse breath sounds improving   Abdominal: Soft. Bowel sounds are normal.   Musculoskeletal: Normal range of motion. He exhibits no edema or tenderness.   Neurological: He is alert and oriented to person, place, and time.   Skin: Skin is warm and dry. Capillary refill takes less than 2 seconds.   Psychiatric: He has a normal mood and affect. His behavior is normal.   Nursing note and vitals reviewed.      Significant Labs:   CBC:   Recent Labs   Lab 12/03/19 0646 12/04/19 0417   WBC 11.37 9.07   HGB 13.2* 13.0*   HCT 39.5* 41.6    167     CMP:   Recent Labs   Lab 12/03/19 0646 12/04/19 0417    142   K 4.0 3.7    106   CO2 26 25    91   BUN 33* 37*   CREATININE 1.5* 1.7*   CALCIUM 9.3 9.2   ANIONGAP 8 11   EGFRNONAA 46.2* 39.7*     Magnesium:   Recent Labs   Lab 12/03/19 0646 12/04/19 0417   MG 2.1 2.1     POCT Glucose: No results for input(s): POCTGLUCOSE in the last 48 hours.    Significant Imaging: I have reviewed all pertinent imaging results/findings within the past 24 hours.      Assessment/Plan:      * NSTEMI (non-ST elevated myocardial infarction)  Elevated troponin  Acute diastolic heart failure  - trop .032--> .088 --> .137--> .167  - EKG without acute ischemic changes on admit  - cardiology consulted, appreciate recs  - started on ACS protocol with heparin gtt X 48 hours, will end tonight  - continue ASA 81 mg daily, Plavix 75 mg daily, and Crestor 20 mg daily  - xarelto held  - TTE with EF 55%, grade II diastolic dysfunction  - Lasix held, Cr 1.7  - patient with additional episode of chest pain this am, relieved with nitro, repeat EKG unremarkable  - risk of contrast induced  nephropathy 14%, will wait until tomorrow for cath, suspect Cr will be at baseline  - NPO midnight  - cath in am  - tele    Elevated troponin        Acute kidney injury superimposed on chronic kidney disease  - Cr 1.7 on admit (baseline 1.4), likely in setting of decreased PO intake  - Ordered bolus and encouraged PO intake- Cr increased  - Patient overloaded on exam, started on IV lasix 40 mg daily  - Cr 1.5 --> 1.7  - lasix held  - Trend daily  - Avoid nephrotoxins    COPD exacerbation  Elevated troponin  - HDS on admission, stable on 2L NC  - Afebrile without leukocytosis  - Labs significant for CHAUNCEY (see below)   - EKG shows NSR (HR 75), no changes noted  - CXR shows bibasilar increased attenuation; ?early pulmonary edema  - Received IV methylprednisolone and azithro in the ER  - Continue azithro, PO prednisone, and duonebs  - Duonebs PRN, wean O2 as tolerated, supportive care  - Pulm follow up as outpatient  - Telemetry    Tobacco abuse  55 year pack history  - Discussed importance of tobacco cessation  - Nicotine patch ordered    HTN (hypertension)  - Elevated on admit in setting of missed AM meds  - Resume home regimen amlodipine 5 mg daily  - added imdur 60 mg daily  - Continue to monitor    CAD (coronary artery disease)  HLD  S/p CABG  - EKG without changes  - Continue ASA, plavix, atorvastatin, fenofibrate, and zetia    PVD (peripheral vascular disease)  S/p BLE stents  - Xarelto held for ACS protocol      VTE Risk Mitigation (From admission, onward)         Ordered     heparin 25,000 units in dextrose 5% 250 mL (100 units/mL) infusion LOW INTENSITY nomogram - [facility]  Continuous     Question:  Heparin Infusion Adjustment (DO NOT MODIFY ANSWER)  Answer:  [email or web address]\Images\Pharmacy\HeparinInfusions\heparin LOW INTENSITY nomogram for [facility] [id number]    12/02/19 1529     heparin 25,000 units in dextrose 5% (100 units/ml) IV bolus from bag - ADDITIONAL PRN BOLUS - 30 units/kg (max bolus 4000 units)  As  needed (PRN)     Question:  Heparin Infusion Adjustment (DO NOT MODIFY ANSWER)  Answer:  \\[email or web address]\Images\Pharmacy\HeparinInfusions\heparin LOW INTENSITY nomogram for [facility] [id number]    12/02/19 1529     heparin 25,000 units in dextrose 5% (100 units/ml) IV bolus from bag - ADDITIONAL PRN BOLUS - 60 units/kg (max bolus 4000 units)  As needed (PRN)     Question:  Heparin Infusion Adjustment (DO NOT MODIFY ANSWER)  Answer:  \\[email or web address]\Images\Pharmacy\HeparinInfusions\heparin LOW INTENSITY nomogram for [facility] [id number]    12/02/19 1529     Reason for No Pharmacological VTE Prophylaxis  Once     Question:  Reasons:  Answer:  Already adequately anticoagulated on oral Anticoagulants    12/02/19 0851     IP VTE HIGH RISK PATIENT  Once      12/02/19 0851                      Hui Argueta PA-C  Department of Hospital Medicine   [facility]

## 2019-12-04 NOTE — ASSESSMENT & PLAN NOTE
HLD  S/p CABG  - EKG without changes  - Continue ASA, plavix, atorvastatin, fenofibrate, and zetia

## 2019-12-04 NOTE — ASSESSMENT & PLAN NOTE
Lonnie Taylor is a 70 yo WM w/ hx of CAD (s/p CABG '97), HTN, HLD, CKD, PVD (s/p aortobifemoral bypass and BL LE PTAs), AAA s/p repair, multiple PCI (most recent in 2018 @ [facility]), and tobacco abuse who presents to [facility] ED c/o worsening SOB and wheezing.  - Chest pain (6/10) did not radiate, was non-exertional, lasted approximately 20-30 minutes, and was relieved by nitroglycerine x1.   - Troponin 0.032 -> 0.088 -> 0.136  - BNP 36  - EKG showed normal sinus rhythm w/ HR 75 and left anterior fascicular block unchanged from previous EKG.     Plan:  - EDVIN score: 5  - Elevated troponin  - Stuttering angina  - Concern for NSTEMI  - Continue current medications per primary team: asa 81mg, Plavix 75mg, and Crestor 20mg  - Hold Xarelto  - Echo on 12/2 shows EF 55% and no wall motion abnormalities  - Stop heparin gtt 48 hours after initiation (12/4 @ 1730)  - Continue IMDUR 60mg qd  - holding lasix today in setting of worsening CHAUNCEY  - Conservative management for now as patient presents w/ CHAUNCEY  - Interval worsening of CHAUNCEY (1.5 -> 1.7), re-evaluate in AM  - MARE risk score: 5  - Risk of Post-PCI MARE: 7.5%  - Obtain cardiology records from [facility] regarding previous PCI  - Continue to monitor creatinine, BMP daily  - Daily weights, strict I/Os  - NPO @ midnight, eval for heart cath in AM  - Continue telemetry  - Patient will need close outpatient follow up w/ cardiologist, Dr. Martinez

## 2019-12-04 NOTE — NURSING
Patient c/o chest pain unresolved after 3 tablets nitro  EKG ordered stat  JEFFY Argueta notified and at bedside  O2 2L NC on patient     Will continue to monitor

## 2019-12-04 NOTE — PROGRESS NOTES
[facility]  Cardiology  Progress Note    Patient Name: Lonnie Taylor  MRN: [id number]  Admission Date: 12/2/2019  Hospital Length of Stay: 2 days  Code Status: Full Code   Attending Physician: Donal Holley MD   Primary Care Physician: Isiah You MD  Expected Discharge Date:   Principal Problem:NSTEMI (non-ST elevated myocardial infarction)    Subjective:     Hospital Course:   Consult acknowledged on 12/2. Patient seen and examined in ED. Follow up troponin 0.136. Concern for stuttering angina and NSTEMI. EDVIN 5. Hold Xarelto. Start heparin gtt. Continue asa, plavix, statin. Start IMDUR 30mg qd. Diurese w/ 40mg IV Lasix BID. Conservative management for now as patient presents w/ CHAUNCEY. (12/3) interval improvement in CHAUNCEY (1.7 ->1.5), patient still hypervolemic. Continue 40mg IV lasix at this time. Patient w/ 2 smaller episodes of chest pain overnight relieved by nitro, increase dose of IMDUR to 60mg qd. NPO @ midnight. Eval for heart cath in AM. (12/4) Patient reports no further episodes of CP overnight and SOB is stable. Patient w/ slight increase in creatinine overnight (1.5->1.7). Patient urinating well on diuresis, continue 40mg IV Lasix BID. Continue ASA, plavix at current dosage. Increase IMDUR to 90mg qd.  Stop heparin gtt 48 hours after initiation. MARE risk 5. Will continue to optimize patient for cath, reevaluate in AM and plan for intervention pending improvement in renal function.    Interval History: NAEON.  Patient reports no further episodes of CP overnight and SOB is stable. Patient reports continued cough productive of mucus. Scattered wheezes on exam, interval improvement noted. Slight increase in creatinine overnight (1.5->1.7). Patient w/ appropriate urine output on diuresis, Patient urinating well on diuresis, continue 40mg IV Lasix BID. Continue ASA, plavix at current dosage. Increase IMDUR to 90mg qd. Stop heparin gtt 48 hours after initiation. MARE risk 5. Will  continue to optimize patient for cath, reevaluate in AM and plan for intervention pending improvement in renal function.    Review of Systems   Constitution: Positive for malaise/fatigue. Negative for chills and fever.   HENT: Negative for congestion and sore throat.    Eyes: Negative for visual disturbance.   Cardiovascular: Positive for orthopnea. Negative for chest pain, irregular heartbeat, leg swelling and palpitations.   Respiratory: Positive for cough, shortness of breath, sputum production and wheezing.    Musculoskeletal: Negative for back pain and myalgias.   Gastrointestinal: Positive for constipation. Negative for abdominal pain, diarrhea, nausea and vomiting.   Genitourinary: Negative for dysuria and hematuria.   Neurological: Negative for dizziness, headaches, light-headedness and weakness.   Psychiatric/Behavioral: Negative for altered mental status.     Objective:     Vital Signs (Most Recent):  Temp: 97.6 °F (36.4 °C) (12/04/19 0844)  Pulse: 63 (12/04/19 0850)  Resp: 20 (12/04/19 0850)  BP: (!) 151/69 (12/04/19 0844)  SpO2: 96 % (12/04/19 0844) Vital Signs (24h Range):  Temp:  [96.2 °F (35.7 °C)-98.6 °F (37 °C)] 97.6 °F (36.4 °C)  Pulse:  [57-86] 63  Resp:  [16-21] 20  SpO2:  [92 %-98 %] 96 %  BP: (121-151)/(59-69) 151/69     Weight: 102.1 kg (225 lb 1.6 oz)  Body mass index is 34.23 kg/m².     SpO2: 96 %  O2 Device (Oxygen Therapy): room air      Intake/Output Summary (Last 24 hours) at 12/4/2019 0935  Last data filed at 12/4/2019 0906  Gross per 24 hour   Intake 1140 ml   Output 2675 ml   Net -1535 ml       Lines/Drains/Airways     Peripheral Intravenous Line                 Peripheral IV - Single Lumen 18 G Anterior;Distal;Right Forearm -- days         Peripheral IV - Single Lumen 12/02/19 1906 20 G Left Antecubital 1 day                Physical Exam   Constitutional: He is oriented to person, place, and time. He appears well-developed and well-nourished. No distress.   HENT:   Head:  Normocephalic and atraumatic.   Eyes: Pupils are equal, round, and reactive to light. EOM are normal. No scleral icterus.   Neck: Normal range of motion. Neck supple. Hepatojugular reflux and JVD present.   Cardiovascular: Normal rate, regular rhythm, normal heart sounds and intact distal pulses.   No murmur heard.  Pulmonary/Chest: Effort normal. No respiratory distress. He has wheezes.   Abdominal: Soft. Bowel sounds are normal. He exhibits no distension. There is no tenderness.   Musculoskeletal: Normal range of motion. He exhibits no edema or tenderness.   Neurological: He is alert and oriented to person, place, and time.   Skin: Skin is warm and dry. He is not diaphoretic. No erythema.   Psychiatric: He has a normal mood and affect. His behavior is normal. Judgment and thought content normal.   Nursing note and vitals reviewed.      Significant Labs:   BMP:   Recent Labs   Lab 12/03/19  0646 12/04/19 0417    91    142   K 4.0 3.7    106   CO2 26 25   BUN 33* 37*   CREATININE 1.5* 1.7*   CALCIUM 9.3 9.2   MG 2.1 2.1   , CMP   Recent Labs   Lab 12/03/19  0646 12/04/19 0417    142   K 4.0 3.7    106   CO2 26 25    91   BUN 33* 37*   CREATININE 1.5* 1.7*   CALCIUM 9.3 9.2   ANIONGAP 8 11   ESTGFRAFRICA 53.4* 45.9*   EGFRNONAA 46.2* 39.7*   , CBC   Recent Labs   Lab 12/03/19  0646 12/04/19 0417   WBC 11.37 9.07   HGB 13.2* 13.0*   HCT 39.5* 41.6    167    and All pertinent lab results from the last 24 hours have been reviewed.    Significant Imaging: Echocardiogram:   Transthoracic echo (TTE) complete (Cupid Only):   Results for orders placed or performed during the hospital encounter of 12/02/19   Echo Color Flow Doppler? Yes   Result Value Ref Range    Ascending aorta 4.32 cm    STJ 3.89 cm    AV mean gradient 8 mmHg    Ao peak clemente 2.11 m/s    Ao VTI 36.99 cm    IVS 0.66 0.6 - 1.1 cm    LA size 4.75 cm    Left Atrium Major Axis 7.12 cm    Left Atrium Minor Axis 6.66  cm    LVIDD 5.55 3.5 - 6.0 cm    LVIDS 4.40 (A) 2.1 - 4.0 cm    LVOT diameter 2.24 cm    LVOT peak VTI 28.34 cm    PW 0.73 0.6 - 1.1 cm    MV Peak A Colby 0.88 m/s    E wave decelartion time 120.46 msec    MV Peak E Colby 1.08 m/s    PV Peak D Colby 0.88 m/s    PV Peak S Colby 0.74 m/s    RA Major Axis 6.24 cm    RA Width 4.27 cm    RVDD 4.14 cm    Sinus 4.25 cm    TAPSE 2.29 cm    TR Max Colby 3.02 m/s    TDI LATERAL 0.12 m/s    TDI SEPTAL 0.09 m/s    LA WIDTH 5.21 cm    LV Diastolic Volume 150.81 mL    LV Systolic Volume 87.69 mL    LVOT peak colby 1.39 m/s    LV LATERAL E/E' RATIO 9.00 m/s    LV SEPTAL E/E' RATIO 12.00 m/s    FS 21 %    LA volume 144.77 cm3    LV mass 136.47 g    Left Ventricle Relative Wall Thickness 0.26 cm    AV valve area 3.02 cm2    AV Velocity Ratio 0.66     AV index (prosthetic) 0.77     E/A ratio 1.23     Mean e' 0.11 m/s    Pulm vein S/D ratio 0.84     LVOT area 3.9 cm2    LVOT stroke volume 111.63 cm3    AV peak gradient 18 mmHg    E/E' ratio 10.29 m/s    LV Systolic Volume Index 40.2 mL/m2    LV Diastolic Volume Index 69.16 mL/m2    LA Volume Index 66.4 mL/m2    LV Mass Index 63 g/m2    Triscuspid Valve Regurgitation Peak Gradient 36 mmHg    BSA 2.25 m2    Right Atrial Pressure (from IVC) 3 mmHg    TV rest pulmonary artery pressure 39 mmHg    Narrative    · Normal left ventricular systolic function. The estimated ejection   fraction is 55%  · Grade II (moderate) left ventricular diastolic dysfunction consistent   with pseudonormalization.  · Severe left atrial enlargement.  · Mild-to-moderate aortic regurgitation.  · Mild to moderate pulmonic regurgitation.  · Mild mitral regurgitation.  · Mild tricuspid regurgitation.  · Normal right ventricular systolic function.  · Moderate right atrial enlargement.  · Normal central venous pressure (3 mm Hg).  · The estimated PA systolic pressure is 39 mm Hg     CHallenging study. Poor endocardial definition       Assessment and Plan:     Brief HPI: Lonnie  Claudia is a 68 yo WM w/ extensive cardiac hx that was consulted to Cardiology for stuttering angina and NSTEMI. Continue conservative management at this time (asa, plavix, IMDUR) while treating CHAUNCEY (stop diuresis). Stop heparin gtt today. Evaluate for heart cath on 12/5 pending improvement in kidney function.    * NSTEMI (non-ST elevated myocardial infarction)  Lonnie Taylor is a 68 yo WM w/ hx of CAD (s/p CABG '97), HTN, HLD, CKD, PVD (s/p aortobifemoral bypass and BL LE PTAs), AAA s/p repair, multiple PCI (most recent in 2018 @ [facility]), and tobacco abuse who presents to [facility] ED c/o worsening SOB and wheezing.  - Chest pain (6/10) did not radiate, was non-exertional, lasted approximately 20-30 minutes, and was relieved by nitroglycerine x1.   - Troponin 0.032 -> 0.088 -> 0.136  - BNP 36  - EKG showed normal sinus rhythm w/ HR 75 and left anterior fascicular block unchanged from previous EKG.     Plan:  - EDVIN score: 5  - Elevated troponin  - Stuttering angina  - Concern for NSTEMI  - Continue current medications per primary team: asa 81mg, Plavix 75mg, and Crestor 20mg  - Hold Xarelto  - Echo on 12/2 shows EF 55% and no wall motion abnormalities  - Stop heparin gtt 48 hours after initiation (12/4 @ 1730)  - Increase IMDUR to 90mg qd  - Continue diuresis w/ 40mg IV Lasix BID  - Conservative management for now as patient presents w/ CHAUNCEY  - Interval worsening of CHAUNCEY (1.5 -> 1.7), re-evaluate in AM  - MARE risk score: 5  - Risk of Post-PCI MAER: 7.5%  - Obtain cardiology records from [facility] regarding previous PCI  - Continue to monitor creatinine, BMP daily  - Daily weights, strict I/Os  - NPO @ midnight, eval for heart cath in AM  - Continue telemetry  - Patient will need close outpatient follow up w/ cardiologist, Dr. Martinez    Elevated troponin  See NSTEMI        VTE Risk Mitigation (From admission, onward)         Ordered     heparin 25,000 units in dextrose 5% 250 mL (100 units/mL) infusion LOW INTENSITY nomogram - [facility]   Continuous     Question:  Heparin Infusion Adjustment (DO NOT MODIFY ANSWER)  Answer:  [email or web address]\epic\Images\Pharmacy\HeparinInfusions\heparin LOW INTENSITY nomogram for [facility] [id number]    12/02/19 1529     heparin 25,000 units in dextrose 5% (100 units/ml) IV bolus from bag - ADDITIONAL PRN BOLUS - 30 units/kg (max bolus 4000 units)  As needed (PRN)     Question:  Heparin Infusion Adjustment (DO NOT MODIFY ANSWER)  Answer:  [email or web address]\epic\Images\Pharmacy\HeparinInfusions\heparin LOW INTENSITY nomogram for [facility] [id number].pdf    12/02/19 1529     heparin 25,000 units in dextrose 5% (100 units/ml) IV bolus from bag - ADDITIONAL PRN BOLUS - 60 units/kg (max bolus 4000 units)  As needed (PRN)     Question:  Heparin Infusion Adjustment (DO NOT MODIFY ANSWER)  Answer:  [email or web address]\epic\Images\Pharmacy\HeparinInfusions\heparin LOW INTENSITY nomogram for [facility] [id number].pdf    12/02/19 1529     Reason for No Pharmacological VTE Prophylaxis  Once     Question:  Reasons:  Answer:  Already adequately anticoagulated on oral Anticoagulants    12/02/19 0851     IP VTE HIGH RISK PATIENT  Once      12/02/19 0851              Thank you for your consult. We will follow up with the patient. Please call with any questions.    Abe Domingo MD  Cardiology  [facility]

## 2019-12-04 NOTE — PLAN OF CARE
12/04/19 0902   Post-Acute Status   Post-Acute Authorization Other   Other Status No Post-Acute Service Needs

## 2019-12-04 NOTE — SUBJECTIVE & OBJECTIVE
Interval History: Patient reports improvement in SOB, cough, mucous production, and wheezing. Patient with episode of chest pain after breathing treatment, resolved after third nitro. Repeat EKG unchanged from previous. Cr 1.7 this am, lasix discontinued. LHC postponed until creatinine at baseline (1.4).     Review of Systems   Constitutional: Positive for fatigue. Negative for chills and fever.   HENT: Negative for congestion and rhinorrhea.    Respiratory: Positive for cough (improving), shortness of breath (improving) and wheezing (improving).    Cardiovascular: Positive for chest pain. Negative for palpitations and leg swelling.   Gastrointestinal: Negative for abdominal distention and abdominal pain.   Genitourinary: Negative for difficulty urinating and hematuria.   Musculoskeletal: Negative for arthralgias and back pain.   Neurological: Negative for dizziness and light-headedness.   Psychiatric/Behavioral: Negative for agitation, behavioral problems, confusion and decreased concentration.     Objective:     Vital Signs (Most Recent):  Temp: 97.5 °F (36.4 °C) (12/04/19 1137)  Pulse: 64 (12/04/19 1137)  Resp: 17 (12/04/19 1137)  BP: (!) 119/58 (12/04/19 1137)  SpO2: 96 % (12/04/19 1137) Vital Signs (24h Range):  Temp:  [96.2 °F (35.7 °C)-98.6 °F (37 °C)] 97.5 °F (36.4 °C)  Pulse:  [57-86] 64  Resp:  [16-21] 17  SpO2:  [92 %-98 %] 96 %  BP: (119-151)/(58-69) 119/58     Weight: 102.1 kg (225 lb 1.6 oz)  Body mass index is 34.23 kg/m².    Intake/Output Summary (Last 24 hours) at 12/4/2019 1140  Last data filed at 12/4/2019 0911  Gross per 24 hour   Intake 1380 ml   Output 2675 ml   Net -1295 ml      Physical Exam   Constitutional: He is oriented to person, place, and time. He appears well-developed and well-nourished.   HENT:   Head: Normocephalic and atraumatic.   Eyes: Pupils are equal, round, and reactive to light. EOM are normal.   Neck: Normal range of motion. Neck supple.   Cardiovascular: Normal rate and  regular rhythm.   Pulmonary/Chest: Effort normal. He has wheezes (bibasilar, improving).   Coarse breath sounds improving   Abdominal: Soft. Bowel sounds are normal.   Musculoskeletal: Normal range of motion. He exhibits no edema or tenderness.   Neurological: He is alert and oriented to person, place, and time.   Skin: Skin is warm and dry. Capillary refill takes less than 2 seconds.   Psychiatric: He has a normal mood and affect. His behavior is normal.   Nursing note and vitals reviewed.      Significant Labs:   CBC:   Recent Labs   Lab 12/03/19 0646 12/04/19 0417   WBC 11.37 9.07   HGB 13.2* 13.0*   HCT 39.5* 41.6    167     CMP:   Recent Labs   Lab 12/03/19 0646 12/04/19 0417    142   K 4.0 3.7    106   CO2 26 25    91   BUN 33* 37*   CREATININE 1.5* 1.7*   CALCIUM 9.3 9.2   ANIONGAP 8 11   EGFRNONAA 46.2* 39.7*     Magnesium:   Recent Labs   Lab 12/03/19 0646 12/04/19 0417   MG 2.1 2.1     POCT Glucose: No results for input(s): POCTGLUCOSE in the last 48 hours.    Significant Imaging: I have reviewed all pertinent imaging results/findings within the past 24 hours.

## 2019-12-04 NOTE — PLAN OF CARE
Pt free of falls/trauma/injuries.  Denies c/o SOB, CP, or discomfort.  Generalized skin remains CDI; Mild edema noted.  Pt being diuresed with IVP Lasix BID; diuresing well.   Wt trending down.  Electrolytes replaced as ordered.  Possible LHC in AM.  Pt tolerating plan of care.

## 2019-12-04 NOTE — PLAN OF CARE
All cardiac workup records from [facility] placed in Pt's chart for physicians to review.

## 2019-12-05 ENCOUNTER — TELEPHONE (OUTPATIENT)
Dept: FAMILY MEDICINE | Facility: CLINIC | Age: 69
End: 2019-12-05

## 2019-12-05 VITALS
HEART RATE: 72 BPM | OXYGEN SATURATION: 95 % | WEIGHT: 222 LBS | BODY MASS INDEX: 33.65 KG/M2 | RESPIRATION RATE: 18 BRPM | HEIGHT: 68 IN | TEMPERATURE: 98 F | SYSTOLIC BLOOD PRESSURE: 112 MMHG | DIASTOLIC BLOOD PRESSURE: 57 MMHG

## 2019-12-05 LAB
ANION GAP SERPL CALC-SCNC: 11 MMOL/L (ref 8–16)
BASOPHILS # BLD AUTO: 0.01 K/UL (ref 0–0.2)
BASOPHILS NFR BLD: 0.1 % (ref 0–1.9)
BUN SERPL-MCNC: 36 MG/DL (ref 8–23)
CALCIUM SERPL-MCNC: 9.5 MG/DL (ref 8.7–10.5)
CHLORIDE SERPL-SCNC: 100 MMOL/L (ref 95–110)
CO2 SERPL-SCNC: 29 MMOL/L (ref 23–29)
CREAT SERPL-MCNC: 1.8 MG/DL (ref 0.5–1.4)
DIFFERENTIAL METHOD: ABNORMAL
EOSINOPHIL # BLD AUTO: 0 K/UL (ref 0–0.5)
EOSINOPHIL NFR BLD: 0.2 % (ref 0–8)
ERYTHROCYTE [DISTWIDTH] IN BLOOD BY AUTOMATED COUNT: 13.7 % (ref 11.5–14.5)
EST. GFR  (AFRICAN AMERICAN): 42.8 ML/MIN/1.73 M^2
EST. GFR  (NON AFRICAN AMERICAN): 37 ML/MIN/1.73 M^2
GLUCOSE SERPL-MCNC: 89 MG/DL (ref 70–110)
HCT VFR BLD AUTO: 43.2 % (ref 40–54)
HGB BLD-MCNC: 13.5 G/DL (ref 14–18)
IMM GRANULOCYTES # BLD AUTO: 0.06 K/UL (ref 0–0.04)
IMM GRANULOCYTES NFR BLD AUTO: 0.7 % (ref 0–0.5)
LYMPHOCYTES # BLD AUTO: 2.3 K/UL (ref 1–4.8)
LYMPHOCYTES NFR BLD: 26.2 % (ref 18–48)
MAGNESIUM SERPL-MCNC: 2.1 MG/DL (ref 1.6–2.6)
MCH RBC QN AUTO: 30.1 PG (ref 27–31)
MCHC RBC AUTO-ENTMCNC: 31.3 G/DL (ref 32–36)
MCV RBC AUTO: 96 FL (ref 82–98)
MONOCYTES # BLD AUTO: 0.8 K/UL (ref 0.3–1)
MONOCYTES NFR BLD: 8.8 % (ref 4–15)
NEUTROPHILS # BLD AUTO: 5.6 K/UL (ref 1.8–7.7)
NEUTROPHILS NFR BLD: 64 % (ref 38–73)
NRBC BLD-RTO: 0 /100 WBC
PHOSPHATE SERPL-MCNC: 4.1 MG/DL (ref 2.7–4.5)
PLATELET # BLD AUTO: 170 K/UL (ref 150–350)
PMV BLD AUTO: 11.6 FL (ref 9.2–12.9)
POTASSIUM SERPL-SCNC: 4.1 MMOL/L (ref 3.5–5.1)
RBC # BLD AUTO: 4.49 M/UL (ref 4.6–6.2)
SODIUM SERPL-SCNC: 140 MMOL/L (ref 136–145)
WBC # BLD AUTO: 8.79 K/UL (ref 3.9–12.7)

## 2019-12-05 PROCEDURE — 25000242 PHARM REV CODE 250 ALT 637 W/ HCPCS: Mod: HCNC | Performed by: PHYSICIAN ASSISTANT

## 2019-12-05 PROCEDURE — 85025 COMPLETE CBC W/AUTO DIFF WBC: CPT | Mod: HCNC

## 2019-12-05 PROCEDURE — 84100 ASSAY OF PHOSPHORUS: CPT | Mod: HCNC

## 2019-12-05 PROCEDURE — 25000003 PHARM REV CODE 250: Mod: HCNC | Performed by: STUDENT IN AN ORGANIZED HEALTH CARE EDUCATION/TRAINING PROGRAM

## 2019-12-05 PROCEDURE — 99232 PR SUBSEQUENT HOSPITAL CARE,LEVL II: ICD-10-PCS | Mod: HCNC,GC,, | Performed by: INTERNAL MEDICINE

## 2019-12-05 PROCEDURE — 99232 SBSQ HOSP IP/OBS MODERATE 35: CPT | Mod: HCNC,GC,, | Performed by: INTERNAL MEDICINE

## 2019-12-05 PROCEDURE — 25000003 PHARM REV CODE 250: Mod: HCNC | Performed by: PHYSICIAN ASSISTANT

## 2019-12-05 PROCEDURE — 99239 HOSP IP/OBS DSCHRG MGMT >30: CPT | Mod: HCNC,,, | Performed by: PHYSICIAN ASSISTANT

## 2019-12-05 PROCEDURE — 80048 BASIC METABOLIC PNL TOTAL CA: CPT | Mod: HCNC

## 2019-12-05 PROCEDURE — 83735 ASSAY OF MAGNESIUM: CPT | Mod: HCNC

## 2019-12-05 PROCEDURE — 36415 COLL VENOUS BLD VENIPUNCTURE: CPT | Mod: HCNC

## 2019-12-05 PROCEDURE — 94640 AIRWAY INHALATION TREATMENT: CPT | Mod: HCNC

## 2019-12-05 PROCEDURE — 94761 N-INVAS EAR/PLS OXIMETRY MLT: CPT | Mod: HCNC

## 2019-12-05 PROCEDURE — 99239 PR HOSPITAL DISCHARGE DAY,>30 MIN: ICD-10-PCS | Mod: HCNC,,, | Performed by: PHYSICIAN ASSISTANT

## 2019-12-05 PROCEDURE — S4991 NICOTINE PATCH NONLEGEND: HCPCS | Mod: HCNC | Performed by: PHYSICIAN ASSISTANT

## 2019-12-05 PROCEDURE — 63600175 PHARM REV CODE 636 W HCPCS: Mod: HCNC | Performed by: PHYSICIAN ASSISTANT

## 2019-12-05 RX ORDER — BENZONATATE 100 MG/1
100 CAPSULE ORAL 3 TIMES DAILY PRN
Qty: 30 CAPSULE | Refills: 0 | Status: SHIPPED | OUTPATIENT
Start: 2019-12-05 | End: 2019-12-15

## 2019-12-05 RX ORDER — AZITHROMYCIN 250 MG/1
250 TABLET, FILM COATED ORAL DAILY
Qty: 1 TABLET | Refills: 0 | Status: SHIPPED | OUTPATIENT
Start: 2019-12-06 | End: 2019-12-07

## 2019-12-05 RX ORDER — ISOSORBIDE MONONITRATE 30 MG/1
120 TABLET, EXTENDED RELEASE ORAL DAILY
Qty: 120 TABLET | Refills: 3 | Status: SHIPPED | OUTPATIENT
Start: 2019-12-06 | End: 2020-06-22 | Stop reason: ALTCHOICE

## 2019-12-05 RX ORDER — PREDNISONE 20 MG/1
40 TABLET ORAL DAILY
Qty: 4 TABLET | Refills: 0 | Status: SHIPPED | OUTPATIENT
Start: 2019-12-06 | End: 2019-12-08

## 2019-12-05 RX ORDER — NITROGLYCERIN 0.4 MG/1
0.4 TABLET SUBLINGUAL EVERY 5 MIN PRN
Qty: 30 TABLET | Refills: 1 | Status: ON HOLD | OUTPATIENT
Start: 2019-12-05 | End: 2022-02-15

## 2019-12-05 RX ADMIN — AZITHROMYCIN 250 MG: 250 TABLET, FILM COATED ORAL at 09:12

## 2019-12-05 RX ADMIN — CLOPIDOGREL BISULFATE 75 MG: 75 TABLET ORAL at 09:12

## 2019-12-05 RX ADMIN — TIOTROPIUM BROMIDE 18 MCG: 18 CAPSULE ORAL; RESPIRATORY (INHALATION) at 09:12

## 2019-12-05 RX ADMIN — IPRATROPIUM BROMIDE AND ALBUTEROL SULFATE 3 ML: .5; 3 SOLUTION RESPIRATORY (INHALATION) at 07:12

## 2019-12-05 RX ADMIN — PREDNISONE 40 MG: 20 TABLET ORAL at 09:12

## 2019-12-05 RX ADMIN — PANTOPRAZOLE SODIUM 40 MG: 40 TABLET, DELAYED RELEASE ORAL at 09:12

## 2019-12-05 RX ADMIN — EZETIMIBE 10 MG: 10 TABLET ORAL at 09:12

## 2019-12-05 RX ADMIN — NICOTINE 1 PATCH: 21 PATCH, EXTENDED RELEASE TRANSDERMAL at 09:12

## 2019-12-05 RX ADMIN — ISOSORBIDE MONONITRATE 90 MG: 60 TABLET, EXTENDED RELEASE ORAL at 09:12

## 2019-12-05 RX ADMIN — IPRATROPIUM BROMIDE AND ALBUTEROL SULFATE 3 ML: .5; 3 SOLUTION RESPIRATORY (INHALATION) at 01:12

## 2019-12-05 RX ADMIN — FENOFIBRATE 145 MG: 145 TABLET, FILM COATED ORAL at 09:12

## 2019-12-05 RX ADMIN — ROSUVASTATIN CALCIUM 20 MG: 20 TABLET, FILM COATED ORAL at 09:12

## 2019-12-05 RX ADMIN — GUAIFENESIN AND DEXTROMETHORPHAN HYDROBROMIDE 1 TABLET: 600; 30 TABLET, EXTENDED RELEASE ORAL at 09:12

## 2019-12-05 RX ADMIN — FLUTICASONE PROPIONATE 100 MCG: 50 SPRAY, METERED NASAL at 09:12

## 2019-12-05 RX ADMIN — SENNOSIDES 8.6 MG: 8.6 TABLET, FILM COATED ORAL at 09:12

## 2019-12-05 RX ADMIN — AMLODIPINE BESYLATE 5 MG: 5 TABLET ORAL at 09:12

## 2019-12-05 RX ADMIN — ASPIRIN 81 MG: 81 TABLET, COATED ORAL at 09:12

## 2019-12-05 NOTE — ASSESSMENT & PLAN NOTE
- Cr 1.7 on admit (baseline 1.4), likely in setting of decreased PO intake  - Ordered bolus and encouraged PO intake- Cr increased  - Patient overloaded on exam, started on IV lasix 40 mg daily  - Cr 1.5 --> 1.7--> 1.8  - transient increase in creatinine 2/2 to lasix administration, lasix discontinued  - Trend daily  - Avoid nephrotoxins

## 2019-12-05 NOTE — TELEPHONE ENCOUNTER
----- Message from Ruthy Saleem sent at 12/5/2019  4:13 PM CST -----  Contact: [facility] [phone]  Type: Rx Clarification/ Additional Information/ Questions    Medication:azithromycin tablet 250 mg    What questions do you have about the medication, if any? Correct quantity      Pharmacy number:[id number] - [place] - [address] AT [address]   [phone] (Phone)  [phone] (Fax)          Comments:please advise, thanks

## 2019-12-05 NOTE — PROGRESS NOTES
"[facility] Hospital Medicine  Progress Note    Patient Name: Lonnie Taylor  MRN: [id number]  Patient Class: IP- Inpatient   Admission Date: 12/2/2019  Length of Stay: 3 days  Attending Physician: Donal Holley MD  Primary Care Provider: Isiah You MD    [facility] Medicine Team: [facility]OSP MED F Hui Argueta PA-C    Subjective:     Principal Problem:NSTEMI (non-ST elevated myocardial infarction)        HPI:  69 year old male with a PMHx of HTN, HLD, CAD s/p CABG, PVD s/p BLE stents, COPD, and tobacco abuse (55 year pack history) presenting to the ER with wife at bedside for shortness of breath. Patient reports he "started getting sick on Monday." He reports he initially developed a productive cough of green sputum, SOB, and wheezing. He presented to urgent care on Wednesday and was diagnosed with bronchitis; he was prescribed Cefdinir. He reports compliance with antibiotics. He reported to the ER Sunday due to persistent complaints. He reports improvement with breathing treatments and was discharged with PO prednisone. Patient reports he presented back to the ER today due to persistent SOB. At the time of my exam, patient has no new complaints. Patient reports he has now a dry cough. Improved SOB with breathing treatments. Denies CP, abdominal pain, N/V/D, fever/chills, palpitations, focal weakness, headache, myalgias, dysuria/frequency.       HDS on admission, stable on 2L NC. Afebrile without leukocytosis. Labs significant for CHAUNCEY and troponin 0.032. BNP WNL. EKG shows NSR (HR 75), no changes noted. CXR shows bibasilar increased attenuation; ?early pulmonary edema. Received IV methylprednisolone, breathing treatments, and azithro in the ER.    Overview/Hospital Course:  Mr. Taylor was admitted to observation for COPD exacerbation. Started on azithromycin, prednisone, and duonebs. Patient with chest pain and elevated troponin to max .167. EKG without changes. Cardiology consulted, " started on ACS protocol for NSTEMI. Started on imdur 30 mg for angina and diuresing with lasix. TTE with EF 55%, grade II diastolic dysfunction, severe LAE. Plan on LH once Creatinine at baseline.     Interval History: Patient reports improvement in cough, SOB, wheezing. Mild cough with decreased mucous production. No further episodes of chest pain. Creatinine increased after lasix restarted yesterday evening. D/c Lasix. LHC once creatinine to baseline    Review of Systems   Constitutional: Negative for chills, fatigue and fever.   HENT: Negative for congestion and rhinorrhea.    Respiratory: Positive for cough (improving), shortness of breath (improving) and wheezing (improving).    Cardiovascular: Positive for leg swelling (improved). Negative for chest pain and palpitations.   Gastrointestinal: Negative for abdominal distention and abdominal pain.   Genitourinary: Negative for difficulty urinating and hematuria.   Musculoskeletal: Negative for arthralgias and back pain.   Neurological: Negative for dizziness and light-headedness.   Psychiatric/Behavioral: Negative for agitation, behavioral problems, confusion and decreased concentration.     Objective:     Vital Signs (Most Recent):  Temp: 97.6 °F (36.4 °C) (12/05/19 1133)  Pulse: (!) 58 (12/05/19 1206)  Resp: 17 (12/05/19 1133)  BP: 139/65 (12/05/19 1133)  SpO2: 96 % (12/05/19 1133) Vital Signs (24h Range):  Temp:  [96.5 °F (35.8 °C)-98.4 °F (36.9 °C)] 97.6 °F (36.4 °C)  Pulse:  [54-78] 58  Resp:  [16-20] 17  SpO2:  [93 %-96 %] 96 %  BP: (137-164)/(63-73) 139/65     Weight: 100.7 kg (222 lb 0.1 oz)  Body mass index is 33.76 kg/m².    Intake/Output Summary (Last 24 hours) at 12/5/2019 1208  Last data filed at 12/5/2019 0800  Gross per 24 hour   Intake 1659.07 ml   Output 2155 ml   Net -495.93 ml      Physical Exam   Constitutional: He is oriented to person, place, and time. He appears well-developed and well-nourished.   HENT:   Head: Normocephalic and  atraumatic.   Eyes: Pupils are equal, round, and reactive to light. EOM are normal.   Neck: Normal range of motion. Neck supple.   Cardiovascular: Normal rate and regular rhythm.   Pulmonary/Chest: Effort normal. He has wheezes (throughout).   Coarse breath sounds resolved   Abdominal: Soft. Bowel sounds are normal.   Musculoskeletal: Normal range of motion. He exhibits no edema or tenderness.   Neurological: He is alert and oriented to person, place, and time.   Skin: Skin is warm and dry. Capillary refill takes less than 2 seconds.   Psychiatric: He has a normal mood and affect. His behavior is normal.   Nursing note and vitals reviewed.      Significant Labs:   CBC:   Recent Labs   Lab 12/04/19 0417 12/05/19 0344   WBC 9.07 8.79   HGB 13.0* 13.5*   HCT 41.6 43.2    170     CMP:   Recent Labs   Lab 12/04/19 0417 12/05/19 0344    140   K 3.7 4.1    100   CO2 25 29   GLU 91 89   BUN 37* 36*   CREATININE 1.7* 1.8*   CALCIUM 9.2 9.5   ANIONGAP 11 11   EGFRNONAA 39.7* 37.0*     Magnesium:   Recent Labs   Lab 12/04/19 0417 12/05/19  0344   MG 2.1 2.1       Significant Imaging: I have reviewed all pertinent imaging results/findings within the past 24 hours.      Assessment/Plan:      * NSTEMI (non-ST elevated myocardial infarction)  Elevated troponin  Acute diastolic heart failure  - trop .032--> .088 --> .137--> .167  - EKG without acute ischemic changes on admit  - cardiology consulted, appreciate recs  - started on ACS protocol with heparin gtt X 48 hours, will end tonight  - continue ASA 81 mg daily, Plavix 75 mg daily, and Crestor 20 mg daily  - xarelto held  - TTE with EF 55%, grade II diastolic dysfunction  - [facility] pending improvement in Cr  - baseline Cr 1.4, currently 1.8  - risk of contrast induced nephropathy 14%  - NPO midnight for possible cath in am  - tele    Elevated troponin        Acute kidney injury superimposed on chronic kidney disease  - Cr 1.7 on admit (baseline 1.4), likely  in setting of decreased PO intake  - Ordered bolus and encouraged PO intake- Cr increased  - Patient overloaded on exam, started on IV lasix 40 mg daily  - Cr 1.5 --> 1.7--> 1.8  - transient increase in creatinine 2/2 to lasix administration, lasix discontinued  - Trend daily  - Avoid nephrotoxins    COPD exacerbation  - HDS on admission, stable on 2L NC  - Afebrile without leukocytosis  - Labs significant for CHAUNCEY (see below)   - EKG shows NSR (HR 75), no changes noted  - CXR shows bibasilar increased attenuation; ?early pulmonary edema  - Received IV methylprednisolone and azithro in the ER  - Continue azithro, PO prednisone, and duonebs  - Duonebs PRN, supportive care  - Pulm follow up as outpatient  - Telemetry    Tobacco abuse  55 year pack history  - Discussed importance of tobacco cessation  - Nicotine patch ordered    HTN (hypertension)  - Elevated on admit in setting of missed AM meds  - Resume home regimen amlodipine 5 mg daily  - added imdur 60 mg daily  - Continue to monitor    CAD (coronary artery disease)  HLD  S/p CABG  - EKG without changes  - Continue ASA, plavix, atorvastatin, fenofibrate, and zetia    PVD (peripheral vascular disease)  S/p BLE stents  - Xarelto held for ACS protocol      VTE Risk Mitigation (From admission, onward)         Ordered     heparin 25,000 units in dextrose 5% 250 mL (100 units/mL) infusion LOW INTENSITY nomogram - [facility]  Continuous     Question:  Heparin Infusion Adjustment (DO NOT MODIFY ANSWER)  Answer:  [email or web address]\epic\Images\Pharmacy\HeparinInfusions\heparin LOW INTENSITY nomogram for [facility] [id number]    12/02/19 1529     Reason for No Pharmacological VTE Prophylaxis  Once     Question:  Reasons:  Answer:  Already adequately anticoagulated on oral Anticoagulants    12/02/19 0851     IP VTE HIGH RISK PATIENT  Once      12/02/19 0851                      Hui Argueta PA-C  Department of Hospital Medicine   [facility]

## 2019-12-05 NOTE — ASSESSMENT & PLAN NOTE
- HDS on admission, stable on 2L NC  - Afebrile without leukocytosis  - Labs significant for CHAUNCEY (see below)   - EKG shows NSR (HR 75), no changes noted  - CXR shows bibasilar increased attenuation; ?early pulmonary edema  - Received IV methylprednisolone and azithro in the ER  - Continue azithro, PO prednisone, and duonebs  - Duonebs PRN, supportive care  - Pulm follow up as outpatient  - Telemetry

## 2019-12-05 NOTE — PROGRESS NOTES
[facility]  Cardiology  Progress Note    Patient Name: Lonnie Taylor  MRN: [id number]  Admission Date: 12/2/2019  Hospital Length of Stay: 3 days  Code Status: Full Code   Attending Physician: Donal Holley MD   Primary Care Physician: Isiah You MD  Expected Discharge Date: 12/6/2019  Principal Problem:NSTEMI (non-ST elevated myocardial infarction)    Subjective:     Hospital Course:   Consult acknowledged on 12/2. Patient seen and examined in ED. Follow up troponin 0.136. Concern for stuttering angina and NSTEMI. EDVIN 5. Hold Xarelto. Start heparin gtt. Continue asa, plavix, statin. Start IMDUR 30mg qd. Diurese w/ 40mg IV Lasix BID. Conservative management for now as patient presents w/ CHAUNCEY. (12/3) interval improvement in CHAUNCEY (1.7 ->1.5), patient still hypervolemic. Continue 40mg IV lasix at this time. Patient w/ 2 smaller episodes of chest pain overnight relieved by nitro, increase dose of IMDUR to 60mg qd. NPO @ midnight. Eval for heart cath in AM. (12/4) Patient reports no further episodes of CP overnight and SOB is stable. Patient w/ slight increase in creatinine overnight (1.5->1.7). Patient urinating well on diuresis, rec holding lasix today in setting of worsening CHAUNCEY. Continue ASA, plavix and IMDUR at current dosage. Stop heparin gtt 48 hours after initiation. MARE risk 5. Will continue to optimize patient for cath, reevaluate in AM and plan for intervention pending improvement in renal function. (12/5) Patient w/ slight bump in creatinine overnight (1.7-1.8). Patient reports good UOP overnight on current lasix regimen. Will recommend holding lasix at this time and will reevaluate in 1 week. Patient reports 1 episode of chest pain yesterday morning relieved by nitro. Continue ASA 81mg, Plavix 75mg, and IMDUR 120mg daily. Patient reports no further episodes of CP overnight and notes improvement in SOB. As the patient has had no further episodes of CP on current treatment and  also has persistent CHAUNCEY, Cardiology will defer LHC at this time. Patient to follow up with general cardiology clinic in 1 week for eval for LHC vs PET.    Interval History: NAEON. Patient reports 1 episode of chest pain yesterday morning relieved by nitro, but no further episodes of CP overnight. Additionally he notes marked improvement in SOB and orthopnea.  Slight increase in creatinine overnight (1.7->1.8).  Patient reports good UOP overnight. Will recommend holding lasix at this time and will reevaluate in 1 week. Continue ASA 81mg, Plavix 75mg, and IMDUR 120mg daily. As the patient has had no further episodes of CP on current treatment and also has persistent CHAUNCEY, Cardiology will defer LHC at this time. Patient to follow up with general cardiology clinic in 1 week for eval for LHC vs PET.    Review of Systems   Constitution: Negative for chills, fever and malaise/fatigue.   HENT: Negative for congestion and sore throat.    Eyes: Negative for visual disturbance.   Cardiovascular: Negative for chest pain, dyspnea on exertion, irregular heartbeat, orthopnea and palpitations.   Respiratory: Positive for cough, shortness of breath and sputum production. Negative for wheezing.    Musculoskeletal: Negative for back pain and myalgias.   Gastrointestinal: Negative for abdominal pain, constipation, diarrhea, nausea and vomiting.   Genitourinary: Negative for dysuria and hematuria.   Neurological: Negative for dizziness, headaches, light-headedness and weakness.   Psychiatric/Behavioral: Negative for altered mental status. The patient is not nervous/anxious.      Objective:     Vital Signs (Most Recent):  Temp: 97.8 °F (36.6 °C) (12/05/19 0815)  Pulse: 64 (12/05/19 0815)  Resp: 16 (12/05/19 0815)  BP: (!) 164/73 (12/05/19 0815)  SpO2: (!) 94 % (12/05/19 0815) Vital Signs (24h Range):  Temp:  [96.5 °F (35.8 °C)-98.4 °F (36.9 °C)] 97.8 °F (36.6 °C)  Pulse:  [54-78] 64  Resp:  [16-18] 16  SpO2:  [93 %-96 %] 94 %  BP:  (119-164)/(58-73) 164/73     Weight: 100.7 kg (222 lb 0.1 oz)  Body mass index is 33.76 kg/m².     SpO2: (!) 94 %  O2 Device (Oxygen Therapy): room air      Intake/Output Summary (Last 24 hours) at 12/5/2019 0928  Last data filed at 12/5/2019 0800  Gross per 24 hour   Intake 1899.07 ml   Output 2555 ml   Net -655.93 ml       Lines/Drains/Airways     Peripheral Intravenous Line                 Peripheral IV - Single Lumen 18 G Anterior;Distal;Right Forearm -- days         Peripheral IV - Single Lumen 12/02/19 1906 20 G Left Antecubital 2 days                Physical Exam   Constitutional: He is oriented to person, place, and time. He appears well-developed and well-nourished. He appears distressed.   HENT:   Head: Normocephalic and atraumatic.   Eyes: Pupils are equal, round, and reactive to light. EOM are normal.   Neck: Normal range of motion. Neck supple. Hepatojugular reflux (interval improvement) and JVD (interval improvement) present.   Cardiovascular: Normal rate, regular rhythm, normal heart sounds and intact distal pulses.   No murmur heard.  Pulmonary/Chest: Effort normal and breath sounds normal. No respiratory distress. He has no wheezes. He has no rales.   Abdominal: Soft. Bowel sounds are normal. He exhibits no distension. There is no tenderness.   Musculoskeletal: Normal range of motion. He exhibits no edema or tenderness.   Neurological: He is alert and oriented to person, place, and time.   Skin: Skin is warm and dry. He is not diaphoretic. No erythema.   Psychiatric: He has a normal mood and affect. His behavior is normal. Judgment and thought content normal.   Nursing note and vitals reviewed.      Significant Labs:   BMP:   Recent Labs   Lab 12/04/19  0417 12/05/19  0344   GLU 91 89    140   K 3.7 4.1    100   CO2 25 29   BUN 37* 36*   CREATININE 1.7* 1.8*   CALCIUM 9.2 9.5   MG 2.1 2.1   , CMP   Recent Labs   Lab 12/04/19  0417 12/05/19  0344    140   K 3.7 4.1    100    CO2 25 29   GLU 91 89   BUN 37* 36*   CREATININE 1.7* 1.8*   CALCIUM 9.2 9.5   ANIONGAP 11 11   ESTGFRAFRICA 45.9* 42.8*   EGFRNONAA 39.7* 37.0*   , CBC   Recent Labs   Lab 12/04/19  0417 12/05/19  0344   WBC 9.07 8.79   HGB 13.0* 13.5*   HCT 41.6 43.2    170    and All pertinent lab results from the last 24 hours have been reviewed.    Significant Imaging: Echocardiogram:   Transthoracic echo (TTE) complete (Cupid Only):   Results for orders placed or performed during the hospital encounter of 12/02/19   Echo Color Flow Doppler? Yes   Result Value Ref Range    Ascending aorta 4.32 cm    STJ 3.89 cm    AV mean gradient 8 mmHg    Ao peak colby 2.11 m/s    Ao VTI 36.99 cm    IVS 0.66 0.6 - 1.1 cm    LA size 4.75 cm    Left Atrium Major Axis 7.12 cm    Left Atrium Minor Axis 6.66 cm    LVIDD 5.55 3.5 - 6.0 cm    LVIDS 4.40 (A) 2.1 - 4.0 cm    LVOT diameter 2.24 cm    LVOT peak VTI 28.34 cm    PW 0.73 0.6 - 1.1 cm    MV Peak A Colby 0.88 m/s    E wave decelartion time 120.46 msec    MV Peak E Colby 1.08 m/s    PV Peak D Colby 0.88 m/s    PV Peak S Colby 0.74 m/s    RA Major Axis 6.24 cm    RA Width 4.27 cm    RVDD 4.14 cm    Sinus 4.25 cm    TAPSE 2.29 cm    TR Max Colby 3.02 m/s    TDI LATERAL 0.12 m/s    TDI SEPTAL 0.09 m/s    LA WIDTH 5.21 cm    LV Diastolic Volume 150.81 mL    LV Systolic Volume 87.69 mL    LVOT peak colby 1.39 m/s    LV LATERAL E/E' RATIO 9.00 m/s    LV SEPTAL E/E' RATIO 12.00 m/s    FS 21 %    LA volume 144.77 cm3    LV mass 136.47 g    Left Ventricle Relative Wall Thickness 0.26 cm    AV valve area 3.02 cm2    AV Velocity Ratio 0.66     AV index (prosthetic) 0.77     E/A ratio 1.23     Mean e' 0.11 m/s    Pulm vein S/D ratio 0.84     LVOT area 3.9 cm2    LVOT stroke volume 111.63 cm3    AV peak gradient 18 mmHg    E/E' ratio 10.29 m/s    LV Systolic Volume Index 40.2 mL/m2    LV Diastolic Volume Index 69.16 mL/m2    LA Volume Index 66.4 mL/m2    LV Mass Index 63 g/m2    Triscuspid Valve Regurgitation  Peak Gradient 36 mmHg    BSA 2.25 m2    Right Atrial Pressure (from IVC) 3 mmHg    TV rest pulmonary artery pressure 39 mmHg    Narrative    · Normal left ventricular systolic function. The estimated ejection   fraction is 55%  · Grade II (moderate) left ventricular diastolic dysfunction consistent   with pseudonormalization.  · Severe left atrial enlargement.  · Mild-to-moderate aortic regurgitation.  · Mild to moderate pulmonic regurgitation.  · Mild mitral regurgitation.  · Mild tricuspid regurgitation.  · Normal right ventricular systolic function.  · Moderate right atrial enlargement.  · Normal central venous pressure (3 mm Hg).  · The estimated PA systolic pressure is 39 mm Hg     CHallenging study. Poor endocardial definition       Assessment and Plan:     Brief HPI: Lonnie Taylor is a 68 yo WM w/ extensive cardiac hx that was consulted to Cardiology for stuttering angina and NSTEMI. Continue conservative management at this time (asa, plavix, IMDUR) while treating CHAUNCEY (stop diuresis). Evaluation for LHC vs. PET.    * NSTEMI (non-ST elevated myocardial infarction)  Lonnie Taylor is a 68 yo WM w/ hx of CAD (s/p CABG '97), HTN, HLD, CKD, PVD (s/p aortobifemoral bypass and BL LE PTAs), AAA s/p repair, multiple PCI (most recent in 2018 @ [facility]), and tobacco abuse who [place] [facility] c/o worsening SOB and wheezing.  - Chest pain (6/10) did not radiate, was non-exertional, lasted approximately 20-30 minutes, and was relieved by nitroglycerine x1.   - Troponin 0.032 -> 0.088 -> 0.136  - BNP 36  - EKG showed normal sinus rhythm w/ HR 75 and left anterior fascicular block unchanged from previous EKG.     Plan:  - EDVIN score: 5  - Elevated troponin  - Stuttering angina and concern for NSTEMI  - Continue current medications per primary team: asa 81mg, Plavix 75mg, and Crestor 20mg  - Increase IMDUR to 120mg qd and continue on discharge  - Echo on 12/2 shows EF 55% and no wall motion abnormalities  - Stop heparin gtt 48  hours after initiation (12/4 @ 1730)  - Can resume Xarelto at this time  - Interval worsening of CHAUNCEY (1.7 -> 1.8)  - Discontinue lasix today in setting of worsening CHAUNCEY, will reevaluate in cardiology clinic next week  - MARE risk score: 5  - Risk of Post-PCI MARE: 7.5%  - Conservative management for now as patient presents w/ CHAUNCEY  - Obtain cardiology records from [facility] regarding previous PCI  - Continue to monitor creatinine, BMP daily  - Daily weights, strict I/Os  - Cardiac diet  - Cardiology will defer LHC at this time as patient reports no further episodes of CP on current treatment overnight and also has persistent CHAUNCEY  - Patient to follow up with general cardiology clinic in 1 week for eval for LHC vs PET    Elevated troponin  See NSTEMI        VTE Risk Mitigation (From admission, onward)         Ordered     rivaroxaban tablet 2.5 mg  2 times daily with meals      12/05/19 1251     heparin 25,000 units in dextrose 5% 250 mL (100 units/mL) infusion LOW INTENSITY nomogram - [facility]  Continuous     Question:  Heparin Infusion Adjustment (DO NOT MODIFY ANSWER)  Answer:  [email or web address] LOW INTENSITY nomogram for [facility] [email or web address]    12/02/19 1529     Reason for No Pharmacological VTE Prophylaxis  Once     Question:  Reasons:  Answer:  Already adequately anticoagulated on oral Anticoagulants    12/02/19 0851     IP VTE HIGH RISK PATIENT  Once      12/02/19 0851              Thank you for your consult. We will sign off. Please call with any questions.    Abe Domingo MD  Cardiology  [facility]

## 2019-12-05 NOTE — PLAN OF CARE
Pt free of falls/injuries/trauma. No c/o SOB or CP. VSS. NSR/ SB on tele.  Heparin and Lasix discontinued. Pt NPO after MN for cath. PIV x2 in place. Clip and prep to be performed. Urinal @ bedside. Call light in reach. Bed @ lowest position. Communication board updated. Will continue to monitor.

## 2019-12-05 NOTE — PLAN OF CARE
Follow-up With    Details      Why        Contact Info         [facility] Card[facility]  Go on 12/12/2019  Hospital follow-up appointment at 10:00 am  [address]  [facility] [address] [phone]   Isiah You MD  In 1 week    [address] [facility]  [place] [address]  [phone]

## 2019-12-05 NOTE — ASSESSMENT & PLAN NOTE
Lonnie Taylor is a 68 yo WM w/ hx of CAD (s/p CABG '97), HTN, HLD, CKD, PVD (s/p aortobifemoral bypass and BL LE PTAs), AAA s/p repair, multiple PCI (most recent in 2018 @ [facility]), and tobacco abuse who presents to [facility] ED c/o worsening SOB and wheezing.  - Chest pain (6/10) did not radiate, was non-exertional, lasted approximately 20-30 minutes, and was relieved by nitroglycerine x1.   - Troponin 0.032 -> 0.088 -> 0.136  - BNP 36  - EKG showed normal sinus rhythm w/ HR 75 and left anterior fascicular block unchanged from previous EKG.     Plan:  - EDVIN score: 5  - Elevated troponin  - Stuttering angina and concern for NSTEMI  - Continue current medications per primary team: asa 81mg, Plavix 75mg, and Crestor 20mg  - Increase IMDUR to 120mg qd and continue on discharge  - Echo on 12/2 shows EF 55% and no wall motion abnormalities  - Stop heparin gtt 48 hours after initiation (12/4 @ 1730)  - Can resume Xarelto at this time  - Interval worsening of CHAUNCEY (1.7 -> 1.8)  - Discontinue lasix today in setting of worsening CHAUNCEY, will reevaluate in cardiology clinic next week  - MARE risk score: 5  - Risk of Post-PCI MARE: 7.5%  - Conservative management for now as patient presents w/ CHAUNCEY  - Obtain cardiology records from [facility] regarding previous PCI  - Continue to monitor creatinine, BMP daily  - Daily weights, strict I/Os  - Cardiac diet  - Cardiology will defer LHC at this time as patient reports no further episodes of CP on current treatment overnight and also has persistent CHAUNCEY  - Patient to follow up with general cardiology clinic in 1 week for eval for LHC vs PET

## 2019-12-05 NOTE — PLAN OF CARE
Pt free of falls/trauma/injuries.  Denies c/o SOB, but continues to experience coughing.  Pt being treated with scheduled nebs, inhalers, and Mucinex.  Pt being treated with PO ABX and steroids as well for COPD exacerbation.  Deep breathing exercises encouraged.  Pt denying any further CP or discomfort.  Generalized skin remains CDI; trace edema noted to BLEs.  TEDs in place. Diuretics on hold this shift; pt continues to diurese well.  Wt trending down.    Creatinine remains elevated from baseline (1.4) at 1.8.   Plan for [facility] once renal function improves.  Pt tolerating plan of care.

## 2019-12-05 NOTE — PROGRESS NOTES
Pt discharged per MD orders.  Tele discontinued and returned to station.  IV discontinued; catheter tip intact x2.  Medication list and prescriptions reviewed; prescriptions sent to pt preferred pharmacy.  Pt verbalizes understanding of all written and verbal discharge instructions.  Pt awaiting family/escort arrival.  Will continue to monitor.

## 2019-12-05 NOTE — SUBJECTIVE & OBJECTIVE
Interval History: NAEON. Patient reports 1 episode of chest pain yesterday morning relieved by nitro, but no further episodes of CP overnight. Additionally he notes marked improvement in SOB and orthopnea.  Slight increase in creatinine overnight (1.7->1.8).  Patient reports good UOP overnight. Will recommend holding lasix at this time and will reevaluate in 1 week. Continue ASA 81mg, Plavix 75mg, and IMDUR 120mg daily. As the patient has had no further episodes of CP on current treatment and also has persistent CHAUNCEY, Cardiology will defer LHC at this time. Patient to follow up with general cardiology clinic in 1 week for eval for LHC vs PET.    Review of Systems   Constitution: Negative for chills, fever and malaise/fatigue.   HENT: Negative for congestion and sore throat.    Eyes: Negative for visual disturbance.   Cardiovascular: Negative for chest pain, dyspnea on exertion, irregular heartbeat, orthopnea and palpitations.   Respiratory: Positive for cough, shortness of breath and sputum production. Negative for wheezing.    Musculoskeletal: Negative for back pain and myalgias.   Gastrointestinal: Negative for abdominal pain, constipation, diarrhea, nausea and vomiting.   Genitourinary: Negative for dysuria and hematuria.   Neurological: Negative for dizziness, headaches, light-headedness and weakness.   Psychiatric/Behavioral: Negative for altered mental status. The patient is not nervous/anxious.      Objective:     Vital Signs (Most Recent):  Temp: 97.8 °F (36.6 °C) (12/05/19 0815)  Pulse: 64 (12/05/19 0815)  Resp: 16 (12/05/19 0815)  BP: (!) 164/73 (12/05/19 0815)  SpO2: (!) 94 % (12/05/19 0815) Vital Signs (24h Range):  Temp:  [96.5 °F (35.8 °C)-98.4 °F (36.9 °C)] 97.8 °F (36.6 °C)  Pulse:  [54-78] 64  Resp:  [16-18] 16  SpO2:  [93 %-96 %] 94 %  BP: (119-164)/(58-73) 164/73     Weight: 100.7 kg (222 lb 0.1 oz)  Body mass index is 33.76 kg/m².     SpO2: (!) 94 %  O2 Device (Oxygen Therapy): room  air      Intake/Output Summary (Last 24 hours) at 12/5/2019 0928  Last data filed at 12/5/2019 0800  Gross per 24 hour   Intake 1899.07 ml   Output 2555 ml   Net -655.93 ml       Lines/Drains/Airways     Peripheral Intravenous Line                 Peripheral IV - Single Lumen 18 G Anterior;Distal;Right Forearm -- days         Peripheral IV - Single Lumen 12/02/19 1906 20 G Left Antecubital 2 days                Physical Exam   Constitutional: He is oriented to person, place, and time. He appears well-developed and well-nourished. He appears distressed.   HENT:   Head: Normocephalic and atraumatic.   Eyes: Pupils are equal, round, and reactive to light. EOM are normal.   Neck: Normal range of motion. Neck supple. Hepatojugular reflux (interval improvement) and JVD (interval improvement) present.   Cardiovascular: Normal rate, regular rhythm, normal heart sounds and intact distal pulses.   No murmur heard.  Pulmonary/Chest: Effort normal and breath sounds normal. No respiratory distress. He has no wheezes. He has no rales.   Abdominal: Soft. Bowel sounds are normal. He exhibits no distension. There is no tenderness.   Musculoskeletal: Normal range of motion. He exhibits no edema or tenderness.   Neurological: He is alert and oriented to person, place, and time.   Skin: Skin is warm and dry. He is not diaphoretic. No erythema.   Psychiatric: He has a normal mood and affect. His behavior is normal. Judgment and thought content normal.   Nursing note and vitals reviewed.      Significant Labs:   BMP:   Recent Labs   Lab 12/04/19  0417 12/05/19  0344   GLU 91 89    140   K 3.7 4.1    100   CO2 25 29   BUN 37* 36*   CREATININE 1.7* 1.8*   CALCIUM 9.2 9.5   MG 2.1 2.1   , CMP   Recent Labs   Lab 12/04/19  0417 12/05/19  0344    140   K 3.7 4.1    100   CO2 25 29   GLU 91 89   BUN 37* 36*   CREATININE 1.7* 1.8*   CALCIUM 9.2 9.5   ANIONGAP 11 11   ESTGFRAFRICA 45.9* 42.8*   EGFRNONAA 39.7* 37.0*   ,  CBC   Recent Labs   Lab 12/04/19  0417 12/05/19  0344   WBC 9.07 8.79   HGB 13.0* 13.5*   HCT 41.6 43.2    170    and All pertinent lab results from the last 24 hours have been reviewed.    Significant Imaging: Echocardiogram:   Transthoracic echo (TTE) complete (Cupid Only):   Results for orders placed or performed during the hospital encounter of 12/02/19   Echo Color Flow Doppler? Yes   Result Value Ref Range    Ascending aorta 4.32 cm    STJ 3.89 cm    AV mean gradient 8 mmHg    Ao peak colby 2.11 m/s    Ao VTI 36.99 cm    IVS 0.66 0.6 - 1.1 cm    LA size 4.75 cm    Left Atrium Major Axis 7.12 cm    Left Atrium Minor Axis 6.66 cm    LVIDD 5.55 3.5 - 6.0 cm    LVIDS 4.40 (A) 2.1 - 4.0 cm    LVOT diameter 2.24 cm    LVOT peak VTI 28.34 cm    PW 0.73 0.6 - 1.1 cm    MV Peak A Colby 0.88 m/s    E wave decelartion time 120.46 msec    MV Peak E Colby 1.08 m/s    PV Peak D Colby 0.88 m/s    PV Peak S Colby 0.74 m/s    RA Major Axis 6.24 cm    RA Width 4.27 cm    RVDD 4.14 cm    Sinus 4.25 cm    TAPSE 2.29 cm    TR Max Colby 3.02 m/s    TDI LATERAL 0.12 m/s    TDI SEPTAL 0.09 m/s    LA WIDTH 5.21 cm    LV Diastolic Volume 150.81 mL    LV Systolic Volume 87.69 mL    LVOT peak colby 1.39 m/s    LV LATERAL E/E' RATIO 9.00 m/s    LV SEPTAL E/E' RATIO 12.00 m/s    FS 21 %    LA volume 144.77 cm3    LV mass 136.47 g    Left Ventricle Relative Wall Thickness 0.26 cm    AV valve area 3.02 cm2    AV Velocity Ratio 0.66     AV index (prosthetic) 0.77     E/A ratio 1.23     Mean e' 0.11 m/s    Pulm vein S/D ratio 0.84     LVOT area 3.9 cm2    LVOT stroke volume 111.63 cm3    AV peak gradient 18 mmHg    E/E' ratio 10.29 m/s    LV Systolic Volume Index 40.2 mL/m2    LV Diastolic Volume Index 69.16 mL/m2    LA Volume Index 66.4 mL/m2    LV Mass Index 63 g/m2    Triscuspid Valve Regurgitation Peak Gradient 36 mmHg    BSA 2.25 m2    Right Atrial Pressure (from IVC) 3 mmHg    TV rest pulmonary artery pressure 39 mmHg    Narrative    · Normal  left ventricular systolic function. The estimated ejection   fraction is 55%  · Grade II (moderate) left ventricular diastolic dysfunction consistent   with pseudonormalization.  · Severe left atrial enlargement.  · Mild-to-moderate aortic regurgitation.  · Mild to moderate pulmonic regurgitation.  · Mild mitral regurgitation.  · Mild tricuspid regurgitation.  · Normal right ventricular systolic function.  · Moderate right atrial enlargement.  · Normal central venous pressure (3 mm Hg).  · The estimated PA systolic pressure is 39 mm Hg     CHallenging study. Poor endocardial definition

## 2019-12-05 NOTE — SUBJECTIVE & OBJECTIVE
Interval History: Patient reports improvement in cough, SOB, wheezing. Mild cough with decreased mucous production. No further episodes of chest pain. Creatinine increased after lasix restarted yesterday evening. D/c Lasix. [facility] once creatinine to baseline    Review of Systems   Constitutional: Negative for chills, fatigue and fever.   HENT: Negative for congestion and rhinorrhea.    Respiratory: Positive for cough (improving), shortness of breath (improving) and wheezing (improving).    Cardiovascular: Positive for leg swelling (improved). Negative for chest pain and palpitations.   Gastrointestinal: Negative for abdominal distention and abdominal pain.   Genitourinary: Negative for difficulty urinating and hematuria.   Musculoskeletal: Negative for arthralgias and back pain.   Neurological: Negative for dizziness and light-headedness.   Psychiatric/Behavioral: Negative for agitation, behavioral problems, confusion and decreased concentration.     Objective:     Vital Signs (Most Recent):  Temp: 97.6 °F (36.4 °C) (12/05/19 1133)  Pulse: (!) 58 (12/05/19 1206)  Resp: 17 (12/05/19 1133)  BP: 139/65 (12/05/19 1133)  SpO2: 96 % (12/05/19 1133) Vital Signs (24h Range):  Temp:  [96.5 °F (35.8 °C)-98.4 °F (36.9 °C)] 97.6 °F (36.4 °C)  Pulse:  [54-78] 58  Resp:  [16-20] 17  SpO2:  [93 %-96 %] 96 %  BP: (137-164)/(63-73) 139/65     Weight: 100.7 kg (222 lb 0.1 oz)  Body mass index is 33.76 kg/m².    Intake/Output Summary (Last 24 hours) at 12/5/2019 1208  Last data filed at 12/5/2019 0800  Gross per 24 hour   Intake 1659.07 ml   Output 2155 ml   Net -495.93 ml      Physical Exam   Constitutional: He is oriented to person, place, and time. He appears well-developed and well-nourished.   HENT:   Head: Normocephalic and atraumatic.   Eyes: Pupils are equal, round, and reactive to light. EOM are normal.   Neck: Normal range of motion. Neck supple.   Cardiovascular: Normal rate and regular rhythm.   Pulmonary/Chest: Effort  normal. He has wheezes (throughout).   Coarse breath sounds resolved   Abdominal: Soft. Bowel sounds are normal.   Musculoskeletal: Normal range of motion. He exhibits no edema or tenderness.   Neurological: He is alert and oriented to person, place, and time.   Skin: Skin is warm and dry. Capillary refill takes less than 2 seconds.   Psychiatric: He has a normal mood and affect. His behavior is normal.   Nursing note and vitals reviewed.      Significant Labs:   CBC:   Recent Labs   Lab 12/04/19 0417 12/05/19 0344   WBC 9.07 8.79   HGB 13.0* 13.5*   HCT 41.6 43.2    170     CMP:   Recent Labs   Lab 12/04/19 0417 12/05/19  0344    140   K 3.7 4.1    100   CO2 25 29   GLU 91 89   BUN 37* 36*   CREATININE 1.7* 1.8*   CALCIUM 9.2 9.5   ANIONGAP 11 11   EGFRNONAA 39.7* 37.0*     Magnesium:   Recent Labs   Lab 12/04/19 0417 12/05/19  0344   MG 2.1 2.1       Significant Imaging: I have reviewed all pertinent imaging results/findings within the past 24 hours.

## 2019-12-06 NOTE — DISCHARGE SUMMARY
"[facility] Hospital Medicine  Discharge Summary      Patient Name: Lonnie Taylor  MRN: [id number]  Admission Date: 12/2/2019  Hospital Length of Stay: 3 days  Discharge Date and Time: 12/5/2019  4:18 PM  Attending Physician: No att. providers found   Discharging Provider: Hui Argueta PA-C  Primary Care Provider: Isiah You MD  [facility] Medicine Team: [facility] MED F Hui Argueta PA-C    HPI:   69 year old male with a PMHx of HTN, HLD, CAD s/p CABG, PVD s/p BLE stents, COPD, and tobacco abuse (55 year pack history) presenting to the ER with wife at bedside for shortness of breath. Patient reports he "started getting sick on Monday." He reports he initially developed a productive cough of green sputum, SOB, and wheezing. He presented to urgent care on Wednesday and was diagnosed with bronchitis; he was prescribed Cefdinir. He reports compliance with antibiotics. He reported to the ER Sunday due to persistent complaints. He reports improvement with breathing treatments and was discharged with PO prednisone. Patient reports he presented back to the ER today due to persistent SOB. At the time of my exam, patient has no new complaints. Patient reports he has now a dry cough. Improved SOB with breathing treatments. Denies CP, abdominal pain, N/V/D, fever/chills, palpitations, focal weakness, headache, myalgias, dysuria/frequency.       HDS on admission, stable on 2L NC. Afebrile without leukocytosis. Labs significant for CHAUNCEY and troponin 0.032. BNP WNL. EKG shows NSR (HR 75), no changes noted. CXR shows bibasilar increased attenuation; ?early pulmonary edema. Received IV methylprednisolone, breathing treatments, and azithro in the ER.    * No surgery found *      Hospital Course:   Mr. Taylor was admitted to observation for COPD exacerbation. Started on azithromycin, prednisone, and duonebs. Patient with chest pain and elevated troponin to max .167. EKG without changes. Cardiology " consulted, started on ACS protocol for NSTEMI. Heparin drip X 48 completed. Started on imdur 30 mg for angina and diuresing with lasix. TTE with EF 55%, grade II diastolic dysfunction, severe LAE. Plan on [facility] once Creatinine at baseline. Creatinine 1.5--> 1.7--> 1.8, lasix discontinued. Patient chest pain free.  Given persistent elevation in Creatinine, risk of MARE too great to attempt cath now. Cardiology recommending follow up in 1 week in clinic to discuss LHC vs nuclear test. Ambulatory referral to cardiology given. Patient discharged on imdur 120 mg daily, prednisone, azithromycin to complete 5 day course total, and continue ASA, plavix, statin, betablocker. Follow up in pulmonology clinic and PCP in 1 week. Patient verbalized understanding. All questions answered.      Consults:   Consults (From admission, onward)        Status Ordering Provider     Inpatient consult to Cardiology  Once     Provider:  (Not yet assigned)    Completed NIKOLAY SHORT          * NSTEMI (non-ST elevated myocardial infarction)  Elevated troponin  Acute diastolic heart failure  - trop .032--> .088 --> .137--> .167  - EKG without acute ischemic changes on admit  - cardiology consulted, appreciate recs  - started on ACS protocol with heparin gtt X 48 hours, will end tonight  - continue ASA 81 mg daily, Plavix 75 mg daily, and Crestor 20 mg daily  - xarelto held  - TTE with EF 55%, grade II diastolic dysfunction  - [facility] pending improvement in Cr  - baseline Cr 1.4, currently 1.8  - risk of contrast induced nephropathy too great to proceed with C currenlty  - follow up with cardiology in 1 week to discuss cath vs. Nuclear stress test  - discharged on imdur 120 mg, continue ASA, Plavix, BB, statin  - ambulatory referral to cardiology given    Acute kidney injury superimposed on chronic kidney disease  - Cr 1.7 on admit (baseline 1.4), likely in setting of decreased PO intake  - Ordered bolus and encouraged PO intake- Cr increased  -  Patient overloaded on exam, started on IV lasix 40 mg daily  - Cr 1.5 --> 1.7--> 1.8  - suspect  transient increase in creatinine 2/2 to lasix administration, lasix discontinued as patient now euvolemic  - Trend daily  - Avoid nephrotoxins    COPD exacerbation  - HDS on admission, stable on 2L NC  - Afebrile without leukocytosis  - Labs significant for CHAUNCEY (see below)   - EKG shows NSR (HR 75), no changes noted  - CXR shows bibasilar increased attenuation; ?early pulmonary edema  - Received IV methylprednisolone and azithro in the ER  - Continue azithro, PO prednisone, and duonebs to complete 5 day course total  - Duonebs PRN, supportive care  - Pulm follow up as outpatient    Tobacco abuse  55 year pack history  - Discussed importance of tobacco cessation  - Nicotine patch ordered  - patient reports he is motivated to quit smoking and has nicotene patches at home      Final Active Diagnoses:    Diagnosis Date Noted POA    PRINCIPAL PROBLEM:  NSTEMI (non-ST elevated myocardial infarction) [I21.4] 12/02/2019 Yes    Acute diastolic heart failure [I50.31] 12/03/2019 Yes    COPD exacerbation [J44.1] 12/02/2019 Yes    Acute kidney injury superimposed on chronic kidney disease [N17.9, N18.9] 12/02/2019 Yes    Elevated troponin [R79.89] 12/02/2019 Yes    Tobacco abuse [Z72.0] 09/18/2018 Yes    Dyslipidemia [E78.5] 09/18/2018 Yes    PVD (peripheral vascular disease) [I73.9] 11/30/2012 Yes    CAD (coronary artery disease) [I25.10] 11/30/2012 Yes    HTN (hypertension) [I10] 11/30/2012 Yes      Problems Resolved During this Admission:       Discharged Condition: good    Disposition: Home or Self Care    Follow Up:  Follow-up Information     [facility] - Cardiology. Go on 12/12/2019.    Specialty:  Cardiology  Why:  Hospital follow-up appointment at 10:00 am  Contact information:  [address]isty Dunne  [facility] [address] [phone]  Additional information:  3rd floor           Isiah You MD In 1  week.    Specialty:  Internal Medicine  Contact information:  [place] [address]  [phone]                 Patient Instructions:      Basic metabolic panel   Standing Status: Future Standing Exp. Date: 02/02/21   Order Comments: Please send results to patient's PCP, Dr. Isiah You     Ambulatory Referral to Cardiology   Referral Priority: Routine Referral Type: Consultation   Referral Reason: Specialty Services Required   Requested Specialty: Cardiology   Number of Visits Requested: 1     Diet Cardiac     Notify your health care provider if you experience any of the following:  temperature >100.4     Notify your health care provider if you experience any of the following:  severe uncontrolled pain     Notify your health care provider if you experience any of the following:  difficulty breathing or increased cough     Activity as tolerated       Significant Diagnostic Studies: Labs:   CMP   Recent Labs   Lab 12/05/19  0344      K 4.1      CO2 29   GLU 89   BUN 36*   CREATININE 1.8*   CALCIUM 9.5   ANIONGAP 11   ESTGFRAFRICA 42.8*   EGFRNONAA 37.0*   , CBC   Recent Labs   Lab 12/05/19  0344   WBC 8.79   HGB 13.5*   HCT 43.2      ,, Troponin   Recent Labs   Lab 12/02/19  1646   TROPONINI 0.167*      TRANSTHORACIC ECHO (TTE) COMPLETE   Conclusion        · Normal left ventricular systolic function. The estimated ejection fraction is 55%  · Grade II (moderate) left ventricular diastolic dysfunction consistent with pseudonormalization.  · Severe left atrial enlargement.  · Mild-to-moderate aortic regurgitation.  · Mild to moderate pulmonic regurgitation.  · Mild mitral regurgitation.  · Mild tricuspid regurgitation.  · Normal right ventricular systolic function.  · Moderate right atrial enlargement.  · Normal central venous pressure (3 mm Hg).  · The estimated PA systolic pressure is 39 mm Hg     CHallenging study. Poor endocardial definition          Pending Diagnostic Studies:      None         Medications:  Reconciled Home Medications:      Medication List      START taking these medications    azithromycin 250 MG tablet  Commonly known as:  Z-RAMOS  Take 1 tablet (250 mg total) by mouth once daily. for 1 dose     benzonatate 100 MG capsule  Commonly known as:  TESSALON  Take 1 capsule (100 mg total) by mouth 3 (three) times daily as needed for Cough.     dextromethorphan-guaifenesin  mg  mg per 12 hr tablet  Commonly known as:  MUCINEX DM  Take 1 tablet by mouth 2 (two) times daily. for 10 days     isosorbide mononitrate 30 MG 24 hr tablet  Commonly known as:  IMDUR  Take 4 tablets (120 mg total) by mouth once daily.     nitroGLYCERIN 0.4 MG SL tablet  Commonly known as:  NITROSTAT  Place 1 tablet (0.4 mg total) under the tongue every 5 (five) minutes as needed for Chest pain.        CONTINUE taking these medications    albuterol 90 mcg/actuation inhaler  Commonly known as:  Ventolin HFA  Inhale 2 puffs into the lungs every 6 (six) hours as needed for Wheezing. Rescue     amLODIPine 5 MG tablet  Commonly known as:  NORVASC  Take 5 mg by mouth 2 (two) times daily.     aspirin 81 MG EC tablet  Commonly known as:  ECOTRIN  Take 81 mg by mouth once daily.     Bystolic 5 MG Tab  Generic drug:  nebivolol  Take 5 mg by mouth once daily.     clopidogrel 75 mg tablet  Commonly known as:  PLAVIX  Take 75 mg by mouth once daily.     Crestor 20 MG tablet  Generic drug:  rosuvastatin  Take 20 mg by mouth once daily.     doxazosin 4 MG tablet  Commonly known as:  CARDURA  Take 4 mg by mouth every 12 (twelve) hours.     fenofibrate 145 MG tablet  Commonly known as:  Tricor  Take 1 tablet (145 mg total) by mouth once daily.     nicotine 21 mg/24 hr  Commonly known as:  NICODERM CQ  Place 1 patch onto the skin once daily.     omeprazole 40 MG capsule  Commonly known as:  PRILOSEC  Take 40 mg by mouth every morning.     predniSONE 20 MG tablet  Commonly known as:  DELTASONE  Take 2 tablets (40  mg total) by mouth once daily. for 2 doses     rivaroxaban 2.5 mg Tab  Commonly known as:  XARELTO  Take 1 tablet (2.5 mg total) by mouth 2 (two) times daily with meals.     Zetia 10 mg tablet  Generic drug:  ezetimibe  Take 10 mg by mouth once daily.            Indwelling Lines/Drains at time of discharge:   Lines/Drains/Airways     None                 Time spent on the discharge of patient: 36 minutes  Patient was seen and examined on the date of discharge and determined to be suitable for discharge.         Hui Argueta PA-C  Department of Hospital Medicine  [facility]

## 2019-12-06 NOTE — ASSESSMENT & PLAN NOTE
55 year pack history  - Discussed importance of tobacco cessation  - Nicotine patch ordered  - patient reports he is motivated to quit smoking and has nicotene patches at home

## 2019-12-06 NOTE — ASSESSMENT & PLAN NOTE
Elevated troponin  Acute diastolic heart failure  - trop .032--> .088 --> .137--> .167  - EKG without acute ischemic changes on admit  - cardiology consulted, appreciate recs  - started on ACS protocol with heparin gtt X 48 hours, will end tonight  - continue ASA 81 mg daily, Plavix 75 mg daily, and Crestor 20 mg daily  - xarelto held  - TTE with EF 55%, grade II diastolic dysfunction  - [facility] pending improvement in Cr  - baseline Cr 1.4, currently 1.8  - risk of contrast induced nephropathy too great to proceed with C currenlty  - follow up with cardiology in 1 week to discuss cath vs. Nuclear stress test  - discharged on imdur 120 mg, continue ASA, Plavix, BB, statin  - ambulatory referral to cardiology given

## 2019-12-06 NOTE — ASSESSMENT & PLAN NOTE
- HDS on admission, stable on 2L NC  - Afebrile without leukocytosis  - Labs significant for CHAUNCEY (see below)   - EKG shows NSR (HR 75), no changes noted  - CXR shows bibasilar increased attenuation; ?early pulmonary edema  - Received IV methylprednisolone and azithro in the ER  - Continue azithro, PO prednisone, and duonebs to complete 5 day course total  - Duonebs PRN, supportive care  - Pulm follow up as outpatient

## 2019-12-06 NOTE — ASSESSMENT & PLAN NOTE
- Cr 1.7 on admit (baseline 1.4), likely in setting of decreased PO intake  - Ordered bolus and encouraged PO intake- Cr increased  - Patient overloaded on exam, started on IV lasix 40 mg daily  - Cr 1.5 --> 1.7--> 1.8  - suspect  transient increase in creatinine 2/2 to lasix administration, lasix discontinued as patient now euvolemic  - Trend daily  - Avoid nephrotoxins

## 2019-12-09 ENCOUNTER — PATIENT OUTREACH (OUTPATIENT)
Dept: ADMINISTRATIVE | Facility: CLINIC | Age: 69
End: 2019-12-09

## 2019-12-09 ENCOUNTER — LAB VISIT (OUTPATIENT)
Dept: LAB | Facility: HOSPITAL | Age: 69
End: 2019-12-09
Attending: PHYSICIAN ASSISTANT
Payer: MEDICARE

## 2019-12-09 ENCOUNTER — TELEPHONE (OUTPATIENT)
Dept: FAMILY MEDICINE | Facility: CLINIC | Age: 69
End: 2019-12-09

## 2019-12-09 DIAGNOSIS — I21.4 NSTEMI (NON-ST ELEVATED MYOCARDIAL INFARCTION): ICD-10-CM

## 2019-12-09 DIAGNOSIS — N25.81 SECONDARY HYPERPARATHYROIDISM OF RENAL ORIGIN: ICD-10-CM

## 2019-12-09 DIAGNOSIS — I10 ESSENTIAL HYPERTENSION, MALIGNANT: ICD-10-CM

## 2019-12-09 DIAGNOSIS — N18.30 CHRONIC KIDNEY DISEASE, STAGE III (MODERATE): ICD-10-CM

## 2019-12-09 DIAGNOSIS — I50.31 ACUTE DIASTOLIC HEART FAILURE: ICD-10-CM

## 2019-12-09 DIAGNOSIS — N18.9 ACUTE KIDNEY INJURY SUPERIMPOSED ON CHRONIC KIDNEY DISEASE: ICD-10-CM

## 2019-12-09 DIAGNOSIS — N17.9 ACUTE KIDNEY INJURY SUPERIMPOSED ON CHRONIC KIDNEY DISEASE: ICD-10-CM

## 2019-12-09 DIAGNOSIS — N18.30 CHRONIC KIDNEY DISEASE, STAGE III (MODERATE): Primary | ICD-10-CM

## 2019-12-09 LAB
25(OH)D3+25(OH)D2 SERPL-MCNC: 11 NG/ML (ref 30–96)
ALBUMIN SERPL BCP-MCNC: 3.5 G/DL (ref 3.5–5.2)
ANION GAP SERPL CALC-SCNC: 8 MMOL/L (ref 8–16)
ANION GAP SERPL CALC-SCNC: 8 MMOL/L (ref 8–16)
BUN SERPL-MCNC: 33 MG/DL (ref 8–23)
BUN SERPL-MCNC: 33 MG/DL (ref 8–23)
CALCIUM SERPL-MCNC: 9.5 MG/DL (ref 8.7–10.5)
CHLORIDE SERPL-SCNC: 110 MMOL/L (ref 95–110)
CHLORIDE SERPL-SCNC: 110 MMOL/L (ref 95–110)
CO2 SERPL-SCNC: 24 MMOL/L (ref 23–29)
CO2 SERPL-SCNC: 24 MMOL/L (ref 23–29)
CREAT SERPL-MCNC: 1.5 MG/DL (ref 0.5–1.4)
CREAT SERPL-MCNC: 1.5 MG/DL (ref 0.5–1.4)
EST. GFR  (AFRICAN AMERICAN): 53.4 ML/MIN/1.73 M^2
EST. GFR  (AFRICAN AMERICAN): 53.4 ML/MIN/1.73 M^2
EST. GFR  (NON AFRICAN AMERICAN): 46.2 ML/MIN/1.73 M^2
EST. GFR  (NON AFRICAN AMERICAN): 46.2 ML/MIN/1.73 M^2
GLUCOSE SERPL-MCNC: 97 MG/DL (ref 70–110)
GLUCOSE SERPL-MCNC: 97 MG/DL (ref 70–110)
MAGNESIUM SERPL-MCNC: 2.1 MG/DL (ref 1.6–2.6)
PHOSPHATE SERPL-MCNC: 2.8 MG/DL (ref 2.7–4.5)
POTASSIUM SERPL-SCNC: 4.1 MMOL/L (ref 3.5–5.1)
POTASSIUM SERPL-SCNC: 4.1 MMOL/L (ref 3.5–5.1)
PTH-INTACT SERPL-MCNC: 67 PG/ML (ref 9–77)
SODIUM SERPL-SCNC: 142 MMOL/L (ref 136–145)
SODIUM SERPL-SCNC: 142 MMOL/L (ref 136–145)

## 2019-12-09 PROCEDURE — 83970 ASSAY OF PARATHORMONE: CPT | Mod: HCNC

## 2019-12-09 PROCEDURE — 80069 RENAL FUNCTION PANEL: CPT | Mod: HCNC

## 2019-12-09 PROCEDURE — 82306 VITAMIN D 25 HYDROXY: CPT | Mod: HCNC

## 2019-12-09 PROCEDURE — 36415 COLL VENOUS BLD VENIPUNCTURE: CPT | Mod: HCNC,PO

## 2019-12-09 PROCEDURE — 83735 ASSAY OF MAGNESIUM: CPT | Mod: HCNC

## 2019-12-09 RX ORDER — PANTOPRAZOLE SODIUM 40 MG/1
40 TABLET, DELAYED RELEASE ORAL DAILY
COMMUNITY

## 2019-12-09 NOTE — TELEPHONE ENCOUNTER
Spoke with pt requesting a Hospital F/U. Pt advised next available appt is 1/6/2016. He declined to schedule with another provider states he was told at the discharge that he will need to see PCP within the next week. Please advise.

## 2019-12-09 NOTE — PLAN OF CARE
Future Appointments   Date Time Provider Department Center   12/13/2019  9:00 AM Tremayne Awad MD [facility] CARDIO [facility]        12/09/19 0847   Final Note   Assessment Type Final Discharge Note   Anticipated Discharge Disposition Home   What phone number can be called within the next 1-3 days to see how you are doing after discharge? [phone]   Hospital Follow Up  Appt(s) scheduled? Yes   Discharge plans and expectations educations in teach back method with documentation complete? Yes   Right Care Referral Info   Post Acute Recommendation No Care

## 2019-12-09 NOTE — TELEPHONE ENCOUNTER
I book patient this Wednesday the 11th at 4 p.m..    He is in the 420 spot but tell him the appointment really as at 4:00 p.m. so arrive before that

## 2019-12-09 NOTE — PATIENT INSTRUCTIONS
COPD Flare    You have had a flare-up of your COPD.  COPD, or chronic obstructive pulmonary disease, is a common lung disease. It causes your airways to become irritated and narrower. This makes it harder for you to breathe. Emphysema and chronic bronchitis are both types of COPD. This is a chronic condition, which means you always have it. Sometimes it gets worse. When this happens, it is called a flare-up.  Symptoms of COPD  People with COPD may have symptoms most of the time. In a flare-up, your symptoms get worse. These symptoms may mean you are having a flare-up:  · Shortness of breath, shallow or rapid breathing, or wheezing that gets worse  · Lung infection  · Cough that gets worse  · More mucus, thicker mucus or mucus of a different color  · Tiredness, decreased energy, or trouble doing your usual activities  · Fever  · Chest tightness  · Your symptoms dont get better even when you use your usual medicines, inhalers, and nebulizer  · Trouble talking  · You feel confused  Causes of flare-ups  Unfortunately, a flare-up can happen even though you did everything right, and you followed your doctors instructions. Some causes of flare-ups are:  · Smoking or secondhand smoke  · Colds, the flu, or respiratory infections  · Air pollution  · Sudden change in the weather  · Dust, irritating chemicals, or strong fumes  · Not taking your medicines as prescribed  Home care  Here are some things you can do at home to treat a flare-up:  · Try not to panic. This makes it harder to breathe, and keeps you from doing the right things.  · Dont smoke or be around others who are smoking.  · Try to drink more fluids than usual during a flare-up, unless your doctor has told you not to because of heart and kidney problems. More fluids can help loosen the mucus.  · Use your inhalers and nebulizer, if you have one, as you have been told to.  · If you were given antibiotics, take them until they are used up or your doctor tells you  to stop. Its important to finish the antibiotics, even though you feel better. This will make sure the infection has cleared.  · If you were given prednisone or another steroid, finish it even if you feel better.  Preventing a flare-up  Even though flare-ups happen, the best way to treat one is to prevent it before it starts. Here are some pointers:  · Dont smoke or be around others who are smoking.  · Take your medicines as you have been told.  · Talk with your doctor about getting a flu shot every year. Also find out if you need a pneumonia shot.  · If there is a weather advisory warning to stay indoors, try to stay inside when possible.  · Try to eat healthy and get plenty of sleep.  · Try to avoid things that usually set you off, like dust, chemical fumes, hairsprays, or strong perfumes.  Follow-up care  Follow up with your healthcare provider, or as advised.  If a culture was done, you will be told if your treatment needs to be changed. You can call as directed for the results.  If X-rays were done, you will be notified of any new findings that may affect your care.  Call 911  Call 911 if any of these occur:  · You have trouble breathing  · You feel confused or its difficult to wake you up  · You faint or lose consciousness  · You have a rapid heart rate  · You have new pain in your chest, arm, shoulder, neck or upper back  When to seek medical advice  Call your healthcare provider right away if any of these occur:  · Wheezing or shortness of breath gets worse  · You need to use your inhalers more often than usual without relief  · Fever of 100.4°F (38ºC) or higher, or as directed by your healthcare provider  · Coughing up lots of dark-colored or bloody mucus (sputum)  · Chest pain with each breath  · You do not start to get better within 24 hours  · Swelling of your ankles gets worse  · Dizziness or weakness  Date Last Reviewed: 9/1/2016  © 9121-7507 [employer]. [address] [facility],  [place] [address]. All rights reserved. This information is not intended as a substitute for professional medical care. Always follow your healthcare professional's instructions.

## 2019-12-12 ENCOUNTER — PATIENT OUTREACH (OUTPATIENT)
Dept: ADMINISTRATIVE | Facility: OTHER | Age: 69
End: 2019-12-12

## 2019-12-12 ENCOUNTER — TELEPHONE (OUTPATIENT)
Dept: CARDIOLOGY | Facility: CLINIC | Age: 69
End: 2019-12-12

## 2019-12-12 DIAGNOSIS — R00.2 PALPITATIONS: Primary | ICD-10-CM

## 2019-12-13 ENCOUNTER — OFFICE VISIT (OUTPATIENT)
Dept: CARDIOLOGY | Facility: CLINIC | Age: 69
End: 2019-12-13
Payer: MEDICARE

## 2019-12-13 VITALS
HEART RATE: 61 BPM | HEIGHT: 69 IN | SYSTOLIC BLOOD PRESSURE: 126 MMHG | BODY MASS INDEX: 34.32 KG/M2 | DIASTOLIC BLOOD PRESSURE: 61 MMHG | WEIGHT: 231.69 LBS

## 2019-12-13 DIAGNOSIS — I25.118 CORONARY ARTERY DISEASE OF NATIVE ARTERY OF NATIVE HEART WITH STABLE ANGINA PECTORIS: ICD-10-CM

## 2019-12-13 DIAGNOSIS — N18.9 ACUTE KIDNEY INJURY SUPERIMPOSED ON CHRONIC KIDNEY DISEASE: ICD-10-CM

## 2019-12-13 DIAGNOSIS — I21.4 NSTEMI (NON-ST ELEVATED MYOCARDIAL INFARCTION): Primary | ICD-10-CM

## 2019-12-13 DIAGNOSIS — I73.9 CLAUDICATION, CLASS II: ICD-10-CM

## 2019-12-13 DIAGNOSIS — E78.5 DYSLIPIDEMIA: ICD-10-CM

## 2019-12-13 DIAGNOSIS — E66.01 SEVERE OBESITY (BMI 35.0-35.9 WITH COMORBIDITY): ICD-10-CM

## 2019-12-13 DIAGNOSIS — I70.213 ATHEROSCLEROSIS OF NATIVE ARTERY OF BOTH LOWER EXTREMITIES WITH INTERMITTENT CLAUDICATION: ICD-10-CM

## 2019-12-13 DIAGNOSIS — N17.9 ACUTE KIDNEY INJURY SUPERIMPOSED ON CHRONIC KIDNEY DISEASE: ICD-10-CM

## 2019-12-13 PROBLEM — I50.31 ACUTE DIASTOLIC HEART FAILURE: Status: RESOLVED | Noted: 2019-12-03 | Resolved: 2019-12-13

## 2019-12-13 PROCEDURE — 1126F AMNT PAIN NOTED NONE PRSNT: CPT | Mod: HCNC,S$GLB,, | Performed by: INTERNAL MEDICINE

## 2019-12-13 PROCEDURE — 99499 UNLISTED E&M SERVICE: CPT | Mod: S$GLB,,, | Performed by: INTERNAL MEDICINE

## 2019-12-13 PROCEDURE — 3074F SYST BP LT 130 MM HG: CPT | Mod: HCNC,CPTII,S$GLB, | Performed by: INTERNAL MEDICINE

## 2019-12-13 PROCEDURE — 99999 PR PBB SHADOW E&M-EST. PATIENT-LVL IV: CPT | Mod: PBBFAC,HCNC,, | Performed by: INTERNAL MEDICINE

## 2019-12-13 PROCEDURE — 99499 RISK ADDL DX/OHS AUDIT: ICD-10-PCS | Mod: S$GLB,,, | Performed by: INTERNAL MEDICINE

## 2019-12-13 PROCEDURE — 1101F PT FALLS ASSESS-DOCD LE1/YR: CPT | Mod: HCNC,CPTII,S$GLB, | Performed by: INTERNAL MEDICINE

## 2019-12-13 PROCEDURE — 1101F PR PT FALLS ASSESS DOC 0-1 FALLS W/OUT INJ PAST YR: ICD-10-PCS | Mod: HCNC,CPTII,S$GLB, | Performed by: INTERNAL MEDICINE

## 2019-12-13 PROCEDURE — 99204 OFFICE O/P NEW MOD 45 MIN: CPT | Mod: HCNC,S$GLB,, | Performed by: INTERNAL MEDICINE

## 2019-12-13 PROCEDURE — 3078F PR MOST RECENT DIASTOLIC BLOOD PRESSURE < 80 MM HG: ICD-10-PCS | Mod: HCNC,CPTII,S$GLB, | Performed by: INTERNAL MEDICINE

## 2019-12-13 PROCEDURE — 99204 PR OFFICE/OUTPT VISIT, NEW, LEVL IV, 45-59 MIN: ICD-10-PCS | Mod: HCNC,S$GLB,, | Performed by: INTERNAL MEDICINE

## 2019-12-13 PROCEDURE — 1159F MED LIST DOCD IN RCRD: CPT | Mod: HCNC,S$GLB,, | Performed by: INTERNAL MEDICINE

## 2019-12-13 PROCEDURE — 1126F PR PAIN SEVERITY QUANTIFIED, NO PAIN PRESENT: ICD-10-PCS | Mod: HCNC,S$GLB,, | Performed by: INTERNAL MEDICINE

## 2019-12-13 PROCEDURE — 3078F DIAST BP <80 MM HG: CPT | Mod: HCNC,CPTII,S$GLB, | Performed by: INTERNAL MEDICINE

## 2019-12-13 PROCEDURE — 99999 PR PBB SHADOW E&M-EST. PATIENT-LVL IV: ICD-10-PCS | Mod: PBBFAC,HCNC,, | Performed by: INTERNAL MEDICINE

## 2019-12-13 PROCEDURE — 1159F PR MEDICATION LIST DOCUMENTED IN MEDICAL RECORD: ICD-10-PCS | Mod: HCNC,S$GLB,, | Performed by: INTERNAL MEDICINE

## 2019-12-13 PROCEDURE — 3074F PR MOST RECENT SYSTOLIC BLOOD PRESSURE < 130 MM HG: ICD-10-PCS | Mod: HCNC,CPTII,S$GLB, | Performed by: INTERNAL MEDICINE

## 2019-12-13 NOTE — PROGRESS NOTES
Subjective:    Patient ID:  Lonnie Taylor is a 69 y.o. male who presents for evaluation of NSTEMI (non-ST elevated myocardial infarction) and Acute kidney injury superimposed on chronic kidney disease      HPI       68-year-old gentleman with a history of coronary artery disease status post bypass grafting, AAA with aorto bifemoral bypass, hypertension, chronic kidney disease, dyslipidemia , and severe claudication post prior left SFA stent. He has been under care of Dr Cunha at [facility]. In July he had a laser atherectomy/PTA/DCB to the left SFA with reduction in stenosis from 90-99% to 0%..He present to our ED 12/2/19 with SOB and precordial chest pressure. TN had a minor increase and BNP was normal and no changes in CXR. He has been having similar pain after inhalation therapy while in hospital after discharge using albuterol. A LHC was not done on that admit due to CKD 3.He was seen by Dr Hernandez and was found to have mild obstructive disease. He has stopped smoking. He has 150 yard claudication   Lab Results   Component Value Date     12/09/2019     12/09/2019    K 4.1 12/09/2019    K 4.1 12/09/2019     12/09/2019     12/09/2019    CO2 24 12/09/2019    CO2 24 12/09/2019    BUN 33 (H) 12/09/2019    BUN 33 (H) 12/09/2019    CREATININE 1.5 (H) 12/09/2019    CREATININE 1.5 (H) 12/09/2019    GLU 97 12/09/2019    GLU 97 12/09/2019    MG 2.1 12/09/2019    AST 15 12/02/2019    ALT 23 12/02/2019    ALBUMIN 3.5 12/09/2019    PROT 7.4 12/02/2019    BILITOT 0.4 12/02/2019    WBC 8.79 12/05/2019    HGB 13.5 (L) 12/05/2019    HCT 43.2 12/05/2019    MCV 96 12/05/2019     12/05/2019    INR 1.0 12/02/2019    PSA 0.95 11/10/2017    TSH 3.405 01/08/2019         Lab Results   Component Value Date    CHOL 140 01/05/2018    HDL 34 (L) 01/05/2018    TRIG 147 01/05/2018       Lab Results   Component Value Date    LDLCALC 76.6 01/05/2018       Past Medical History:   Diagnosis Date    CAD (coronary  artery disease)     Dr Martinez    Carotid artery disease     Chronic kidney disease, stage III (moderate)     Depression     Hepatic cyst     Hepatic cyst     High-density lipoprotein deficiency     HTN (hypertension)     Hx of colonic polyps     Insomnia     PVD (peripheral vascular disease)     stented    S/P AAA repair        Current Outpatient Medications:     albuterol (VENTOLIN HFA) 90 mcg/actuation inhaler, Inhale 2 puffs into the lungs every 6 (six) hours as needed for Wheezing. Rescue, Disp: 1 Inhaler, Rfl: 12    amLODIPine (NORVASC) 5 MG tablet, Take 5 mg by mouth 2 (two) times daily. , Disp: , Rfl:     aspirin (ECOTRIN) 81 MG EC tablet, Take 81 mg by mouth once daily., Disp: , Rfl:     benzonatate (TESSALON) 100 MG capsule, Take 1 capsule (100 mg total) by mouth 3 (three) times daily as needed for Cough., Disp: 30 capsule, Rfl: 0    BYSTOLIC 5 mg Tab, Take 5 mg by mouth once daily. , Disp: , Rfl:     clopidogrel (PLAVIX) 75 mg tablet, Take 75 mg by mouth once daily., Disp: , Rfl:     CRESTOR 20 mg tablet, Take 20 mg by mouth once daily. , Disp: , Rfl:     doxazosin (CARDURA) 4 MG tablet, Take 4 mg by mouth every 12 (twelve) hours. , Disp: , Rfl:     fenofibrate (TRICOR) 145 MG tablet, Take 1 tablet (145 mg total) by mouth once daily., Disp: , Rfl:     isosorbide mononitrate (IMDUR) 30 MG 24 hr tablet, Take 4 tablets (120 mg total) by mouth once daily., Disp: 120 tablet, Rfl: 3    nicotine (NICODERM CQ) 21 mg/24 hr, Place 1 patch onto the skin once daily., Disp: 14 patch, Rfl: 0    nitroGLYCERIN (NITROSTAT) 0.4 MG SL tablet, Place 1 tablet (0.4 mg total) under the tongue every 5 (five) minutes as needed for Chest pain., Disp: 30 tablet, Rfl: 1    pantoprazole (PROTONIX) 40 MG tablet, Take 40 mg by mouth once daily., Disp: , Rfl:     ZETIA 10 mg tablet, Take 10 mg by mouth once daily. , Disp: , Rfl:     dextromethorphan-guaifenesin  mg (MUCINEX DM)  mg per 12 hr tablet,  "Take 1 tablet by mouth 2 (two) times daily. for 10 days (Patient not taking: Reported on 12/13/2019), Disp: 20 tablet, Rfl: 0    omeprazole (PRILOSEC) 40 MG capsule, Take 40 mg by mouth every morning. , Disp: , Rfl:     rivaroxaban (XARELTO) 2.5 mg Tab, Take 1 tablet (2.5 mg total) by mouth 2 (two) times daily with meals. (Patient not taking: Reported on 12/13/2019), Disp: , Rfl:           Review of Systems   Constitution: Negative for decreased appetite, diaphoresis, fever, malaise/fatigue, weight gain and weight loss.   HENT: Negative for congestion, ear discharge, ear pain and nosebleeds.    Eyes: Negative for blurred vision, double vision and visual disturbance.   Cardiovascular: Positive for chest pain. Negative for claudication, cyanosis, dyspnea on exertion, irregular heartbeat, leg swelling, near-syncope, orthopnea, palpitations, paroxysmal nocturnal dyspnea and syncope.   Respiratory: Negative for cough, hemoptysis, shortness of breath, sleep disturbances due to breathing, snoring, sputum production and wheezing.    Endocrine: Negative for polydipsia, polyphagia and polyuria.   Hematologic/Lymphatic: Negative for adenopathy and bleeding problem. Does not bruise/bleed easily.   Skin: Negative for color change, nail changes, poor wound healing and rash.   Musculoskeletal: Negative for muscle cramps and muscle weakness.   Gastrointestinal: Negative for abdominal pain, anorexia, change in bowel habit, hematochezia, nausea and vomiting.   Genitourinary: Negative for dysuria, frequency and hematuria.   Neurological: Negative for brief paralysis, difficulty with concentration, excessive daytime sleepiness, dizziness, focal weakness, headaches, light-headedness, seizures, vertigo and weakness.   Psychiatric/Behavioral: Negative for altered mental status and depression.   Allergic/Immunologic: Negative for persistent infections.        Objective:Blood pressure 126/61, pulse 61, height 5' 9" (1.753 m), weight 105.1 " kg (231 lb 11.3 oz).            Physical Exam   Constitutional: He is oriented to person, place, and time. He appears well-developed and well-nourished.   HENT:   Head: Normocephalic.   Right Ear: External ear normal.   Left Ear: External ear normal.   Nose: Nose normal.   Inspection of lips, teeth and gums normal   Eyes: Pupils are equal, round, and reactive to light. EOM are normal. No scleral icterus.   Neck: Normal range of motion. Neck supple. No JVD present. No tracheal deviation present. No thyromegaly present.   Cardiovascular: Normal rate, regular rhythm and intact distal pulses. Exam reveals no gallop and no friction rub.   No murmur heard.  Pulses:       Carotid pulses are 2+ on the right side, and 2+ on the left side.       Femoral pulses are 1+ on the right side, and 1+ on the left side.       Dorsalis pedis pulses are 0 on the right side, and 0 on the left side.        Posterior tibial pulses are 0 on the right side, and 0 on the left side.   Pulmonary/Chest: Effort normal and breath sounds normal.           Abdominal: Bowel sounds are normal. He exhibits no distension. There is no hepatosplenomegaly. There is no tenderness. There is no guarding.   Musculoskeletal: Normal range of motion. He exhibits no edema or tenderness.   Lymphadenopathy:   Palpation of neck and groin lymph nodes normal   Neurological: He is alert and oriented to person, place, and time. No cranial nerve deficit. He exhibits normal muscle tone. Coordination normal.   Skin: Skin is dry.   Palpation of skin normal   Psychiatric: His behavior is normal. Judgment and thought content normal.         Assessment:       1. NSTEMI (non-ST elevated myocardial infarction)    2. Coronary artery disease of native artery of native heart with stable angina pectoris    3. Dyslipidemia    4. Atherosclerosis of native artery of both lower extremities with intermittent claudication    5. Acute kidney injury superimposed on chronic kidney disease     6. Severe obesity (BMI 35.0-35.9 with comorbidity)         Plan:       Lonnie was seen today for nstemi (non-st elevated myocardial infarction) and acute kidney injury superimposed on chronic kidney disease.    Diagnoses and all orders for this visit:    NSTEMI (non-ST elevated myocardial infarction)  -     Cardiac PET Scan Stress; Future    Coronary artery disease of native artery of native heart with stable angina pectoris    Dyslipidemia    Atherosclerosis of native artery of both lower extremities with intermittent claudication    Acute kidney injury superimposed on chronic kidney disease    Severe obesity (BMI 35.0-35.9 with comorbidity)

## 2019-12-13 NOTE — LETTER
December 13, 2019      Hui Argueta PA-C  [id number] [facility] [id number]           [facility] - Cardiology  [address]  [place] [address]  Phone: [phone]          Patient: Lonnie Taylor   MR Number: [id number]   YOB: 1950   Date of Visit: 12/13/2019       Dear Hui Argueta:    Thank you for referring Lonnie Taylor to me for evaluation. Attached you will find relevant portions of my assessment and plan of care.    If you have questions, please do not hesitate to call me. I look forward to following Lonnie Taylor along with you.    Sincerely,    Tremayne Awad MD    Enclosure  CC:  No Recipients    If you would like to receive this communication electronically, please contact [email or web address] or [phone] to request more information on [employer] [facility] access.    For providers and/or their staff who would like to refer a patient to [facility], please contact us through our one-stop-shop provider referral line, [facility], at [phone].    If you feel you have received this communication in error or would no longer like to receive these types of communications, please e-mail [email or web address]

## 2019-12-16 ENCOUNTER — IMMUNIZATION (OUTPATIENT)
Dept: PHARMACY | Facility: CLINIC | Age: 69
End: 2019-12-16
Payer: MEDICARE

## 2019-12-19 ENCOUNTER — TELEPHONE (OUTPATIENT)
Dept: CARDIOLOGY | Facility: CLINIC | Age: 69
End: 2019-12-19

## 2019-12-23 ENCOUNTER — CLINICAL SUPPORT (OUTPATIENT)
Dept: CARDIOLOGY | Facility: CLINIC | Age: 69
End: 2019-12-23
Attending: INTERNAL MEDICINE
Payer: MEDICARE

## 2019-12-23 DIAGNOSIS — I21.4 NSTEMI (NON-ST ELEVATED MYOCARDIAL INFARCTION): ICD-10-CM

## 2020-01-02 ENCOUNTER — TELEPHONE (OUTPATIENT)
Dept: CARDIOLOGY | Facility: CLINIC | Age: 70
End: 2020-01-02

## 2020-01-02 NOTE — TELEPHONE ENCOUNTER
----- Message from Fern Curiel sent at 1/2/2020  1:10 PM CST -----  Contact: Patient  The pt is returning a call. Please call him back @ [phone]. Thanks, Fern

## 2020-01-07 ENCOUNTER — CLINICAL SUPPORT (OUTPATIENT)
Dept: SMOKING CESSATION | Facility: CLINIC | Age: 70
End: 2020-01-07
Payer: COMMERCIAL

## 2020-01-07 DIAGNOSIS — F17.200 NICOTINE DEPENDENCE: Primary | ICD-10-CM

## 2020-01-07 PROCEDURE — 99407 PR TOBACCO USE CESSATION INTENSIVE >10 MINUTES: ICD-10-PCS | Mod: S$GLB,,, | Performed by: INTERNAL MEDICINE

## 2020-01-07 PROCEDURE — 99407 BEHAV CHNG SMOKING > 10 MIN: CPT | Mod: S$GLB,,, | Performed by: INTERNAL MEDICINE

## 2020-01-11 ENCOUNTER — PATIENT OUTREACH (OUTPATIENT)
Dept: ADMINISTRATIVE | Facility: OTHER | Age: 70
End: 2020-01-11

## 2020-01-31 ENCOUNTER — OFFICE VISIT (OUTPATIENT)
Dept: FAMILY MEDICINE | Facility: CLINIC | Age: 70
End: 2020-01-31
Payer: MEDICARE

## 2020-01-31 VITALS
SYSTOLIC BLOOD PRESSURE: 120 MMHG | HEIGHT: 69 IN | DIASTOLIC BLOOD PRESSURE: 52 MMHG | OXYGEN SATURATION: 95 % | HEART RATE: 72 BPM | BODY MASS INDEX: 33.83 KG/M2 | WEIGHT: 228.38 LBS | TEMPERATURE: 98 F

## 2020-01-31 DIAGNOSIS — I73.9 PAD (PERIPHERAL ARTERY DISEASE): ICD-10-CM

## 2020-01-31 DIAGNOSIS — E66.01 SEVERE OBESITY (BMI 35.0-35.9 WITH COMORBIDITY): ICD-10-CM

## 2020-01-31 DIAGNOSIS — I73.9 PVD (PERIPHERAL VASCULAR DISEASE): ICD-10-CM

## 2020-01-31 DIAGNOSIS — I73.9 CLAUDICATION, CLASS II: ICD-10-CM

## 2020-01-31 DIAGNOSIS — Z00.00 ENCOUNTER FOR PREVENTIVE HEALTH EXAMINATION: Primary | ICD-10-CM

## 2020-01-31 DIAGNOSIS — J44.1 COPD EXACERBATION: ICD-10-CM

## 2020-01-31 DIAGNOSIS — Z87.891 PERSONAL HISTORY OF NICOTINE DEPENDENCE: ICD-10-CM

## 2020-01-31 DIAGNOSIS — N18.30 CHRONIC KIDNEY DISEASE, STAGE III (MODERATE): ICD-10-CM

## 2020-01-31 DIAGNOSIS — Z12.11 COLON CANCER SCREENING: ICD-10-CM

## 2020-01-31 DIAGNOSIS — Z12.9 SCREENING FOR CANCER: ICD-10-CM

## 2020-01-31 DIAGNOSIS — Z13.6 SCREENING FOR AAA (ABDOMINAL AORTIC ANEURYSM): ICD-10-CM

## 2020-01-31 DIAGNOSIS — Z72.0 TOBACCO ABUSE: ICD-10-CM

## 2020-01-31 DIAGNOSIS — D69.6 THROMBOCYTOPENIA, UNSPECIFIED: ICD-10-CM

## 2020-01-31 DIAGNOSIS — I70.213 ATHEROSCLEROSIS OF NATIVE ARTERY OF BOTH LOWER EXTREMITIES WITH INTERMITTENT CLAUDICATION: ICD-10-CM

## 2020-01-31 DIAGNOSIS — I21.4 NSTEMI (NON-ST ELEVATED MYOCARDIAL INFARCTION): ICD-10-CM

## 2020-01-31 PROCEDURE — 3078F DIAST BP <80 MM HG: CPT | Mod: HCNC,CPTII,S$GLB, | Performed by: NURSE PRACTITIONER

## 2020-01-31 PROCEDURE — 99499 RISK ADDL DX/OHS AUDIT: ICD-10-PCS | Mod: S$GLB,,, | Performed by: NURSE PRACTITIONER

## 2020-01-31 PROCEDURE — 99999 PR PBB SHADOW E&M-EST. PATIENT-LVL V: CPT | Mod: PBBFAC,HCNC,, | Performed by: NURSE PRACTITIONER

## 2020-01-31 PROCEDURE — G0439 PPPS, SUBSEQ VISIT: HCPCS | Mod: HCNC,S$GLB,, | Performed by: NURSE PRACTITIONER

## 2020-01-31 PROCEDURE — 99999 PR PBB SHADOW E&M-EST. PATIENT-LVL V: ICD-10-PCS | Mod: PBBFAC,HCNC,, | Performed by: NURSE PRACTITIONER

## 2020-01-31 PROCEDURE — 3074F PR MOST RECENT SYSTOLIC BLOOD PRESSURE < 130 MM HG: ICD-10-PCS | Mod: HCNC,CPTII,S$GLB, | Performed by: NURSE PRACTITIONER

## 2020-01-31 PROCEDURE — G0439 PR MEDICARE ANNUAL WELLNESS SUBSEQUENT VISIT: ICD-10-PCS | Mod: HCNC,S$GLB,, | Performed by: NURSE PRACTITIONER

## 2020-01-31 PROCEDURE — 3074F SYST BP LT 130 MM HG: CPT | Mod: HCNC,CPTII,S$GLB, | Performed by: NURSE PRACTITIONER

## 2020-01-31 PROCEDURE — 99499 UNLISTED E&M SERVICE: CPT | Mod: S$GLB,,, | Performed by: NURSE PRACTITIONER

## 2020-01-31 PROCEDURE — 3078F PR MOST RECENT DIASTOLIC BLOOD PRESSURE < 80 MM HG: ICD-10-PCS | Mod: HCNC,CPTII,S$GLB, | Performed by: NURSE PRACTITIONER

## 2020-01-31 RX ORDER — EVOLOCUMAB 140 MG/ML
INJECTION, SOLUTION SUBCUTANEOUS
COMMUNITY
Start: 2020-01-15 | End: 2020-06-30

## 2020-01-31 RX ORDER — ERGOCALCIFEROL 1.25 MG/1
CAPSULE ORAL
COMMUNITY
Start: 2020-01-03 | End: 2020-06-22 | Stop reason: ALTCHOICE

## 2020-01-31 RX ORDER — BENZONATATE 200 MG/1
200 CAPSULE ORAL 3 TIMES DAILY PRN
Qty: 30 CAPSULE | Refills: 0 | Status: SHIPPED | OUTPATIENT
Start: 2020-01-31 | End: 2020-02-10

## 2020-01-31 NOTE — PATIENT INSTRUCTIONS
Counseling and Referral of Other Preventative  (Italic type indicates deductible and co-insurance are waived)    Patient Name: Lonnie Taylor  Today's Date: 1/31/2020    Health Maintenance       Date Due Completion Date    Shingles Vaccine (1 of 2) 01/19/1998 ---    LDCT Lung Screen 01/19/2003 ---    Abdominal Aortic Aneurysm Screening 01/19/2013 ---    Colonoscopy 10/07/2018 10/7/2015    High Dose Statin 01/31/2021 1/31/2020    Lipid Panel 01/09/2024 1/9/2019    TETANUS VACCINE 12/13/2029 12/13/2019        No orders of the defined types were placed in this encounter.    The following information is provided to all patients.  This information is to help you find resources for any of the problems found today that may be affecting your health:                Living healthy guide: www.[facility].[place].gov      Understanding Diabetes: www.diabetes.org      Eating healthy: www.cdc.gov/healthyweight      CDC home safety checklist: www.cdc.gov/steadi/patient.html      Agency on Aging: www.goea.[place].[facility]      Alcoholics anonymous (AA): www.aa.org      Physical Activity: www.sarita.nih.gov/tt6wqnf      Tobacco use: www.quitwithusla.org

## 2020-02-04 ENCOUNTER — HOSPITAL ENCOUNTER (OUTPATIENT)
Dept: RADIOLOGY | Facility: HOSPITAL | Age: 70
Discharge: HOME | End: 2020-02-04
Attending: NURSE PRACTITIONER
Payer: MEDICARE

## 2020-02-04 DIAGNOSIS — Z13.6 SCREENING FOR AAA (ABDOMINAL AORTIC ANEURYSM): ICD-10-CM

## 2020-02-04 PROCEDURE — 76775 US EXAM ABDO BACK WALL LIM: CPT | Mod: TC,HCNC,PN

## 2020-02-04 PROCEDURE — 76775 US ABDOMINAL AORTA: ICD-10-PCS | Mod: 26,HCNC,, | Performed by: RADIOLOGY

## 2020-02-04 PROCEDURE — 76775 US EXAM ABDO BACK WALL LIM: CPT | Mod: 26,HCNC,, | Performed by: RADIOLOGY

## 2020-02-07 ENCOUNTER — OFFICE VISIT (OUTPATIENT)
Dept: URGENT CARE | Facility: CLINIC | Age: 70
End: 2020-02-07
Payer: MEDICARE

## 2020-02-07 VITALS
SYSTOLIC BLOOD PRESSURE: 128 MMHG | HEART RATE: 63 BPM | TEMPERATURE: 97 F | BODY MASS INDEX: 33.77 KG/M2 | RESPIRATION RATE: 19 BRPM | HEIGHT: 69 IN | DIASTOLIC BLOOD PRESSURE: 62 MMHG | WEIGHT: 228 LBS | OXYGEN SATURATION: 96 %

## 2020-02-07 DIAGNOSIS — J44.1 COPD EXACERBATION: Primary | ICD-10-CM

## 2020-02-07 PROCEDURE — 99214 OFFICE O/P EST MOD 30 MIN: CPT | Mod: 25,S$GLB,, | Performed by: NURSE PRACTITIONER

## 2020-02-07 PROCEDURE — 99214 PR OFFICE/OUTPT VISIT, EST, LEVL IV, 30-39 MIN: ICD-10-PCS | Mod: 25,S$GLB,, | Performed by: NURSE PRACTITIONER

## 2020-02-07 PROCEDURE — 71046 XR CHEST PA AND LATERAL: ICD-10-PCS | Mod: FY,S$GLB,, | Performed by: RADIOLOGY

## 2020-02-07 PROCEDURE — 96372 PR INJECTION,THERAP/PROPH/DIAG2ST, IM OR SUBCUT: ICD-10-PCS | Mod: S$GLB,,, | Performed by: NURSE PRACTITIONER

## 2020-02-07 PROCEDURE — 71046 X-RAY EXAM CHEST 2 VIEWS: CPT | Mod: FY,S$GLB,, | Performed by: RADIOLOGY

## 2020-02-07 PROCEDURE — 96372 THER/PROPH/DIAG INJ SC/IM: CPT | Mod: S$GLB,,, | Performed by: NURSE PRACTITIONER

## 2020-02-07 RX ORDER — ALBUTEROL SULFATE 1.25 MG/3ML
1.25 SOLUTION RESPIRATORY (INHALATION) EVERY 6 HOURS PRN
Qty: 1 BOX | Refills: 0 | Status: SHIPPED | OUTPATIENT
Start: 2020-02-07 | End: 2021-02-06

## 2020-02-07 RX ORDER — IPRATROPIUM BROMIDE AND ALBUTEROL SULFATE 2.5; .5 MG/3ML; MG/3ML
3 SOLUTION RESPIRATORY (INHALATION) EVERY 6 HOURS PRN
Qty: 1 BOX | Refills: 0 | Status: SHIPPED | OUTPATIENT
Start: 2020-02-07 | End: 2021-02-06

## 2020-02-07 RX ORDER — LEVALBUTEROL INHALATION SOLUTION 1.25 MG/3ML
1.25 SOLUTION RESPIRATORY (INHALATION)
Status: DISCONTINUED | OUTPATIENT
Start: 2020-02-07 | End: 2020-02-07

## 2020-02-07 NOTE — PATIENT INSTRUCTIONS
Diagnosing COPD  Your healthcare provider will use your past health history, a physical exam, and certain tests to diagnose COPD.  Health history  Your healthcare provider learns about your health history by asking questions. Topics include:  · History of present illness. Tell your healthcare provider about your symptoms. Also tell him or her how long you have had them.  · Past medical and surgical history. Share other health problems and surgery you have had.  · Family history. Report serious health problems in close family members. This is especially true of any lung problems.  · Social and environmental history. The most important factor in COPD is whether you smoke or have smoked in the past. You should also tell your provider if you have been around secondhand smoke, harmful chemicals, or air pollution.  · Functional assessment. Report whether breathing gets in the way of your daily activities.  Physical exam    Your provider will examine you. He or she will check your heart and lungs with a stethoscope. The focus will be on your airways, including your nose and throat.   Diagnostic tests  · Pulmonary function tests measure the flow of air into and out of your lungs. They also check how much air your lungs can hold. The most common pulmonary function test is spirometry. This measures how fast and how much air you can blow out (exhale). This test is important to help diagnose COPD.  · Pulse oximetry shows how much oxygen is in your blood (oxygen saturation). This may be done at rest. It may also be done during and after exercise.  · Arterial blood gas tests measure levels of oxygen and carbon dioxide in your blood.  · Chest X-rays show the size and shape of your lungs. They can also show certain problems in the lungs.  · CT scans show detailed images of the lungs.  Date Last Reviewed: 10/1/2016  © 7475-0140 [employer]. [address] [place] [address], [place] [address]. All rights reserved. This  information is not intended as a substitute for professional medical care. Always follow your healthcare professional's instructions.

## 2020-02-11 ENCOUNTER — OFFICE VISIT (OUTPATIENT)
Dept: URGENT CARE | Facility: CLINIC | Age: 70
End: 2020-02-11
Payer: MEDICARE

## 2020-02-11 VITALS
SYSTOLIC BLOOD PRESSURE: 157 MMHG | WEIGHT: 228 LBS | HEART RATE: 63 BPM | TEMPERATURE: 98 F | DIASTOLIC BLOOD PRESSURE: 70 MMHG | HEIGHT: 69 IN | BODY MASS INDEX: 33.77 KG/M2 | OXYGEN SATURATION: 97 % | RESPIRATION RATE: 17 BRPM

## 2020-02-11 DIAGNOSIS — J20.8 ACUTE BACTERIAL BRONCHITIS: Primary | ICD-10-CM

## 2020-02-11 DIAGNOSIS — R05.9 COUGH: ICD-10-CM

## 2020-02-11 DIAGNOSIS — B96.89 ACUTE BACTERIAL BRONCHITIS: Primary | ICD-10-CM

## 2020-02-11 PROCEDURE — 99214 PR OFFICE/OUTPT VISIT, EST, LEVL IV, 30-39 MIN: ICD-10-PCS | Mod: S$GLB,,, | Performed by: NURSE PRACTITIONER

## 2020-02-11 PROCEDURE — 99214 OFFICE O/P EST MOD 30 MIN: CPT | Mod: S$GLB,,, | Performed by: NURSE PRACTITIONER

## 2020-02-11 RX ORDER — BENZONATATE 200 MG/1
200 CAPSULE ORAL 3 TIMES DAILY PRN
Qty: 30 CAPSULE | Refills: 0 | Status: SHIPPED | OUTPATIENT
Start: 2020-02-11 | End: 2020-02-21

## 2020-02-11 RX ORDER — DOXYCYCLINE 100 MG/1
100 CAPSULE ORAL EVERY 12 HOURS
Qty: 14 CAPSULE | Refills: 0 | Status: SHIPPED | OUTPATIENT
Start: 2020-02-11 | End: 2020-02-18 | Stop reason: SDUPTHER

## 2020-02-11 NOTE — PATIENT INSTRUCTIONS
"Bronchitis  If your condition worsens or fails to improve we recommend that you receive another evaluation at the ER immediately or contact your PCP to discuss your concerns or return here. You must understand that you've received an urgent care treatment only and that you may be released before all your medical problems are known or treated. You the patient will arrange for follouwp care as instructed. .  Rest and fluids are important  Can use honey with clary to soothe your throat  Take inhaler as prescribed and needed for wheezing  Take prescription cough meds (pills) as prescribed; take prescription cough syrup at night as needed for cough.  Do not take both the prescribed cough pills and syrup at the same time.   -  Flonase (fluticasone) is a nasal spray which is available over the counter and may help with your symptoms.   -  Zyrtec D, Claritin D or Allegra D can also help with symptoms of congestion and drainage.   -  If you have hypertension avoid using the "D" which is the decongestant.  Instead you can use Coricidin HBP for cold and cough symptoms.    -  If you just have drainage you can take plain Zyrtec, Claritin or Allegra   -  If you just have a congested feeling you can take pseudoephedrine (unless you have high blood pressure) which you have to sign for behind the counter. Do not buy the phenylephrine which is on the shelf as it is not effective   -  Rest and fluids are also important.   -  Tylenol or ibuprofen can also be used as directed for pain unless you have an allergy to them or medical condition such as stomach ulcers, kidney or liver disease or blood thinners etc for which you should not be taking these type of medications.   Please follow up with your primary care doctor or specialist in the next 48-72hrs as needed and if no improvement  If you  smoke, please stop smoking.                                                              "

## 2020-02-11 NOTE — PROGRESS NOTES
"Subjective:       Patient ID: Lonnie Taylor is a 70 y.o. male.    Vitals:  height is 5' 9" (1.753 m) and weight is 103.4 kg (228 lb). His oral temperature is 97.6 °F (36.4 °C). His blood pressure is 157/70 (abnormal) and his pulse is 63. His respiration is 17 and oxygen saturation is 97%.     Chief Complaint: Cough    70-year-old male presents with cough and sputum production for approximately 2-1/2 weeks.  States he was seen here on last Thursday and was given a steroid shot with nebulizer treatment.  Patient states that he does not have a nebulizer machine and he is very upset he had to spend money for something that he cannot use.  Patient is adamant about receiving antibiotics today states that his in-hospital December and diagnosed with a COPD.  He denies chest pain, shortness of breath palpitations and dizziness at this time.    Cough   This is a chronic problem. The current episode started 1 to 4 weeks ago. The problem has been gradually worsening. The problem occurs constantly. The cough is productive of sputum. Associated symptoms include shortness of breath. Pertinent negatives include no chills, ear pain, eye redness, fever, hemoptysis, myalgias, rash, sore throat or wheezing. The symptoms are aggravated by lying down. He has tried steroid inhaler for the symptoms. The treatment provided no relief.       Constitution: Negative for chills, sweating, fatigue and fever.   HENT: Negative for ear pain, congestion, sinus pain, sinus pressure, sore throat and voice change.    Neck: Negative for painful lymph nodes.   Eyes: Negative for eye redness.   Respiratory: Positive for cough, sputum production and shortness of breath. Negative for chest tightness, bloody sputum, COPD, stridor, wheezing and asthma.    Gastrointestinal: Negative for nausea and vomiting.   Musculoskeletal: Negative for muscle ache.   Skin: Negative for rash.   Allergic/Immunologic: Negative for seasonal allergies and asthma. "   Hematologic/Lymphatic: Negative for swollen lymph nodes.       Objective:      Physical Exam   Constitutional: He is oriented to person, place, and time. He appears well-developed and well-nourished. He is cooperative.  Non-toxic appearance. He does not have a sickly appearance. He does not appear ill. No distress.   HENT:   Head: Normocephalic and atraumatic.   Right Ear: Hearing, tympanic membrane, external ear and ear canal normal.   Left Ear: Hearing, tympanic membrane, external ear and ear canal normal.   Nose: Nose normal. No mucosal edema, rhinorrhea or nasal deformity. No epistaxis. Right sinus exhibits no maxillary sinus tenderness and no frontal sinus tenderness. Left sinus exhibits no maxillary sinus tenderness and no frontal sinus tenderness.   Mouth/Throat: Uvula is midline, oropharynx is clear and moist and mucous membranes are normal. No trismus in the jaw. Normal dentition. No uvula swelling. No oropharyngeal exudate, posterior oropharyngeal edema or posterior oropharyngeal erythema.   Eyes: Conjunctivae and lids are normal. No scleral icterus.   Neck: Trachea normal, full passive range of motion without pain and phonation normal. Neck supple. No neck rigidity. No edema and no erythema present.   Cardiovascular: Normal rate, regular rhythm, normal heart sounds, intact distal pulses and normal pulses.   Pulmonary/Chest: Effort normal and breath sounds normal. No respiratory distress. He has no decreased breath sounds. He has no rhonchi.   Mild wheezing in left lower lobe.    Abdominal: Normal appearance.   Musculoskeletal: Normal range of motion. He exhibits no edema or deformity.   Neurological: He is alert and oriented to person, place, and time. He exhibits normal muscle tone. Coordination normal.   Skin: Skin is warm, dry, intact, not diaphoretic and not pale.   Psychiatric: He has a normal mood and affect. His speech is normal and behavior is normal. Judgment and thought content normal.  Cognition and memory are normal.   Nursing note and vitals reviewed.        Assessment:       1. Acute bacterial bronchitis    2. Cough        Plan:     patient refuses breathing treatment in clinic.  Patient states that he just needs antibiotics.  Patient seemed to be very happy upon discharge with this treatment plan.  Advised patient that if he develops chest pain, palpitations, shortness of breath, or dizziness that he should go to the ER otherwise he can follow up with his primary care provider.    Acute bacterial bronchitis  -     doxycycline (MONODOX) 100 MG capsule; Take 1 capsule (100 mg total) by mouth every 12 (twelve) hours. for 7 days  Dispense: 14 capsule; Refill: 0  -     benzonatate (TESSALON) 200 MG capsule; Take 1 capsule (200 mg total) by mouth 3 (three) times daily as needed.  Dispense: 30 capsule; Refill: 0    Cough  -     benzonatate (TESSALON) 200 MG capsule; Take 1 capsule (200 mg total) by mouth 3 (three) times daily as needed.  Dispense: 30 capsule; Refill: 0          Patient Instructions   Bronchitis  If your condition worsens or fails to improve we recommend that you receive another evaluation at the ER immediately or contact your PCP to discuss your concerns or return here. You must understand that you've received an urgent care treatment only and that you may be released before all your medical problems are known or treated. You the patient will arrange for follouwp care as instructed. .  Rest and fluids are important  Can use honey with clary to soothe your throat  Take inhaler as prescribed and needed for wheezing  Take prescription cough meds (pills) as prescribed; take prescription cough syrup at night as needed for cough.  Do not take both the prescribed cough pills and syrup at the same time.   -  Flonase (fluticasone) is a nasal spray which is available over the counter and may help with your symptoms.   -  Zyrtec D, Claritin D or Allegra D can also help with symptoms of  "congestion and drainage.   -  If you have hypertension avoid using the "D" which is the decongestant.  Instead you can use Coricidin HBP for cold and cough symptoms.    -  If you just have drainage you can take plain Zyrtec, Claritin or Allegra   -  If you just have a congested feeling you can take pseudoephedrine (unless you have high blood pressure) which you have to sign for behind the counter. Do not buy the phenylephrine which is on the shelf as it is not effective   -  Rest and fluids are also important.   -  Tylenol or ibuprofen can also be used as directed for pain unless you have an allergy to them or medical condition such as stomach ulcers, kidney or liver disease or blood thinners etc for which you should not be taking these type of medications.   Please follow up with your primary care doctor or specialist in the next 48-72hrs as needed and if no improvement  If you  smoke, please stop smoking.                                                                  "

## 2020-02-12 ENCOUNTER — PATIENT MESSAGE (OUTPATIENT)
Dept: FAMILY MEDICINE | Facility: CLINIC | Age: 70
End: 2020-02-12

## 2020-02-12 DIAGNOSIS — J44.9 CHRONIC OBSTRUCTIVE PULMONARY DISEASE, UNSPECIFIED COPD TYPE: ICD-10-CM

## 2020-02-12 DIAGNOSIS — J44.1 COPD EXACERBATION: Primary | ICD-10-CM

## 2020-02-12 NOTE — TELEPHONE ENCOUNTER
Please fax the prescription for the nebulizer and the separate prescription for nebulizer supplies to his [facility] in [place] on file and hold those prescriptions since we may very well need to send them to a DME company or to a separate pharmacy that might have the nebulizers in the pharmacy.      Please call patient and let him know when it is done and have him check around to local pharmacies that might have nebulizers in the pharmacy    I believe at least the old [facility] on the [facility] had them.  The pharmacy and [place] may have had different owners but they may carry the nebulizers

## 2020-02-13 RX ORDER — PREDNISONE 20 MG/1
40 TABLET ORAL DAILY
Qty: 6 TABLET | Refills: 0 | Status: SHIPPED | OUTPATIENT
Start: 2020-02-13 | End: 2020-02-16

## 2020-02-18 ENCOUNTER — PATIENT MESSAGE (OUTPATIENT)
Dept: FAMILY MEDICINE | Facility: CLINIC | Age: 70
End: 2020-02-18

## 2020-02-18 DIAGNOSIS — J20.8 ACUTE BACTERIAL BRONCHITIS: ICD-10-CM

## 2020-02-18 DIAGNOSIS — B96.89 ACUTE BACTERIAL BRONCHITIS: ICD-10-CM

## 2020-02-18 RX ORDER — DOXYCYCLINE 100 MG/1
100 CAPSULE ORAL EVERY 12 HOURS
Qty: 14 CAPSULE | Refills: 0 | Status: SHIPPED | OUTPATIENT
Start: 2020-02-18 | End: 2020-02-25

## 2020-02-18 RX ORDER — PREDNISONE 20 MG/1
20 TABLET ORAL DAILY
Qty: 7 TABLET | Refills: 0 | Status: SHIPPED | OUTPATIENT
Start: 2020-02-18 | End: 2021-02-04 | Stop reason: ALTCHOICE

## 2020-03-09 ENCOUNTER — TELEPHONE (OUTPATIENT)
Dept: FAMILY MEDICINE | Facility: CLINIC | Age: 70
End: 2020-03-09

## 2020-03-09 NOTE — TELEPHONE ENCOUNTER
----- Message from Radha Alcocer LPN sent at 3/6/2020  2:36 PM CST -----  Regarding: Unable to reach for colonoscopy scheduling  Dear   ,    We have been trying to reach your patient to schedule their colonoscopy, you have placed for them.  A letter has also been sent to  schedule as well as several calls. Please reach out to your patient regarding scheduling their procedure.        Sincerely,  DINORA Alcocer LPN Endoscopy   [phone]

## 2020-03-19 ENCOUNTER — PATIENT MESSAGE (OUTPATIENT)
Dept: ADMINISTRATIVE | Facility: OTHER | Age: 70
End: 2020-03-19

## 2020-03-23 ENCOUNTER — PATIENT MESSAGE (OUTPATIENT)
Dept: ADMINISTRATIVE | Facility: OTHER | Age: 70
End: 2020-03-23

## 2020-06-24 ENCOUNTER — LAB VISIT (OUTPATIENT)
Dept: LAB | Facility: HOSPITAL | Age: 70
End: 2020-06-24
Attending: INTERNAL MEDICINE
Payer: MEDICARE

## 2020-06-24 DIAGNOSIS — N25.81 SECONDARY HYPERPARATHYROIDISM OF RENAL ORIGIN: ICD-10-CM

## 2020-06-24 DIAGNOSIS — I10 ESSENTIAL HYPERTENSION, MALIGNANT: ICD-10-CM

## 2020-06-24 DIAGNOSIS — N18.30 CHRONIC KIDNEY DISEASE, STAGE III (MODERATE): Primary | ICD-10-CM

## 2020-06-24 DIAGNOSIS — N18.30 CHRONIC KIDNEY DISEASE, STAGE III (MODERATE): ICD-10-CM

## 2020-06-24 DIAGNOSIS — E55.9 VITAMIN D DEFICIENCY DISEASE: ICD-10-CM

## 2020-06-24 LAB
25(OH)D3+25(OH)D2 SERPL-MCNC: 23 NG/ML (ref 30–96)
ALBUMIN SERPL BCP-MCNC: 4 G/DL (ref 3.5–5.2)
ANION GAP SERPL CALC-SCNC: 9 MMOL/L (ref 8–16)
BASOPHILS # BLD AUTO: 0.08 K/UL (ref 0–0.2)
BASOPHILS NFR BLD: 1.2 % (ref 0–1.9)
BUN SERPL-MCNC: 19 MG/DL (ref 8–23)
CALCIUM SERPL-MCNC: 9.5 MG/DL (ref 8.7–10.5)
CHLORIDE SERPL-SCNC: 109 MMOL/L (ref 95–110)
CO2 SERPL-SCNC: 24 MMOL/L (ref 23–29)
CREAT SERPL-MCNC: 1.6 MG/DL (ref 0.5–1.4)
DIFFERENTIAL METHOD: NORMAL
EOSINOPHIL # BLD AUTO: 0.2 K/UL (ref 0–0.5)
EOSINOPHIL NFR BLD: 2.7 % (ref 0–8)
ERYTHROCYTE [DISTWIDTH] IN BLOOD BY AUTOMATED COUNT: 13.7 % (ref 11.5–14.5)
EST. GFR  (AFRICAN AMERICAN): 49 ML/MIN/1.73 M^2
EST. GFR  (NON AFRICAN AMERICAN): 42.4 ML/MIN/1.73 M^2
GLUCOSE SERPL-MCNC: 98 MG/DL (ref 70–110)
HCT VFR BLD AUTO: 47.7 % (ref 40–54)
HGB BLD-MCNC: 15.3 G/DL (ref 14–18)
IMM GRANULOCYTES # BLD AUTO: 0.01 K/UL (ref 0–0.04)
IMM GRANULOCYTES NFR BLD AUTO: 0.1 % (ref 0–0.5)
LYMPHOCYTES # BLD AUTO: 1.8 K/UL (ref 1–4.8)
LYMPHOCYTES NFR BLD: 26.4 % (ref 18–48)
MAGNESIUM SERPL-MCNC: 1.8 MG/DL (ref 1.6–2.6)
MCH RBC QN AUTO: 30.2 PG (ref 27–31)
MCHC RBC AUTO-ENTMCNC: 32.1 G/DL (ref 32–36)
MCV RBC AUTO: 94 FL (ref 82–98)
MONOCYTES # BLD AUTO: 0.5 K/UL (ref 0.3–1)
MONOCYTES NFR BLD: 7.3 % (ref 4–15)
NEUTROPHILS # BLD AUTO: 4.2 K/UL (ref 1.8–7.7)
NEUTROPHILS NFR BLD: 62.3 % (ref 38–73)
NRBC BLD-RTO: 0 /100 WBC
PHOSPHATE SERPL-MCNC: 3.6 MG/DL (ref 2.7–4.5)
PLATELET # BLD AUTO: 161 K/UL (ref 150–350)
PMV BLD AUTO: 11.3 FL (ref 9.2–12.9)
POTASSIUM SERPL-SCNC: 4.1 MMOL/L (ref 3.5–5.1)
PTH-INTACT SERPL-MCNC: 49 PG/ML (ref 9–77)
RBC # BLD AUTO: 5.06 M/UL (ref 4.6–6.2)
SODIUM SERPL-SCNC: 142 MMOL/L (ref 136–145)
WBC # BLD AUTO: 6.67 K/UL (ref 3.9–12.7)

## 2020-06-24 PROCEDURE — 83735 ASSAY OF MAGNESIUM: CPT | Mod: HCNC

## 2020-06-24 PROCEDURE — 83970 ASSAY OF PARATHORMONE: CPT | Mod: HCNC

## 2020-06-24 PROCEDURE — 36415 COLL VENOUS BLD VENIPUNCTURE: CPT | Mod: HCNC,PO

## 2020-06-24 PROCEDURE — 82306 VITAMIN D 25 HYDROXY: CPT | Mod: HCNC

## 2020-06-24 PROCEDURE — 85025 COMPLETE CBC W/AUTO DIFF WBC: CPT | Mod: HCNC

## 2020-06-24 PROCEDURE — 80069 RENAL FUNCTION PANEL: CPT | Mod: HCNC

## 2020-07-09 ENCOUNTER — CLINICAL SUPPORT (OUTPATIENT)
Dept: SMOKING CESSATION | Facility: CLINIC | Age: 70
End: 2020-07-09
Payer: COMMERCIAL

## 2020-07-09 DIAGNOSIS — F17.200 NICOTINE DEPENDENCE: Primary | ICD-10-CM

## 2020-07-09 PROCEDURE — 99406 PR TOBACCO USE CESSATION INTERMEDIATE 3-10 MINUTES: ICD-10-PCS | Mod: S$GLB,,,

## 2020-07-09 PROCEDURE — 99406 BEHAV CHNG SMOKING 3-10 MIN: CPT | Mod: S$GLB,,,

## 2020-07-09 NOTE — PROGRESS NOTES
Spoke with patients wife today in regard to smoking cessation progress for 12- month telephone follow up. She states that she nor her  are tobacco free. Patient states they will again try quitting on her own again. Offered patient virtual, phone and clinic visits. She stated that they are  ready to return to the program at this time. having medical test done. Informed patient of benefit period and contact information if any further help or support is needed. Will complete/resolve smart form for 12- month follow up on Quit attempt #1.

## 2020-07-10 PROBLEM — R94.39 ABNORMAL MYOCARDIAL PERFUSION STUDY: Status: ACTIVE | Noted: 2020-07-10

## 2020-07-10 PROBLEM — I20.9 ANGINA, CLASS III: Status: ACTIVE | Noted: 2020-07-10

## 2020-07-10 PROBLEM — I20.89 ANGINAL EQUIVALENT: Status: ACTIVE | Noted: 2020-07-10

## 2020-09-28 PROBLEM — J44.9 COPD (CHRONIC OBSTRUCTIVE PULMONARY DISEASE): Status: ACTIVE | Noted: 2019-12-02

## 2020-09-29 ENCOUNTER — PATIENT MESSAGE (OUTPATIENT)
Dept: OTHER | Facility: OTHER | Age: 70
End: 2020-09-29

## 2020-10-19 PROBLEM — R94.2 ABNORMAL PFT: Status: ACTIVE | Noted: 2020-10-19

## 2020-12-10 ENCOUNTER — PES CALL (OUTPATIENT)
Dept: ADMINISTRATIVE | Facility: CLINIC | Age: 70
End: 2020-12-10

## 2020-12-11 ENCOUNTER — PATIENT MESSAGE (OUTPATIENT)
Dept: OTHER | Facility: OTHER | Age: 70
End: 2020-12-11

## 2020-12-18 NOTE — SUBJECTIVE & OBJECTIVE
Labs ordered today. We will contact you with the results and plan going forward.     Preventive Health Recommendations  Female Ages 40 to 49    Yearly exam:     See your health care provider every year in order to  1. Review health changes.   2. Discuss preventive care.    3. Review your medicines if your doctor prescribed any.      Get a Pap test every three years (unless you have an abnormal result and your provider advises testing more often).      If you get Pap tests with HPV test, you only need to test every 5 years, unless you have an abnormal result. You do not need a Pap test if your uterus was removed (hysterectomy) and you have not had cancer.      You should be tested each year for STDs (sexually transmitted diseases), if you're at risk.     Ask your doctor if you should have a mammogram.      Have a colonoscopy (test for colon cancer) if someone in your family has had colon cancer or polyps before age 50.       Have a cholesterol test every 5 years.       Have a diabetes test (fasting glucose) after age 45. If you are at risk for diabetes, you should have this test every 3 years.    Shots: Get a flu shot each year. Get a tetanus shot every 10 years.     Nutrition:     Eat at least 5 servings of fruits and vegetables each day.    Eat whole-grain bread, whole-wheat pasta and brown rice instead of white grains and rice.    Get adequate Calcium and Vitamin D.      Lifestyle    Exercise at least 150 minutes a week (an average of 30 minutes a day, 5 days a week). This will help you control your weight and prevent disease.    Limit alcohol to one drink per day.    No smoking.     Wear sunscreen to prevent skin cancer.    See your dentist every six months for an exam and cleaning.   Past Medical History:   Diagnosis Date    CAD (coronary artery disease)     Dr Martinez    Carotid artery disease     Chronic kidney disease, stage III (moderate)     Depression     Hepatic cyst     Hepatic cyst     High-density lipoprotein deficiency     HTN (hypertension)     Insomnia     PVD (peripheral vascular disease)     stented    S/P AAA repair        Past Surgical History:   Procedure Laterality Date    ABDOMINAL AORTIC ANEURYSM REPAIR      ABDOMINAL AORTIC ANEURYSM REPAIR      ANGIOGRAPHY OF LOWER EXTREMITY Left 10/23/2018    Procedure: Angiogram Extremity Unilateral;  Surgeon: Gregor Cunha MD;  Location: [facility] CATH;  Service: Cardiology;  Laterality: Left;  LLE intervention-Will start with 4/5 slender sheath left radial and take diagnostic angio of LLE to determine whether brachial access or tibial access will be required    CATARACT EXTRACTION W/ INTRAOCULAR LENS  IMPLANT, BILATERAL      CATARACT SX Bilateral     CHOLECYSTECTOMY      COLONOSCOPY N/A 10/7/2015    Procedure: COLONOSCOPY;  Surgeon: Genaro You MD;  Location: [facility] ENDO ([facility] FLR);  Service: Endoscopy;  Laterality: N/A;    CORONARY ANGIOPLASTY WITH STENT PLACEMENT      CORONARY ARTERY BYPASS GRAFT      HERNIA REPAIR      LAMINECTOMY      PATELLA SURGERY      PERCUTANEOUS TRANSLUMINAL ANGIOPLASTY (PTA) OF PERIPHERAL VESSEL N/A 9/18/2018    Procedure: PTA, PERIPHERAL BLOOD VESSEL;  Surgeon: Gregor Cunha MD;  Location: [facility] CATH;  Service: Cardiology;  Laterality: N/A;  Site change:  right popliteal artery access using US guidance.    PERCUTANEOUS TRANSLUMINAL ANGIOPLASTY (PTA) OF PERIPHERAL VESSEL Left 7/25/2019    Procedure: PTA, PERIPHERAL VESSEL;  Surgeon: Gregor Cunha MD;  Location: [facility] CATH;  Service: Cardiology;  Laterality: Left;    VASECTOMY         Review of patient's allergies indicates:   Allergen Reactions    Aggrastat concentrate [tirofiban] Shortness Of Breath and Other (See Comments)      "Fever and chills    Brilinta [ticagrelor] Shortness Of Breath    Cymbalta [duloxetine] Nausea Only    Bupropion Other (See Comments)     "turns into zombie" withdrawn, not talking, outbursts       Current Facility-Administered Medications on File Prior to Encounter   Medication    [COMPLETED] albuterol-ipratropium 2.5 mg-0.5 mg/3 mL nebulizer solution 3 mL     Current Outpatient Medications on File Prior to Encounter   Medication Sig    albuterol (VENTOLIN HFA) 90 mcg/actuation inhaler Inhale 2 puffs into the lungs every 6 (six) hours as needed for Wheezing. Rescue    amLODIPine (NORVASC) 5 MG tablet Take 5 mg by mouth 2 (two) times daily.     aspirin (ECOTRIN) 81 MG EC tablet Take 81 mg by mouth once daily.    BYSTOLIC 5 mg Tab Take 5 mg by mouth once daily.     clopidogrel (PLAVIX) 75 mg tablet Take 75 mg by mouth once daily.    CRESTOR 20 mg tablet Take 20 mg by mouth once daily.     doxazosin (CARDURA) 4 MG tablet Take 4 mg by mouth every 12 (twelve) hours.     fenofibrate (TRICOR) 145 MG tablet Take 1 tablet (145 mg total) by mouth once daily.    nicotine (NICODERM CQ) 21 mg/24 hr Place 1 patch onto the skin once daily.    omeprazole (PRILOSEC) 40 MG capsule Take 40 mg by mouth every morning.     predniSONE (DELTASONE) 20 MG tablet Take 2 tablets (40 mg total) by mouth once daily. for 5 days    rivaroxaban (XARELTO) 2.5 mg Tab Take 1 tablet (2.5 mg total) by mouth 2 (two) times daily with meals.    ZETIA 10 mg tablet Take 10 mg by mouth once daily.     [DISCONTINUED] buPROPion (WELLBUTRIN XL) 150 MG TB24 tablet Take 1 tablet (150 mg total) by mouth once daily.     Family History     Problem Relation (Age of Onset)    Heart disease Mother, Father        Tobacco Use    Smoking status: Current Every Day Smoker     Packs/day: 1.00     Years: 50.00     Pack years: 50.00    Smokeless tobacco: Never Used   Substance and Sexual Activity    Alcohol use: No    Drug use: Yes     Types: Marijuana     " Comment: OCCASSIONALLY    Sexual activity: Not on file     Review of Systems   Constitution: Positive for malaise/fatigue. Negative for chills, fever and night sweats.   HENT: Negative for congestion and hearing loss.    Eyes: Negative for visual disturbance.   Cardiovascular: Positive for chest pain, dyspnea on exertion and orthopnea. Negative for irregular heartbeat, leg swelling, palpitations and syncope.   Respiratory: Positive for cough, shortness of breath, sleep disturbances due to breathing, sputum production and wheezing.    Skin: Negative for rash.   Musculoskeletal: Negative for back pain and myalgias.   Gastrointestinal: Positive for diarrhea. Negative for abdominal pain, change in bowel habit, constipation, nausea and vomiting.   Genitourinary: Negative for dysuria and hematuria.   Neurological: Positive for weakness. Negative for dizziness, headaches and light-headedness.   Psychiatric/Behavioral: Negative for altered mental status. The patient has insomnia. The patient is not nervous/anxious.      Objective:     Vital Signs (Most Recent):  Temp: 98.3 °F (36.8 °C) (12/02/19 0529)  Pulse: 83 (12/02/19 1046)  Resp: 16 (12/02/19 1046)  BP: 137/62 (12/02/19 1030)  SpO2: 97 % (12/02/19 1030) Vital Signs (24h Range):  Temp:  [98.2 °F (36.8 °C)-98.3 °F (36.8 °C)] 98.3 °F (36.8 °C)  Pulse:  [75-93] 83  Resp:  [15-28] 16  SpO2:  [92 %-98 %] 97 %  BP: (118-163)/(56-68) 137/62     Weight: 99.8 kg (220 lb)  Body mass index is 33.45 kg/m².    SpO2: 97 %  O2 Device (Oxygen Therapy): nasal cannula    No intake or output data in the 24 hours ending 12/02/19 1214    Lines/Drains/Airways     Peripheral Intravenous Line                 Peripheral IV - Single Lumen 12/02/19 0544 18 G Right Hand less than 1 day                Physical Exam   Constitutional: He is oriented to person, place, and time. He appears well-developed and well-nourished. No distress.   HENT:   Head: Normocephalic and atraumatic.   Eyes: Pupils are  equal, round, and reactive to light. Conjunctivae and EOM are normal. No scleral icterus.   Neck: Normal range of motion. Neck supple. JVD present.   Cardiovascular: Normal rate, regular rhythm, normal heart sounds and intact distal pulses.   No murmur heard.  Pulmonary/Chest: Effort normal. No respiratory distress. He has wheezes.   Abdominal: Soft. Bowel sounds are normal. He exhibits no distension. There is no tenderness. There is no rebound.   Musculoskeletal: Normal range of motion. He exhibits edema (1+ pitting edema in BL LE). He exhibits no tenderness.   Neurological: He is alert and oriented to person, place, and time.   Skin: Skin is warm and dry. He is not diaphoretic. No erythema.   Psychiatric: He has a normal mood and affect. His behavior is normal. Judgment and thought content normal.   Nursing note and vitals reviewed.      Significant Labs:   BMP:   Recent Labs   Lab 12/01/19  1101 12/02/19  0554   * 121*    139   K 3.7 3.7    106   CO2 24 19*   BUN 22 29*   CREATININE 1.6* 1.7*   CALCIUM 9.4 9.9   , CMP   Recent Labs   Lab 12/01/19  1101 12/02/19  0554    139   K 3.7 3.7    106   CO2 24 19*   * 121*   BUN 22 29*   CREATININE 1.6* 1.7*   CALCIUM 9.4 9.9   PROT 7.2 7.4   ALBUMIN 3.8 3.9   BILITOT 0.5 0.4   ALKPHOS 42* 43*   AST 21 15   ALT 26 23   ANIONGAP 10 14   ESTGFRAFRICA 49.4* 45.9*   EGFRNONAA 42.7* 39.7*   , CBC   Recent Labs   Lab 12/01/19  1101 12/02/19  0554   WBC 6.95 10.42   HGB 14.0 14.0   HCT 44.4 42.4   * 174   , INR No results for input(s): INR, PROTIME in the last 48 hours., Troponin   Recent Labs   Lab 12/01/19  1101 12/02/19  0554 12/02/19  0908   TROPONINI 0.013 0.032* 0.088*    and All pertinent lab results from the last 24 hours have been reviewed.    Significant Imaging: EKG: NSR, HR 75, L anterior fasiculuar block unchanged from previous EKG

## 2021-01-14 ENCOUNTER — PATIENT MESSAGE (OUTPATIENT)
Dept: FAMILY MEDICINE | Facility: CLINIC | Age: 71
End: 2021-01-14

## 2021-01-22 ENCOUNTER — LAB VISIT (OUTPATIENT)
Dept: LAB | Facility: HOSPITAL | Age: 71
End: 2021-01-22
Attending: INTERNAL MEDICINE
Payer: MEDICARE

## 2021-01-22 DIAGNOSIS — I10 ESSENTIAL HYPERTENSION, BENIGN: ICD-10-CM

## 2021-01-22 DIAGNOSIS — N18.30 CHRONIC KIDNEY DISEASE, STAGE III (MODERATE): Primary | ICD-10-CM

## 2021-01-22 DIAGNOSIS — E55.9 VITAMIN D DEFICIENCY: ICD-10-CM

## 2021-01-22 DIAGNOSIS — E55.9 VITAMIN D DEFICIENCY DISEASE: ICD-10-CM

## 2021-01-22 DIAGNOSIS — I10 ESSENTIAL HYPERTENSION, MALIGNANT: ICD-10-CM

## 2021-01-22 DIAGNOSIS — N25.81 SECONDARY HYPERPARATHYROIDISM OF RENAL ORIGIN: ICD-10-CM

## 2021-01-22 LAB
ALBUMIN SERPL BCP-MCNC: 4.1 G/DL (ref 3.5–5.2)
ANION GAP SERPL CALC-SCNC: 13 MMOL/L (ref 8–16)
BUN SERPL-MCNC: 24 MG/DL (ref 8–23)
CALCIUM SERPL-MCNC: 9.5 MG/DL (ref 8.7–10.5)
CHLORIDE SERPL-SCNC: 108 MMOL/L (ref 95–110)
CO2 SERPL-SCNC: 21 MMOL/L (ref 23–29)
CREAT SERPL-MCNC: 1.5 MG/DL (ref 0.5–1.4)
ERYTHROCYTE [DISTWIDTH] IN BLOOD BY AUTOMATED COUNT: 15 % (ref 11.5–14.5)
EST. GFR  (AFRICAN AMERICAN): 52.6 ML/MIN/1.73 M^2
EST. GFR  (NON AFRICAN AMERICAN): 45.5 ML/MIN/1.73 M^2
GLUCOSE SERPL-MCNC: 84 MG/DL (ref 70–110)
HCT VFR BLD AUTO: 46.5 % (ref 40–54)
HGB BLD-MCNC: 15 G/DL (ref 14–18)
MAGNESIUM SERPL-MCNC: 1.9 MG/DL (ref 1.6–2.6)
MCH RBC QN AUTO: 30.2 PG (ref 27–31)
MCHC RBC AUTO-ENTMCNC: 32.3 G/DL (ref 32–36)
MCV RBC AUTO: 94 FL (ref 82–98)
PHOSPHATE SERPL-MCNC: 3.1 MG/DL (ref 2.7–4.5)
PLATELET # BLD AUTO: 171 K/UL (ref 150–350)
PMV BLD AUTO: 11.4 FL (ref 9.2–12.9)
POTASSIUM SERPL-SCNC: 3.8 MMOL/L (ref 3.5–5.1)
PTH-INTACT SERPL-MCNC: 48 PG/ML (ref 9–77)
RBC # BLD AUTO: 4.97 M/UL (ref 4.6–6.2)
SODIUM SERPL-SCNC: 142 MMOL/L (ref 136–145)
WBC # BLD AUTO: 6.39 K/UL (ref 3.9–12.7)

## 2021-01-22 PROCEDURE — 82306 VITAMIN D 25 HYDROXY: CPT

## 2021-01-22 PROCEDURE — 85027 COMPLETE CBC AUTOMATED: CPT

## 2021-01-22 PROCEDURE — 80069 RENAL FUNCTION PANEL: CPT

## 2021-01-22 PROCEDURE — 36415 COLL VENOUS BLD VENIPUNCTURE: CPT | Mod: PO

## 2021-01-22 PROCEDURE — 83970 ASSAY OF PARATHORMONE: CPT

## 2021-01-22 PROCEDURE — 82043 UR ALBUMIN QUANTITATIVE: CPT

## 2021-01-22 PROCEDURE — 83735 ASSAY OF MAGNESIUM: CPT

## 2021-01-22 PROCEDURE — 81001 URINALYSIS AUTO W/SCOPE: CPT

## 2021-01-22 PROCEDURE — 82570 ASSAY OF URINE CREATININE: CPT

## 2021-01-23 LAB
25(OH)D3+25(OH)D2 SERPL-MCNC: 19 NG/ML (ref 30–96)
ALBUMIN/CREAT UR: 26.7 UG/MG (ref 0–30)
CREAT UR-MCNC: 225 MG/DL (ref 23–375)
MICROALBUMIN UR DL<=1MG/L-MCNC: 60 UG/ML
MICROSCOPIC COMMENT: NORMAL
RBC #/AREA URNS AUTO: 0 /HPF (ref 0–4)
WBC #/AREA URNS AUTO: 1 /HPF (ref 0–5)

## 2021-03-23 ENCOUNTER — LAB VISIT (OUTPATIENT)
Dept: LAB | Facility: HOSPITAL | Age: 71
End: 2021-03-23
Attending: INTERNAL MEDICINE
Payer: MEDICARE

## 2021-03-23 DIAGNOSIS — N18.30 CHRONIC KIDNEY DISEASE, STAGE III (MODERATE): Primary | ICD-10-CM

## 2021-03-23 DIAGNOSIS — N18.30 CHRONIC KIDNEY DISEASE, STAGE III (MODERATE): ICD-10-CM

## 2021-03-23 LAB
ANION GAP SERPL CALC-SCNC: 9 MMOL/L (ref 8–16)
BUN SERPL-MCNC: 23 MG/DL (ref 8–23)
CALCIUM SERPL-MCNC: 9.6 MG/DL (ref 8.7–10.5)
CHLORIDE SERPL-SCNC: 110 MMOL/L (ref 95–110)
CO2 SERPL-SCNC: 22 MMOL/L (ref 23–29)
CREAT SERPL-MCNC: 1.5 MG/DL (ref 0.5–1.4)
EST. GFR  (AFRICAN AMERICAN): 52.6 ML/MIN/1.73 M^2
EST. GFR  (NON AFRICAN AMERICAN): 45.5 ML/MIN/1.73 M^2
GLUCOSE SERPL-MCNC: 99 MG/DL (ref 70–110)
POTASSIUM SERPL-SCNC: 4 MMOL/L (ref 3.5–5.1)
SODIUM SERPL-SCNC: 141 MMOL/L (ref 136–145)

## 2021-03-23 PROCEDURE — 80048 BASIC METABOLIC PNL TOTAL CA: CPT

## 2021-03-23 PROCEDURE — 36415 COLL VENOUS BLD VENIPUNCTURE: CPT | Mod: PO

## 2021-03-26 ENCOUNTER — PATIENT MESSAGE (OUTPATIENT)
Dept: RESEARCH | Facility: HOSPITAL | Age: 71
End: 2021-03-26

## 2021-04-12 ENCOUNTER — PATIENT MESSAGE (OUTPATIENT)
Dept: ADMINISTRATIVE | Facility: HOSPITAL | Age: 71
End: 2021-04-12

## 2021-04-29 ENCOUNTER — PES CALL (OUTPATIENT)
Dept: ADMINISTRATIVE | Facility: CLINIC | Age: 71
End: 2021-04-29

## 2021-06-03 PROBLEM — E66.01 SEVERE OBESITY (BMI 35.0-35.9 WITH COMORBIDITY): Status: RESOLVED | Noted: 2018-01-16 | Resolved: 2021-06-03

## 2021-06-03 PROBLEM — I77.819 ECTASIS AORTA: Status: ACTIVE | Noted: 2021-06-03

## 2021-06-03 PROBLEM — J43.9 PULMONARY EMPHYSEMA: Status: ACTIVE | Noted: 2019-12-02

## 2021-06-03 PROBLEM — I72.3 ILIAC ARTERY ANEURYSM, BILATERAL: Status: ACTIVE | Noted: 2021-06-03

## 2021-06-03 PROBLEM — R79.89 ELEVATED TROPONIN: Status: RESOLVED | Noted: 2019-12-02 | Resolved: 2021-06-03

## 2021-06-03 PROBLEM — I25.2 HISTORY OF NON-ST ELEVATION MYOCARDIAL INFARCTION (NSTEMI): Status: ACTIVE | Noted: 2019-12-02

## 2021-06-04 PROBLEM — M54.50 LOWER BACK PAIN: Status: ACTIVE | Noted: 2021-06-04

## 2021-11-04 DIAGNOSIS — E55.9 VITAMIN D DEFICIENCY: ICD-10-CM

## 2021-11-04 DIAGNOSIS — I10 ESSENTIAL (PRIMARY) HYPERTENSION: ICD-10-CM

## 2021-11-04 DIAGNOSIS — N18.30 CHRONIC KIDNEY DISEASE, STAGE III (MODERATE): ICD-10-CM

## 2021-11-04 DIAGNOSIS — Z79.899 ENCOUNTER FOR LONG-TERM (CURRENT) USE OF OTHER MEDICATIONS: Primary | ICD-10-CM

## 2021-12-06 ENCOUNTER — LAB VISIT (OUTPATIENT)
Dept: LAB | Facility: HOSPITAL | Age: 71
End: 2021-12-06
Attending: INTERNAL MEDICINE
Payer: MEDICARE

## 2021-12-06 DIAGNOSIS — E55.9 VITAMIN D DEFICIENCY: ICD-10-CM

## 2021-12-06 DIAGNOSIS — Z79.899 ENCOUNTER FOR LONG-TERM (CURRENT) USE OF OTHER MEDICATIONS: Primary | ICD-10-CM

## 2021-12-06 DIAGNOSIS — Z79.899 ENCOUNTER FOR LONG-TERM (CURRENT) USE OF OTHER MEDICATIONS: ICD-10-CM

## 2021-12-06 DIAGNOSIS — N18.30 CHRONIC KIDNEY DISEASE, STAGE III (MODERATE): ICD-10-CM

## 2021-12-06 DIAGNOSIS — I10 ESSENTIAL (PRIMARY) HYPERTENSION: ICD-10-CM

## 2021-12-06 LAB
25(OH)D3+25(OH)D2 SERPL-MCNC: 16 NG/ML (ref 30–96)
ALBUMIN SERPL BCP-MCNC: 3.6 G/DL (ref 3.5–5.2)
ANION GAP SERPL CALC-SCNC: 13 MMOL/L (ref 8–16)
BASOPHILS # BLD AUTO: 0.04 K/UL (ref 0–0.2)
BASOPHILS NFR BLD: 0.7 % (ref 0–1.9)
BUN SERPL-MCNC: 16 MG/DL (ref 8–23)
CALCIUM SERPL-MCNC: 9.7 MG/DL (ref 8.7–10.5)
CHLORIDE SERPL-SCNC: 108 MMOL/L (ref 95–110)
CO2 SERPL-SCNC: 21 MMOL/L (ref 23–29)
CREAT SERPL-MCNC: 1.4 MG/DL (ref 0.5–1.4)
DIFFERENTIAL METHOD: ABNORMAL
EOSINOPHIL # BLD AUTO: 0.1 K/UL (ref 0–0.5)
EOSINOPHIL NFR BLD: 2 % (ref 0–8)
ERYTHROCYTE [DISTWIDTH] IN BLOOD BY AUTOMATED COUNT: 14.3 % (ref 11.5–14.5)
EST. GFR  (AFRICAN AMERICAN): 57.2 ML/MIN/1.73 M^2
EST. GFR  (NON AFRICAN AMERICAN): 49.5 ML/MIN/1.73 M^2
GLUCOSE SERPL-MCNC: 111 MG/DL (ref 70–110)
HCT VFR BLD AUTO: 42.8 % (ref 40–54)
HGB BLD-MCNC: 13.9 G/DL (ref 14–18)
IMM GRANULOCYTES # BLD AUTO: 0.02 K/UL (ref 0–0.04)
IMM GRANULOCYTES NFR BLD AUTO: 0.3 % (ref 0–0.5)
LYMPHOCYTES # BLD AUTO: 1.3 K/UL (ref 1–4.8)
LYMPHOCYTES NFR BLD: 21.7 % (ref 18–48)
MAGNESIUM SERPL-MCNC: 1.9 MG/DL (ref 1.6–2.6)
MCH RBC QN AUTO: 30.5 PG (ref 27–31)
MCHC RBC AUTO-ENTMCNC: 32.5 G/DL (ref 32–36)
MCV RBC AUTO: 94 FL (ref 82–98)
MONOCYTES # BLD AUTO: 0.4 K/UL (ref 0.3–1)
MONOCYTES NFR BLD: 7.1 % (ref 4–15)
NEUTROPHILS # BLD AUTO: 4.1 K/UL (ref 1.8–7.7)
NEUTROPHILS NFR BLD: 68.2 % (ref 38–73)
NRBC BLD-RTO: 0 /100 WBC
PHOSPHATE SERPL-MCNC: 2.9 MG/DL (ref 2.7–4.5)
PLATELET # BLD AUTO: 166 K/UL (ref 150–450)
PMV BLD AUTO: 11.3 FL (ref 9.2–12.9)
POTASSIUM SERPL-SCNC: 4 MMOL/L (ref 3.5–5.1)
PTH-INTACT SERPL-MCNC: 58.6 PG/ML (ref 9–77)
RBC # BLD AUTO: 4.56 M/UL (ref 4.6–6.2)
SODIUM SERPL-SCNC: 142 MMOL/L (ref 136–145)
WBC # BLD AUTO: 6.05 K/UL (ref 3.9–12.7)

## 2021-12-06 PROCEDURE — 36415 COLL VENOUS BLD VENIPUNCTURE: CPT | Mod: HCNC,PO | Performed by: INTERNAL MEDICINE

## 2021-12-06 PROCEDURE — 85025 COMPLETE CBC W/AUTO DIFF WBC: CPT | Mod: HCNC | Performed by: INTERNAL MEDICINE

## 2021-12-06 PROCEDURE — 83735 ASSAY OF MAGNESIUM: CPT | Mod: HCNC | Performed by: INTERNAL MEDICINE

## 2021-12-06 PROCEDURE — 83970 ASSAY OF PARATHORMONE: CPT | Mod: HCNC | Performed by: INTERNAL MEDICINE

## 2021-12-06 PROCEDURE — 80069 RENAL FUNCTION PANEL: CPT | Mod: HCNC | Performed by: INTERNAL MEDICINE

## 2021-12-06 PROCEDURE — 82306 VITAMIN D 25 HYDROXY: CPT | Mod: HCNC | Performed by: INTERNAL MEDICINE

## 2021-12-15 ENCOUNTER — PES CALL (OUTPATIENT)
Dept: ADMINISTRATIVE | Facility: CLINIC | Age: 71
End: 2021-12-15
Payer: MEDICARE

## 2022-01-07 ENCOUNTER — PATIENT OUTREACH (OUTPATIENT)
Dept: ADMINISTRATIVE | Facility: HOSPITAL | Age: 72
End: 2022-01-07
Payer: MEDICARE

## 2022-01-27 NOTE — PROGRESS NOTES
Spoke with patient today in regard to smoking cessation progress for 6 month telephone follow up, he states not tobacco free. Patient states he is going to try quitting on his own using the prescribed tobacco cessation medication of nicotine patches from his previous quit attempt. He is not ready to return to the program at this time. Informed patient of benefit period, future follow up, and contact information if any further help or support is needed. Will complete smart form for 3 and 6 month follow up on Quit attempt #1.    
2 seconds or less

## 2022-02-10 DIAGNOSIS — N18.9 CKD (CHRONIC KIDNEY DISEASE): ICD-10-CM

## 2022-02-10 DIAGNOSIS — I10 HTN (HYPERTENSION): ICD-10-CM

## 2022-02-10 DIAGNOSIS — I25.10 CAD (CORONARY ARTERY DISEASE): Primary | ICD-10-CM

## 2022-02-11 ENCOUNTER — HOSPITAL ENCOUNTER (INPATIENT)
Facility: HOSPITAL | Age: 72
LOS: 4 days | Discharge: HOME | DRG: 281 | End: 2022-02-15
Attending: EMERGENCY MEDICINE | Admitting: HOSPITALIST
Payer: MEDICARE

## 2022-02-11 DIAGNOSIS — I21.4 NON-ST ELEVATION MYOCARDIAL INFARCTION (NSTEMI): ICD-10-CM

## 2022-02-11 DIAGNOSIS — R07.9 CHEST PAIN: ICD-10-CM

## 2022-02-11 DIAGNOSIS — I25.10 CORONARY ARTERY DISEASE, UNSPECIFIED VESSEL OR LESION TYPE, UNSPECIFIED WHETHER ANGINA PRESENT, UNSPECIFIED WHETHER NATIVE OR TRANSPLANTED HEART: ICD-10-CM

## 2022-02-11 DIAGNOSIS — I21.4 NSTEMI (NON-ST ELEVATION MYOCARDIAL INFARCTION): ICD-10-CM

## 2022-02-11 DIAGNOSIS — I21.4 NSTEMI (NON-ST ELEVATED MYOCARDIAL INFARCTION): Primary | ICD-10-CM

## 2022-02-11 DIAGNOSIS — I24.9 ACS (ACUTE CORONARY SYNDROME): ICD-10-CM

## 2022-02-11 DIAGNOSIS — I25.119 CORONARY ARTERY DISEASE INVOLVING NATIVE CORONARY ARTERY OF NATIVE HEART WITH ANGINA PECTORIS: ICD-10-CM

## 2022-02-11 LAB
ABO + RH BLD: NORMAL
ALBUMIN SERPL BCP-MCNC: 3.8 G/DL (ref 3.5–5.2)
ALP SERPL-CCNC: 39 U/L (ref 55–135)
ALT SERPL W/O P-5'-P-CCNC: 13 U/L (ref 10–44)
ANION GAP SERPL CALC-SCNC: 11 MMOL/L (ref 8–16)
APTT BLDCRRT: 25.4 SEC (ref 21–32)
AST SERPL-CCNC: 29 U/L (ref 10–40)
BASOPHILS # BLD AUTO: 0.05 K/UL (ref 0–0.2)
BASOPHILS # BLD AUTO: 0.07 K/UL (ref 0–0.2)
BASOPHILS NFR BLD: 0.7 % (ref 0–1.9)
BASOPHILS NFR BLD: 0.9 % (ref 0–1.9)
BILIRUB SERPL-MCNC: 0.4 MG/DL (ref 0.1–1)
BLD GP AB SCN CELLS X3 SERPL QL: NORMAL
BNP SERPL-MCNC: 85 PG/ML (ref 0–99)
BUN SERPL-MCNC: 15 MG/DL (ref 8–23)
CALCIUM SERPL-MCNC: 10.6 MG/DL (ref 8.7–10.5)
CHLORIDE SERPL-SCNC: 107 MMOL/L (ref 95–110)
CHOLEST SERPL-MCNC: 139 MG/DL (ref 120–199)
CHOLEST/HDLC SERPL: 3.8 {RATIO} (ref 2–5)
CO2 SERPL-SCNC: 30 MMOL/L (ref 23–29)
CREAT SERPL-MCNC: 1.6 MG/DL (ref 0.5–1.4)
CTP QC/QA: YES
DIFFERENTIAL METHOD: NORMAL
DIFFERENTIAL METHOD: NORMAL
EOSINOPHIL # BLD AUTO: 0.1 K/UL (ref 0–0.5)
EOSINOPHIL # BLD AUTO: 0.2 K/UL (ref 0–0.5)
EOSINOPHIL NFR BLD: 1.7 % (ref 0–8)
EOSINOPHIL NFR BLD: 2.1 % (ref 0–8)
ERYTHROCYTE [DISTWIDTH] IN BLOOD BY AUTOMATED COUNT: 14 % (ref 11.5–14.5)
ERYTHROCYTE [DISTWIDTH] IN BLOOD BY AUTOMATED COUNT: 14.1 % (ref 11.5–14.5)
EST. GFR  (AFRICAN AMERICAN): 48.3 ML/MIN/1.73 M^2
EST. GFR  (NON AFRICAN AMERICAN): 41.8 ML/MIN/1.73 M^2
ESTIMATED AVG GLUCOSE: 105 MG/DL (ref 68–131)
GLUCOSE SERPL-MCNC: 87 MG/DL (ref 70–110)
HBA1C MFR BLD: 5.3 % (ref 4–5.6)
HCT VFR BLD AUTO: 42.3 % (ref 40–54)
HCT VFR BLD AUTO: 45.8 % (ref 40–54)
HDLC SERPL-MCNC: 37 MG/DL (ref 40–75)
HDLC SERPL: 26.6 % (ref 20–50)
HGB BLD-MCNC: 14 G/DL (ref 14–18)
HGB BLD-MCNC: 14.8 G/DL (ref 14–18)
IMM GRANULOCYTES # BLD AUTO: 0.01 K/UL (ref 0–0.04)
IMM GRANULOCYTES # BLD AUTO: 0.03 K/UL (ref 0–0.04)
IMM GRANULOCYTES NFR BLD AUTO: 0.1 % (ref 0–0.5)
IMM GRANULOCYTES NFR BLD AUTO: 0.4 % (ref 0–0.5)
INR PPP: 1.1 (ref 0.8–1.2)
LDLC SERPL CALC-MCNC: 84.6 MG/DL (ref 63–159)
LYMPHOCYTES # BLD AUTO: 1.9 K/UL (ref 1–4.8)
LYMPHOCYTES # BLD AUTO: 1.9 K/UL (ref 1–4.8)
LYMPHOCYTES NFR BLD: 23.1 % (ref 18–48)
LYMPHOCYTES NFR BLD: 26.5 % (ref 18–48)
MCH RBC QN AUTO: 30.4 PG (ref 27–31)
MCH RBC QN AUTO: 30.6 PG (ref 27–31)
MCHC RBC AUTO-ENTMCNC: 32.3 G/DL (ref 32–36)
MCHC RBC AUTO-ENTMCNC: 33.1 G/DL (ref 32–36)
MCV RBC AUTO: 92 FL (ref 82–98)
MCV RBC AUTO: 95 FL (ref 82–98)
MONOCYTES # BLD AUTO: 0.5 K/UL (ref 0.3–1)
MONOCYTES # BLD AUTO: 0.6 K/UL (ref 0.3–1)
MONOCYTES NFR BLD: 6.3 % (ref 4–15)
MONOCYTES NFR BLD: 8 % (ref 4–15)
NEUTROPHILS # BLD AUTO: 4.4 K/UL (ref 1.8–7.7)
NEUTROPHILS # BLD AUTO: 5.5 K/UL (ref 1.8–7.7)
NEUTROPHILS NFR BLD: 62.6 % (ref 38–73)
NEUTROPHILS NFR BLD: 67.6 % (ref 38–73)
NONHDLC SERPL-MCNC: 102 MG/DL
NRBC BLD-RTO: 0 /100 WBC
NRBC BLD-RTO: 0 /100 WBC
PLATELET # BLD AUTO: 173 K/UL (ref 150–450)
PLATELET # BLD AUTO: 176 K/UL (ref 150–450)
PMV BLD AUTO: 10.9 FL (ref 9.2–12.9)
PMV BLD AUTO: 11.5 FL (ref 9.2–12.9)
POTASSIUM SERPL-SCNC: 3.8 MMOL/L (ref 3.5–5.1)
PROT SERPL-MCNC: 7.1 G/DL (ref 6–8.4)
PROTHROMBIN TIME: 11.1 SEC (ref 9–12.5)
RBC # BLD AUTO: 4.61 M/UL (ref 4.6–6.2)
RBC # BLD AUTO: 4.84 M/UL (ref 4.6–6.2)
SARS-COV-2 RDRP RESP QL NAA+PROBE: NEGATIVE
SODIUM SERPL-SCNC: 148 MMOL/L (ref 136–145)
TRIGL SERPL-MCNC: 87 MG/DL (ref 30–150)
TROPONIN I SERPL DL<=0.01 NG/ML-MCNC: 13.75 NG/ML (ref 0–0.03)
TROPONIN I SERPL DL<=0.01 NG/ML-MCNC: 3.83 NG/ML (ref 0–0.03)
WBC # BLD AUTO: 7.1 K/UL (ref 3.9–12.7)
WBC # BLD AUTO: 8.09 K/UL (ref 3.9–12.7)

## 2022-02-11 PROCEDURE — 93010 ELECTROCARDIOGRAM REPORT: CPT | Mod: HCNC,,, | Performed by: INTERNAL MEDICINE

## 2022-02-11 PROCEDURE — 63600175 PHARM REV CODE 636 W HCPCS: Mod: HCNC | Performed by: EMERGENCY MEDICINE

## 2022-02-11 PROCEDURE — 99222 PR INITIAL HOSPITAL CARE,LEVL II: ICD-10-PCS | Mod: HCNC,,, | Performed by: HOSPITALIST

## 2022-02-11 PROCEDURE — 80061 LIPID PANEL: CPT | Mod: HCNC | Performed by: HOSPITALIST

## 2022-02-11 PROCEDURE — 93005 ELECTROCARDIOGRAM TRACING: CPT | Mod: HCNC

## 2022-02-11 PROCEDURE — 99291 PR CRITICAL CARE, E/M 30-74 MINUTES: ICD-10-PCS | Mod: CS,,, | Performed by: EMERGENCY MEDICINE

## 2022-02-11 PROCEDURE — 99223 PR INITIAL HOSPITAL CARE,LEVL III: ICD-10-PCS | Mod: HCNC,,, | Performed by: INTERNAL MEDICINE

## 2022-02-11 PROCEDURE — 20600001 HC STEP DOWN PRIVATE ROOM: Mod: HCNC

## 2022-02-11 PROCEDURE — 25000003 PHARM REV CODE 250: Mod: HCNC | Performed by: EMERGENCY MEDICINE

## 2022-02-11 PROCEDURE — 85610 PROTHROMBIN TIME: CPT | Mod: HCNC | Performed by: EMERGENCY MEDICINE

## 2022-02-11 PROCEDURE — 85025 COMPLETE CBC W/AUTO DIFF WBC: CPT | Mod: HCNC | Performed by: EMERGENCY MEDICINE

## 2022-02-11 PROCEDURE — 99223 1ST HOSP IP/OBS HIGH 75: CPT | Mod: HCNC,,, | Performed by: INTERNAL MEDICINE

## 2022-02-11 PROCEDURE — 86850 RBC ANTIBODY SCREEN: CPT | Mod: HCNC | Performed by: HOSPITALIST

## 2022-02-11 PROCEDURE — 25000003 PHARM REV CODE 250: Mod: HCNC | Performed by: HOSPITALIST

## 2022-02-11 PROCEDURE — 80053 COMPREHEN METABOLIC PANEL: CPT | Mod: HCNC | Performed by: EMERGENCY MEDICINE

## 2022-02-11 PROCEDURE — 84484 ASSAY OF TROPONIN QUANT: CPT | Mod: 91,HCNC | Performed by: EMERGENCY MEDICINE

## 2022-02-11 PROCEDURE — 85730 THROMBOPLASTIN TIME PARTIAL: CPT | Mod: HCNC | Performed by: EMERGENCY MEDICINE

## 2022-02-11 PROCEDURE — 84484 ASSAY OF TROPONIN QUANT: CPT | Mod: HCNC | Performed by: HOSPITALIST

## 2022-02-11 PROCEDURE — 99291 CRITICAL CARE FIRST HOUR: CPT | Mod: 25,HCNC

## 2022-02-11 PROCEDURE — 99292 PR CRITICAL CARE, ADDL 30 MIN: ICD-10-PCS | Mod: CS,,, | Performed by: EMERGENCY MEDICINE

## 2022-02-11 PROCEDURE — 83036 HEMOGLOBIN GLYCOSYLATED A1C: CPT | Mod: HCNC | Performed by: HOSPITALIST

## 2022-02-11 PROCEDURE — 93010 EKG 12-LEAD: ICD-10-PCS | Mod: HCNC,,, | Performed by: INTERNAL MEDICINE

## 2022-02-11 PROCEDURE — U0002 COVID-19 LAB TEST NON-CDC: HCPCS | Mod: HCNC | Performed by: EMERGENCY MEDICINE

## 2022-02-11 PROCEDURE — 99292 CRITICAL CARE ADDL 30 MIN: CPT | Mod: CS,,, | Performed by: EMERGENCY MEDICINE

## 2022-02-11 PROCEDURE — 99291 CRITICAL CARE FIRST HOUR: CPT | Mod: CS,,, | Performed by: EMERGENCY MEDICINE

## 2022-02-11 PROCEDURE — 99222 1ST HOSP IP/OBS MODERATE 55: CPT | Mod: HCNC,,, | Performed by: HOSPITALIST

## 2022-02-11 PROCEDURE — 83880 ASSAY OF NATRIURETIC PEPTIDE: CPT | Mod: HCNC | Performed by: EMERGENCY MEDICINE

## 2022-02-11 PROCEDURE — 99292 CRITICAL CARE ADDL 30 MIN: CPT | Mod: 25,HCNC

## 2022-02-11 RX ORDER — AMLODIPINE BESYLATE 5 MG/1
5 TABLET ORAL DAILY
Status: DISCONTINUED | OUTPATIENT
Start: 2022-02-12 | End: 2022-02-15 | Stop reason: HOSPADM

## 2022-02-11 RX ORDER — LISINOPRIL 5 MG/1
5 TABLET ORAL DAILY
Status: DISCONTINUED | OUTPATIENT
Start: 2022-02-12 | End: 2022-02-13

## 2022-02-11 RX ORDER — CLOPIDOGREL BISULFATE 75 MG/1
75 TABLET ORAL DAILY
Status: DISCONTINUED | OUTPATIENT
Start: 2022-02-12 | End: 2022-02-12

## 2022-02-11 RX ORDER — METOPROLOL TARTRATE 25 MG/1
25 TABLET, FILM COATED ORAL 2 TIMES DAILY
Status: DISCONTINUED | OUTPATIENT
Start: 2022-02-11 | End: 2022-02-13

## 2022-02-11 RX ORDER — SODIUM CHLORIDE 0.9 % (FLUSH) 0.9 %
10 SYRINGE (ML) INJECTION
Status: DISCONTINUED | OUTPATIENT
Start: 2022-02-11 | End: 2022-02-15 | Stop reason: HOSPADM

## 2022-02-11 RX ORDER — ASPIRIN 81 MG/1
81 TABLET ORAL DAILY
Status: DISCONTINUED | OUTPATIENT
Start: 2022-02-12 | End: 2022-02-15 | Stop reason: HOSPADM

## 2022-02-11 RX ORDER — ASPIRIN 325 MG
325 TABLET ORAL
Status: COMPLETED | OUTPATIENT
Start: 2022-02-11 | End: 2022-02-11

## 2022-02-11 RX ORDER — ATORVASTATIN CALCIUM 20 MG/1
80 TABLET, FILM COATED ORAL DAILY
Status: DISCONTINUED | OUTPATIENT
Start: 2022-02-12 | End: 2022-02-15 | Stop reason: HOSPADM

## 2022-02-11 RX ORDER — GABAPENTIN 300 MG/1
300 CAPSULE ORAL 3 TIMES DAILY
Status: DISCONTINUED | OUTPATIENT
Start: 2022-02-12 | End: 2022-02-15 | Stop reason: HOSPADM

## 2022-02-11 RX ORDER — DOXAZOSIN 2 MG/1
4 TABLET ORAL DAILY
Status: DISCONTINUED | OUTPATIENT
Start: 2022-02-12 | End: 2022-02-13

## 2022-02-11 RX ORDER — PANTOPRAZOLE SODIUM 40 MG/1
40 TABLET, DELAYED RELEASE ORAL DAILY
Status: DISCONTINUED | OUTPATIENT
Start: 2022-02-12 | End: 2022-02-15 | Stop reason: HOSPADM

## 2022-02-11 RX ORDER — HEPARIN SODIUM,PORCINE/D5W 25000/250
0-40 INTRAVENOUS SOLUTION INTRAVENOUS CONTINUOUS
Status: DISCONTINUED | OUTPATIENT
Start: 2022-02-11 | End: 2022-02-14

## 2022-02-11 RX ORDER — NITROGLYCERIN 0.4 MG/1
0.4 TABLET SUBLINGUAL EVERY 5 MIN PRN
Status: DISCONTINUED | OUTPATIENT
Start: 2022-02-11 | End: 2022-02-15 | Stop reason: HOSPADM

## 2022-02-11 RX ORDER — ISOSORBIDE MONONITRATE 30 MG/1
30 TABLET, EXTENDED RELEASE ORAL DAILY
Status: DISCONTINUED | OUTPATIENT
Start: 2022-02-12 | End: 2022-02-15 | Stop reason: HOSPADM

## 2022-02-11 RX ORDER — HEPARIN SODIUM,PORCINE/D5W 25000/250
0-40 INTRAVENOUS SOLUTION INTRAVENOUS CONTINUOUS
Status: DISCONTINUED | OUTPATIENT
Start: 2022-02-11 | End: 2022-02-11

## 2022-02-11 RX ORDER — FUROSEMIDE 20 MG/1
20 TABLET ORAL DAILY
Status: DISCONTINUED | OUTPATIENT
Start: 2022-02-12 | End: 2022-02-15 | Stop reason: HOSPADM

## 2022-02-11 RX ORDER — CLOPIDOGREL 300 MG/1
300 TABLET, FILM COATED ORAL
Status: COMPLETED | OUTPATIENT
Start: 2022-02-11 | End: 2022-02-11

## 2022-02-11 RX ORDER — RANOLAZINE 500 MG/1
500 TABLET, EXTENDED RELEASE ORAL 2 TIMES DAILY
Status: DISCONTINUED | OUTPATIENT
Start: 2022-02-11 | End: 2022-02-15 | Stop reason: HOSPADM

## 2022-02-11 RX ORDER — FENOFIBRATE 145 MG/1
145 TABLET, FILM COATED ORAL DAILY
Status: DISCONTINUED | OUTPATIENT
Start: 2022-02-12 | End: 2022-02-15 | Stop reason: HOSPADM

## 2022-02-11 RX ORDER — TALC
6 POWDER (GRAM) TOPICAL NIGHTLY PRN
Status: DISCONTINUED | OUTPATIENT
Start: 2022-02-11 | End: 2022-02-15 | Stop reason: HOSPADM

## 2022-02-11 RX ADMIN — RANOLAZINE 500 MG: 500 TABLET, EXTENDED RELEASE ORAL at 11:02

## 2022-02-11 RX ADMIN — ASPIRIN 325 MG ORAL TABLET 325 MG: 325 PILL ORAL at 07:02

## 2022-02-11 RX ADMIN — HEPARIN SODIUM 12 UNITS/KG/HR: 5000 INJECTION INTRAVENOUS; SUBCUTANEOUS at 08:02

## 2022-02-11 RX ADMIN — CLOPIDOGREL BISULFATE 300 MG: 300 TABLET, FILM COATED ORAL at 07:02

## 2022-02-11 RX ADMIN — METOPROLOL TARTRATE 25 MG: 25 TABLET, FILM COATED ORAL at 11:02

## 2022-02-11 NOTE — Clinical Note
175 ml of contrast were injected throughout the case. 25 mL of contrast was the total wasted during the case. 200 mL was the total amount used during the case.

## 2022-02-11 NOTE — Clinical Note
The catheter was repositioned into the SVG to OM GRAFT. An angiography was performed of the graft. Multiple views were taken. The angiography was performed via hand injection with .   Catheter removed.

## 2022-02-11 NOTE — Clinical Note
The catheter was repositioned into the ostium   left main. An angiography was performed of the left coronary arteries. The angiography was performed via hand injection with .   Catheter removed.

## 2022-02-11 NOTE — Clinical Note
The catheter was inserted into the left subclavian artery. An angiography was performed of the BECK to LAD GRAFT. The angiography was performed via hand injection with .   Catheter removed.

## 2022-02-12 LAB
ANION GAP SERPL CALC-SCNC: 11 MMOL/L (ref 8–16)
APTT BLDCRRT: 32.2 SEC (ref 21–32)
APTT BLDCRRT: 32.3 SEC (ref 21–32)
APTT BLDCRRT: 33.2 SEC (ref 21–32)
APTT BLDCRRT: 43.9 SEC (ref 21–32)
ASCENDING AORTA: 4.01 CM
AV INDEX (PROSTH): 0.95
AV MEAN GRADIENT: 4 MMHG
AV PEAK GRADIENT: 8 MMHG
AV VALVE AREA: 3.52 CM2
AV VELOCITY RATIO: 0.91
BASOPHILS # BLD AUTO: 0.05 K/UL (ref 0–0.2)
BASOPHILS NFR BLD: 0.7 % (ref 0–1.9)
BSA FOR ECHO PROCEDURE: 2.25 M2
BUN SERPL-MCNC: 15 MG/DL (ref 8–23)
CALCIUM SERPL-MCNC: 10.5 MG/DL (ref 8.7–10.5)
CHLORIDE SERPL-SCNC: 104 MMOL/L (ref 95–110)
CO2 SERPL-SCNC: 28 MMOL/L (ref 23–29)
CREAT SERPL-MCNC: 1.6 MG/DL (ref 0.5–1.4)
CV ECHO LV RWT: 0.34 CM
DIFFERENTIAL METHOD: ABNORMAL
DOP CALC AO PEAK VEL: 1.38 M/S
DOP CALC AO VTI: 24.64 CM
DOP CALC LVOT AREA: 3.7 CM2
DOP CALC LVOT DIAMETER: 2.17 CM
DOP CALC LVOT PEAK VEL: 1.26 M/S
DOP CALC LVOT STROKE VOLUME: 86.65 CM3
DOP CALCLVOT PEAK VEL VTI: 23.44 CM
E WAVE DECELERATION TIME: 147.7 MSEC
E/A RATIO: 2
E/E' RATIO: 16.57 M/S
ECHO LV POSTERIOR WALL: 0.95 CM (ref 0.6–1.1)
EJECTION FRACTION: 40 %
EOSINOPHIL # BLD AUTO: 0.2 K/UL (ref 0–0.5)
EOSINOPHIL NFR BLD: 2.8 % (ref 0–8)
ERYTHROCYTE [DISTWIDTH] IN BLOOD BY AUTOMATED COUNT: 14.2 % (ref 11.5–14.5)
EST. GFR  (AFRICAN AMERICAN): 48.3 ML/MIN/1.73 M^2
EST. GFR  (NON AFRICAN AMERICAN): 41.8 ML/MIN/1.73 M^2
FRACTIONAL SHORTENING: 14 % (ref 28–44)
GLUCOSE SERPL-MCNC: 95 MG/DL (ref 70–110)
HCT VFR BLD AUTO: 43.2 % (ref 40–54)
HGB BLD-MCNC: 13.9 G/DL (ref 14–18)
IMM GRANULOCYTES # BLD AUTO: 0.01 K/UL (ref 0–0.04)
IMM GRANULOCYTES NFR BLD AUTO: 0.1 % (ref 0–0.5)
INTERVENTRICULAR SEPTUM: 0.87 CM (ref 0.6–1.1)
IVRT: 117.03 MSEC
LA MAJOR: 6.83 CM
LA MINOR: 6.59 CM
LA WIDTH: 4.44 CM
LEFT ATRIUM SIZE: 3.48 CM
LEFT ATRIUM VOLUME INDEX MOD: 32 ML/M2
LEFT ATRIUM VOLUME INDEX: 40.2 ML/M2
LEFT ATRIUM VOLUME MOD: 70 CM3
LEFT ATRIUM VOLUME: 88.1 CM3
LEFT INTERNAL DIMENSION IN SYSTOLE: 4.88 CM (ref 2.1–4)
LEFT VENTRICLE DIASTOLIC VOLUME INDEX: 71.83 ML/M2
LEFT VENTRICLE DIASTOLIC VOLUME: 157.3 ML
LEFT VENTRICLE MASS INDEX: 90 G/M2
LEFT VENTRICLE SYSTOLIC VOLUME INDEX: 51.1 ML/M2
LEFT VENTRICLE SYSTOLIC VOLUME: 111.96 ML
LEFT VENTRICULAR INTERNAL DIMENSION IN DIASTOLE: 5.66 CM (ref 3.5–6)
LEFT VENTRICULAR MASS: 197.94 G
LV LATERAL E/E' RATIO: 14.5 M/S
LV SEPTAL E/E' RATIO: 19.33 M/S
LYMPHOCYTES # BLD AUTO: 2.4 K/UL (ref 1–4.8)
LYMPHOCYTES NFR BLD: 33.5 % (ref 18–48)
MCH RBC QN AUTO: 30.8 PG (ref 27–31)
MCHC RBC AUTO-ENTMCNC: 32.2 G/DL (ref 32–36)
MCV RBC AUTO: 96 FL (ref 82–98)
MONOCYTES # BLD AUTO: 0.6 K/UL (ref 0.3–1)
MONOCYTES NFR BLD: 8.8 % (ref 4–15)
MV PEAK A VEL: 0.58 M/S
MV PEAK E VEL: 1.16 M/S
MV STENOSIS PRESSURE HALF TIME: 42.83 MS
MV VALVE AREA P 1/2 METHOD: 5.14 CM2
NEUTROPHILS # BLD AUTO: 3.9 K/UL (ref 1.8–7.7)
NEUTROPHILS NFR BLD: 54.1 % (ref 38–73)
NRBC BLD-RTO: 0 /100 WBC
PISA TR MAX VEL: 1.98 M/S
PLATELET # BLD AUTO: 180 K/UL (ref 150–450)
PMV BLD AUTO: 11.9 FL (ref 9.2–12.9)
POTASSIUM SERPL-SCNC: 3.7 MMOL/L (ref 3.5–5.1)
RA MAJOR: 5.18 CM
RA PRESSURE: 3 MMHG
RA WIDTH: 3.8 CM
RBC # BLD AUTO: 4.52 M/UL (ref 4.6–6.2)
RIGHT VENTRICULAR END-DIASTOLIC DIMENSION: 3.68 CM
RV TISSUE DOPPLER FREE WALL SYSTOLIC VELOCITY 1 (APICAL 4 CHAMBER VIEW): 10.08 CM/S
SINUS: 3.7 CM
SODIUM SERPL-SCNC: 143 MMOL/L (ref 136–145)
STJ: 3.36 CM
T4 FREE SERPL-MCNC: 0.98 NG/DL (ref 0.71–1.51)
TDI LATERAL: 0.08 M/S
TDI SEPTAL: 0.06 M/S
TDI: 0.07 M/S
TR MAX PG: 16 MMHG
TRICUSPID ANNULAR PLANE SYSTOLIC EXCURSION: 1.89 CM
TROPONIN I SERPL DL<=0.01 NG/ML-MCNC: 17.67 NG/ML (ref 0–0.03)
TROPONIN I SERPL DL<=0.01 NG/ML-MCNC: 18.03 NG/ML (ref 0–0.03)
TROPONIN I SERPL DL<=0.01 NG/ML-MCNC: 21.19 NG/ML (ref 0–0.03)
TSH SERPL DL<=0.005 MIU/L-ACNC: 0.23 UIU/ML (ref 0.4–4)
TV REST PULMONARY ARTERY PRESSURE: 19 MMHG
WBC # BLD AUTO: 7.17 K/UL (ref 3.9–12.7)

## 2022-02-12 PROCEDURE — 94640 AIRWAY INHALATION TREATMENT: CPT | Mod: HCNC

## 2022-02-12 PROCEDURE — 84484 ASSAY OF TROPONIN QUANT: CPT | Mod: 91,HCNC | Performed by: HOSPITALIST

## 2022-02-12 PROCEDURE — 99900035 HC TECH TIME PER 15 MIN (STAT): Mod: HCNC

## 2022-02-12 PROCEDURE — 85025 COMPLETE CBC W/AUTO DIFF WBC: CPT | Mod: HCNC | Performed by: HOSPITALIST

## 2022-02-12 PROCEDURE — 84439 ASSAY OF FREE THYROXINE: CPT | Mod: HCNC | Performed by: HOSPITALIST

## 2022-02-12 PROCEDURE — 27000221 HC OXYGEN, UP TO 24 HOURS: Mod: HCNC

## 2022-02-12 PROCEDURE — 36415 COLL VENOUS BLD VENIPUNCTURE: CPT | Mod: HCNC | Performed by: HOSPITALIST

## 2022-02-12 PROCEDURE — 93010 EKG 12-LEAD: ICD-10-PCS | Mod: HCNC,,, | Performed by: INTERNAL MEDICINE

## 2022-02-12 PROCEDURE — 99233 PR SUBSEQUENT HOSPITAL CARE,LEVL III: ICD-10-PCS | Mod: HCNC,,, | Performed by: HOSPITALIST

## 2022-02-12 PROCEDURE — 99233 SBSQ HOSP IP/OBS HIGH 50: CPT | Mod: HCNC,,, | Performed by: HOSPITALIST

## 2022-02-12 PROCEDURE — 85730 THROMBOPLASTIN TIME PARTIAL: CPT | Mod: 91,HCNC | Performed by: HOSPITALIST

## 2022-02-12 PROCEDURE — 84443 ASSAY THYROID STIM HORMONE: CPT | Mod: HCNC | Performed by: HOSPITALIST

## 2022-02-12 PROCEDURE — 25000242 PHARM REV CODE 250 ALT 637 W/ HCPCS: Mod: HCNC | Performed by: HOSPITALIST

## 2022-02-12 PROCEDURE — 25000003 PHARM REV CODE 250: Mod: HCNC | Performed by: HOSPITALIST

## 2022-02-12 PROCEDURE — 94760 N-INVAS EAR/PLS OXIMETRY 1: CPT | Mod: HCNC

## 2022-02-12 PROCEDURE — 84484 ASSAY OF TROPONIN QUANT: CPT | Mod: HCNC | Performed by: HOSPITALIST

## 2022-02-12 PROCEDURE — 25000003 PHARM REV CODE 250: Mod: HCNC | Performed by: EMERGENCY MEDICINE

## 2022-02-12 PROCEDURE — 93010 ELECTROCARDIOGRAM REPORT: CPT | Mod: HCNC,,, | Performed by: INTERNAL MEDICINE

## 2022-02-12 PROCEDURE — 80048 BASIC METABOLIC PNL TOTAL CA: CPT | Mod: HCNC | Performed by: HOSPITALIST

## 2022-02-12 PROCEDURE — 99233 PR SUBSEQUENT HOSPITAL CARE,LEVL III: ICD-10-PCS | Mod: HCNC,,, | Performed by: INTERNAL MEDICINE

## 2022-02-12 PROCEDURE — 63600175 PHARM REV CODE 636 W HCPCS: Mod: HCNC | Performed by: HOSPITALIST

## 2022-02-12 PROCEDURE — 94761 N-INVAS EAR/PLS OXIMETRY MLT: CPT | Mod: HCNC

## 2022-02-12 PROCEDURE — 93005 ELECTROCARDIOGRAM TRACING: CPT | Mod: HCNC

## 2022-02-12 PROCEDURE — 20600001 HC STEP DOWN PRIVATE ROOM: Mod: HCNC

## 2022-02-12 PROCEDURE — 99233 SBSQ HOSP IP/OBS HIGH 50: CPT | Mod: HCNC,,, | Performed by: INTERNAL MEDICINE

## 2022-02-12 RX ORDER — IPRATROPIUM BROMIDE AND ALBUTEROL SULFATE 2.5; .5 MG/3ML; MG/3ML
3 SOLUTION RESPIRATORY (INHALATION) EVERY 6 HOURS PRN
Status: DISCONTINUED | OUTPATIENT
Start: 2022-02-12 | End: 2022-02-13

## 2022-02-12 RX ORDER — PRASUGREL 10 MG/1
10 TABLET, FILM COATED ORAL DAILY
Status: DISCONTINUED | OUTPATIENT
Start: 2022-02-13 | End: 2022-02-15 | Stop reason: HOSPADM

## 2022-02-12 RX ORDER — PRASUGREL 10 MG/1
60 TABLET, FILM COATED ORAL ONCE
Status: COMPLETED | OUTPATIENT
Start: 2022-02-12 | End: 2022-02-12

## 2022-02-12 RX ORDER — HYDROXYZINE HYDROCHLORIDE 25 MG/1
25 TABLET, FILM COATED ORAL 3 TIMES DAILY PRN
Status: DISCONTINUED | OUTPATIENT
Start: 2022-02-12 | End: 2022-02-15 | Stop reason: HOSPADM

## 2022-02-12 RX ADMIN — Medication 6 MG: at 09:02

## 2022-02-12 RX ADMIN — HYDROXYZINE HYDROCHLORIDE 25 MG: 25 TABLET ORAL at 04:02

## 2022-02-12 RX ADMIN — IPRATROPIUM BROMIDE AND ALBUTEROL SULFATE 3 ML: 2.5; .5 SOLUTION RESPIRATORY (INHALATION) at 12:02

## 2022-02-12 RX ADMIN — FUROSEMIDE 20 MG: 20 TABLET ORAL at 09:02

## 2022-02-12 RX ADMIN — GABAPENTIN 300 MG: 300 CAPSULE ORAL at 03:02

## 2022-02-12 RX ADMIN — HEPARIN SODIUM 14 UNITS/KG/HR: 5000 INJECTION INTRAVENOUS; SUBCUTANEOUS at 06:02

## 2022-02-12 RX ADMIN — RANOLAZINE 500 MG: 500 TABLET, EXTENDED RELEASE ORAL at 09:02

## 2022-02-12 RX ADMIN — DOXAZOSIN 4 MG: 2 TABLET ORAL at 09:02

## 2022-02-12 RX ADMIN — PANTOPRAZOLE SODIUM 40 MG: 40 TABLET, DELAYED RELEASE ORAL at 09:02

## 2022-02-12 RX ADMIN — FENOFIBRATE 145 MG: 145 TABLET, FILM COATED ORAL at 09:02

## 2022-02-12 RX ADMIN — ATORVASTATIN CALCIUM 80 MG: 20 TABLET, FILM COATED ORAL at 09:02

## 2022-02-12 RX ADMIN — ASPIRIN 81 MG: 81 TABLET, COATED ORAL at 09:02

## 2022-02-12 RX ADMIN — HEPARIN SODIUM 18 UNITS/KG/HR: 5000 INJECTION INTRAVENOUS; SUBCUTANEOUS at 10:02

## 2022-02-12 RX ADMIN — METOPROLOL TARTRATE 25 MG: 25 TABLET, FILM COATED ORAL at 09:02

## 2022-02-12 RX ADMIN — AMLODIPINE BESYLATE 5 MG: 5 TABLET ORAL at 09:02

## 2022-02-12 RX ADMIN — LISINOPRIL 5 MG: 5 TABLET ORAL at 09:02

## 2022-02-12 RX ADMIN — GABAPENTIN 300 MG: 300 CAPSULE ORAL at 09:02

## 2022-02-12 RX ADMIN — PRASUGREL 60 MG: 10 TABLET, FILM COATED ORAL at 10:02

## 2022-02-12 RX ADMIN — POTASSIUM BICARBONATE 25 MEQ: 978 TABLET, EFFERVESCENT ORAL at 12:02

## 2022-02-12 RX ADMIN — ISOSORBIDE MONONITRATE 30 MG: 30 TABLET, EXTENDED RELEASE ORAL at 09:02

## 2022-02-12 NOTE — SUBJECTIVE & OBJECTIVE
Interval History: No acute events overnight. Mild SOB this morning which resolved with 2L NC. Denies CP, HA, palpitations, nausea, emesis, diaphoresis, abdominal pain. VSS, afebrile.    Review of Systems   Constitutional: Negative for chills, fever and malaise/fatigue.   Eyes: Negative for visual disturbance.   Cardiovascular: Negative for chest pain, irregular heartbeat, leg swelling and palpitations.   Respiratory: Negative for cough and shortness of breath.    Gastrointestinal: Negative for abdominal pain, heartburn, nausea and vomiting.   Neurological: Negative for dizziness, headaches and light-headedness.   Psychiatric/Behavioral: Negative for altered mental status.     Objective:     Vital Signs (Most Recent):  Temp: 98.4 °F (36.9 °C) (02/12/22 0735)  Pulse: 70 (02/12/22 0735)  Resp: 18 (02/12/22 0735)  BP: (!) 148/69 (02/12/22 0735)  SpO2: 95 % (02/12/22 0735) Vital Signs (24h Range):  Temp:  [97.6 °F (36.4 °C)-98.4 °F (36.9 °C)] 98.4 °F (36.9 °C)  Pulse:  [66-92] 70  Resp:  [16-20] 18  SpO2:  [94 %-99 %] 95 %  BP: (127-169)/(56-90) 148/69     Weight: 104.5 kg (230 lb 6.1 oz)  Body mass index is 34.02 kg/m².     SpO2: 95 %  O2 Device (Oxygen Therapy): room air      Intake/Output Summary (Last 24 hours) at 2/12/2022 0859  Last data filed at 2/11/2022 2328  Gross per 24 hour   Intake 200 ml   Output --   Net 200 ml       Lines/Drains/Airways     Peripheral Intravenous Line                 Peripheral IV - Single Lumen 02/11/22 1843 20 G Right Antecubital <1 day                Physical Exam  Vitals and nursing note reviewed.   Constitutional:       General: He is not in acute distress.     Appearance: He is obese. He is not ill-appearing.   HENT:      Head: Normocephalic and atraumatic.      Mouth/Throat:      Mouth: Mucous membranes are moist.   Eyes:      Conjunctiva/sclera: Conjunctivae normal.   Cardiovascular:      Rate and Rhythm: Normal rate and regular rhythm.      Pulses: Normal pulses.           Radial  pulses are 2+ on the right side and 2+ on the left side.      Heart sounds: Normal heart sounds, S1 normal and S2 normal. No murmur heard.  No friction rub. No gallop.    Pulmonary:      Effort: Pulmonary effort is normal. No respiratory distress.      Breath sounds: Normal breath sounds. No wheezing.   Abdominal:      General: There is no distension.      Palpations: Abdomen is soft.      Tenderness: There is no abdominal tenderness.   Musculoskeletal:      Right lower leg: No edema.      Left lower leg: No edema.   Skin:     General: Skin is warm and dry.   Neurological:      General: No focal deficit present.      Mental Status: He is alert and oriented to person, place, and time.   Psychiatric:         Mood and Affect: Mood normal.         Significant Labs:   CMP   Recent Labs   Lab 02/11/22 1813   *   K 3.8      CO2 30*   GLU 87   BUN 15   CREATININE 1.6*   CALCIUM 10.6*   PROT 7.1   ALBUMIN 3.8   BILITOT 0.4   ALKPHOS 39*   AST 29   ALT 13   ANIONGAP 11   ESTGFRAFRICA 48.3*   EGFRNONAA 41.8*   , CBC   Recent Labs   Lab 02/11/22 1813 02/11/22 1813 02/11/22 1947 02/11/22 1947 02/12/22  0357   WBC 8.09  --  7.10  --  7.17   HGB 14.8  --  14.0  --  13.9*   HCT 45.8   < > 42.3   < > 43.2     --  176  --  180    < > = values in this interval not displayed.   , INR   Recent Labs   Lab 02/11/22 1947   INR 1.1   , Troponin   Recent Labs   Lab 02/11/22 2229 02/12/22  0013 02/12/22  0357   TROPONINI 13.750* 17.673* 21.189*    and All pertinent lab results from the last 24 hours have been reviewed.    Significant Imaging: Echocardiogram:   Transthoracic echo (TTE) complete (Cupid Only):   Results for orders placed or performed during the hospital encounter of 12/02/19   Echo Color Flow Doppler? Yes   Result Value Ref Range    Ascending aorta 4.32 cm    STJ 3.89 cm    AV mean gradient 8 mmHg    Ao peak clemente 2.11 m/s    Ao VTI 36.99 cm    IVS 0.66 0.6 - 1.1 cm    LA size 4.75 cm    Left Atrium Major  Axis 7.12 cm    Left Atrium Minor Axis 6.66 cm    LVIDd 5.55 3.5 - 6.0 cm    LVIDs 4.40 (A) 2.1 - 4.0 cm    LVOT diameter 2.24 cm    LVOT peak VTI 28.34 cm    Posterior Wall 0.73 0.6 - 1.1 cm    MV Peak A Colby 0.88 m/s    E wave deceleration time 120.46 msec    MV Peak E Colby 1.08 m/s    PV Peak D Colby 0.88 m/s    PV Peak S Colby 0.74 m/s    RA Major Axis 6.24 cm    RA Width 4.27 cm    RVDD 4.14 cm    Sinus 4.25 cm    TAPSE 2.29 cm    TR Max Colby 3.02 m/s    TDI LATERAL 0.12 m/s    TDI SEPTAL 0.09 m/s    LA WIDTH 5.21 cm    LV Diastolic Volume 150.81 mL    LV Systolic Volume 87.69 mL    LVOT peak colby 1.39 m/s    LV LATERAL E/E' RATIO 9.00 m/s    LV SEPTAL E/E' RATIO 12.00 m/s    FS 21 %    LA volume 144.77 cm3    LV mass 136.47 g    Left Ventricle Relative Wall Thickness 0.26 cm    AV valve area 3.02 cm2    AV Velocity Ratio 0.66     AV index (prosthetic) 0.77     E/A ratio 1.23     Mean e' 0.11 m/s    Pulm vein S/D ratio 0.84     LVOT area 3.9 cm2    LVOT stroke volume 111.63 cm3    AV peak gradient 18 mmHg    E/E' ratio 10.29 m/s    LV Systolic Volume Index 40.2 mL/m2    LV Diastolic Volume Index 69.16 mL/m2    LA Volume Index 66.4 mL/m2    LV Mass Index 63 g/m2    Triscuspid Valve Regurgitation Peak Gradient 36 mmHg    BSA 2.25 m2    Right Atrial Pressure (from IVC) 3 mmHg    TV rest pulmonary artery pressure 39 mmHg    Narrative    · Normal left ventricular systolic function. The estimated ejection   fraction is 55%  · Grade II (moderate) left ventricular diastolic dysfunction consistent   with pseudonormalization.  · Severe left atrial enlargement.  · Mild-to-moderate aortic regurgitation.  · Mild to moderate pulmonic regurgitation.  · Mild mitral regurgitation.  · Mild tricuspid regurgitation.  · Normal right ventricular systolic function.  · Moderate right atrial enlargement.  · Normal central venous pressure (3 mm Hg).  · The estimated PA systolic pressure is 39 mm Hg     CHallenging study. Poor endocardial  definition

## 2022-02-12 NOTE — ED PROVIDER NOTES
"Encounter Date: 2/11/2022       History     Chief Complaint   Patient presents with    Chest Pain     X 6 hours, took tums, nitro, with no relief at home.  Received 4- 81mg aspirin and 2 SL nitro with EMS and is now pain free     HPI   Lonnie Taylor is a 72-year-old male with a history of CAD status post CABG, history of cardiac stents in 2018 in 2020, history of peripheral arterial disease, obesity, tobacco abuse, hypertension, hyperlipidemia presenting with chest pain evaluation.  He initially reported chest discomfort this morning on awakening at 9:00 a.m. which was intermittent continued throughout the day.  It was located substernal without radiation.  He did endorse having diaphoresis but denies any associated shortness of breath, arm pain, jaw pain, leg pain, lightheadedness, dizziness, falls or trauma.  Pain was moderately severe, and he called EMS for evaluation.  He received 2 doses of nitroglycerin with improvement in his symptoms with the 2nd dose to resolution.  The chest tightness and discomfort felt different than his previous presentation which was more similar to pressure.  He reports taking Plavix and aspirin, he admits to persistent tobacco use to 1 pack per day.  He denies any fevers, chills, nausea, vomiting and his pain is resolved at this time.  No other aggravating or alleviating factors.    Review of patient's allergies indicates:   Allergen Reactions    Aggrastat concentrate [tirofiban] Shortness Of Breath and Other (See Comments)     Fever and chills    Brilinta [ticagrelor] Shortness Of Breath    Cymbalta [duloxetine] Nausea Only    Bupropion Other (See Comments)     "turns into zombie" withdrawn, not talking, outbursts     Past Medical History:   Diagnosis Date    CAD (coronary artery disease)     Dr Martinez    Carotid artery disease     Chronic kidney disease, stage III (moderate)     Depression     Dyslipidemia     Hepatic cyst     Hepatic cyst     High-density lipoprotein " deficiency     HTN (hypertension)     Hx of colonic polyps     Insomnia     PVD (peripheral vascular disease)     stented    S/P AAA repair      Past Surgical History:   Procedure Laterality Date    ABDOMINAL AORTIC ANEURYSM REPAIR      ABDOMINAL AORTIC ANEURYSM REPAIR      ANGIOGRAPHY OF LOWER EXTREMITY Left 10/23/2018    Procedure: Angiogram Extremity Unilateral;  Surgeon: Gregor Cunha MD;  Location: [facility] CATH;  Service: Cardiology;  Laterality: Left;  LLE intervention-Will start with 4/5 slender sheath left radial and take diagnostic angio of LLE to determine whether brachial access or tibial access will be required    CATARACT EXTRACTION W/ INTRAOCULAR LENS  IMPLANT, BILATERAL      CATARACT SX Bilateral     CATHETERIZATION OF BOTH LEFT AND RIGHT HEART N/A 7/10/2020    Procedure: CATHETERIZATION, HEART, BOTH LEFT AND RIGHT;  Surgeon: Gregor Cunha MD;  Location: [facility] CATH;  Service: Cardiology;  Laterality: N/A;  LHC + RHC +/- PCI -access left radial artery and left brachial vein    CHOLECYSTECTOMY      COLONOSCOPY N/A 10/7/2015    Procedure: COLONOSCOPY;  Surgeon: Genaro You MD;  Location: [facility] ENDO ([address] [place] [address]);  Service: Endoscopy;  Laterality: N/A;    CORONARY ANGIOPLASTY WITH STENT PLACEMENT      CORONARY ARTERY BYPASS GRAFT      HERNIA REPAIR      LAMINECTOMY      MAGNETIC RESONANCE IMAGING N/A 6/4/2021    Procedure: MRI (MAGNETIC RESONANCE IMAGING) LUMBAR SPINE;  Surgeon: [facility] Diagnostic Provider;  Location: [facility];  Service: General;  Laterality: N/A;  In MRI @ 9:10 per Krys, To follow WC RM1    PATELLA SURGERY      PERCUTANEOUS TRANSLUMINAL ANGIOPLASTY (PTA) OF PERIPHERAL VESSEL N/A 9/18/2018    Procedure: PTA, PERIPHERAL BLOOD VESSEL;  Surgeon: Gregor Cunha MD;  Location: [facility] CATH;  Service: Cardiology;  Laterality: N/A;  Site change:  right popliteal artery access using US guidance.    PERCUTANEOUS TRANSLUMINAL ANGIOPLASTY (PTA) OF PERIPHERAL VESSEL Left  7/25/2019    Procedure: PTA, PERIPHERAL VESSEL;  Surgeon: Gregor Cunha MD;  Location: [facility] CATH;  Service: Cardiology;  Laterality: Left;    VASECTOMY       Family History   Problem Relation Age of Onset    Heart disease Mother     Heart disease Father     Lung cancer Brother      Social History     Tobacco Use    Smoking status: Current Every Day Smoker     Packs/day: 1.00     Years: 50.00     Pack years: 50.00    Smokeless tobacco: Never Used    Tobacco comment: Currently smoke 1 ppd   Substance Use Topics    Alcohol use: No    Drug use: Yes     Types: Marijuana     Comment: OCCASSIONALLY     Review of Systems   Constitutional: Positive for diaphoresis. Negative for chills, fatigue and fever.   HENT: Negative for congestion, ear pain and sore throat.    Eyes: Negative for photophobia and visual disturbance.   Respiratory: Positive for chest tightness. Negative for shortness of breath.    Cardiovascular: Positive for chest pain. Negative for palpitations and leg swelling.   Gastrointestinal: Negative for abdominal distention, abdominal pain, nausea and vomiting.   Genitourinary: Negative for dysuria and flank pain.   Musculoskeletal: Negative for back pain, myalgias and neck stiffness.   Skin: Negative for color change and wound.   Neurological: Negative for weakness and headaches.   Psychiatric/Behavioral: Negative for confusion. The patient is not nervous/anxious.        Physical Exam     Initial Vitals   BP Pulse Resp Temp SpO2   02/11/22 1601 02/11/22 1601 02/11/22 1603 02/11/22 1601 02/11/22 1601   (!) 132/90 92 20 98.1 °F (36.7 °C) 95 %      MAP       --                Physical Exam    Nursing note and vitals reviewed.      Gen/Constitutional: Interactive.  Mild emotional distress  Head: Normocephalic, Atraumatic  Neck: supple, no masses or LAD, no JVD  Eyes: PERRLA, conjunctiva clear  Ears, Nose and Throat: No rhinorrhea or stridor.  Cardiac:  Regular rate, Reg Rhythm, No murmur; 2+ distal  pulses on the radial and DP  Pulmonary: CTA Bilat, no wheezes, rhonchi, rales.  No increased work of breathing.  GI: Abdomen soft, non-tender, non-distended; no rebound or guarding  : No CVA tenderness.  Musculoskeletal: Extremities warm, well perfused, no erythema, no edema  Skin: No rashes, cyanosis or jaundice.  Neuro: Alert and Oriented x 3; No focal motor or sensory deficits.    Psych: Normal affect      ED Course   Critical Care    Date/Time: 2/11/2022 6:45 PM  Performed by: Linden Salmeron DO  Authorized by: Linden Salmeron DO   Direct patient critical care time: 15 minutes  Additional history critical care time: 20 minutes  Ordering / reviewing critical care time: 20 minutes  Documentation critical care time: 7 minutes  Consulting other physicians critical care time: 10 minutes  Consult with family critical care time: 5 minutes  Total critical care time (exclusive of procedural time) : 77 minutes  Critical care was necessary to treat or prevent imminent or life-threatening deterioration of the following conditions: cardiac failure (NSTEMI  -- ACS).  Critical care was time spent personally by me on the following activities: blood draw for specimens, development of treatment plan with patient or surrogate, discussions with consultants, evaluation of patient's response to treatment, examination of patient, obtaining history from patient or surrogate, ordering and performing treatments and interventions, ordering and review of laboratory studies, ordering and review of radiographic studies, pulse oximetry, re-evaluation of patient's condition and review of old charts.        Labs Reviewed   COMPREHENSIVE METABOLIC PANEL - Abnormal; Notable for the following components:       Result Value    Sodium 148 (*)     CO2 30 (*)     Creatinine 1.6 (*)     Calcium 10.6 (*)     Alkaline Phosphatase 39 (*)     eGFR if  48.3 (*)     eGFR if non  41.8 (*)     All other components within  normal limits   TROPONIN I - Abnormal; Notable for the following components:    Troponin I 3.830 (*)     All other components within normal limits   LIPID PANEL - Abnormal; Notable for the following components:    HDL 37 (*)     All other components within normal limits   TROPONIN I - Abnormal; Notable for the following components:    Troponin I 13.750 (*)     All other components within normal limits   CBC W/ AUTO DIFFERENTIAL   B-TYPE NATRIURETIC PEPTIDE   APTT    Narrative:     (if patient is on warfarin prior to heparin therapy)   PROTIME-INR    Narrative:     (if patient is on warfarin prior to heparin therapy)   CBC W/ AUTO DIFFERENTIAL    Narrative:     (if patient is on warfarin prior to heparin therapy)   SARS-COV-2 RDRP GENE     EKG Readings: (Independently Interpreted)   Initial Reading: No STEMI. Previous EKG: Compared with most recent EKG Rhythm: Normal Sinus Rhythm. Heart Rate: 75. Ectopy: PVCs. ST Segments: Non-Specific ST Segment Depression. Axis: Left Axis Deviation.   Incomplete left bundle branch     ECG Results          EKG 12-lead (Final result)  Result time 02/12/22 10:45:29    Final result by Interface, Lab In [facility] (02/12/22 10:45:29)                 Narrative:    Test Reason :     Vent. Rate : 078 BPM     Atrial Rate : 078 BPM     P-R Int : 152 ms          QRS Dur : 104 ms      QT Int : 418 ms       P-R-T Axes : 077 -49 082 degrees     QTc Int : 476 ms    Sinus rhythm with Premature supraventricular complexes  Left axis deviation  Abnormal ECG  When compared with ECG of 11-FEB-2022 16:12,  No significant change was found  Confirmed by Chin Conde MD ([id number]) on 2/12/2022 10:45:21 AM    Referred By: [email or web address]   SELF           Confirmed By:Chin Conde MD                             EKG 12-lead (Final result)  Result time 02/12/22 09:59:13    Final result by Interface, Lab In [facility] (02/12/22 09:59:13)                 Narrative:    Test Reason : I21.4,    Vent. Rate : 075 BPM     Atrial  Rate : 075 BPM     P-R Int : 156 ms          QRS Dur : 110 ms      QT Int : 436 ms       P-R-T Axes : 071 -42 082 degrees     QTc Int : 486 ms    Sinus rhythm with Premature atrial complexes  Left axis deviation  Incomplete left bundle branch block  Nonspecific T wave abnormality  Prolonged QT  Abnormal ECG  When compared with ECG of 27-MAY-2021 14:15,  Premature atrial complexes are now Present  Incomplete left bundle branch block is now Present    Confirmed by LINDA RAYMOND, HOMEYAR ([id number]) on 2/12/2022 9:59:06 AM    Referred By: ADRIANA   SELF           Confirmed By:ADELFO MCKEON MD                            Imaging Results          X-Ray Chest AP Portable (Final result)  Result time 02/11/22 19:29:03    Final result by Gaston Ryan MD (02/11/22 19:29:03)                 Impression:      No detrimental change or radiographic acute intrathoracic process seen on this limited single view.      Electronically signed by: Gaston Ryan MD  Date:    02/11/2022  Time:    19:29             Narrative:    EXAMINATION:  XR CHEST AP PORTABLE    CLINICAL HISTORY:  Chest Pain;    TECHNIQUE:  Single frontal view of the chest was performed.    COMPARISON:  Chest radiograph 06/30/2020 and CT thorax 10/14/2020    FINDINGS:  Monitoring leads overlie the chest.  Resolution is somewhat limited by body habitus with underpenetration.    No detrimental change.  Sternotomy wires appear stable.  Cardiomediastinal silhouette is midline and prominent with calcification and tortuosity of the aorta and enlarged cardiac silhouette similar to prior.  Pulmonary vasculature and hilar contours are within normal limits.  Few scattered linear opacities throughout each lung consistent with minimal platelike scarring versus atelectasis.  The lungs are otherwise well expanded without large consolidation, pleural effusion or pneumothorax.  No acute osseous process seen.                              X-Rays:   Independently Interpreted Readings:   Chest  X-Ray: Normal heart size.  No infiltrates.  No acute abnormalities.     Medications   heparin 25,000 units in dextrose 5% 250 mL (100 units/mL) infusion LOW INTENSITY nomogram - [facility] (16 Units/kg/hr × 83.3 kg (Adjusted) Intravenous Rate/Dose Change 2/12/22 1017)   heparin 25,000 units in dextrose 5% (100 units/ml) IV bolus from bag - ADDITIONAL PRN BOLUS - 60 units/kg (max bolus 4000 units) (has no administration in time range)   heparin 25,000 units in dextrose 5% (100 units/ml) IV bolus from bag - ADDITIONAL PRN BOLUS - 30 units/kg (max bolus 4000 units) (2,500 Units Intravenous Bolus from Bag 2/12/22 1020)   sodium chloride 0.9% flush 10 mL (has no administration in time range)   melatonin tablet 6 mg (has no administration in time range)   amLODIPine tablet 5 mg (5 mg Oral Given 2/12/22 0904)   aspirin EC tablet 81 mg (81 mg Oral Given 2/12/22 0905)   atorvastatin tablet 80 mg (80 mg Oral Given 2/12/22 0904)   doxazosin tablet 4 mg (4 mg Oral Given 2/12/22 0904)   fenofibrate tablet 145 mg (145 mg Oral Given 2/12/22 0905)   furosemide tablet 20 mg (20 mg Oral Given 2/12/22 0905)   gabapentin capsule 300 mg (300 mg Oral Given 2/12/22 0905)   isosorbide mononitrate 24 hr tablet 30 mg (30 mg Oral Given 2/12/22 0905)   pantoprazole EC tablet 40 mg (40 mg Oral Given 2/12/22 0905)   ranolazine 12 hr tablet 500 mg (500 mg Oral Given 2/12/22 0905)   metoprolol tartrate (LOPRESSOR) tablet 25 mg (25 mg Oral Given 2/12/22 0905)   nitroGLYCERIN SL tablet 0.4 mg (has no administration in time range)   lisinopriL tablet 5 mg (5 mg Oral Given 2/12/22 0905)   prasugreL tablet 10 mg (has no administration in time range)   clopidogreL tablet 300 mg (300 mg Oral Given 2/11/22 1944)   aspirin tablet 325 mg (325 mg Oral Given 2/11/22 1944)   heparin 25,000 units in dextrose 5% (100 units/ml) IV bolus from bag INITIAL BOLUS (max bolus 4000 units) (4,000 Units Intravenous Bolus from Bag 2/11/22 2023)   prasugreL tablet 60 mg (60 mg  Oral Given 2/12/22 1009)     Medical Decision Making:   History:   I obtained history from: EMS provider.       <> Summary of History: Brought in via EMS for chest pain, 2 doses of nitroglycerin given, 12 lead without STEMI, resolution of chest pain with nitroglycerin  Old Medical Records: I decided to obtain old medical records.  Old Records Summarized: records from clinic visits and records from another hospital.       <> Summary of Records: 7/10/2020:  [facility], right and left heart catheterization  Impression:  1. Critical multivessel native CAD with patent vein graft to LAD with collateral flow to distal RCA and distal circ.  2. Normal right heart pressures    Initial Assessment:   Lonnie Taylor is a 72-year-old male with a history of CAD status post CABG, history of cardiac stents in 2018 in 2020, history of peripheral arterial disease, obesity, tobacco abuse, hypertension, hyperlipidemia presenting with chest pain evaluation.  Differential Diagnosis:   ACS, PE, aortic dissection, pericarditis, cardiac tamponade, pneumothorax, pneumonia, esophageal perforation, musculoskeletal  Independently Interpreted Test(s):   I have ordered and independently interpreted X-rays - see prior notes.  I have ordered and independently interpreted EKG Reading(s) - see prior notes  Clinical Tests:   Lab Tests: Ordered and Reviewed  Radiological Study: Ordered and Reviewed  Medical Tests: Reviewed and Ordered  Other:   I have discussed this case with another health care provider.       <> Summary of the Discussion: Cardiology and hospital medicine    Additional MDM:     Well's Criteria Score:  -Clinical symptoms of DVT (leg swelling, pain with palpation) = 0.0  -Other diagnosis less likely than pulmonary embolism =            0.0  -Heart Rate >100 =   0.0  -Immobilization (= or > than 3 days) or surgery in the previous 4 weeks = 0.0  -Previous DVT/PE = 0.0  -Hemoptysis =          0.0  -Malignancy =            0.0  Well's Probability Score =    0      Heart Score:    History:          Highly suspicious.  ECG:             Nonspecific repolarisation disturbance  Age:               >65 years  Risk factors: >= 3 risk factors or history of atherosclerotic disease  Troponin:       >2x normal limit  Final Score: 9      Emergent evaluation of patient presenting with chest pain.  On arrival he is slightly hypertensive, without tachycardia, hypoxemia, tachypnea or fevers.  Physical exam findings with no focal deficits, no JVD, no adventitious lung sounds, and cardiac exam unremarkable.  No abdominal peritoneal findings.  Extensive cardiac history in the past including CABG and multiple stents.  Chest pain resolved with 2 dose of nitroglycerin given in route via EMS.  Patient received full-dose aspirin this a.m. when his symptoms initially began.  He administered this by himself.  Given significant risk factors and initial heart score of 7 without troponin added patient is a high risk candidate for ACS.  Doubt PE based on Wells score of 0, history and examination.  ECG shows T-wave abnormalities, left axis deviation and incomplete left bundle branch block without STEMI on my read.  IV lines were placed, placed on monitor, cardiac telemetry and pulse oximetry monitoring.  No arrhythmias on re-evaluation.  Troponin elevated to 3.8, consistent with NSTEMI.  Heart score now 9, discussed case with Cardiology regarding intervention, they recommend ACS management with heparin, Plavix/prasugrel and admission to Medicine.  They will consult Interventional Cardiology for intervention.  Patient is hemodynamically stable, chest pain-free while in the emergency department with continued cardiac monitoring.  Please see critical care note for critical care time.  Chest x-ray reviewed with no evidence of pneumothorax, free air or widened mediastinum, doubt aortic dissection.  Suspect likely ACS as source of  patient's chest pain.    Complexity:   Critical care                 Clinical Impression:   Final diagnoses:  [R07.9] Chest pain  [I21.4] NSTEMI (non-ST elevated myocardial infarction) (Primary)          ED Disposition Condition    Admit             Linden Salmeron DO, FAAEM  Emergency Staff Physician   Dept of Emergency Medicine   [facility]  Spectralink: [id number]        Disclaimer: This note has been generated using voice-recognition software. There may be typographical errors that have been missed during proof-reading.       Linden Salmeron DO  02/12/22 1117

## 2022-02-12 NOTE — PROGRESS NOTES
Progress Note  [facility] Medicine    Primary Team: [facility]OSP MED V  Admit Date: 2/11/2022   Length of Stay:  LOS: 1 day   SUBJECTIVE:   Reason for Admission:  NSTEMI (non-ST elevated myocardial infarction)    HPI:  72-year-old male presenting with chest discomfort since about 9 this morning which had been refractory to antacids and nitroglycerin at home.  He had thought it was heartburn due to dinner his wife had brought home the previous night which he said contained a lot of green onions.  His symptoms occurred while he was moving around doing his normal morning activities.  He also notes that he was up late fiddling with a new GoPro camera that he bought for his upcoming trip to [place].  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  After his symptoms persisted throughout the morning and into mid day he became frustrated and decided to call for an ambulance.  He was given a nitroglycerin which considerably relieved his chest pressure/discomfort, and a 2nd nitroglycerin resolved it.  After coming to the ED he was shocked to learn that he had had a heart attack; he did not suspect that that had been what was going on based on how subtle his symptoms were.  He has not had any similar exertional or anginal symptoms in the recent past.    Interval history:    Pt was diagnosed with NSTEMI and admitted to  for further evaluation.  This morning, he denies chest pain but reports some SOB for which he requests a breathing treatment (uses 1 daily at home).  Reviewed labs and ECHO results.    Review of Systems:  Constitutional: no fever or chills  Respiratory: positive for dyspnea on exertion  Cardiovascular: no chest pain or palpitations  Gastrointestinal: no nausea or vomiting, no abdominal pain or change in bowel habits  Musculoskeletal: no arthralgias or myalgias     OBJECTIVE:      Temp:  [97.6 °F (36.4 °C)-98.4 °F (36.9 °C)]   Pulse:  [61-92]   Resp:  [16-20]   BP: (127-169)/(56-90)   SpO2:  [94 %-99 %]  Body mass index is 33.97 kg/m².  Intake/Outake:  This Shift:  No intake/output data recorded.    Net I/O past 24h:     Intake/Output Summary (Last 24 hours) at 2/12/2022 1444  Last data filed at 2/11/2022 2328  Gross per 24 hour   Intake 200 ml   Output --   Net 200 ml             Physical Exam:  Gen- well-developed, well-nourished, NAD  CVS- S1 and S2 present, RRR, no murmur  Resp- CTA b/l, no work of breathing  Abd- BS+, soft, NT, ND  Ext- no clubbing, cyanosis, or edema    Laboratory:  CBC/Anemia Labs: Coags:    Recent Labs   Lab 02/11/22 1813 02/11/22 1947 02/12/22 0357   WBC 8.09 7.10 7.17   HGB 14.8 14.0 13.9*   HCT 45.8 42.3 43.2    176 180   MCV 95 92 96   RDW 14.1 14.0 14.2    Recent Labs   Lab 02/11/22 1947 02/11/22 1947 02/12/22 0228 02/12/22 0357 02/12/22  0841   INR 1.1  --   --   --   --    APTT 25.4   < > 32.2* 43.9* 32.3*    < > = values in this interval not displayed.        Chemistries:   Recent Labs   Lab 02/11/22 1813 02/12/22  0841   * 143   K 3.8 3.7    104   CO2 30* 28   BUN 15 15   CREATININE 1.6* 1.6*   CALCIUM 10.6* 10.5   PROT 7.1  --    BILITOT 0.4  --    ALKPHOS 39*  --    ALT 13  --    AST 29  --         Medications:  Scheduled Meds:   amLODIPine  5 mg Oral Daily    aspirin  81 mg Oral Daily    atorvastatin  80 mg Oral Daily    doxazosin  4 mg Oral Daily    fenofibrate  145 mg Oral Daily    furosemide  20 mg Oral Daily    gabapentin  300 mg Oral TID    isosorbide mononitrate  30 mg Oral Daily    lisinopriL  5 mg Oral Daily    metoprolol tartrate  25 mg Oral BID    pantoprazole  40 mg Oral Daily    [START ON 2/13/2022] prasugreL  10 mg Oral Daily    ranolazine  500 mg Oral BID                             Continuous Infusions:   heparin (porcine) in D5W 16 Units/kg/hr (02/12/22 1017)     PRN Meds:.albuterol-ipratropium,  heparin (PORCINE), heparin (PORCINE), melatonin, nitroGLYCERIN, sodium chloride 0.9%     ASSESSMENT/PLAN:     Active Hospital Problems    Diagnosis  POA    *NSTEMI (non-ST elevated myocardial infarction) [I21.4]  Yes    Pulmonary emphysema [J43.9]  Yes     Ct chest results dated 10/14/2020      Tobacco abuse [Z72.0]  Yes    S/P AAA repair [Z98.890, Z86.79]  Not Applicable    Morbid obesity [E66.01]  Yes    Chronic kidney disease, stage III (moderate) [N18.30]  Yes    Primary hypertension [I10]  Yes    Coronary artery disease involving native coronary artery with angina pectoris [I25.119]  Yes    PVD (peripheral vascular disease) [I73.9]  Yes      Resolved Hospital Problems   No resolved problems to display.     NSTEMI  CAD s/p CABG 2007, PCI 2018, PCI 2020  -on ACS protocol including Prasugrel load 60mg today (10mg starting tomorrow), ASA, Metoprolol, high intensity statin, and Heparin gtt  -continue cardiac monitoring; Trop peaked at 21  -Interventional Cardiology consulted for possible C Monday  -ECHO reviewed, with WMA abnormalities  -continue Imdur, Ranexa    CKD III  -creatinine at baseline, continue monitoring  -will need pre and post hydration for [facility]    Combined CHF  -prior ECHO with only diastolic dysfunction, ECHO today:  · The left ventricle is normal in size with moderately decreased systolic function.  · The estimated ejection fraction is 40%.  · There are segmental left ventricular wall motion abnormalities.  · Grade III left ventricular diastolic dysfunction.  · Mild left atrial enlargement.  · Normal right ventricular size with normal right ventricular systolic function.  · Mild right atrial enlargement.  · Mild mitral regurgitation.  · Mild aortic regurgitation.  · Normal central venous pressure (3 mmHg).  · The estimated PA systolic pressure is 19 mmHg.  · The ascending aorta is dilated.  · Posterior pericardial effusion.  -continue lisinopril, Lasix 20mg    Essential HTN  -bp at  goal  -continue Lisinopril, Metoprolol, Imdur, Amlodipine    Tobacco Abuse  -pt not interested in cessation  -continue nebs PRN  -PFT 2020 show FEV/FVC 72%; mild obstructive airway disease, minimal restriction, moderately severe diffusion defect    Hypokalemia  -repleted today    PVD   S/p AAA repair  -continue DAPT, statin, bp control  -ascending aorta dilatation seen on ECHO    DVT ppx- Heparin gtt  CODE Status- FULL    Dispo- home in 2-3 days pending Cardiology recs    Triny Chandler MD  Hospital Medicine Staff

## 2022-02-12 NOTE — NURSING
Spoke with MD Christine in regards to results from Troponin. Additional troponin will be ordered. Continuing trending troponin levels until peak.

## 2022-02-12 NOTE — ASSESSMENT & PLAN NOTE
73 yo with known CAD with previous CABG and PCI (most recent 3/2020) and multiple risk factors presenting with possible cardiac chest pain that is made more concerning with immediate relief with slNTG and a troponin finding of 3.8. ECG and cxr unremarkable.  See HPI for coronary anatomy  Currently asymptomatic, hemodynamically stable, chest pain free  I would treat for ACS for reasons mentioned above.  I will place interventional cardiology call  Pt given plavix 300 mg and .  Continue to trend troponin until it peaks or plateaus.  Recommend loading with prasugrel 60 mg now and continue on 10 mg daily  Discontinue plavix from home regimen  Would recommend remainder of acs protocol with heparin ggt  Increase crestor to 40 mg daily  Continue beta blocker and other home medications  Keep NPO Sunday at midnight possible angiogram on Monday.  If pt has recurrent chest pain, obtain stat ecg and troponin. If it is not alleviated with NTG, please call and notify cardiology

## 2022-02-12 NOTE — H&P
[facility] - Cardiology [facility] Medicine  History & Physical    Patient Name: Lonnie Taylor  MRN: [id number]  Patient Class: IP- Inpatient  Admission Date: 2/11/2022  Attending Physician: Triny Chandler MD   Primary Care Provider: Isiah You MD         Patient information was obtained from patient, past medical records and ER records.     Subjective:     Principal Problem:NSTEMI (non-ST elevated myocardial infarction)    Chief Complaint:   Chief Complaint   Patient presents with    Chest Pain     X 6 hours, took tums, nitro, with no relief at home.  Received 4- 81mg aspirin and 2 SL nitro with EMS and is now pain free        HPI: 72-year-old male presenting with chest discomfort since about 9 this morning which had been refractory to antacids and nitroglycerin at home.  He had thought it was heartburn due to dinner his wife had brought home the previous night which he said contained a lot of green onions.  His symptoms occurred while he was moving around doing his normal morning activities.  He also notes that he was up late fiddling with a new GoPro camera that he bought for his upcoming trip to [place].  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  After his symptoms persisted throughout the morning and into mid day he became frustrated and decided to call for an ambulance.  He was given a nitroglycerin which considerably relieved his chest pressure/discomfort, and a 2nd nitroglycerin resolved it.  After coming to the ED he was shocked to learn that he had had a heart attack; he did not suspect that that had been what was going on based on how subtle his symptoms were.  He has not had any similar exertional or anginal symptoms in the recent past.      Past Medical History:   Diagnosis Date    CAD (coronary artery disease)     Dr Martinez    Carotid artery disease      Chronic kidney disease, stage III (moderate)     Depression     Dyslipidemia     Hepatic cyst     Hepatic cyst     High-density lipoprotein deficiency     HTN (hypertension)     Hx of colonic polyps     Insomnia     PVD (peripheral vascular disease)     stented    S/P AAA repair        Past Surgical History:   Procedure Laterality Date    ABDOMINAL AORTIC ANEURYSM REPAIR      ABDOMINAL AORTIC ANEURYSM REPAIR      ANGIOGRAPHY OF LOWER EXTREMITY Left 10/23/2018    Procedure: Angiogram Extremity Unilateral;  Surgeon: Gregor Cunha MD;  Location: [facility] CATH;  Service: Cardiology;  Laterality: Left;  LLE intervention-Will start with 4/5 slender sheath left radial and take diagnostic angio of LLE to determine whether brachial access or tibial access will be required    CATARACT EXTRACTION W/ INTRAOCULAR LENS  IMPLANT, BILATERAL      CATARACT SX Bilateral     CATHETERIZATION OF BOTH LEFT AND RIGHT HEART N/A 7/10/2020    Procedure: CATHETERIZATION, HEART, BOTH LEFT AND RIGHT;  Surgeon: Gregor Cunha MD;  Location: [facility] CATH;  Service: Cardiology;  Laterality: N/A;  LHC + RHC +/- PCI -access left radial artery and left brachial vein    CHOLECYSTECTOMY      COLONOSCOPY N/A 10/7/2015    Procedure: COLONOSCOPY;  Surgeon: Genaro You MD;  Location: [facility] ENDO ([address] [place] [address]);  Service: Endoscopy;  Laterality: N/A;    CORONARY ANGIOPLASTY WITH STENT PLACEMENT      CORONARY ARTERY BYPASS GRAFT      HERNIA REPAIR      LAMINECTOMY      MAGNETIC RESONANCE IMAGING N/A 6/4/2021    Procedure: MRI (MAGNETIC RESONANCE IMAGING) LUMBAR SPINE;  Surgeon: Asa Diagnostic Provider;  [facility];  Service: General;  Laterality: N/A;  In MRI @ 9:10 per Krys, To follow WC RM1    PATELLA SURGERY      PERCUTANEOUS TRANSLUMINAL ANGIOPLASTY (PTA) OF PERIPHERAL VESSEL N/A 9/18/2018    Procedure: PTA, PERIPHERAL BLOOD VESSEL;  Surgeon: Gregor Cunha MD;  Location: [facility] CATH;  Service: Cardiology;   "Laterality: N/A;  Site change:  right popliteal artery access using US guidance.    PERCUTANEOUS TRANSLUMINAL ANGIOPLASTY (PTA) OF PERIPHERAL VESSEL Left 7/25/2019    Procedure: PTA, PERIPHERAL VESSEL;  Surgeon: Gregor Cunha MD;  Location: [facility] CATH;  Service: Cardiology;  Laterality: Left;    VASECTOMY         Review of patient's allergies indicates:   Allergen Reactions    Aggrastat concentrate [tirofiban] Shortness Of Breath and Other (See Comments)     Fever and chills    Brilinta [ticagrelor] Shortness Of Breath    Cymbalta [duloxetine] Nausea Only    Bupropion Other (See Comments)     "turns into zombie" withdrawn, not talking, outbursts       No current facility-administered medications on file prior to encounter.     Current Outpatient Medications on File Prior to Encounter   Medication Sig    albuterol (VENTOLIN HFA) 90 mcg/actuation inhaler Inhale 2 puffs into the lungs every 6 (six) hours as needed for Wheezing. Rescue    amLODIPine (NORVASC) 10 MG tablet Take 5 mg by mouth once daily.     aspirin (ECOTRIN) 81 MG EC tablet Take 81 mg by mouth once daily.    cilostazoL (PLETAL) 100 MG Tab Take 100 mg by mouth 2 (two) times daily.     clopidogrel (PLAVIX) 75 mg tablet Take 75 mg by mouth once daily.    CRESTOR 20 mg tablet Take 20 mg by mouth once daily.     doxazosin (CARDURA) 4 MG tablet Take 4 mg by mouth every 12 (twelve) hours.     fenofibrate (TRICOR) 145 MG tablet Take 1 tablet (145 mg total) by mouth once daily.    furosemide (LASIX) 20 MG tablet 20 mg once daily.     gabapentin (NEURONTIN) 300 MG capsule 3 (three) times daily.    isosorbide mononitrate (IMDUR) 30 MG 24 hr tablet Take 1 tablet (30 mg total) by mouth once daily.    nitroGLYCERIN (NITROSTAT) 0.4 MG SL tablet Place 1 tablet (0.4 mg total) under the tongue every 5 (five) minutes as needed for Chest pain.    pantoprazole (PROTONIX) 40 MG tablet Take 40 mg by mouth once daily.    ranolazine (RANEXA) 500 MG Tb12 " Take 500 mg by mouth 2 (two) times daily. 11/15/20    [DISCONTINUED] amLODIPine (NORVASC) 5 MG tablet     [DISCONTINUED] ipratropium-albuterol (COMBIVENT)  mcg/actuation inhaler Inhale 1 puff into the lungs every 6 (six) hours as needed for Wheezing. Rescue    [DISCONTINUED] rivaroxaban (XARELTO) 2.5 mg Tab Take 1 tablet (2.5 mg total) by mouth 2 (two) times daily with meals.    [DISCONTINUED] tiotropium (SPIRIVA) 18 mcg inhalation capsule Inhale 1 capsule (18 mcg total) into the lungs once daily. Controller     Family History     Problem Relation (Age of Onset)    Heart disease Mother, Father    Lung cancer Brother        Tobacco Use    Smoking status: Current Every Day Smoker     Packs/day: 1.00     Years: 50.00     Pack years: 50.00    Smokeless tobacco: Never Used    Tobacco comment: Currently smoke 1 ppd   Substance and Sexual Activity    Alcohol use: No    Drug use: Yes     Types: Marijuana     Comment: OCCASSIONALLY    Sexual activity: Not on file     Review of Systems   Constitutional: Negative for activity change, appetite change and fatigue.   HENT: Negative for nosebleeds and sore throat.    Eyes: Negative for photophobia and visual disturbance.   Respiratory: Positive for cough (Occasional). Negative for shortness of breath.    Cardiovascular: Negative for palpitations and leg swelling.   Gastrointestinal: Negative for abdominal pain, nausea and vomiting.   Endocrine: Negative for polydipsia and polyuria.   Genitourinary: Negative for dysuria and hematuria.   Musculoskeletal: Negative for arthralgias and myalgias.   Skin: Negative for color change and rash.   Neurological: Negative for dizziness, syncope and light-headedness.     Objective:     Vital Signs (Most Recent):  Temp: 98.1 °F (36.7 °C) (02/11/22 1846)  Pulse: 69 (02/11/22 2003)  Resp: 19 (02/11/22 2003)  BP: (!) 169/75 (02/11/22 2003)  SpO2: 96 % (02/11/22 2003) Vital Signs (24h Range):  Temp:  [98.1 °F (36.7 °C)] 98.1 °F (36.7  °C)  Pulse:  [68-92] 69  Resp:  [16-20] 19  SpO2:  [95 %-99 %] 96 %  BP: (132-169)/(70-90) 169/75     Weight: 102.1 kg (225 lb)  Body mass index is 33.23 kg/m².    Physical Exam  Vitals and nursing note reviewed.   Constitutional:       General: He is not in acute distress.     Appearance: Normal appearance. He is not ill-appearing.   HENT:      Head: Normocephalic and atraumatic.      Nose:      Comments: Wearing surgical mask     Mouth/Throat:      Comments: Wearing surgical mask  Eyes:      General: No scleral icterus.     Extraocular Movements: Extraocular movements intact.      Conjunctiva/sclera: Conjunctivae normal.      Pupils: Pupils are equal, round, and reactive to light.   Cardiovascular:      Rate and Rhythm: Normal rate and regular rhythm.      Pulses: Normal pulses.      Heart sounds: Normal heart sounds. No murmur heard.  No gallop.    Pulmonary:      Effort: Pulmonary effort is normal.      Breath sounds: Normal breath sounds.   Abdominal:      General: Bowel sounds are normal. There is no distension.      Palpations: Abdomen is soft.      Tenderness: There is no abdominal tenderness. There is no guarding.   Musculoskeletal:         General: No swelling or tenderness. Normal range of motion.      Cervical back: Normal range of motion and neck supple. No rigidity.      Right lower leg: No edema.      Left lower leg: No edema.   Lymphadenopathy:      Cervical: No cervical adenopathy.   Skin:     General: Skin is warm and dry.      Findings: No erythema or rash.   Neurological:      Mental Status: He is alert and oriented to person, place, and time.      Cranial Nerves: No cranial nerve deficit.      Motor: No weakness.           CRANIAL NERVES     CN III, IV, VI   Pupils are equal, round, and reactive to light.       Significant Labs:   All pertinent labs within the past 24 hours have been reviewed.  CBC:   Recent Labs   Lab 02/11/22 1813 02/11/22 1947   WBC 8.09 7.10   HGB 14.8 14.0   HCT 45.8 42.3     176     CMP:   Recent Labs   Lab 02/11/22  1813   *   K 3.8      CO2 30*   GLU 87   BUN 15   CREATININE 1.6*   CALCIUM 10.6*   PROT 7.1   ALBUMIN 3.8   BILITOT 0.4   ALKPHOS 39*   AST 29   ALT 13   ANIONGAP 11   EGFRNONAA 41.8*     Cardiac Markers:   Recent Labs   Lab 02/11/22  1813   BNP 85     Troponin:   Recent Labs   Lab 02/11/22  1813   TROPONINI 3.830*     Urine Studies: No results for input(s): COLORU, APPEARANCEUA, PHUR, SPECGRAV, PROTEINUA, GLUCUA, KETONESU, BILIRUBINUA, OCCULTUA, NITRITE, UROBILINOGEN, LEUKOCYTESUR, RBCUA, WBCUA, BACTERIA, SQUAMEPITHEL, HYALINECASTS in the last 48 hours.    Invalid input(s): RADAMES    Significant Imaging: I have reviewed all pertinent imaging results/findings within the past 24 hours.    Assessment/Plan:     * NSTEMI (non-ST elevated myocardial infarction)  NSTEMI pathway initiated.  Patient on heparin drip and DAPT.  Cardiology consulted and anticipate LHC in the morning along with 2D echo to assess LV EF.  Patient asymptomatic presently, but sublingual nitroglycerin ordered p.r.n. should symptoms recur.      Tobacco abuse  Patient active smoker with no current interest in quitting.  Nicotine patch contraindicated in context of ACS.      Chronic kidney disease, stage III (moderate)  Kidney function at baseline.  Dose renally cleared medications accordingly.  He would be at some increased risk for contrast-induced nephropathy, so will defer initiating ACE-inhibitor at this time anticipating likely angiogram in the morning.      Primary hypertension  Will continue amlodipine 10 mg daily, as well as doxazosin 4 mg daily.        VTE Risk Mitigation (From admission, onward)         Ordered     heparin 25,000 units in dextrose 5% (100 units/ml) IV bolus from bag - ADDITIONAL PRN BOLUS - 60 units/kg (max bolus 4000 units)  As needed (PRN)        Question:  Heparin Infusion Adjustment (DO NOT MODIFY ANSWER)  Answer:   [email or web address]\epic\Images\Pharmacy\HeparinInfusions\heparin LOW INTENSITY nomogram for [facility] [id number].pdf    02/11/22 1931     heparin 25,000 units in dextrose 5% (100 units/ml) IV bolus from bag - ADDITIONAL PRN BOLUS - 30 units/kg (max bolus 4000 units)  As needed (PRN)        Question:  Heparin Infusion Adjustment (DO NOT MODIFY ANSWER)  Answer:  \\[email or web address]\epic\Images\Pharmacy\HeparinInfusions\heparin LOW INTENSITY nomogram for [facility] [id number].pdf    02/11/22 1931     IP VTE HIGH RISK PATIENT  Once         02/11/22 2149     Reason for No Pharmacological VTE Prophylaxis  Once        Question:  Reasons:  Answer:  IV Heparin w/in 24 hrs. Pre or Post-Op    02/11/22 2149     Place sequential compression device  Until discontinued         02/11/22 2149     heparin 25,000 units in dextrose 5% 250 mL (100 units/mL) infusion LOW INTENSITY nomogram - [facility]  Continuous        Question Answer Comment   Heparin Infusion Adjustment (DO NOT MODIFY ANSWER) \\[email or web address]\epic\Images\Pharmacy\HeparinInfusions\heparin LOW INTENSITY nomogram for [facility] [id number].pdf    Begin at (in units/kg/hr) 12        02/11/22 1931                   Miguel Angel Overton MD  Department of Hospital Medicine   [facility] - Cardiology Stepdown

## 2022-02-12 NOTE — CONSULTS
Food & Nutrition  Education    Diet Education: Cardiac   Time Spent: 10 minutes  Learners: Patient    Nutrition Education provided with handouts. All questions and concerns answered. Dietitian's contact information provided.     Pt reports excellent appetite PTA & currently, stable weight. Appears nourished w/ no indicators of malnutrition.    Follow-Up: Yes    Please re-consult as needed.    Thanks!  MS Marivel, RD, LDN

## 2022-02-12 NOTE — HPI
Mr. Lonnie Taylor is a 73 yo M with pmhx s/f CAD s/p Cabg (2v? In 6/22/2007), PCI 2/9/18 and 3/2020, reports LHC in 7/2020 and 9/2021 with no intervention at OSH. He also has, PAD, obesity, significant tobacco use (still 1ppd), HTN, HLD who presents for chest discomfort that began this morning at 9am. He woke up feeling asympatomatic but began feeling severe chest discomfort described as a burning sensation like indigestion. It was substernal with no radiation. Denies alleviating or exacerbating factors.  He did have diaphoresis (cold sweats) but no other associated symptoms. He tried  4x sl NTG (unsure of how old they were) with no alleviation. He then called EMS, who gave him a slNTG which helped it subside and it immediately resolved after a 2nd dose of ntg. This presentation was different from his previous presentations, which felt more like pressure.     Of note, he follows with Dr. Dasilva with Cardiology at OSH. He reports strict compliance with home medications. He is not really active but has not had any difficulties with his ADLs. He's been in his usual state of health until today. He admits to persistent +1ppd tobacco, no alcohol, no illicit drug use.    In the ER, patient found to be hemodynamically stable and was chest pain free. ECG and CXR unremarkable. Workup notable for a troponin of 3.8.       Please see below for most recent [facility] results from 7/2020.      LMCA:         Lesion on LMCA: 75% stenosis .       LAD:         Lesion on Prox LAD: Ostial.99% stenosis .         Lesion on Mid LAD: Proximal subsection.100% stenosis .         Lesion on Mid LAD: Distal subsection.30% stenosis .         Lesion on Dist LAD: Proximal subsection.30% stenosis .         Lesion on 1st Diag: Ostial.100% stenosis.       LCx:         Lesion on 1st Ob Mishel: Ostial.100% stenosis .         Lesion on Prox CX: Mid subsection.100% stenosis .         Lesion on 2nd Ob Mishel: Proximal subsection.100% stenosis .       RCA:          Lesion on Mid RCA: 100% stenosis .       Cardiac Grafts        -  There is a Vein graft that originates at the Aorta Left and attaches to       the 1st Ob Mishel.        -  There is a Vein graft that originates at the LIMA and attaches to the       Mid LAD.       Graft Lesions         Lesion on LIMA to Mid LAD: 0% stenosis .         Lesion on Aorta Left to 1st Ob Mishel: Proximal anastomosis.0% stenosis .         Comments:Widely patent (in-stent).         Lesion on Aorta Left to 1st Ob Mishel: Proximal body.         Comments:Ectatic         Lesion on Aorta Left to 1st Ob Mishel: Distal body.0% stenosis .         Comments:Widely patent (in-stent).

## 2022-02-12 NOTE — PLAN OF CARE
VSS. Continue on heparin gtt 18 units/kg/hr. Unable to get accurate urine output d/t pt keeps forget to use urinal, and urinate in toilet twice. Pt educated on fall risk and use urinal. Denies chest pain, SOB, palpitations, dizziness, pain, or discomfort. Plan of care reviewed with pt, all questions answered. Bed locked in lowest position, call bell within reach, no acute distress noted, will continue to monitor.

## 2022-02-12 NOTE — HPI
72-year-old male presenting with chest discomfort since about 9 this morning which had been refractory to antacids and nitroglycerin at home.  He had thought it was heartburn due to dinner his wife had brought home the previous night which he said contained a lot of green onions.  His symptoms occurred while he was moving around doing his normal morning activities.  He also notes that he was up late fiddling with a new GoPro camera that he bought for his upcoming trip to [place].  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  After his symptoms persisted throughout the morning and into mid day he became frustrated and decided to call for an ambulance.  He was given a nitroglycerin which considerably relieved his chest pressure/discomfort, and a 2nd nitroglycerin resolved it.  After coming to the ED he was shocked to learn that he had had a heart attack; he did not suspect that that had been what was going on based on how subtle his symptoms were.  He has not had any similar exertional or anginal symptoms in the recent past.

## 2022-02-12 NOTE — ASSESSMENT & PLAN NOTE
NSTEMI pathway initiated.  Patient on heparin drip and DAPT.  Cardiology consulted and anticipate LHC in the morning along with 2D echo to assess LV EF.  Patient asymptomatic presently, but sublingual nitroglycerin ordered p.r.n. should symptoms recur.

## 2022-02-12 NOTE — SUBJECTIVE & OBJECTIVE
Past Medical History:   Diagnosis Date    CAD (coronary artery disease)     Dr Martinez    Carotid artery disease     Chronic kidney disease, stage III (moderate)     Depression     Dyslipidemia     Hepatic cyst     Hepatic cyst     High-density lipoprotein deficiency     HTN (hypertension)     Hx of colonic polyps     Insomnia     PVD (peripheral vascular disease)     stented    S/P AAA repair        Past Surgical History:   Procedure Laterality Date    ABDOMINAL AORTIC ANEURYSM REPAIR      ABDOMINAL AORTIC ANEURYSM REPAIR      ANGIOGRAPHY OF LOWER EXTREMITY Left 10/23/2018    Procedure: Angiogram Extremity Unilateral;  Surgeon: Gregor Cunha MD;  Location: [facility] CATH;  Service: Cardiology;  Laterality: Left;  LLE intervention-Will start with 4/5 slender sheath left radial and take diagnostic angio of LLE to determine whether brachial access or tibial access will be required    CATARACT EXTRACTION W/ INTRAOCULAR LENS  IMPLANT, BILATERAL      CATARACT SX Bilateral     CATHETERIZATION OF BOTH LEFT AND RIGHT HEART N/A 7/10/2020    Procedure: CATHETERIZATION, HEART, BOTH LEFT AND RIGHT;  Surgeon: Gregor Cunha MD;  Location: [facility] CATH;  Service: Cardiology;  Laterality: N/A;  LHC + RHC +/- PCI -access left radial artery and left brachial vein    CHOLECYSTECTOMY      COLONOSCOPY N/A 10/7/2015    Procedure: COLONOSCOPY;  Surgeon: Genaro You MD;  Location: [facility] ENDO ([address] [place] [address]);  Service: Endoscopy;  Laterality: N/A;    CORONARY ANGIOPLASTY WITH STENT PLACEMENT      CORONARY ARTERY BYPASS GRAFT      HERNIA REPAIR      LAMINECTOMY      MAGNETIC RESONANCE IMAGING N/A 6/4/2021    Procedure: MRI (MAGNETIC RESONANCE IMAGING) LUMBAR SPINE;  Surgeon: Asa Diagnostic Provider;  Location: [facility];  Service: General;  Laterality: N/A;  In MRI @ 9:10 per Krys, To follow WC RM1    PATELLA SURGERY      PERCUTANEOUS TRANSLUMINAL ANGIOPLASTY (PTA) OF PERIPHERAL VESSEL N/A 9/18/2018    Procedure:  "PTA, PERIPHERAL BLOOD VESSEL;  Surgeon: Gregor Cunha MD;  Location: [facility] CATH;  Service: Cardiology;  Laterality: N/A;  Site change:  right popliteal artery access using US guidance.    PERCUTANEOUS TRANSLUMINAL ANGIOPLASTY (PTA) OF PERIPHERAL VESSEL Left 7/25/2019    Procedure: PTA, PERIPHERAL VESSEL;  Surgeon: Gregor Cunha MD;  Location: [facility] CATH;  Service: Cardiology;  Laterality: Left;    VASECTOMY         Review of patient's allergies indicates:   Allergen Reactions    Aggrastat concentrate [tirofiban] Shortness Of Breath and Other (See Comments)     Fever and chills    Brilinta [ticagrelor] Shortness Of Breath    Cymbalta [duloxetine] Nausea Only    Bupropion Other (See Comments)     "turns into zombie" withdrawn, not talking, outbursts       No current facility-administered medications on file prior to encounter.     Current Outpatient Medications on File Prior to Encounter   Medication Sig    albuterol (VENTOLIN HFA) 90 mcg/actuation inhaler Inhale 2 puffs into the lungs every 6 (six) hours as needed for Wheezing. Rescue    amLODIPine (NORVASC) 10 MG tablet Take 5 mg by mouth once daily.     aspirin (ECOTRIN) 81 MG EC tablet Take 81 mg by mouth once daily.    cilostazoL (PLETAL) 100 MG Tab Take 100 mg by mouth 2 (two) times daily.     clopidogrel (PLAVIX) 75 mg tablet Take 75 mg by mouth once daily.    CRESTOR 20 mg tablet Take 20 mg by mouth once daily.     doxazosin (CARDURA) 4 MG tablet Take 4 mg by mouth every 12 (twelve) hours.     fenofibrate (TRICOR) 145 MG tablet Take 1 tablet (145 mg total) by mouth once daily.    furosemide (LASIX) 20 MG tablet 20 mg once daily.     gabapentin (NEURONTIN) 300 MG capsule 3 (three) times daily.    isosorbide mononitrate (IMDUR) 30 MG 24 hr tablet Take 1 tablet (30 mg total) by mouth once daily.    nitroGLYCERIN (NITROSTAT) 0.4 MG SL tablet Place 1 tablet (0.4 mg total) under the tongue every 5 (five) minutes as needed for Chest pain.    " pantoprazole (PROTONIX) 40 MG tablet Take 40 mg by mouth once daily.    ranolazine (RANEXA) 500 MG Tb12 Take 500 mg by mouth 2 (two) times daily. 11/15/20    [DISCONTINUED] amLODIPine (NORVASC) 5 MG tablet     [DISCONTINUED] ipratropium-albuterol (COMBIVENT)  mcg/actuation inhaler Inhale 1 puff into the lungs every 6 (six) hours as needed for Wheezing. Rescue    [DISCONTINUED] rivaroxaban (XARELTO) 2.5 mg Tab Take 1 tablet (2.5 mg total) by mouth 2 (two) times daily with meals.    [DISCONTINUED] tiotropium (SPIRIVA) 18 mcg inhalation capsule Inhale 1 capsule (18 mcg total) into the lungs once daily. Controller     Family History     Problem Relation (Age of Onset)    Heart disease Mother, Father    Lung cancer Brother        Tobacco Use    Smoking status: Current Every Day Smoker     Packs/day: 1.00     Years: 50.00     Pack years: 50.00    Smokeless tobacco: Never Used    Tobacco comment: Currently smoke 1 ppd   Substance and Sexual Activity    Alcohol use: No    Drug use: Yes     Types: Marijuana     Comment: OCCASSIONALLY    Sexual activity: Not on file     Review of Systems   Constitutional: Negative for activity change, appetite change and fatigue.   HENT: Negative for nosebleeds and sore throat.    Eyes: Negative for photophobia and visual disturbance.   Respiratory: Positive for cough (Occasional). Negative for shortness of breath.    Cardiovascular: Negative for palpitations and leg swelling.   Gastrointestinal: Negative for abdominal pain, nausea and vomiting.   Endocrine: Negative for polydipsia and polyuria.   Genitourinary: Negative for dysuria and hematuria.   Musculoskeletal: Negative for arthralgias and myalgias.   Skin: Negative for color change and rash.   Neurological: Negative for dizziness, syncope and light-headedness.     Objective:     Vital Signs (Most Recent):  Temp: 98.1 °F (36.7 °C) (02/11/22 1846)  Pulse: 69 (02/11/22 2003)  Resp: 19 (02/11/22 2003)  BP: (!) 169/75  (02/11/22 2003)  SpO2: 96 % (02/11/22 2003) Vital Signs (24h Range):  Temp:  [98.1 °F (36.7 °C)] 98.1 °F (36.7 °C)  Pulse:  [68-92] 69  Resp:  [16-20] 19  SpO2:  [95 %-99 %] 96 %  BP: (132-169)/(70-90) 169/75     Weight: 102.1 kg (225 lb)  Body mass index is 33.23 kg/m².    Physical Exam  Vitals and nursing note reviewed.   Constitutional:       General: He is not in acute distress.     Appearance: Normal appearance. He is not ill-appearing.   HENT:      Head: Normocephalic and atraumatic.      Nose:      Comments: Wearing surgical mask     Mouth/Throat:      Comments: Wearing surgical mask  Eyes:      General: No scleral icterus.     Extraocular Movements: Extraocular movements intact.      Conjunctiva/sclera: Conjunctivae normal.      Pupils: Pupils are equal, round, and reactive to light.   Cardiovascular:      Rate and Rhythm: Normal rate and regular rhythm.      Pulses: Normal pulses.      Heart sounds: Normal heart sounds. No murmur heard.  No gallop.    Pulmonary:      Effort: Pulmonary effort is normal.      Breath sounds: Normal breath sounds.   Abdominal:      General: Bowel sounds are normal. There is no distension.      Palpations: Abdomen is soft.      Tenderness: There is no abdominal tenderness. There is no guarding.   Musculoskeletal:         General: No swelling or tenderness. Normal range of motion.      Cervical back: Normal range of motion and neck supple. No rigidity.      Right lower leg: No edema.      Left lower leg: No edema.   Lymphadenopathy:      Cervical: No cervical adenopathy.   Skin:     General: Skin is warm and dry.      Findings: No erythema or rash.   Neurological:      Mental Status: He is alert and oriented to person, place, and time.      Cranial Nerves: No cranial nerve deficit.      Motor: No weakness.           CRANIAL NERVES     CN III, IV, VI   Pupils are equal, round, and reactive to light.       Significant Labs:   All pertinent labs within the past 24 hours have been  reviewed.  CBC:   Recent Labs   Lab 02/11/22 1813 02/11/22 1947   WBC 8.09 7.10   HGB 14.8 14.0   HCT 45.8 42.3    176     CMP:   Recent Labs   Lab 02/11/22 1813   *   K 3.8      CO2 30*   GLU 87   BUN 15   CREATININE 1.6*   CALCIUM 10.6*   PROT 7.1   ALBUMIN 3.8   BILITOT 0.4   ALKPHOS 39*   AST 29   ALT 13   ANIONGAP 11   EGFRNONAA 41.8*     Cardiac Markers:   Recent Labs   Lab 02/11/22 1813   BNP 85     Troponin:   Recent Labs   Lab 02/11/22 1813   TROPONINI 3.830*     Urine Studies: No results for input(s): COLORU, APPEARANCEUA, PHUR, SPECGRAV, PROTEINUA, GLUCUA, KETONESU, BILIRUBINUA, OCCULTUA, NITRITE, UROBILINOGEN, LEUKOCYTESUR, RBCUA, WBCUA, BACTERIA, SQUAMEPITHEL, HYALINECASTS in the last 48 hours.    Invalid input(s): RADAMES    Significant Imaging: I have reviewed all pertinent imaging results/findings within the past 24 hours.

## 2022-02-12 NOTE — SUBJECTIVE & OBJECTIVE
Past Medical History:   Diagnosis Date    CAD (coronary artery disease)     Dr Martinez    Carotid artery disease     Chronic kidney disease, stage III (moderate)     Depression     Dyslipidemia     Hepatic cyst     Hepatic cyst     High-density lipoprotein deficiency     HTN (hypertension)     Hx of colonic polyps     Insomnia     PVD (peripheral vascular disease)     stented    S/P AAA repair        Past Surgical History:   Procedure Laterality Date    ABDOMINAL AORTIC ANEURYSM REPAIR      ABDOMINAL AORTIC ANEURYSM REPAIR      ANGIOGRAPHY OF LOWER EXTREMITY Left 10/23/2018    Procedure: Angiogram Extremity Unilateral;  Surgeon: Gregor Cunha MD;  Location: [facility] CATH;  Service: Cardiology;  Laterality: Left;  LLE intervention-Will start with 4/5 slender sheath left radial and take diagnostic angio of LLE to determine whether brachial access or tibial access will be required    CATARACT EXTRACTION W/ INTRAOCULAR LENS  IMPLANT, BILATERAL      CATARACT SX Bilateral     CATHETERIZATION OF BOTH LEFT AND RIGHT HEART N/A 7/10/2020    Procedure: CATHETERIZATION, HEART, BOTH LEFT AND RIGHT;  Surgeon: Gregor Cunha MD;  Location: [facility] CATH;  Service: Cardiology;  Laterality: N/A;  LHC + RHC +/- PCI -access left radial artery and left brachial vein    CHOLECYSTECTOMY      COLONOSCOPY N/A 10/7/2015    Procedure: COLONOSCOPY;  Surgeon: Genaro You MD;  Location: [facility] ENDO ([address] [place] [address]);  Service: Endoscopy;  Laterality: N/A;    CORONARY ANGIOPLASTY WITH STENT PLACEMENT      CORONARY ARTERY BYPASS GRAFT      HERNIA REPAIR      LAMINECTOMY      MAGNETIC RESONANCE IMAGING N/A 6/4/2021    Procedure: MRI (MAGNETIC RESONANCE IMAGING) LUMBAR SPINE;  Surgeon: Asa Diagnostic Provider;  Location: [facility];  Service: General;  Laterality: N/A;  In MRI @ 9:10 per Krys, To follow WC RM1    PATELLA SURGERY      PERCUTANEOUS TRANSLUMINAL ANGIOPLASTY (PTA) OF PERIPHERAL VESSEL N/A 9/18/2018    Procedure:  "PTA, PERIPHERAL BLOOD VESSEL;  Surgeon: Gregor Cunha MD;  Location: [facility] CATH;  Service: Cardiology;  Laterality: N/A;  Site change:  right popliteal artery access using US guidance.    PERCUTANEOUS TRANSLUMINAL ANGIOPLASTY (PTA) OF PERIPHERAL VESSEL Left 7/25/2019    Procedure: PTA, PERIPHERAL VESSEL;  Surgeon: Gregor Cunha MD;  Location: [facility] CATH;  Service: Cardiology;  Laterality: Left;    VASECTOMY         Review of patient's allergies indicates:   Allergen Reactions    Aggrastat concentrate [tirofiban] Shortness Of Breath and Other (See Comments)     Fever and chills    Brilinta [ticagrelor] Shortness Of Breath    Cymbalta [duloxetine] Nausea Only    Bupropion Other (See Comments)     "turns into zombie" withdrawn, not talking, outbursts       No current facility-administered medications on file prior to encounter.     Current Outpatient Medications on File Prior to Encounter   Medication Sig    albuterol (VENTOLIN HFA) 90 mcg/actuation inhaler Inhale 2 puffs into the lungs every 6 (six) hours as needed for Wheezing. Rescue    amLODIPine (NORVASC) 10 MG tablet Take 5 mg by mouth once daily.     amLODIPine (NORVASC) 5 MG tablet     aspirin (ECOTRIN) 81 MG EC tablet Take 81 mg by mouth once daily.    cilostazoL (PLETAL) 100 MG Tab Take 100 mg by mouth 2 (two) times daily.     clopidogrel (PLAVIX) 75 mg tablet Take 75 mg by mouth once daily.    CRESTOR 20 mg tablet Take 20 mg by mouth once daily.     doxazosin (CARDURA) 4 MG tablet Take 4 mg by mouth every 12 (twelve) hours.     fenofibrate (TRICOR) 145 MG tablet Take 1 tablet (145 mg total) by mouth once daily.    furosemide (LASIX) 20 MG tablet 20 mg once daily.     gabapentin (NEURONTIN) 300 MG capsule     ipratropium-albuterol (COMBIVENT)  mcg/actuation inhaler Inhale 1 puff into the lungs every 6 (six) hours as needed for Wheezing. Rescue    isosorbide mononitrate (IMDUR) 30 MG 24 hr tablet Take 1 tablet (30 mg total) by " mouth once daily.    nitroGLYCERIN (NITROSTAT) 0.4 MG SL tablet Place 1 tablet (0.4 mg total) under the tongue every 5 (five) minutes as needed for Chest pain.    pantoprazole (PROTONIX) 40 MG tablet Take 40 mg by mouth once daily.    ranolazine (RANEXA) 500 MG Tb12 Take 500 mg by mouth 2 (two) times daily. 11/15/20    rivaroxaban (XARELTO) 2.5 mg Tab Take 1 tablet (2.5 mg total) by mouth 2 (two) times daily with meals.    tiotropium (SPIRIVA) 18 mcg inhalation capsule Inhale 1 capsule (18 mcg total) into the lungs once daily. Controller     Family History     Problem Relation (Age of Onset)    Heart disease Mother, Father        Tobacco Use    Smoking status: Current Every Day Smoker     Packs/day: 1.00     Years: 50.00     Pack years: 50.00    Smokeless tobacco: Never Used    Tobacco comment: Currently smoke 1 ppd   Substance and Sexual Activity    Alcohol use: No    Drug use: Yes     Types: Marijuana     Comment: OCCASSIONALLY    Sexual activity: Not on file     Review of Systems   Constitutional: Positive for diaphoresis. Negative for chills, decreased appetite and fever.   HENT: Negative for congestion and sore throat.    Eyes: Negative for blurred vision and discharge.   Cardiovascular: Positive for chest pain. Negative for claudication, cyanosis, dyspnea on exertion and leg swelling.   Respiratory: Negative for cough, hemoptysis and shortness of breath.    Endocrine: Negative for cold intolerance and heat intolerance.   Skin: Negative for color change.   Musculoskeletal: Negative for muscle weakness and myalgias.   Gastrointestinal: Positive for heartburn. Negative for bloating and abdominal pain.   Neurological: Negative for dizziness, focal weakness and weakness.   Psychiatric/Behavioral: Negative for altered mental status and depression.     Objective:     Vital Signs (Most Recent):  Temp: 98.1 °F (36.7 °C) (02/11/22 1846)  Pulse: 68 (02/11/22 1906)  Resp: 16 (02/11/22 1906)  BP: (!) 163/70  (02/11/22 1906)  SpO2: 97 % (02/11/22 1906) Vital Signs (24h Range):  Temp:  [98.1 °F (36.7 °C)] 98.1 °F (36.7 °C)  Pulse:  [68-92] 68  Resp:  [16-20] 16  SpO2:  [95 %-99 %] 97 %  BP: (132-163)/(70-90) 163/70     Weight: 102.1 kg (225 lb)  Body mass index is 33.23 kg/m².    SpO2: 97 %  O2 Device (Oxygen Therapy): room air    No intake or output data in the 24 hours ending 02/11/22 2043    Lines/Drains/Airways     Peripheral Intravenous Line                 Peripheral IV - Single Lumen 02/11/22 1843 20 G Right Antecubital <1 day         Peripheral IV - Single Lumen 02/11/22 1951 18 G Left Antecubital <1 day                Physical Exam  Constitutional:       Appearance: He is well-developed and well-nourished.   HENT:      Head: Normocephalic and atraumatic.      Right Ear: External ear normal.      Left Ear: External ear normal.   Eyes:      Extraocular Movements: EOM normal.      Conjunctiva/sclera: Conjunctivae normal.   Cardiovascular:      Rate and Rhythm: Normal rate and regular rhythm.      Pulses: Intact distal pulses.           Radial pulses are 2+ on the right side and 2+ on the left side.      Heart sounds: Normal heart sounds.   Pulmonary:      Effort: Pulmonary effort is normal. No respiratory distress.      Breath sounds: Normal breath sounds. No wheezing.   Abdominal:      General: Bowel sounds are normal. There is no distension.      Palpations: Abdomen is soft.      Tenderness: There is no abdominal tenderness.   Musculoskeletal:         General: No edema. Normal range of motion.      Cervical back: Normal range of motion and neck supple.   Skin:     General: Skin is warm and dry.   Neurological:      Mental Status: He is alert and oriented to person, place, and time.   Psychiatric:         Mood and Affect: Mood normal.         Behavior: Behavior normal.         Significant Labs:   CMP   Recent Labs   Lab 02/11/22  1813   *   K 3.8      CO2 30*   GLU 87   BUN 15   CREATININE 1.6*    CALCIUM 10.6*   PROT 7.1   ALBUMIN 3.8   BILITOT 0.4   ALKPHOS 39*   AST 29   ALT 13   ANIONGAP 11   ESTGFRAFRICA 48.3*   EGFRNONAA 41.8*   , CBC   Recent Labs   Lab 02/11/22 1813 02/11/22 1813 02/11/22 1947   WBC 8.09  --  7.10   HGB 14.8  --  14.0   HCT 45.8   < > 42.3     --  176    < > = values in this interval not displayed.    and INR   Recent Labs   Lab 02/11/22 1947   INR 1.1       Significant Imaging: EKG: non specific ecg changes, appear chronic. no acute ischemic findings.

## 2022-02-12 NOTE — ASSESSMENT & PLAN NOTE
73 yo with known CAD with previous CABG and PCI (most recent 3/2020) and multiple risk factors presenting with possible cardiac chest pain that is made more concerning with immediate relief with slNTG and a troponin finding of 3.8. ECG and cxr unremarkable.  See HPI for coronary anatomy  Currently asymptomatic, hemodynamically stable, and chest pain free  I would treat for ACS for reasons mentioned above.  Pt given plavix 300 mg and .    Plan:  - Plan for Cath on Monday  - Continue to trend troponin until it peaks or plateaus.  - Continue prasugrel 10 mg daily  - f/u Echo  - Discontinue plavix from home regimen  - Would recommend remainder of acs protocol with heparin ggt  - Increase crestor to 40 mg daily  - Continue beta blocker and other home medications  - Keep NPO Sunday at midnight possible angiogram on Monday.  - If pt has recurrent chest pain, obtain stat ecg and troponin. If it is not alleviated with NTG, please call and notify cardiology

## 2022-02-12 NOTE — PROGRESS NOTES
[facility] - Cardiology Stepdown  Cardiology  Progress Note    Patient Name: Lonnie Taylor  MRN: [id number]  Admission Date: 2/11/2022  Hospital Length of Stay: 1 days  Code Status: Full Code   Attending Physician: Triny Chandler MD   Primary Care Physician: Isiah You MD  Expected Discharge Date:    Principal Problem:NSTEMI (non-ST elevated myocardial infarction)    Subjective:     Hospital Course:   No notes on file    Interval History: No acute events overnight. Mild SOB this morning which resolved with 2L NC. Denies CP, HA, palpitations, nausea, emesis, diaphoresis, abdominal pain. VSS, afebrile.    Review of Systems   Constitutional: Negative for chills, fever and malaise/fatigue.   Eyes: Negative for visual disturbance.   Cardiovascular: Negative for chest pain, irregular heartbeat, leg swelling and palpitations.   Respiratory: Negative for cough and shortness of breath.    Gastrointestinal: Negative for abdominal pain, heartburn, nausea and vomiting.   Neurological: Negative for dizziness, headaches and light-headedness.   Psychiatric/Behavioral: Negative for altered mental status.     Objective:     Vital Signs (Most Recent):  Temp: 98.4 °F (36.9 °C) (02/12/22 0735)  Pulse: 70 (02/12/22 0735)  Resp: 18 (02/12/22 0735)  BP: (!) 148/69 (02/12/22 0735)  SpO2: 95 % (02/12/22 0735) Vital Signs (24h Range):  Temp:  [97.6 °F (36.4 °C)-98.4 °F (36.9 °C)] 98.4 °F (36.9 °C)  Pulse:  [66-92] 70  Resp:  [16-20] 18  SpO2:  [94 %-99 %] 95 %  BP: (127-169)/(56-90) 148/69     Weight: 104.5 kg (230 lb 6.1 oz)  Body mass index is 34.02 kg/m².     SpO2: 95 %  O2 Device (Oxygen Therapy): room air      Intake/Output Summary (Last 24 hours) at 2/12/2022 0859  Last data filed at 2/11/2022 2328  Gross per 24 hour   Intake 200 ml   Output --   Net 200 ml       Lines/Drains/Airways       Peripheral Intravenous Line                   Peripheral IV - Single Lumen 02/11/22 1843 20 G Right Antecubital <1 day                     Physical Exam  Vitals and nursing note reviewed.   Constitutional:       General: He is not in acute distress.     Appearance: He is obese. He is not ill-appearing.   HENT:      Head: Normocephalic and atraumatic.      Mouth/Throat:      Mouth: Mucous membranes are moist.   Eyes:      Conjunctiva/sclera: Conjunctivae normal.   Cardiovascular:      Rate and Rhythm: Normal rate and regular rhythm.      Pulses: Normal pulses.           Radial pulses are 2+ on the right side and 2+ on the left side.      Heart sounds: Normal heart sounds, S1 normal and S2 normal. No murmur heard.  No friction rub. No gallop.    Pulmonary:      Effort: Pulmonary effort is normal. No respiratory distress.      Breath sounds: Normal breath sounds. No wheezing.   Abdominal:      General: There is no distension.      Palpations: Abdomen is soft.      Tenderness: There is no abdominal tenderness.   Musculoskeletal:      Right lower leg: No edema.      Left lower leg: No edema.   Skin:     General: Skin is warm and dry.   Neurological:      General: No focal deficit present.      Mental Status: He is alert and oriented to person, place, and time.   Psychiatric:         Mood and Affect: Mood normal.         Significant Labs:   CMP   Recent Labs   Lab 02/11/22 1813   *   K 3.8      CO2 30*   GLU 87   BUN 15   CREATININE 1.6*   CALCIUM 10.6*   PROT 7.1   ALBUMIN 3.8   BILITOT 0.4   ALKPHOS 39*   AST 29   ALT 13   ANIONGAP 11   ESTGFRAFRICA 48.3*   EGFRNONAA 41.8*   , CBC   Recent Labs   Lab 02/11/22 1813 02/11/22 1813 02/11/22 1947 02/11/22 1947 02/12/22 0357   WBC 8.09  --  7.10  --  7.17   HGB 14.8  --  14.0  --  13.9*   HCT 45.8   < > 42.3   < > 43.2     --  176  --  180    < > = values in this interval not displayed.   , INR   Recent Labs   Lab 02/11/22 1947   INR 1.1   , Troponin   Recent Labs   Lab 02/11/22 2229 02/12/22  0013 02/12/22 0357   TROPONINI 13.750* 17.673* 21.189*    and All pertinent lab  results from the last 24 hours have been reviewed.    Significant Imaging: Echocardiogram:   Transthoracic echo (TTE) complete (Cupid Only):   Results for orders placed or performed during the hospital encounter of 12/02/19   Echo Color Flow Doppler? Yes   Result Value Ref Range    Ascending aorta 4.32 cm    STJ 3.89 cm    AV mean gradient 8 mmHg    Ao peak colby 2.11 m/s    Ao VTI 36.99 cm    IVS 0.66 0.6 - 1.1 cm    LA size 4.75 cm    Left Atrium Major Axis 7.12 cm    Left Atrium Minor Axis 6.66 cm    LVIDd 5.55 3.5 - 6.0 cm    LVIDs 4.40 (A) 2.1 - 4.0 cm    LVOT diameter 2.24 cm    LVOT peak VTI 28.34 cm    Posterior Wall 0.73 0.6 - 1.1 cm    MV Peak A Colby 0.88 m/s    E wave deceleration time 120.46 msec    MV Peak E Colby 1.08 m/s    PV Peak D Colby 0.88 m/s    PV Peak S Colby 0.74 m/s    RA Major Axis 6.24 cm    RA Width 4.27 cm    RVDD 4.14 cm    Sinus 4.25 cm    TAPSE 2.29 cm    TR Max Colby 3.02 m/s    TDI LATERAL 0.12 m/s    TDI SEPTAL 0.09 m/s    LA WIDTH 5.21 cm    LV Diastolic Volume 150.81 mL    LV Systolic Volume 87.69 mL    LVOT peak colby 1.39 m/s    LV LATERAL E/E' RATIO 9.00 m/s    LV SEPTAL E/E' RATIO 12.00 m/s    FS 21 %    LA volume 144.77 cm3    LV mass 136.47 g    Left Ventricle Relative Wall Thickness 0.26 cm    AV valve area 3.02 cm2    AV Velocity Ratio 0.66     AV index (prosthetic) 0.77     E/A ratio 1.23     Mean e' 0.11 m/s    Pulm vein S/D ratio 0.84     LVOT area 3.9 cm2    LVOT stroke volume 111.63 cm3    AV peak gradient 18 mmHg    E/E' ratio 10.29 m/s    LV Systolic Volume Index 40.2 mL/m2    LV Diastolic Volume Index 69.16 mL/m2    LA Volume Index 66.4 mL/m2    LV Mass Index 63 g/m2    Triscuspid Valve Regurgitation Peak Gradient 36 mmHg    BSA 2.25 m2    Right Atrial Pressure (from IVC) 3 mmHg    TV rest pulmonary artery pressure 39 mmHg    Narrative    · Normal left ventricular systolic function. The estimated ejection   fraction is 55%  · Grade II (moderate) left ventricular diastolic  dysfunction consistent   with pseudonormalization.  · Severe left atrial enlargement.  · Mild-to-moderate aortic regurgitation.  · Mild to moderate pulmonic regurgitation.  · Mild mitral regurgitation.  · Mild tricuspid regurgitation.  · Normal right ventricular systolic function.  · Moderate right atrial enlargement.  · Normal central venous pressure (3 mm Hg).  · The estimated PA systolic pressure is 39 mm Hg     CHallenging study. Poor endocardial definition       Assessment and Plan:         * NSTEMI (non-ST elevated myocardial infarction)  71 yo with known CAD with previous CABG and PCI (most recent 3/2020) and multiple risk factors presenting with possible cardiac chest pain that is made more concerning with immediate relief with slNTG and a troponin finding of 3.8. ECG and cxr unremarkable.  See HPI for coronary anatomy  Currently asymptomatic, hemodynamically stable, and chest pain free  I would treat for ACS for reasons mentioned above.  Pt given plavix 300 mg and .    Plan:  - Plan for Cath on Monday  - Continue to trend troponin until it peaks or plateaus.  - Continue prasugrel 10 mg daily  - f/u Echo  - Discontinue plavix from home regimen  - Would recommend remainder of acs protocol with heparin ggt  - Increase crestor to 40 mg daily  - Continue beta blocker and other home medications  - Keep NPO Sunday at midnight possible angiogram on Monday.  - If pt has recurrent chest pain, obtain stat ecg and troponin. If it is not alleviated with NTG, please call and notify cardiology          VTE Risk Mitigation (From admission, onward)           Ordered     heparin 25,000 units in dextrose 5% (100 units/ml) IV bolus from bag - ADDITIONAL PRN BOLUS - 60 units/kg (max bolus 4000 units)  As needed (PRN)        Question:  Heparin Infusion Adjustment (DO NOT MODIFY ANSWER)  Answer:  [email or web address]\epic\Images\Pharmacy\HeparinInfusions\heparin LOW INTENSITY nomogram for [facility] [id number]    02/11/22 1931      heparin 25,000 units in dextrose 5% (100 units/ml) IV bolus from bag - ADDITIONAL PRN BOLUS - 30 units/kg (max bolus 4000 units)  As needed (PRN)        Question:  Heparin Infusion Adjustment (DO NOT MODIFY ANSWER)  Answer:  [email or web address]\epic\Images\Pharmacy\HeparinInfusions\heparin LOW INTENSITY nomogram for [facility] [id number]    02/11/22 1931     IP VTE HIGH RISK PATIENT  Once         02/11/22 2149     Reason for No Pharmacological VTE Prophylaxis  Once        Question:  Reasons:  Answer:  IV Heparin w/in 24 hrs. Pre or Post-Op    02/11/22 2149     Place sequential compression device  Until discontinued         02/11/22 2149     heparin 25,000 units in dextrose 5% 250 mL (100 units/mL) infusion LOW INTENSITY nomogram - [facility]  Continuous        Question Answer Comment   Heparin Infusion Adjustment (DO NOT MODIFY ANSWER) [email or web address]\epic\Images\Pharmacy\HeparinInfusions\heparin LOW INTENSITY nomogram for [facility] [id number]    Begin at (in units/kg/hr) 12        02/11/22 1931                    Reinier Mccoy MD  Cardiology  [facility] - Cardiology Stepdown  I have personally taken the history and examined the patient and agree with the resident's note as stated above.

## 2022-02-12 NOTE — PLAN OF CARE
Problem: Adult Inpatient Plan of Care  Goal: Plan of Care Review  Outcome: Ongoing, Progressing  Goal: Patient-Specific Goal (Individualized)  Outcome: Ongoing, Progressing  Goal: Absence of Hospital-Acquired Illness or Injury  Outcome: Ongoing, Progressing  Goal: Optimal Comfort and Wellbeing  Outcome: Ongoing, Progressing  Goal: Readiness for Transition of Care  Outcome: Ongoing, Progressing     Problem: Adjustment to Illness (Acute Coronary Syndrome)  Goal: Optimal Adaptation to Illness  Outcome: Ongoing, Progressing     Problem: Dysrhythmia (Acute Coronary Syndrome)  Goal: Normalized Cardiac Rhythm  Outcome: Ongoing, Progressing     Problem: Cardiac-Related Pain (Acute Coronary Syndrome)  Goal: Absence of Cardiac-Related Pain  Outcome: Ongoing, Progressing     Problem: Hemodynamic Instability (Acute Coronary Syndrome)  Goal: Effective Cardiac Pump Function  Outcome: Ongoing, Progressing   Cardiology Step Down. See progress note and flowsheet. Plan: Continue to monitor patient and report any abnormalities in labs, vitals signs, and body system functions to physician as indicated. Patient was given education on their disease process and medications.

## 2022-02-12 NOTE — ASSESSMENT & PLAN NOTE
Patient active smoker with no current interest in quitting.  Nicotine patch contraindicated in context of ACS.

## 2022-02-12 NOTE — ASSESSMENT & PLAN NOTE
Kidney function at baseline.  Dose renally cleared medications accordingly.  He would be at some increased risk for contrast-induced nephropathy, so will defer initiating ACE-inhibitor at this time anticipating likely angiogram in the morning.

## 2022-02-12 NOTE — CONSULTS
[facility] - Emergency Dept  Cardiology  Consult Note    Patient Name: Lonnie Taylor  MRN: [id number]  Admission Date: 2/11/2022  Hospital Length of Stay: 0 days  Code Status: Prior   Attending Provider: Triny Chandler MD   Consulting Provider: Noelle Abdi MD  Primary Care Physician: Isiah You MD  Principal Problem:<principal problem not specified>    Patient information was obtained from patient and ER records.     Inpatient consult to Cardiology  Consult performed by: Noelle Abdi MD  Consult ordered by: Linden Salmeron DO        Subjective:     Chief Complaint:  Chest pain     HPI:   Mr. Lonnie Taylor is a 71 yo M with pmhx s/f CAD s/p Cabg (2v? In 6/22/2007), PCI 2/9/18 and 3/2020, reports LHC in 7/2020 and 9/2021 with no intervention at OSH. He also has, PAD, obesity, significant tobacco use (still 1ppd), HTN, HLD who presents for chest discomfort that began this morning at 9am. He woke up feeling asympatomatic but began feeling severe chest discomfort described as a burning sensation like indigestion. It was substernal with no radiation. Denies alleviating or exacerbating factors.  He did have diaphoresis (cold sweats) but no other associated symptoms. He tried  4x sl NTG (unsure of how old they were) with no alleviation. He then called EMS, who gave him a slNTG which helped it subside and it immediately resolved after a 2nd dose of ntg. This presentation was different from his previous presentations, which felt more like pressure.     Of note, he follows with Dr. Dasilva with Cardiology at OSH. He reports strict compliance with home medications. He is not really active but has not had any difficulties with his ADLs. He's been in his usual state of health until today. He admits to persistent +1ppd tobacco, no alcohol, no illicit drug use.    In the ER, patient found to be hemodynamically stable and was chest pain free. ECG and CXR unremarkable. Workup notable for a troponin of 3.8.       Please see  below for most recent [facility] results from 7/2020.      LMCA:         Lesion on LMCA: 75% stenosis .       LAD:         Lesion on Prox LAD: Ostial.99% stenosis .         Lesion on Mid LAD: Proximal subsection.100% stenosis .         Lesion on Mid LAD: Distal subsection.30% stenosis .         Lesion on Dist LAD: Proximal subsection.30% stenosis .         Lesion on 1st Diag: Ostial.100% stenosis.       LCx:         Lesion on 1st Ob Mishel: Ostial.100% stenosis .         Lesion on Prox CX: Mid subsection.100% stenosis .         Lesion on 2nd Ob Misehl: Proximal subsection.100% stenosis .       RCA:         Lesion on Mid RCA: 100% stenosis .       Cardiac Grafts        -  There is a Vein graft that originates at the Aorta Left and attaches to       the 1st Ob Mishel.        -  There is a Vein graft that originates at the LIMA and attaches to the       Mid LAD.       Graft Lesions         Lesion on LIMA to Mid LAD: 0% stenosis .         Lesion on Aorta Left to 1st Ob Mishel: Proximal anastomosis.0% stenosis .         Comments:Widely patent (in-stent).         Lesion on Aorta Left to 1st Ob Mishel: Proximal body.         Comments:Ectatic         Lesion on Aorta Left to 1st Ob Mishel: Distal body.0% stenosis .         Comments:Widely patent (in-stent).      Past Medical History:   Diagnosis Date    CAD (coronary artery disease)     Dr Martinez    Carotid artery disease     Chronic kidney disease, stage III (moderate)     Depression     Dyslipidemia     Hepatic cyst     Hepatic cyst     High-density lipoprotein deficiency     HTN (hypertension)     Hx of colonic polyps     Insomnia     PVD (peripheral vascular disease)     stented    S/P AAA repair        Past Surgical History:   Procedure Laterality Date    ABDOMINAL AORTIC ANEURYSM REPAIR      ABDOMINAL AORTIC ANEURYSM REPAIR      ANGIOGRAPHY OF LOWER EXTREMITY Left 10/23/2018    Procedure: Angiogram Extremity Unilateral;  Surgeon: Gregor Cunha MD;  Location: [facility] CATH;   Service: Cardiology;  Laterality: Left;  LLE intervention-Will start with 4/5 slender sheath left radial and take diagnostic angio of LLE to determine whether brachial access or tibial access will be required    CATARACT EXTRACTION W/ INTRAOCULAR LENS  IMPLANT, BILATERAL      CATARACT SX Bilateral     CATHETERIZATION OF BOTH LEFT AND RIGHT HEART N/A 7/10/2020    Procedure: CATHETERIZATION, HEART, BOTH LEFT AND RIGHT;  Surgeon: Gregor Cunha MD;  [facility];  Service: Cardiology;  Laterality: N/A;  LHC + RHC +/- PCI -access left radial artery and left brachial vein    CHOLECYSTECTOMY      COLONOSCOPY N/A 10/7/2015    Procedure: COLONOSCOPY;  Surgeon: Genaro You MD;  Location: [facility] ENDO (4TH FLR);  Service: Endoscopy;  Laterality: N/A;    CORONARY ANGIOPLASTY WITH STENT PLACEMENT      CORONARY ARTERY BYPASS GRAFT      HERNIA REPAIR      LAMINECTOMY      MAGNETIC RESONANCE IMAGING N/A 6/4/2021    Procedure: MRI (MAGNETIC RESONANCE IMAGING) LUMBAR SPINE;  Surgeon: Asa Diagnostic Provider;  Location: [facility];  Service: General;  Laterality: N/A;  In MRI @ 9:10 per Krys, To follow WC RM1    PATELLA SURGERY      PERCUTANEOUS TRANSLUMINAL ANGIOPLASTY (PTA) OF PERIPHERAL VESSEL N/A 9/18/2018    Procedure: PTA, PERIPHERAL BLOOD VESSEL;  Surgeon: Gregor Cunha MD;  [facility];  Service: Cardiology;  Laterality: N/A;  Site change:  right popliteal artery access using US guidance.    PERCUTANEOUS TRANSLUMINAL ANGIOPLASTY (PTA) OF PERIPHERAL VESSEL Left 7/25/2019    Procedure: PTA, PERIPHERAL VESSEL;  Surgeon: Gregor Cunha MD;  [facility];  Service: Cardiology;  Laterality: Left;    VASECTOMY         Review of patient's allergies indicates:   Allergen Reactions    Aggrastat concentrate [tirofiban] Shortness Of Breath and Other (See Comments)     Fever and chills    Brilinta [ticagrelor] Shortness Of Breath    Cymbalta [duloxetine] Nausea Only    Bupropion Other  "(See Comments)     "turns into zombie" withdrawn, not talking, outbursts       No current facility-administered medications on file prior to encounter.     Current Outpatient Medications on File Prior to Encounter   Medication Sig    albuterol (VENTOLIN HFA) 90 mcg/actuation inhaler Inhale 2 puffs into the lungs every 6 (six) hours as needed for Wheezing. Rescue    amLODIPine (NORVASC) 10 MG tablet Take 5 mg by mouth once daily.     amLODIPine (NORVASC) 5 MG tablet     aspirin (ECOTRIN) 81 MG EC tablet Take 81 mg by mouth once daily.    cilostazoL (PLETAL) 100 MG Tab Take 100 mg by mouth 2 (two) times daily.     clopidogrel (PLAVIX) 75 mg tablet Take 75 mg by mouth once daily.    CRESTOR 20 mg tablet Take 20 mg by mouth once daily.     doxazosin (CARDURA) 4 MG tablet Take 4 mg by mouth every 12 (twelve) hours.     fenofibrate (TRICOR) 145 MG tablet Take 1 tablet (145 mg total) by mouth once daily.    furosemide (LASIX) 20 MG tablet 20 mg once daily.     gabapentin (NEURONTIN) 300 MG capsule     ipratropium-albuterol (COMBIVENT)  mcg/actuation inhaler Inhale 1 puff into the lungs every 6 (six) hours as needed for Wheezing. Rescue    isosorbide mononitrate (IMDUR) 30 MG 24 hr tablet Take 1 tablet (30 mg total) by mouth once daily.    nitroGLYCERIN (NITROSTAT) 0.4 MG SL tablet Place 1 tablet (0.4 mg total) under the tongue every 5 (five) minutes as needed for Chest pain.    pantoprazole (PROTONIX) 40 MG tablet Take 40 mg by mouth once daily.    ranolazine (RANEXA) 500 MG Tb12 Take 500 mg by mouth 2 (two) times daily. 11/15/20    rivaroxaban (XARELTO) 2.5 mg Tab Take 1 tablet (2.5 mg total) by mouth 2 (two) times daily with meals.    tiotropium (SPIRIVA) 18 mcg inhalation capsule Inhale 1 capsule (18 mcg total) into the lungs once daily. Controller     Family History     Problem Relation (Age of Onset)    Heart disease Mother, Father        Tobacco Use    Smoking status: Current Every Day Smoker "     Packs/day: 1.00     Years: 50.00     Pack years: 50.00    Smokeless tobacco: Never Used    Tobacco comment: Currently smoke 1 ppd   Substance and Sexual Activity    Alcohol use: No    Drug use: Yes     Types: Marijuana     Comment: OCCASSIONALLY    Sexual activity: Not on file     Review of Systems   Constitutional: Positive for diaphoresis. Negative for chills, decreased appetite and fever.   HENT: Negative for congestion and sore throat.    Eyes: Negative for blurred vision and discharge.   Cardiovascular: Positive for chest pain. Negative for claudication, cyanosis, dyspnea on exertion and leg swelling.   Respiratory: Negative for cough, hemoptysis and shortness of breath.    Endocrine: Negative for cold intolerance and heat intolerance.   Skin: Negative for color change.   Musculoskeletal: Negative for muscle weakness and myalgias.   Gastrointestinal: Positive for heartburn. Negative for bloating and abdominal pain.   Neurological: Negative for dizziness, focal weakness and weakness.   Psychiatric/Behavioral: Negative for altered mental status and depression.     Objective:     Vital Signs (Most Recent):  Temp: 98.1 °F (36.7 °C) (02/11/22 1846)  Pulse: 68 (02/11/22 1906)  Resp: 16 (02/11/22 1906)  BP: (!) 163/70 (02/11/22 1906)  SpO2: 97 % (02/11/22 1906) Vital Signs (24h Range):  Temp:  [98.1 °F (36.7 °C)] 98.1 °F (36.7 °C)  Pulse:  [68-92] 68  Resp:  [16-20] 16  SpO2:  [95 %-99 %] 97 %  BP: (132-163)/(70-90) 163/70     Weight: 102.1 kg (225 lb)  Body mass index is 33.23 kg/m².    SpO2: 97 %  O2 Device (Oxygen Therapy): room air    No intake or output data in the 24 hours ending 02/11/22 2043    Lines/Drains/Airways     Peripheral Intravenous Line                 Peripheral IV - Single Lumen 02/11/22 1843 20 G Right Antecubital <1 day         Peripheral IV - Single Lumen 02/11/22 1951 18 G Left Antecubital <1 day                Physical Exam  Constitutional:       Appearance: He is well-developed and  well-nourished.   HENT:      Head: Normocephalic and atraumatic.      Right Ear: External ear normal.      Left Ear: External ear normal.   Eyes:      Extraocular Movements: EOM normal.      Conjunctiva/sclera: Conjunctivae normal.   Cardiovascular:      Rate and Rhythm: Normal rate and regular rhythm.      Pulses: Intact distal pulses.           Radial pulses are 2+ on the right side and 2+ on the left side.      Heart sounds: Normal heart sounds.   Pulmonary:      Effort: Pulmonary effort is normal. No respiratory distress.      Breath sounds: Normal breath sounds. No wheezing.   Abdominal:      General: Bowel sounds are normal. There is no distension.      Palpations: Abdomen is soft.      Tenderness: There is no abdominal tenderness.   Musculoskeletal:         General: No edema. Normal range of motion.      Cervical back: Normal range of motion and neck supple.   Skin:     General: Skin is warm and dry.   Neurological:      Mental Status: He is alert and oriented to person, place, and time.   Psychiatric:         Mood and Affect: Mood normal.         Behavior: Behavior normal.         Significant Labs:   CMP   Recent Labs   Lab 02/11/22 1813   *   K 3.8      CO2 30*   GLU 87   BUN 15   CREATININE 1.6*   CALCIUM 10.6*   PROT 7.1   ALBUMIN 3.8   BILITOT 0.4   ALKPHOS 39*   AST 29   ALT 13   ANIONGAP 11   ESTGFRAFRICA 48.3*   EGFRNONAA 41.8*   , CBC   Recent Labs   Lab 02/11/22 1813 02/11/22 1813 02/11/22 1947   WBC 8.09  --  7.10   HGB 14.8  --  14.0   HCT 45.8   < > 42.3     --  176    < > = values in this interval not displayed.    and INR   Recent Labs   Lab 02/11/22 1947   INR 1.1       Significant Imaging: EKG: non specific ecg changes, appear chronic. no acute ischemic findings.     Assessment and Plan:     NSTEMI (non-ST elevated myocardial infarction)  73 yo with known CAD with previous CABG and PCI (most recent 3/2020) and multiple risk factors presenting with possible cardiac  chest pain that is made more concerning with immediate relief with slNTG and a troponin finding of 3.8. ECG and cxr unremarkable.  See HPI for coronary anatomy  Currently asymptomatic, hemodynamically stable, chest pain free  I would treat for ACS for reasons mentioned above.  I will place interventional cardiology call  Pt given plavix 300 mg and .  Continue to trend troponin until it peaks or plateaus.  Recommend formal echo in am  Recommend Hg a1c and TSH in am  Recommend loading with prasugrel 60 mg now and continue on 10 mg daily  Discontinue plavix from home regimen  Would recommend remainder of acs protocol with heparin ggt  Increase crestor to 40 mg daily  Continue beta blocker and other home medications  Keep NPO Sunday at midnight possible angiogram on Monday.  If pt has recurrent chest pain, obtain stat ecg and troponin. If it is not alleviated with NTG, please call and notify cardiology          VTE Risk Mitigation (From admission, onward)         Ordered     heparin 25,000 units in dextrose 5% (100 units/ml) IV bolus from bag - ADDITIONAL PRN BOLUS - 60 units/kg (max bolus 4000 units)  As needed (PRN)        Question:  Heparin Infusion Adjustment (DO NOT MODIFY ANSWER)  Answer:  \[email or web address]\epic\Images\Pharmacy\HeparinInfusions\heparin LOW INTENSITY nomogram for [facility] [id number]    02/11/22 1931     heparin 25,000 units in dextrose 5% (100 units/ml) IV bolus from bag - ADDITIONAL PRN BOLUS - 30 units/kg (max bolus 4000 units)  As needed (PRN)        Question:  Heparin Infusion Adjustment (DO NOT MODIFY ANSWER)  Answer:  \[email or web address]\Images\Pharmacy\HeparinInfusions\heparin LOW INTENSITY nomogram for [facility] [id number].pdf    02/11/22 1931     heparin 25,000 units in dextrose 5% 250 mL (100 units/mL) infusion LOW INTENSITY nomogram - [facility]  Continuous        Question Answer Comment   Heparin Infusion Adjustment (DO NOT MODIFY ANSWER) \\[email or web address]\Images\Pharmacy\HeparinInfusions\heparin LOW  INTENSITY nomogram for [facility] [id number]    Begin at (in units/kg/hr) 12        02/11/22 1931                Thank you for your consult. I will follow-up with patient. Please contact us if you have any additional questions.    Noelle Abdi MD  Cardiology   [facility] - Emergency Dept  I have personally taken the history and examined the patient and agree with the resident's note as stated above.  Change to more potent anti-platelet and increase statin. Cath and intervention.

## 2022-02-12 NOTE — ED TRIAGE NOTES
Pt is 73 y/o male that presents to the ED via EMS transport from home.     Pt c/o chest pain that started at 0900 today. Pt took 4 nitro tabs at home. Pt received ASA in transport to ED. Pt did have SOB this AM, denies SOB at this time.

## 2022-02-13 LAB
ANION GAP SERPL CALC-SCNC: 10 MMOL/L (ref 8–16)
APTT BLDCRRT: 38.4 SEC (ref 21–32)
APTT BLDCRRT: 43.1 SEC (ref 21–32)
APTT BLDCRRT: 43.3 SEC (ref 21–32)
APTT BLDCRRT: 58.4 SEC (ref 21–32)
BASOPHILS # BLD AUTO: 0.04 K/UL (ref 0–0.2)
BASOPHILS NFR BLD: 0.6 % (ref 0–1.9)
BUN SERPL-MCNC: 16 MG/DL (ref 8–23)
CALCIUM SERPL-MCNC: 10.1 MG/DL (ref 8.7–10.5)
CHLORIDE SERPL-SCNC: 104 MMOL/L (ref 95–110)
CO2 SERPL-SCNC: 26 MMOL/L (ref 23–29)
CREAT SERPL-MCNC: 1.5 MG/DL (ref 0.5–1.4)
DIFFERENTIAL METHOD: ABNORMAL
EOSINOPHIL # BLD AUTO: 0.2 K/UL (ref 0–0.5)
EOSINOPHIL NFR BLD: 2.6 % (ref 0–8)
ERYTHROCYTE [DISTWIDTH] IN BLOOD BY AUTOMATED COUNT: 14.1 % (ref 11.5–14.5)
EST. GFR  (AFRICAN AMERICAN): 52.3 ML/MIN/1.73 M^2
EST. GFR  (NON AFRICAN AMERICAN): 45.2 ML/MIN/1.73 M^2
GLUCOSE SERPL-MCNC: 71 MG/DL (ref 70–110)
HCT VFR BLD AUTO: 43.1 % (ref 40–54)
HGB BLD-MCNC: 13.6 G/DL (ref 14–18)
IMM GRANULOCYTES # BLD AUTO: 0 K/UL (ref 0–0.04)
IMM GRANULOCYTES NFR BLD AUTO: 0 % (ref 0–0.5)
LYMPHOCYTES # BLD AUTO: 2.6 K/UL (ref 1–4.8)
LYMPHOCYTES NFR BLD: 36.4 % (ref 18–48)
MCH RBC QN AUTO: 30.5 PG (ref 27–31)
MCHC RBC AUTO-ENTMCNC: 31.6 G/DL (ref 32–36)
MCV RBC AUTO: 97 FL (ref 82–98)
MONOCYTES # BLD AUTO: 0.6 K/UL (ref 0.3–1)
MONOCYTES NFR BLD: 8.8 % (ref 4–15)
NEUTROPHILS # BLD AUTO: 3.6 K/UL (ref 1.8–7.7)
NEUTROPHILS NFR BLD: 51.6 % (ref 38–73)
NRBC BLD-RTO: 0 /100 WBC
PLATELET # BLD AUTO: 146 K/UL (ref 150–450)
PMV BLD AUTO: 11.8 FL (ref 9.2–12.9)
POTASSIUM SERPL-SCNC: 3.4 MMOL/L (ref 3.5–5.1)
RBC # BLD AUTO: 4.46 M/UL (ref 4.6–6.2)
SODIUM SERPL-SCNC: 140 MMOL/L (ref 136–145)
WBC # BLD AUTO: 7.04 K/UL (ref 3.9–12.7)

## 2022-02-13 PROCEDURE — 99233 PR SUBSEQUENT HOSPITAL CARE,LEVL III: ICD-10-PCS | Mod: HCNC,,, | Performed by: INTERNAL MEDICINE

## 2022-02-13 PROCEDURE — 99900035 HC TECH TIME PER 15 MIN (STAT): Mod: HCNC

## 2022-02-13 PROCEDURE — 80048 BASIC METABOLIC PNL TOTAL CA: CPT | Mod: HCNC | Performed by: HOSPITALIST

## 2022-02-13 PROCEDURE — 25000003 PHARM REV CODE 250: Mod: HCNC | Performed by: HOSPITALIST

## 2022-02-13 PROCEDURE — 25000003 PHARM REV CODE 250: Mod: HCNC | Performed by: EMERGENCY MEDICINE

## 2022-02-13 PROCEDURE — 25000242 PHARM REV CODE 250 ALT 637 W/ HCPCS: Mod: HCNC | Performed by: HOSPITALIST

## 2022-02-13 PROCEDURE — 99233 PR SUBSEQUENT HOSPITAL CARE,LEVL III: ICD-10-PCS | Mod: HCNC,GC,, | Performed by: HOSPITALIST

## 2022-02-13 PROCEDURE — 63600175 PHARM REV CODE 636 W HCPCS: Mod: HCNC | Performed by: HOSPITALIST

## 2022-02-13 PROCEDURE — 99233 SBSQ HOSP IP/OBS HIGH 50: CPT | Mod: HCNC,GC,, | Performed by: HOSPITALIST

## 2022-02-13 PROCEDURE — 25000003 PHARM REV CODE 250: Mod: HCNC | Performed by: INTERNAL MEDICINE

## 2022-02-13 PROCEDURE — 36415 COLL VENOUS BLD VENIPUNCTURE: CPT | Mod: HCNC | Performed by: HOSPITALIST

## 2022-02-13 PROCEDURE — 99233 SBSQ HOSP IP/OBS HIGH 50: CPT | Mod: HCNC,,, | Performed by: INTERNAL MEDICINE

## 2022-02-13 PROCEDURE — 94761 N-INVAS EAR/PLS OXIMETRY MLT: CPT | Mod: HCNC

## 2022-02-13 PROCEDURE — 27000221 HC OXYGEN, UP TO 24 HOURS: Mod: HCNC

## 2022-02-13 PROCEDURE — 85730 THROMBOPLASTIN TIME PARTIAL: CPT | Mod: 91,HCNC | Performed by: HOSPITALIST

## 2022-02-13 PROCEDURE — 85025 COMPLETE CBC W/AUTO DIFF WBC: CPT | Mod: HCNC | Performed by: HOSPITALIST

## 2022-02-13 PROCEDURE — 94640 AIRWAY INHALATION TREATMENT: CPT | Mod: HCNC

## 2022-02-13 PROCEDURE — 20600001 HC STEP DOWN PRIVATE ROOM: Mod: HCNC

## 2022-02-13 RX ORDER — LISINOPRIL 10 MG/1
10 TABLET ORAL DAILY
Status: DISCONTINUED | OUTPATIENT
Start: 2022-02-13 | End: 2022-02-15

## 2022-02-13 RX ORDER — IPRATROPIUM BROMIDE AND ALBUTEROL SULFATE 2.5; .5 MG/3ML; MG/3ML
3 SOLUTION RESPIRATORY (INHALATION) EVERY 4 HOURS PRN
Status: DISCONTINUED | OUTPATIENT
Start: 2022-02-13 | End: 2022-02-15 | Stop reason: HOSPADM

## 2022-02-13 RX ORDER — SPIRONOLACTONE 25 MG/1
25 TABLET ORAL DAILY
Status: DISCONTINUED | OUTPATIENT
Start: 2022-02-13 | End: 2022-02-15 | Stop reason: HOSPADM

## 2022-02-13 RX ORDER — CARVEDILOL 6.25 MG/1
6.25 TABLET ORAL 2 TIMES DAILY
Status: DISCONTINUED | OUTPATIENT
Start: 2022-02-13 | End: 2022-02-15 | Stop reason: HOSPADM

## 2022-02-13 RX ADMIN — PRASUGREL 10 MG: 10 TABLET, FILM COATED ORAL at 08:02

## 2022-02-13 RX ADMIN — RANOLAZINE 500 MG: 500 TABLET, EXTENDED RELEASE ORAL at 08:02

## 2022-02-13 RX ADMIN — IPRATROPIUM BROMIDE AND ALBUTEROL SULFATE 3 ML: 2.5; .5 SOLUTION RESPIRATORY (INHALATION) at 10:02

## 2022-02-13 RX ADMIN — FUROSEMIDE 20 MG: 20 TABLET ORAL at 08:02

## 2022-02-13 RX ADMIN — CARVEDILOL 6.25 MG: 6.25 TABLET, FILM COATED ORAL at 08:02

## 2022-02-13 RX ADMIN — ISOSORBIDE MONONITRATE 30 MG: 30 TABLET, EXTENDED RELEASE ORAL at 08:02

## 2022-02-13 RX ADMIN — HEPARIN SODIUM 20 UNITS/KG/HR: 5000 INJECTION INTRAVENOUS; SUBCUTANEOUS at 12:02

## 2022-02-13 RX ADMIN — GABAPENTIN 300 MG: 300 CAPSULE ORAL at 02:02

## 2022-02-13 RX ADMIN — Medication 6 MG: at 08:02

## 2022-02-13 RX ADMIN — AMLODIPINE BESYLATE 5 MG: 5 TABLET ORAL at 08:02

## 2022-02-13 RX ADMIN — GABAPENTIN 300 MG: 300 CAPSULE ORAL at 08:02

## 2022-02-13 RX ADMIN — POTASSIUM BICARBONATE 25 MEQ: 978 TABLET, EFFERVESCENT ORAL at 09:02

## 2022-02-13 RX ADMIN — LISINOPRIL 10 MG: 5 TABLET ORAL at 08:02

## 2022-02-13 RX ADMIN — SPIRONOLACTONE 25 MG: 25 TABLET ORAL at 08:02

## 2022-02-13 RX ADMIN — ASPIRIN 81 MG: 81 TABLET, COATED ORAL at 08:02

## 2022-02-13 RX ADMIN — POTASSIUM BICARBONATE 25 MEQ: 978 TABLET, EFFERVESCENT ORAL at 12:02

## 2022-02-13 RX ADMIN — ATORVASTATIN CALCIUM 80 MG: 20 TABLET, FILM COATED ORAL at 08:02

## 2022-02-13 RX ADMIN — PANTOPRAZOLE SODIUM 40 MG: 40 TABLET, DELAYED RELEASE ORAL at 08:02

## 2022-02-13 RX ADMIN — FENOFIBRATE 145 MG: 145 TABLET, FILM COATED ORAL at 08:02

## 2022-02-13 NOTE — ASSESSMENT & PLAN NOTE
71 yo with known CAD with previous CABG and PCI (most recent 3/2020) and multiple risk factors presenting with possible cardiac chest pain that is made more concerning with immediate relief with slNTG and a troponin finding of 3.8. ECG and cxr unremarkable.  See HPI for coronary anatomy  Currently asymptomatic, hemodynamically stable, and chest pain free  I would treat for ACS for reasons mentioned above.  Pt given plavix 300 mg and .    Echo with EF decreased to 40% from 55% in 2019 and with segmental LV WMA, grade III ventricular diastolic dysfunction. EKG unchanged however troponin peaked 21    Plan:  - Plan for [facility] tomorrow; NPO at MN  - Continue DAPT, high intensity statin, heparin gtt  - Continue current GDMT: Coreg 6.25mg BID, Lisinopril 10mg, spironolactone 25mg   - If pt has recurrent chest pain, obtain stat ecg and troponin. If it is not alleviated with NTG, please call and notify cardiology

## 2022-02-13 NOTE — NURSING
"Notified MD. Chandler pt c/o dizziness and stomach upset, stated "doesn't feel right", denies cp or sob, /64, map 92, HR 62, sat O2 93% on room air.  "

## 2022-02-13 NOTE — PLAN OF CARE
Problem: Adult Inpatient Plan of Care  Goal: Plan of Care Review  Outcome: Ongoing, Progressing  Goal: Patient-Specific Goal (Individualized)  Outcome: Ongoing, Progressing  Goal: Absence of Hospital-Acquired Illness or Injury  Outcome: Ongoing, Progressing  Goal: Optimal Comfort and Wellbeing  Outcome: Ongoing, Progressing  Goal: Readiness for Transition of Care  Outcome: Ongoing, Progressing     Problem: Adjustment to Illness (Acute Coronary Syndrome)  Goal: Optimal Adaptation to Illness  Outcome: Ongoing, Progressing     Problem: Dysrhythmia (Acute Coronary Syndrome)  Goal: Normalized Cardiac Rhythm  Outcome: Ongoing, Progressing     Problem: Cardiac-Related Pain (Acute Coronary Syndrome)  Goal: Absence of Cardiac-Related Pain  Outcome: Ongoing, Progressing   Cardiology Step Down. See progress note and flowsheet. Plan: Continue to monitor patient and report any abnormalities in labs, vitals signs, and body system functions to physician as indicated. Patient was given education on their disease process and medications.

## 2022-02-13 NOTE — PROGRESS NOTES
Progress Note  Hospital Medicine    Primary Team: [facility] HOSP MED V  Admit Date: 2/11/2022   Length of Stay:  LOS: 2 days   SUBJECTIVE:   Reason for Admission:  NSTEMI (non-ST elevated myocardial infarction)    HPI:  72-year-old male presenting with chest discomfort since about 9 this morning which had been refractory to antacids and nitroglycerin at home.  He had thought it was heartburn due to dinner his wife had brought home the previous night which he said contained a lot of green onions.  His symptoms occurred while he was moving around doing his normal morning activities.  He also notes that he was up late fiddling with a new GoPro camera that he bought for his upcoming trip to [place].  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  After his symptoms persisted throughout the morning and into mid day he became frustrated and decided to call for an ambulance.  He was given a nitroglycerin which considerably relieved his chest pressure/discomfort, and a 2nd nitroglycerin resolved it.  After coming to the ED he was shocked to learn that he had had a heart attack; he did not suspect that that had been what was going on based on how subtle his symptoms were.  He has not had any similar exertional or anginal symptoms in the recent past.    Hospital Course:  Pt was diagnosed with NSTEMI and admitted to  for further evaluation.  He was started on ACS protocol including Prasugrel and Heparin gtt, with [facility] planned for 2/14/22.    Interval History:  This morning, pt reported dizziness and not feeling like himself 1h after morning medications, which including changing Metoprolol to Coreg and increasing Lisinopril from 5mg to 10mg.  He feels this is due to the medication change; denies chest pain or SOB.  Symptoms self-resolved within ~ 5 min.    Review of Systems:  Constitutional: no  fever or chills  Respiratory: positive for dyspnea on exertion  Cardiovascular: no chest pain or palpitations  Gastrointestinal: no nausea or vomiting, no abdominal pain or change in bowel habits  Musculoskeletal: no arthralgias or myalgias     OBJECTIVE:     Temp:  [97.8 °F (36.6 °C)-98.8 °F (37.1 °C)]   Pulse:  [62-69]   Resp:  [18-24]   BP: (114-144)/(56-65)   SpO2:  [93 %-98 %]  Body mass index is 33.83 kg/m².  Intake/Outake:  This Shift:  I/O this shift:  In: 360 [P.O.:360]  Out: 600 [Urine:600]    Net I/O past 24h:     Intake/Output Summary (Last 24 hours) at 2/13/2022 1518  Last data filed at 2/13/2022 1200  Gross per 24 hour   Intake 840 ml   Output 1150 ml   Net -310 ml             Physical Exam:  Gen- well-developed, well-nourished, NAD  CVS- S1 and S2 present, RRR, no murmur  Resp- few expiratory wheezes b/l, no work of breathing  Abd- BS+, soft, NT, ND  Ext- no clubbing, cyanosis, or edema    Laboratory:  CBC/Anemia Labs: Coags:    Recent Labs   Lab 02/11/22 1947 02/12/22  0357 02/13/22  0301   WBC 7.10 7.17 7.04   HGB 14.0 13.9* 13.6*   HCT 42.3 43.2 43.1    180 146*   MCV 92 96 97   RDW 14.0 14.2 14.1    Recent Labs   Lab 02/11/22 1947 02/12/22  0228 02/12/22  2329 02/13/22  0301 02/13/22  0856   INR 1.1  --   --   --   --    APTT 25.4   < > 38.4* 58.4* 43.1*    < > = values in this interval not displayed.        Chemistries:   Recent Labs   Lab 02/11/22  1813 02/12/22  0841 02/13/22  0301   * 143 140   K 3.8 3.7 3.4*    104 104   CO2 30* 28 26   BUN 15 15 16   CREATININE 1.6* 1.6* 1.5*   CALCIUM 10.6* 10.5 10.1   PROT 7.1  --   --    BILITOT 0.4  --   --    ALKPHOS 39*  --   --    ALT 13  --   --    AST 29  --   --         Medications:  Scheduled Meds:   amLODIPine  5 mg Oral Daily    aspirin  81 mg Oral Daily    atorvastatin  80 mg Oral Daily    carvediloL  6.25 mg Oral BID    fenofibrate  145 mg Oral Daily    furosemide  20 mg Oral Daily    gabapentin  300 mg Oral TID     isosorbide mononitrate  30 mg Oral Daily    lisinopriL  10 mg Oral Daily    pantoprazole  40 mg Oral Daily    prasugreL  10 mg Oral Daily    ranolazine  500 mg Oral BID    spironolactone  25 mg Oral Daily                             Continuous Infusions:   heparin (porcine) in D5W 20 Units/kg/hr (02/13/22 1251)     PRN Meds:.albuterol-ipratropium, heparin (PORCINE), heparin (PORCINE), hydrOXYzine HCL, melatonin, nitroGLYCERIN, sodium chloride 0.9%     ASSESSMENT/PLAN:     Active Hospital Problems    Diagnosis  POA    *NSTEMI (non-ST elevated myocardial infarction) [I21.4]  Yes    Pulmonary emphysema [J43.9]  Yes     Ct chest results dated 10/14/2020      Tobacco abuse [Z72.0]  Yes    S/P AAA repair [Z98.890, Z86.79]  Not Applicable    Morbid obesity [E66.01]  Yes    Chronic kidney disease, stage III (moderate) [N18.30]  Yes    Primary hypertension [I10]  Yes    Coronary artery disease involving native coronary artery with angina pectoris [I25.119]  Yes    PVD (peripheral vascular disease) [I73.9]  Yes      Resolved Hospital Problems   No resolved problems to display.     NSTEMI  CAD s/p CABG 2007, PCI 2018, PCI 2020  -on ACS protocol: Prasugrel 10mg s/p load, lASA, Coreg, high intensity statin, and Heparin gtt  -continue cardiac monitoring; Trop peaked at 21  -Interventional Cardiology consulted for possible LHC Monday; NPO past midnight  -ECHO reviewed, with WMA abnormalities  -continue Imdur, Ranexa    CKD III  -creatinine at baseline, continue monitoring  -will need pre and post hydration for LHC    Combined CHF  -prior ECHO with only diastolic dysfunction, ECHO 2/12:  · The left ventricle is normal in size with moderately decreased systolic function.  · The estimated ejection fraction is 40%.  · There are segmental left ventricular wall motion abnormalities.  · Grade III left ventricular diastolic dysfunction.  · Mild left atrial enlargement.  · Normal right ventricular size with normal right  ventricular systolic function.  · Mild right atrial enlargement.  · Mild mitral regurgitation.  · Mild aortic regurgitation.  · Normal central venous pressure (3 mmHg).  · The estimated PA systolic pressure is 19 mmHg.  · The ascending aorta is dilated.  · Posterior pericardial effusion.  -continue Lisinopril, Lasix 20mg    Essential HTN  -bp at goal  -continue Lisinopril, Coreg, Imdur, Amlodipine    Tobacco Abuse  -pt now motivated for cessation; does not want a nicotine patch  -continue nebs PRN, including given this morning  -PFT 2020 show FEV/FVC 72%; mild obstructive airway disease, minimal restriction, moderately severe diffusion defect    Hypokalemia  -repleted today    PVD   S/p AAA repair  -continue DAPT, statin, bp control  -ascending aorta dilatation seen on ECHO    DVT ppx- Heparin gtt  CODE Status- FULL    Dispo- home in 1-2 days pending Cardiology evaluation    Triny Chandler MD  Hospital Medicine Staff

## 2022-02-13 NOTE — SUBJECTIVE & OBJECTIVE
Interval History: No major events overnight. Denied chest pain, SOB, palpitations, LE edema. Plan for [facility] tomorrow    Review of Systems   Constitutional: Negative for chills, fever and malaise/fatigue.   Eyes: Negative for visual disturbance.   Cardiovascular: Negative for chest pain, irregular heartbeat, leg swelling and palpitations.   Respiratory: Negative for cough and shortness of breath.    Gastrointestinal: Negative for abdominal pain, heartburn, nausea and vomiting.   Neurological: Negative for dizziness, headaches and light-headedness.   Psychiatric/Behavioral: Negative for altered mental status.     Objective:     Vital Signs (Most Recent):  Temp: 98.2 °F (36.8 °C) (02/13/22 0734)  Pulse: 64 (02/13/22 0734)  Resp: 18 (02/13/22 0734)  BP: (!) 140/65 (02/13/22 0734)  SpO2: (!) 94 % (02/13/22 0734) Vital Signs (24h Range):  Temp:  [97.8 °F (36.6 °C)-98.8 °F (37.1 °C)] 98.2 °F (36.8 °C)  Pulse:  [61-69] 64  Resp:  [16-20] 18  SpO2:  [92 %-95 %] 94 %  BP: (114-144)/(56-65) 140/65     Weight: 103.9 kg (229 lb 0.9 oz)  Body mass index is 33.83 kg/m².     SpO2: (!) 94 %  O2 Device (Oxygen Therapy): nasal cannula      Intake/Output Summary (Last 24 hours) at 2/13/2022 0824  Last data filed at 2/13/2022 0600  Gross per 24 hour   Intake 600 ml   Output 550 ml   Net 50 ml       Lines/Drains/Airways     Peripheral Intravenous Line                 Peripheral IV - Single Lumen 02/11/22 1843 20 G Right Antecubital 1 day                Physical Exam  Vitals and nursing note reviewed.   Constitutional:       General: He is not in acute distress.     Appearance: He is obese. He is not ill-appearing.   HENT:      Head: Normocephalic and atraumatic.      Mouth/Throat:      Mouth: Mucous membranes are moist.   Eyes:      Conjunctiva/sclera: Conjunctivae normal.   Cardiovascular:      Rate and Rhythm: Normal rate and regular rhythm.      Pulses: Normal pulses.           Radial pulses are 2+ on the right side and 2+ on the left  side.      Heart sounds: Normal heart sounds, S1 normal and S2 normal. No murmur heard.  No friction rub. No gallop.    Pulmonary:      Effort: Pulmonary effort is normal. No respiratory distress.      Breath sounds: Normal breath sounds. No wheezing.   Abdominal:      General: There is no distension.      Palpations: Abdomen is soft.      Tenderness: There is no abdominal tenderness.   Musculoskeletal:      Right lower leg: No edema.      Left lower leg: No edema.   Skin:     General: Skin is warm and dry.   Neurological:      General: No focal deficit present.      Mental Status: He is alert and oriented to person, place, and time.   Psychiatric:         Mood and Affect: Mood normal.         Significant Labs: All pertinent lab results from the last 24 hours have been reviewed.    Significant Imaging: reviewed

## 2022-02-13 NOTE — PROGRESS NOTES
[facility] - Cardiology Stepdown  Cardiology  Progress Note    Patient Name: Lonnie Taylor  MRN: [id number]  Admission Date: 2/11/2022  Hospital Length of Stay: 2 days  Code Status: Full Code   Attending Physician: Triny Chandler MD   Primary Care Physician: Isiah You MD  Expected Discharge Date: 2/15/2022  Principal Problem:NSTEMI (non-ST elevated myocardial infarction)    Subjective:     Hospital Course:   No notes on file    Interval History: No major events overnight. Denied chest pain, SOB, palpitations, LE edema. Plan for [facility] tomorrow    Review of Systems   Constitutional: Negative for chills, fever and malaise/fatigue.   Eyes: Negative for visual disturbance.   Cardiovascular: Negative for chest pain, irregular heartbeat, leg swelling and palpitations.   Respiratory: Negative for cough and shortness of breath.    Gastrointestinal: Negative for abdominal pain, heartburn, nausea and vomiting.   Neurological: Negative for dizziness, headaches and light-headedness.   Psychiatric/Behavioral: Negative for altered mental status.     Objective:     Vital Signs (Most Recent):  Temp: 98.2 °F (36.8 °C) (02/13/22 0734)  Pulse: 64 (02/13/22 0734)  Resp: 18 (02/13/22 0734)  BP: (!) 140/65 (02/13/22 0734)  SpO2: (!) 94 % (02/13/22 0734) Vital Signs (24h Range):  Temp:  [97.8 °F (36.6 °C)-98.8 °F (37.1 °C)] 98.2 °F (36.8 °C)  Pulse:  [61-69] 64  Resp:  [16-20] 18  SpO2:  [92 %-95 %] 94 %  BP: (114-144)/(56-65) 140/65     Weight: 103.9 kg (229 lb 0.9 oz)  Body mass index is 33.83 kg/m².     SpO2: (!) 94 %  O2 Device (Oxygen Therapy): nasal cannula      Intake/Output Summary (Last 24 hours) at 2/13/2022 0824  Last data filed at 2/13/2022 0600  Gross per 24 hour   Intake 600 ml   Output 550 ml   Net 50 ml       Lines/Drains/Airways       Peripheral Intravenous Line                   Peripheral IV - Single Lumen 02/11/22 1843 20 G Right Antecubital 1 day                    Physical Exam  Vitals and nursing note reviewed.    Constitutional:       General: He is not in acute distress.     Appearance: He is obese. He is not ill-appearing.   HENT:      Head: Normocephalic and atraumatic.      Mouth/Throat:      Mouth: Mucous membranes are moist.   Eyes:      Conjunctiva/sclera: Conjunctivae normal.   Cardiovascular:      Rate and Rhythm: Normal rate and regular rhythm.      Pulses: Normal pulses.           Radial pulses are 2+ on the right side and 2+ on the left side.      Heart sounds: Normal heart sounds, S1 normal and S2 normal. No murmur heard.  No friction rub. No gallop.    Pulmonary:      Effort: Pulmonary effort is normal. No respiratory distress.      Breath sounds: Normal breath sounds. No wheezing.   Abdominal:      General: There is no distension.      Palpations: Abdomen is soft.      Tenderness: There is no abdominal tenderness.   Musculoskeletal:      Right lower leg: No edema.      Left lower leg: No edema.   Skin:     General: Skin is warm and dry.   Neurological:      General: No focal deficit present.      Mental Status: He is alert and oriented to person, place, and time.   Psychiatric:         Mood and Affect: Mood normal.         Significant Labs: All pertinent lab results from the last 24 hours have been reviewed.    Significant Imaging: reviewed    Assessment and Plan:         * NSTEMI (non-ST elevated myocardial infarction)  71 yo with known CAD with previous CABG and PCI (most recent 3/2020) and multiple risk factors presenting with possible cardiac chest pain that is made more concerning with immediate relief with slNTG and a troponin finding of 3.8. ECG and cxr unremarkable.  See HPI for coronary anatomy  Currently asymptomatic, hemodynamically stable, and chest pain free  I would treat for ACS for reasons mentioned above.  Pt given plavix 300 mg and .    Echo with EF decreased to 40% from 55% in 2019 and with segmental LV WMA, grade III ventricular diastolic dysfunction. EKG unchanged however troponin  peaked 21    Plan:  - Plan for [facility] tomorrow; NPO at [place]  - Continue DAPT, high intensity statin, heparin gtt  - Continue current GDMT: Coreg 6.25mg BID, Lisinopril 10mg, spironolactone 25mg   - If pt has recurrent chest pain, obtain stat ecg and troponin. If it is not alleviated with NTG, please call and notify cardiology          VTE Risk Mitigation (From admission, onward)           Ordered     heparin 25,000 units in dextrose 5% (100 units/ml) IV bolus from bag - ADDITIONAL PRN BOLUS - 60 units/kg (max bolus 4000 units)  As needed (PRN)        Question:  Heparin Infusion Adjustment (DO NOT MODIFY ANSWER)  Answer:  [email or web address]\epic\Images\Pharmacy\HeparinInfusions\heparin LOW INTENSITY nomogram for [facility] [id number]    02/11/22 1931     heparin 25,000 units in dextrose 5% (100 units/ml) IV bolus from bag - ADDITIONAL PRN BOLUS - 30 units/kg (max bolus 4000 units)  As needed (PRN)        Question:  Heparin Infusion Adjustment (DO NOT MODIFY ANSWER)  Answer:  [email or web address]\epic\Images\Pharmacy\HeparinInfusions\heparin LOW INTENSITY nomogram for [facility] [id number]    02/11/22 1931     IP VTE HIGH RISK PATIENT  Once         02/11/22 2149     Reason for No Pharmacological VTE Prophylaxis  Once        Question:  Reasons:  Answer:  IV Heparin w/in 24 hrs. Pre or Post-Op    02/11/22 2149     Place sequential compression device  Until discontinued         02/11/22 2149     heparin 25,000 units in dextrose 5% 250 mL (100 units/mL) infusion LOW INTENSITY nomogram - [facility]  Continuous        Question Answer Comment   Heparin Infusion Adjustment (DO NOT MODIFY ANSWER) [email or web address]\epic\Images\Pharmacy\HeparinInfusions\heparin LOW INTENSITY nomogram for [facility] [id number]    Begin at (in units/kg/hr) 12        02/11/22 1931                    Fan Chatterjee MD  Cardiology  [facility] - Cardiology Stepdown  I have personally taken the history and examined the patient and agree with the resident's note as stated above. LVEF has  dropped to 40-cath and intervention  tomorrow and adjust meds. Again discussed smoking cessation.

## 2022-02-13 NOTE — PLAN OF CARE
VSS. K replaced. Continue on heparin gtt 20 units/kg/hr. Spouse at bedside. NPO starts midnight for cath lab tomorrow. Pt educated on fall risk and use urinal. Denies chest pain, SOB, palpitations, dizziness, pain, or discomfort. Plan of care reviewed with pt, all questions answered. Bed locked in lowest position, call bell within reach, no acute distress noted, will continue to monitor

## 2022-02-14 DIAGNOSIS — I21.4 NSTEMI (NON-ST ELEVATION MYOCARDIAL INFARCTION): Primary | ICD-10-CM

## 2022-02-14 LAB
ANION GAP SERPL CALC-SCNC: 10 MMOL/L (ref 8–16)
APTT BLDCRRT: 42.3 SEC (ref 21–32)
BASOPHILS # BLD AUTO: 0.05 K/UL (ref 0–0.2)
BASOPHILS NFR BLD: 0.9 % (ref 0–1.9)
BUN SERPL-MCNC: 18 MG/DL (ref 8–23)
CALCIUM SERPL-MCNC: 10.1 MG/DL (ref 8.7–10.5)
CHLORIDE SERPL-SCNC: 104 MMOL/L (ref 95–110)
CO2 SERPL-SCNC: 24 MMOL/L (ref 23–29)
CREAT SERPL-MCNC: 1.6 MG/DL (ref 0.5–1.4)
DIFFERENTIAL METHOD: ABNORMAL
EOSINOPHIL # BLD AUTO: 0.2 K/UL (ref 0–0.5)
EOSINOPHIL NFR BLD: 3 % (ref 0–8)
ERYTHROCYTE [DISTWIDTH] IN BLOOD BY AUTOMATED COUNT: 13.9 % (ref 11.5–14.5)
EST. GFR  (AFRICAN AMERICAN): 48.3 ML/MIN/1.73 M^2
EST. GFR  (NON AFRICAN AMERICAN): 41.8 ML/MIN/1.73 M^2
GLUCOSE SERPL-MCNC: 78 MG/DL (ref 70–110)
HCT VFR BLD AUTO: 42.3 % (ref 40–54)
HGB BLD-MCNC: 13.6 G/DL (ref 14–18)
IMM GRANULOCYTES # BLD AUTO: 0.01 K/UL (ref 0–0.04)
IMM GRANULOCYTES NFR BLD AUTO: 0.2 % (ref 0–0.5)
LYMPHOCYTES # BLD AUTO: 1.9 K/UL (ref 1–4.8)
LYMPHOCYTES NFR BLD: 33.8 % (ref 18–48)
MAGNESIUM SERPL-MCNC: 1.7 MG/DL (ref 1.6–2.6)
MCH RBC QN AUTO: 30.8 PG (ref 27–31)
MCHC RBC AUTO-ENTMCNC: 32.2 G/DL (ref 32–36)
MCV RBC AUTO: 96 FL (ref 82–98)
MONOCYTES # BLD AUTO: 0.5 K/UL (ref 0.3–1)
MONOCYTES NFR BLD: 9.6 % (ref 4–15)
NEUTROPHILS # BLD AUTO: 3 K/UL (ref 1.8–7.7)
NEUTROPHILS NFR BLD: 52.5 % (ref 38–73)
NRBC BLD-RTO: 0 /100 WBC
PLATELET # BLD AUTO: 123 K/UL (ref 150–450)
PMV BLD AUTO: 11.7 FL (ref 9.2–12.9)
POTASSIUM SERPL-SCNC: 3.6 MMOL/L (ref 3.5–5.1)
RBC # BLD AUTO: 4.42 M/UL (ref 4.6–6.2)
SODIUM SERPL-SCNC: 138 MMOL/L (ref 136–145)
WBC # BLD AUTO: 5.62 K/UL (ref 3.9–12.7)

## 2022-02-14 PROCEDURE — 99223 PR INITIAL HOSPITAL CARE,LEVL III: ICD-10-PCS | Mod: HCNC,,, | Performed by: INTERNAL MEDICINE

## 2022-02-14 PROCEDURE — 99152 MOD SED SAME PHYS/QHP 5/>YRS: CPT | Mod: HCNC,,, | Performed by: INTERNAL MEDICINE

## 2022-02-14 PROCEDURE — 99153 MOD SED SAME PHYS/QHP EA: CPT | Mod: HCNC | Performed by: INTERNAL MEDICINE

## 2022-02-14 PROCEDURE — 99232 PR SUBSEQUENT HOSPITAL CARE,LEVL II: ICD-10-PCS | Mod: HCNC,,, | Performed by: INTERNAL MEDICINE

## 2022-02-14 PROCEDURE — 93010 EKG 12-LEAD: ICD-10-PCS | Mod: HCNC,,, | Performed by: INTERNAL MEDICINE

## 2022-02-14 PROCEDURE — 80048 BASIC METABOLIC PNL TOTAL CA: CPT | Mod: HCNC | Performed by: HOSPITALIST

## 2022-02-14 PROCEDURE — 20600001 HC STEP DOWN PRIVATE ROOM: Mod: HCNC

## 2022-02-14 PROCEDURE — 99152 MOD SED SAME PHYS/QHP 5/>YRS: CPT | Mod: HCNC | Performed by: INTERNAL MEDICINE

## 2022-02-14 PROCEDURE — 25000003 PHARM REV CODE 250: Mod: HCNC | Performed by: HOSPITALIST

## 2022-02-14 PROCEDURE — 85025 COMPLETE CBC W/AUTO DIFF WBC: CPT | Mod: HCNC | Performed by: HOSPITALIST

## 2022-02-14 PROCEDURE — 99232 SBSQ HOSP IP/OBS MODERATE 35: CPT | Mod: HCNC,,, | Performed by: INTERNAL MEDICINE

## 2022-02-14 PROCEDURE — C1887 CATHETER, GUIDING: HCPCS | Mod: HCNC | Performed by: INTERNAL MEDICINE

## 2022-02-14 PROCEDURE — 85025 COMPLETE CBC W/AUTO DIFF WBC: CPT | Mod: 91,HCNC | Performed by: INTERNAL MEDICINE

## 2022-02-14 PROCEDURE — 99152 PR MOD CONSCIOUS SEDATION, SAME PHYS, 5+ YRS, FIRST 15 MIN: ICD-10-PCS | Mod: HCNC,,, | Performed by: INTERNAL MEDICINE

## 2022-02-14 PROCEDURE — 99233 PR SUBSEQUENT HOSPITAL CARE,LEVL III: ICD-10-PCS | Mod: HCNC,GC,, | Performed by: HOSPITALIST

## 2022-02-14 PROCEDURE — 93005 ELECTROCARDIOGRAM TRACING: CPT | Mod: HCNC

## 2022-02-14 PROCEDURE — 25500020 PHARM REV CODE 255: Mod: HCNC | Performed by: INTERNAL MEDICINE

## 2022-02-14 PROCEDURE — 27000221 HC OXYGEN, UP TO 24 HOURS: Mod: HCNC

## 2022-02-14 PROCEDURE — 99233 SBSQ HOSP IP/OBS HIGH 50: CPT | Mod: HCNC,GC,, | Performed by: HOSPITALIST

## 2022-02-14 PROCEDURE — 36415 COLL VENOUS BLD VENIPUNCTURE: CPT | Mod: HCNC | Performed by: INTERNAL MEDICINE

## 2022-02-14 PROCEDURE — 25000003 PHARM REV CODE 250: Mod: HCNC | Performed by: STUDENT IN AN ORGANIZED HEALTH CARE EDUCATION/TRAINING PROGRAM

## 2022-02-14 PROCEDURE — 27201423 OPTIME MED/SURG SUP & DEVICES STERILE SUPPLY: Mod: HCNC | Performed by: INTERNAL MEDICINE

## 2022-02-14 PROCEDURE — C1769 GUIDE WIRE: HCPCS | Mod: HCNC | Performed by: INTERNAL MEDICINE

## 2022-02-14 PROCEDURE — 99223 1ST HOSP IP/OBS HIGH 75: CPT | Mod: HCNC,,, | Performed by: INTERNAL MEDICINE

## 2022-02-14 PROCEDURE — 25000003 PHARM REV CODE 250: Mod: HCNC | Performed by: INTERNAL MEDICINE

## 2022-02-14 PROCEDURE — 63600175 PHARM REV CODE 636 W HCPCS: Mod: HCNC | Performed by: HOSPITALIST

## 2022-02-14 PROCEDURE — 83735 ASSAY OF MAGNESIUM: CPT | Mod: HCNC | Performed by: HOSPITALIST

## 2022-02-14 PROCEDURE — 93459: ICD-10-PCS | Mod: 26,22,HCNC, | Performed by: INTERNAL MEDICINE

## 2022-02-14 PROCEDURE — 93459 L HRT ART/GRFT ANGIO: CPT | Mod: HCNC | Performed by: INTERNAL MEDICINE

## 2022-02-14 PROCEDURE — 93458 L HRT ARTERY/VENTRICLE ANGIO: CPT | Mod: HCNC | Performed by: INTERNAL MEDICINE

## 2022-02-14 PROCEDURE — C1894 INTRO/SHEATH, NON-LASER: HCPCS | Mod: HCNC | Performed by: INTERNAL MEDICINE

## 2022-02-14 PROCEDURE — 94640 AIRWAY INHALATION TREATMENT: CPT | Mod: HCNC

## 2022-02-14 PROCEDURE — 93459 L HRT ART/GRFT ANGIO: CPT | Mod: 26,22,HCNC, | Performed by: INTERNAL MEDICINE

## 2022-02-14 PROCEDURE — 25000242 PHARM REV CODE 250 ALT 637 W/ HCPCS: Mod: HCNC | Performed by: HOSPITALIST

## 2022-02-14 PROCEDURE — 63600175 PHARM REV CODE 636 W HCPCS: Mod: HCNC | Performed by: INTERNAL MEDICINE

## 2022-02-14 PROCEDURE — 85730 THROMBOPLASTIN TIME PARTIAL: CPT | Mod: HCNC | Performed by: HOSPITALIST

## 2022-02-14 PROCEDURE — 36415 COLL VENOUS BLD VENIPUNCTURE: CPT | Mod: HCNC | Performed by: HOSPITALIST

## 2022-02-14 PROCEDURE — 63600175 PHARM REV CODE 636 W HCPCS: Mod: HCNC | Performed by: STUDENT IN AN ORGANIZED HEALTH CARE EDUCATION/TRAINING PROGRAM

## 2022-02-14 PROCEDURE — 25000003 PHARM REV CODE 250: Mod: HCNC | Performed by: EMERGENCY MEDICINE

## 2022-02-14 PROCEDURE — 93010 ELECTROCARDIOGRAM REPORT: CPT | Mod: HCNC,,, | Performed by: INTERNAL MEDICINE

## 2022-02-14 RX ORDER — HEPARIN SODIUM 1000 [USP'U]/ML
INJECTION, SOLUTION INTRAVENOUS; SUBCUTANEOUS
Status: DISCONTINUED | OUTPATIENT
Start: 2022-02-14 | End: 2022-02-14

## 2022-02-14 RX ORDER — MIDAZOLAM HYDROCHLORIDE 1 MG/ML
INJECTION, SOLUTION INTRAMUSCULAR; INTRAVENOUS
Status: DISCONTINUED | OUTPATIENT
Start: 2022-02-14 | End: 2022-02-15 | Stop reason: HOSPADM

## 2022-02-14 RX ORDER — LIDOCAINE HYDROCHLORIDE 20 MG/ML
INJECTION, SOLUTION EPIDURAL; INFILTRATION; INTRACAUDAL; PERINEURAL
Status: DISCONTINUED | OUTPATIENT
Start: 2022-02-14 | End: 2022-02-15 | Stop reason: HOSPADM

## 2022-02-14 RX ORDER — FENTANYL CITRATE 50 UG/ML
INJECTION, SOLUTION INTRAMUSCULAR; INTRAVENOUS
Status: DISCONTINUED | OUTPATIENT
Start: 2022-02-14 | End: 2022-02-15 | Stop reason: HOSPADM

## 2022-02-14 RX ORDER — SODIUM CHLORIDE 9 MG/ML
INJECTION, SOLUTION INTRAVENOUS CONTINUOUS
Status: ACTIVE | OUTPATIENT
Start: 2022-02-14 | End: 2022-02-14

## 2022-02-14 RX ORDER — DIPHENHYDRAMINE HCL 50 MG
50 CAPSULE ORAL ONCE
Status: COMPLETED | OUTPATIENT
Start: 2022-02-14 | End: 2022-02-14

## 2022-02-14 RX ORDER — ACETAMINOPHEN 325 MG/1
650 TABLET ORAL EVERY 4 HOURS PRN
Status: DISCONTINUED | OUTPATIENT
Start: 2022-02-14 | End: 2022-02-15 | Stop reason: HOSPADM

## 2022-02-14 RX ORDER — PRASUGREL 10 MG/1
10 TABLET, FILM COATED ORAL DAILY
Qty: 30 TABLET | Refills: 11 | Status: ON HOLD | OUTPATIENT
Start: 2022-02-15 | End: 2022-03-31 | Stop reason: SDUPTHER

## 2022-02-14 RX ORDER — ONDANSETRON 8 MG/1
8 TABLET, ORALLY DISINTEGRATING ORAL EVERY 8 HOURS PRN
Status: DISCONTINUED | OUTPATIENT
Start: 2022-02-14 | End: 2022-02-15 | Stop reason: HOSPADM

## 2022-02-14 RX ORDER — HEPARIN SOD,PORCINE/0.9 % NACL 1000/500ML
INTRAVENOUS SOLUTION INTRAVENOUS
Status: DISCONTINUED | OUTPATIENT
Start: 2022-02-14 | End: 2022-02-14

## 2022-02-14 RX ADMIN — CARVEDILOL 6.25 MG: 6.25 TABLET, FILM COATED ORAL at 08:02

## 2022-02-14 RX ADMIN — RANOLAZINE 500 MG: 500 TABLET, EXTENDED RELEASE ORAL at 08:02

## 2022-02-14 RX ADMIN — SODIUM CHLORIDE, SODIUM LACTATE, POTASSIUM CHLORIDE, AND CALCIUM CHLORIDE 500 ML: .6; .31; .03; .02 INJECTION, SOLUTION INTRAVENOUS at 01:02

## 2022-02-14 RX ADMIN — PANTOPRAZOLE SODIUM 40 MG: 40 TABLET, DELAYED RELEASE ORAL at 08:02

## 2022-02-14 RX ADMIN — LISINOPRIL 10 MG: 5 TABLET ORAL at 08:02

## 2022-02-14 RX ADMIN — HEPARIN SODIUM 20 UNITS/KG/HR: 5000 INJECTION INTRAVENOUS; SUBCUTANEOUS at 03:02

## 2022-02-14 RX ADMIN — AMLODIPINE BESYLATE 5 MG: 5 TABLET ORAL at 08:02

## 2022-02-14 RX ADMIN — FUROSEMIDE 20 MG: 20 TABLET ORAL at 08:02

## 2022-02-14 RX ADMIN — ISOSORBIDE MONONITRATE 30 MG: 30 TABLET, EXTENDED RELEASE ORAL at 08:02

## 2022-02-14 RX ADMIN — SPIRONOLACTONE 25 MG: 25 TABLET ORAL at 08:02

## 2022-02-14 RX ADMIN — Medication 6 MG: at 08:02

## 2022-02-14 RX ADMIN — POTASSIUM BICARBONATE 25 MEQ: 978 TABLET, EFFERVESCENT ORAL at 08:02

## 2022-02-14 RX ADMIN — DIPHENHYDRAMINE HYDROCHLORIDE 50 MG: 50 CAPSULE ORAL at 03:02

## 2022-02-14 RX ADMIN — IPRATROPIUM BROMIDE AND ALBUTEROL SULFATE 3 ML: .5; 3 SOLUTION RESPIRATORY (INHALATION) at 09:02

## 2022-02-14 RX ADMIN — ASPIRIN 81 MG: 81 TABLET, COATED ORAL at 08:02

## 2022-02-14 RX ADMIN — GABAPENTIN 300 MG: 300 CAPSULE ORAL at 08:02

## 2022-02-14 RX ADMIN — ATORVASTATIN CALCIUM 80 MG: 20 TABLET, FILM COATED ORAL at 08:02

## 2022-02-14 RX ADMIN — FENOFIBRATE 145 MG: 145 TABLET, FILM COATED ORAL at 08:02

## 2022-02-14 RX ADMIN — PRASUGREL 10 MG: 10 TABLET, FILM COATED ORAL at 08:02

## 2022-02-14 NOTE — HPI
Mr. Lonnie Taylor is a 73 yo M with pmhx s/f CAD s/p Cabg (2v In 6/22/2006), PCI 2/9/18 and 3/2020, reports LHC in 7/2020 and 9/2021 which showed patent grafts.  He also has, PAD, obesity, significant tobacco use (still 1ppd), HTN, HLD who presents for chest discomfort . He woke up feeling asympatomatic but began feeling severe chest discomfort described as a burning sensation like indigestion. It was substernal with no radiation. Denies alleviating or exacerbating factors.  He did have diaphoresis (cold sweats) but no other associated symptoms. He tried  4x sl NTG (unsure of how old they were) with no alleviation. He then called EMS, who gave him a slNTG which helped it subside and it immediately resolved after a 2nd dose of ntg. This presentation was different from his previous presentations, which felt more like pressure. Trop peakeduo to 21

## 2022-02-14 NOTE — ASSESSMENT & PLAN NOTE
73 yo with known CAD with previous CABG and PCI (most recent 3/2020) and multiple risk factors presenting with possible cardiac chest pain that is made more concerning with immediate relief with slNTG and a troponin finding of 3.8 >> 21. ECG and CXR unremarkable.  See HPI for coronary anatomy  Currently asymptomatic, hemodynamically stable, and chest pain free  I would treat for ACS for reasons mentioned above.    Echo with EF decreased to 40% from 55% in 2019 and with segmental LV WMA, grade III ventricular diastolic dysfunction.     Plan:  - Plan for [facility] today  - Continue DAPT, high intensity statin, heparin gtt  - Continue current GDMT: Coreg 6.25mg BID, Lisinopril 10mg, spironolactone 25mg   - Ranolazine and Imdur anti-anginals  - If pt has recurrent chest pain, obtain stat ecg and troponin. If it is not alleviated with NTG, please call and notify cardiology

## 2022-02-14 NOTE — NURSING
Notified MD. Chandler pt c/o 5/10 cp like muscular pain comes and goes. /68, sat O2 98%, HR 57. Pt denies CP right now. EKG ordered.

## 2022-02-14 NOTE — SUBJECTIVE & OBJECTIVE
Past Medical History:   Diagnosis Date    CAD (coronary artery disease)     Dr Martinez    Carotid artery disease     Chronic kidney disease, stage III (moderate)     Depression     Dyslipidemia     Hepatic cyst     Hepatic cyst     High-density lipoprotein deficiency     HTN (hypertension)     Hx of colonic polyps     Insomnia     PVD (peripheral vascular disease)     stented    S/P AAA repair        Past Surgical History:   Procedure Laterality Date    ABDOMINAL AORTIC ANEURYSM REPAIR      ABDOMINAL AORTIC ANEURYSM REPAIR      ANGIOGRAPHY OF LOWER EXTREMITY Left 10/23/2018    Procedure: Angiogram Extremity Unilateral;  Surgeon: Gregor Cunha MD;  Location: [facility] CATH;  Service: Cardiology;  Laterality: Left;  LLE intervention-Will start with 4/5 slender sheath left radial and take diagnostic angio of LLE to determine whether brachial access or tibial access will be required    CATARACT EXTRACTION W/ INTRAOCULAR LENS  IMPLANT, BILATERAL      CATARACT SX Bilateral     CATHETERIZATION OF BOTH LEFT AND RIGHT HEART N/A 7/10/2020    Procedure: CATHETERIZATION, HEART, BOTH LEFT AND RIGHT;  Surgeon: Gregor Cuhna MD;  Location: [facility] CATH;  Service: Cardiology;  Laterality: N/A;  LHC + RHC +/- PCI -access left radial artery and left brachial vein    CHOLECYSTECTOMY      COLONOSCOPY N/A 10/7/2015    Procedure: COLONOSCOPY;  Surgeon: Genaro You MD;  Location: [facility] ENDO ([address] [place] [address]);  Service: Endoscopy;  Laterality: N/A;    CORONARY ANGIOPLASTY WITH STENT PLACEMENT      CORONARY ARTERY BYPASS GRAFT      HERNIA REPAIR      LAMINECTOMY      MAGNETIC RESONANCE IMAGING N/A 6/4/2021    Procedure: MRI (MAGNETIC RESONANCE IMAGING) LUMBAR SPINE;  Surgeon: Asa Diagnostic Provider;  Location: [facility];  Service: General;  Laterality: N/A;  In MRI @ 9:10 per Krys, To follow WC RM1    PATELLA SURGERY      PERCUTANEOUS TRANSLUMINAL ANGIOPLASTY (PTA) OF PERIPHERAL VESSEL N/A 9/18/2018    Procedure:  "PTA, PERIPHERAL BLOOD VESSEL;  Surgeon: Gregor Cunha MD;  Location: [facility] CATH;  Service: Cardiology;  Laterality: N/A;  Site change:  right popliteal artery access using US guidance.    PERCUTANEOUS TRANSLUMINAL ANGIOPLASTY (PTA) OF PERIPHERAL VESSEL Left 7/25/2019    Procedure: PTA, PERIPHERAL VESSEL;  Surgeon: Gregor Cunha MD;  Location: [facility] CATH;  Service: Cardiology;  Laterality: Left;    VASECTOMY         Review of patient's allergies indicates:   Allergen Reactions    Aggrastat concentrate [tirofiban] Shortness Of Breath and Other (See Comments)     Fever and chills    Brilinta [ticagrelor] Shortness Of Breath    Cymbalta [duloxetine] Nausea Only    Bupropion Other (See Comments)     "turns into zombie" withdrawn, not talking, outbursts       PTA Medications   Medication Sig    albuterol (VENTOLIN HFA) 90 mcg/actuation inhaler Inhale 2 puffs into the lungs every 6 (six) hours as needed for Wheezing. Rescue    amLODIPine (NORVASC) 10 MG tablet Take 5 mg by mouth once daily.     aspirin (ECOTRIN) 81 MG EC tablet Take 81 mg by mouth once daily.    cilostazoL (PLETAL) 100 MG Tab Take 100 mg by mouth 2 (two) times daily.     clopidogrel (PLAVIX) 75 mg tablet Take 75 mg by mouth once daily.    CRESTOR 20 mg tablet Take 20 mg by mouth once daily.     doxazosin (CARDURA) 4 MG tablet Take 4 mg by mouth every 12 (twelve) hours.     fenofibrate (TRICOR) 145 MG tablet Take 1 tablet (145 mg total) by mouth once daily.    furosemide (LASIX) 20 MG tablet 20 mg once daily.     gabapentin (NEURONTIN) 300 MG capsule 3 (three) times daily.    isosorbide mononitrate (IMDUR) 30 MG 24 hr tablet Take 1 tablet (30 mg total) by mouth once daily.    nitroGLYCERIN (NITROSTAT) 0.4 MG SL tablet Place 1 tablet (0.4 mg total) under the tongue every 5 (five) minutes as needed for Chest pain.    pantoprazole (PROTONIX) 40 MG tablet Take 40 mg by mouth once daily.    ranolazine (RANEXA) 500 MG Tb12 Take 500 mg by " mouth 2 (two) times daily. 11/15/20     Family History     Problem Relation (Age of Onset)    Heart disease Mother, Father    Lung cancer Brother        Tobacco Use    Smoking status: Current Every Day Smoker     Packs/day: 1.00     Years: 50.00     Pack years: 50.00    Smokeless tobacco: Never Used    Tobacco comment: Currently smoke 1 ppd   Substance and Sexual Activity    Alcohol use: No    Drug use: Yes     Types: Marijuana     Comment: OCCASSIONALLY    Sexual activity: Not on file     Review of Systems   Constitutional: Negative.   HENT: Negative.    Eyes: Negative.    Cardiovascular: Positive for chest pain.   Respiratory: Negative.    Endocrine: Negative.    Skin: Negative.    Musculoskeletal: Negative.    Gastrointestinal: Negative.    Genitourinary: Negative.    Neurological: Negative.      Objective:     Vital Signs (Most Recent):  Temp: 98 °F (36.7 °C) (02/14/22 0725)  Pulse: 60 (02/14/22 0750)  Resp: 18 (02/14/22 0725)  BP: (!) 145/67 (02/14/22 0725)  SpO2: 95 % (02/14/22 0725) Vital Signs (24h Range):  Temp:  [97.6 °F (36.4 °C)-98.2 °F (36.8 °C)] 98 °F (36.7 °C)  Pulse:  [60-69] 60  Resp:  [16-24] 18  SpO2:  [91 %-98 %] 95 %  BP: (115-147)/(62-67) 145/67     Weight: 103.4 kg (227 lb 15.3 oz)  Body mass index is 33.66 kg/m².    SpO2: 95 %  O2 Device (Oxygen Therapy): nasal cannula      Intake/Output Summary (Last 24 hours) at 2/14/2022 0853  Last data filed at 2/14/2022 0800  Gross per 24 hour   Intake 440 ml   Output 1800 ml   Net -1360 ml       Lines/Drains/Airways     Peripheral Intravenous Line                 Peripheral IV - Single Lumen 02/11/22 1843 20 G Right Antecubital 2 days         Peripheral IV - Single Lumen 02/13/22 22 G Left;Posterior Hand 1 day         Peripheral IV - Single Lumen 02/13/22 1830 20 G Anterior;Right Upper Arm <1 day                Physical Exam  Constitutional:       Appearance: Normal appearance.   HENT:      Head: Normocephalic.      Nose: Nose normal.   Eyes:       Extraocular Movements: Extraocular movements intact.      Pupils: Pupils are equal, round, and reactive to light.   Cardiovascular:      Rate and Rhythm: Normal rate and regular rhythm.      Pulses: Normal pulses.      Heart sounds: Normal heart sounds.   Pulmonary:      Effort: Pulmonary effort is normal.      Breath sounds: Normal breath sounds.   Abdominal:      General: Abdomen is flat. Bowel sounds are normal.      Palpations: Abdomen is soft.   Musculoskeletal:         General: Normal range of motion.   Skin:     General: Skin is warm.      Capillary Refill: Capillary refill takes less than 2 seconds.   Neurological:      General: No focal deficit present.      Mental Status: He is alert and oriented to person, place, and time.         Significant Labs: All pertinent lab results from the last 24 hours have been reviewed.    Significant Imaging: EKG: NSR

## 2022-02-14 NOTE — ASSESSMENT & PLAN NOTE
--C +/- PCI, patient is a YANG candidate  - Anti-platelet Therapy: ASA / Ticagrelor  - Access: Right radial  - Catheters: Romario  - Creatinine/CrCl: 1.25  - Allergies: No shellfish / Iodine allergy  - Pre-Hydration: NS  - Pre-Op Med: Bendaryl 50mg pO   - All patient's questions were answered.  -The risks, benefits and alternatives of the procedure were explained to the patient.   -The risks of coronary angiography include but are not limited to: bleeding, infection, heart rhythm abnormalities, allergic reactions, kidney injury and potential need for dialysis, stroke and death.   - Should stenting be indicated, the patient has agreed to dual anti-platelet therapy for 1-consecutive year with a drug-eluting stent and a minimum of 1-month with the use of a bare metal stent  - Additionally, pt is aware that non-compliance is likely to result in stent clotting with heart attack, heart failure, and/or death  -The risks of moderate sedation include hypotension, respiratory depression, arrhythmias, bronchospasm, and death.   - Informed consent was obtained and the  patient is agreeable to proceed with the procedure.

## 2022-02-14 NOTE — NURSING
Patient NPO since midnight. Clipped and prepped. Cleaned with CHG wipes. PTT stable, no changes made to Heparin gtt.

## 2022-02-14 NOTE — NURSING
Notified MD. Kelly pt HR stays in 50s now, and gabapentin due at 1500 and cath lab scheduled at 1540, MD. Kelly is ok to hold gabapentin.

## 2022-02-14 NOTE — PROGRESS NOTES
[facility] - [facility] Stepdown  Cardiology  Progress Note    Patient Name: Lonnie Taylor  MRN: [id number]  Admission Date: 2/11/2022  Hospital Length of Stay: 3 days  Code Status: Full Code   Attending Physician: Triny Chandler MD   Primary Care Physician: Isiah You MD  Expected Discharge Date: 2/14/2022  Principal Problem:NSTEMI (non-ST elevated myocardial infarction)    Subjective:     Hospital Course:   72 year old male witth CAD s/p CABG and PCI in the past, admitted for severe cardiac chest pain relieved by NTG, with troponin peak at 21 but no significant EKG changes. Cardiology consulted and following for NSTEMI. [facility] 2/14.      Interval History: No major events overnight. Denies chest pain or SOB, when I saw him he was on room air.   [facility] today.    Review of Systems   Constitutional: Negative for chills, fever and malaise/fatigue.   Eyes: Negative for visual disturbance.   Cardiovascular: Negative for chest pain, irregular heartbeat, leg swelling and palpitations.   Respiratory: Negative for cough and shortness of breath.    Gastrointestinal: Negative for abdominal pain, heartburn, nausea and vomiting.   Neurological: Negative for dizziness, headaches and light-headedness.   Psychiatric/Behavioral: Negative for altered mental status.     Objective:     Vital Signs (Most Recent):  Temp: 98 °F (36.7 °C) (02/14/22 0725)  Pulse: 60 (02/14/22 0750)  Resp: 18 (02/14/22 0725)  BP: (!) 145/67 (02/14/22 0725)  SpO2: 95 % (02/14/22 0725) Vital Signs (24h Range):  Temp:  [97.6 °F (36.4 °C)-98.2 °F (36.8 °C)] 98 °F (36.7 °C)  Pulse:  [60-76] 60  Resp:  [16-24] 18  SpO2:  [91 %-98 %] 95 %  BP: (115-147)/(62-67) 145/67     Weight: 103.4 kg (227 lb 15.3 oz)  Body mass index is 33.66 kg/m².     SpO2: 95 %  O2 Device (Oxygen Therapy): nasal cannula      Intake/Output Summary (Last 24 hours) at 2/14/2022 5891  Last data filed at 2/14/2022 0300  Gross per 24 hour   Intake 680 ml   Output 1450 ml   Net -770 ml        Lines/Drains/Airways       Peripheral Intravenous Line                   Peripheral IV - Single Lumen 02/11/22 1843 20 G Right Antecubital 2 days         Peripheral IV - Single Lumen 02/13/22 22 G Left;Posterior Hand 1 day         Peripheral IV - Single Lumen 02/13/22 1830 20 G Anterior;Right Upper Arm <1 day                    Physical Exam  Vitals and nursing note reviewed.   Constitutional:       General: He is not in acute distress.     Appearance: He is obese. He is not ill-appearing.   HENT:      Head: Normocephalic and atraumatic.      Mouth/Throat:      Mouth: Mucous membranes are moist.   Eyes:      Conjunctiva/sclera: Conjunctivae normal.   Cardiovascular:      Rate and Rhythm: Normal rate and regular rhythm.      Pulses:           Radial pulses are 2+ on the right side and 1+ on the left side.        Popliteal pulses are 1+ on the right side and 1+ on the left side.      Heart sounds: Normal heart sounds, S1 normal and S2 normal. No murmur heard.  No friction rub. No gallop.    Pulmonary:      Effort: Pulmonary effort is normal. No respiratory distress.      Breath sounds: Normal breath sounds. No wheezing.   Abdominal:      General: There is no distension.      Palpations: Abdomen is soft.      Tenderness: There is no abdominal tenderness.   Musculoskeletal:      Right lower leg: No edema.      Left lower leg: No edema.   Skin:     General: Skin is warm and dry.   Neurological:      General: No focal deficit present.      Mental Status: He is alert and oriented to person, place, and time.   Psychiatric:         Mood and Affect: Mood normal.         Significant Labs: All pertinent lab results from the last 24 hours have been reviewed.    Significant Imaging: reviewed    Assessment and Plan:     * NSTEMI (non-ST elevated myocardial infarction)  71 yo with known CAD with previous CABG and PCI (most recent 3/2020) and multiple risk factors presenting with possible cardiac chest pain that is made more  concerning with immediate relief with slNTG and a troponin finding of 3.8 >> 21. ECG and CXR unremarkable.  See HPI for coronary anatomy  Currently asymptomatic, hemodynamically stable, and chest pain free  I would treat for ACS for reasons mentioned above.    Echo with EF decreased to 40% from 55% in 2019 and with segmental LV WMA, grade III ventricular diastolic dysfunction.     Plan:  - Plan for [facility] today  - Continue DAPT, high intensity statin, heparin gtt  - Continue current GDMT: Coreg 6.25mg BID, Lisinopril 10mg, spironolactone 25mg   - Ranolazine and Imdur anti-anginals  - If pt has recurrent chest pain, obtain stat ecg and troponin. If it is not alleviated with NTG, please call and notify cardiology        VTE Risk Mitigation (From admission, onward)           Ordered     heparin 25,000 units in dextrose 5% (100 units/ml) IV bolus from bag - ADDITIONAL PRN BOLUS - 60 units/kg (max bolus 4000 units)  As needed (PRN)        Question:  Heparin Infusion Adjustment (DO NOT MODIFY ANSWER)  Answer:  \\[email or web address]\epic\Images\Pharmacy\HeparinInfusions\heparin LOW INTENSITY nomogram for [facility] [id number].pdf    02/11/22 1931     heparin 25,000 units in dextrose 5% (100 units/ml) IV bolus from bag - ADDITIONAL PRN BOLUS - 30 units/kg (max bolus 4000 units)  As needed (PRN)        Question:  Heparin Infusion Adjustment (DO NOT MODIFY ANSWER)  Answer:  \[email or web address]\epic\Images\Pharmacy\HeparinInfusions\heparin LOW INTENSITY nomogram for [facility] [id number]    02/11/22 1931     IP VTE HIGH RISK PATIENT  Once         02/11/22 2149     Reason for No Pharmacological VTE Prophylaxis  Once        Question:  Reasons:  Answer:  IV Heparin w/in 24 hrs. Pre or Post-Op    02/11/22 2149     Place sequential compression device  Until discontinued         02/11/22 2149     heparin 25,000 units in dextrose 5% 250 mL (100 units/mL) infusion LOW INTENSITY nomogram - [facility]  Continuous        Question Answer Comment   Heparin Infusion Adjustment (DO  NOT MODIFY ANSWER) [email or web address]\epic\Images\Pharmacy\HeparinInfusions\heparin LOW INTENSITY nomogram for [facility] [id number]    Begin at (in units/kg/hr) 12        02/11/22 1931                    Meredith Stevens MD  Cardiology  [facility] - Card[facility]  I have personally taken the history and examined the patient and agree with the resident's note as stated above.  Cath today

## 2022-02-14 NOTE — PROGRESS NOTES
Progress Note  Hospital Medicine    Primary Team: [facility]  Admit Date: 2/11/2022   Length of Stay:  LOS: 3 days   SUBJECTIVE:   Reason for Admission:  NSTEMI (non-ST elevated myocardial infarction)    HPI:  72-year-old male presenting with chest discomfort since about 9 this morning which had been refractory to antacids and nitroglycerin at home.  He had thought it was heartburn due to dinner his wife had brought home the previous night which he said contained a lot of green onions.  His symptoms occurred while he was moving around doing his normal morning activities.  He also notes that he was up late fiddling with a new GoPro camera that he bought for his upcoming trip to [place].  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  After his symptoms persisted throughout the morning and into mid day he became frustrated and decided to call for an ambulance.  He was given a nitroglycerin which considerably relieved his chest pressure/discomfort, and a 2nd nitroglycerin resolved it.  After coming to the ED he was shocked to learn that he had had a heart attack; he did not suspect that that had been what was going on based on how subtle his symptoms were.  He has not had any similar exertional or anginal symptoms in the recent past.    Hospital Course:  Pt was diagnosed with NSTEMI and admitted to  for further evaluation.  He was started on ACS protocol including Prasugrel and Heparin gtt, with C planned for 2/14/22.    Interval History:  No event overnight, rested well. NPO, LHC today    Review of Systems:  Constitutional: no fever or chills  Respiratory: positive for dyspnea on exertion  Cardiovascular: no chest pain or palpitations  Gastrointestinal: no nausea or vomiting, no abdominal pain or change in bowel habits  Musculoskeletal: no arthralgias or myalgias      OBJECTIVE:     Temp:  [97.6 °F (36.4 °C)-98.2 °F (36.8 °C)]   Pulse:  [57-69]   Resp:  [16-20]   BP: (127-147)/(62-67)   SpO2:  [91 %-96 %]  Body mass index is 33.66 kg/m².  Intake/Outake:  This Shift:  I/O this shift:  In: -   Out: 350 [Urine:350]    Net I/O past 24h:     Intake/Output Summary (Last 24 hours) at 2/14/2022 1320  Last data filed at 2/14/2022 0800  Gross per 24 hour   Intake 320 ml   Output 1200 ml   Net -880 ml             Physical Exam:  Gen- well-developed, well-nourished, NAD  CVS- S1 and S2 present, RRR, no murmur  Resp- few expiratory wheezes b/l, no work of breathing, on 1-2L NC for comfort  Abd- BS+, soft, NT, ND  Ext- no clubbing, cyanosis, or edema    Laboratory:  CBC/Anemia Labs: Coags:    Recent Labs   Lab 02/12/22  0357 02/13/22  0301 02/14/22  0247   WBC 7.17 7.04 5.62   HGB 13.9* 13.6* 13.6*   HCT 43.2 43.1 42.3    146* 123*   MCV 96 97 96   RDW 14.2 14.1 13.9    Recent Labs   Lab 02/11/22  1947 02/12/22  0228 02/13/22  0856 02/13/22  1503 02/14/22  0247   INR 1.1  --   --   --   --    APTT 25.4   < > 43.1* 43.3* 42.3*    < > = values in this interval not displayed.        Chemistries:   Recent Labs   Lab 02/11/22  1813 02/11/22  1813 02/12/22  0841 02/13/22  0301 02/14/22  0247   *   < > 143 140 138   K 3.8   < > 3.7 3.4* 3.6      < > 104 104 104   CO2 30*   < > 28 26 24   BUN 15   < > 15 16 18   CREATININE 1.6*   < > 1.6* 1.5* 1.6*   CALCIUM 10.6*   < > 10.5 10.1 10.1   PROT 7.1  --   --   --   --    BILITOT 0.4  --   --   --   --    ALKPHOS 39*  --   --   --   --    ALT 13  --   --   --   --    AST 29  --   --   --   --    MG  --   --   --   --  1.7    < > = values in this interval not displayed.        Medications:  Scheduled Meds:   amLODIPine  5 mg Oral Daily    aspirin  81 mg Oral Daily    atorvastatin  80 mg Oral Daily    carvediloL  6.25 mg Oral BID    fenofibrate  145 mg Oral Daily    furosemide  20 mg Oral Daily    gabapentin  300 mg Oral TID     isosorbide mononitrate  30 mg Oral Daily    lisinopriL  10 mg Oral Daily    pantoprazole  40 mg Oral Daily    prasugreL  10 mg Oral Daily    ranolazine  500 mg Oral BID    spironolactone  25 mg Oral Daily                             Continuous Infusions:   heparin (porcine) in D5W 20 Units/kg/hr (02/14/22 0344)     PRN Meds:.albuterol-ipratropium, heparin (PORCINE), heparin (PORCINE), hydrOXYzine HCL, melatonin, nitroGLYCERIN, sodium chloride 0.9%     ASSESSMENT/PLAN:     Active Hospital Problems    Diagnosis  POA    *NSTEMI (non-ST elevated myocardial infarction) [I21.4]  Yes    Pulmonary emphysema [J43.9]  Yes     Ct chest results dated 10/14/2020      Tobacco abuse [Z72.0]  Yes    S/P AAA repair [Z98.890, Z86.79]  Not Applicable    Morbid obesity [E66.01]  Yes    Chronic kidney disease, stage III (moderate) [N18.30]  Yes    Primary hypertension [I10]  Yes    Coronary artery disease involving native coronary artery with angina pectoris [I25.119]  Yes    PVD (peripheral vascular disease) [I73.9]  Yes      Resolved Hospital Problems   No resolved problems to display.       NSTEMI  CAD s/p CABG 2007, PCI 2018, PCI 2020  -on ACS protocol: Prasugrel 10mg s/p load, lASA, Coreg, high intensity statin, and Heparin gtt  -continue cardiac monitoring; Trop peaked at 21  -Interventional Cardiology consulted for possible LHC later today; NPO for that. Will plan for discharge tomorrow AM given patient might not return back from procedure until late in the evening  -ECHO reviewed, with WMA abnormalities  -continue antianginals- Imdur, Ranexa; GDMT- coreg, statin, ACEI, aldactone, lasix    CKD III  -creatinine at baseline, continue monitoring  -hydration w 500 cc LR over 3 hrs pre-LHC    Combined CHF  -prior ECHO with only diastolic dysfunction, ECHO 2/12:  · The left ventricle is normal in size with moderately decreased systolic function.  · The estimated ejection fraction is 40%.  · There are segmental left  ventricular wall motion abnormalities.  · Grade III left ventricular diastolic dysfunction.  · Mild left atrial enlargement.  · Normal right ventricular size with normal right ventricular systolic function.  · Mild right atrial enlargement.  · Mild mitral regurgitation.  · Mild aortic regurgitation.  · Normal central venous pressure (3 mmHg).  · The estimated PA systolic pressure is 19 mmHg.  · The ascending aorta is dilated.  · Posterior pericardial effusion.    -continue GDMT    Essential HTN  -bp at goal  -continue Lisinopril, Coreg, Imdur, Amlodipine    Tobacco Abuse  -pt now motivated for cessation; does not want a nicotine patch  -continue nebs PRN, including given this morning  -PFT 2020 show FEV/FVC 72%; mild obstructive airway disease, minimal restriction, moderately severe diffusion defect    Hypokalemia  -repleted today    PVD   S/p AAA repair  -continue DAPT, statin, bp control, resume cilostazol  -ascending aorta dilatation seen on ECHO    DVT ppx- Heparin gtt - to be stopped after LHC, will transition to dvt ppx lovenox if patient here overnight  CODE Status- FULL    Dispo- home tomorrow w cardiac rehab referral    Discussed with Dr Chandler  Attending attestation to follow    Pierre Kelly MD  Internal Medicine PGY-3

## 2022-02-14 NOTE — HOSPITAL COURSE
72 year old male witth CAD s/p CABG and PCI in the past, admitted for severe cardiac chest pain relieved by NTG, with troponin peak at 21 but no significant EKG changes. Cardiology consulted and following for NSTEMI. [facility] 2/14 showed diffuse disease and no intervention was performed. Grafts were patent. Ostial to prox % occluded. LVEDP elevated to 35mmHg.

## 2022-02-14 NOTE — PLAN OF CARE
[facility] - Cardiology Stepdown  Initial Discharge Assessment       Primary Care Provider: Isiah You MD    Admission Diagnosis: Non-ST elevation myocardial infarction (NSTEMI) [I21.4]  NSTEMI (non-ST elevation myocardial infarction) [I21.4]  NSTEMI (non-ST elevated myocardial infarction) [I21.4]  Chest pain [R07.9]    Admission Date: 2/11/2022  Expected Discharge Date: 2/14/2022    Discharge Barriers Identified: None    Payor: [employer] MEDICARE / Plan: [employer] MEDICARE HMO / Product Type: Capitation /     Extended Emergency Contact Information  Primary Emergency Contact: Ginny Taylor  Address: [address] [place] [address] [place] [address] [facility]  Home Phone: [phone]  Work Phone: [phone]  Mobile Phone: [phone]  Relation: Spouse    Discharge Plan A: Home  Discharge Plan B: [id number]  [place] [address]  [place] [address]  Phone: [phone] Fax: [phone]    [facility] [address]  [address] [facility] [address]  Phone: [phone] Fax: [phone]      Initial Assessment (most recent)     Adult Discharge Assessment - 02/14/22 1230        Discharge Assessment    Assessment Type Discharge Planning Assessment     Confirmed/corrected address, phone number and insurance Yes     Confirmed Demographics Correct on Facesheet     Source of Information family     If unable to respond/provide information was family/caregiver contacted? Yes     Contact Name/Number Ginny Taylor [phone]     Communicated RICKY with patient/caregiver Yes     Lives With spouse     Do you expect to return to your current living situation? Yes     Do you have help at home or someone to help you manage your care at home? Yes     Prior to hospitilization cognitive status: Alert/Oriented     Current cognitive status:  Alert/Oriented     Walking or Climbing Stairs Difficulty none     Dressing/Bathing Difficulty none     Equipment Currently Used at Home none   Wife states the patient has a cane and a walker, but does not use them    Readmission within 30 days? No     Do you currently have service(s) that help you manage your care at home? No     Do you take prescription medications? Yes     Do you have prescription coverage? Yes     Do you have any problems affording any of your prescribed medications? No     Is the patient taking medications as prescribed? yes     Who is going to help you get home at discharge? wife     Are you on dialysis? No     Do you take coumadin? No     Discharge Plan A Home     Discharge Plan B Home Health     DME Needed Upon Discharge  none     Discharge Plan discussed with: Spouse/sig other     Discharge Barriers Identified None                  Kenya Naik RN, CM   Ext: [phone]

## 2022-02-14 NOTE — NURSING
Notified MD. Chandler pt c/o sob, denies cp, sat O2 is 98% at room air, 2L NC administered and called RT for breathing TX. After O2 given an breathing Tx , pt said he feel much better, no more sob.

## 2022-02-14 NOTE — SUBJECTIVE & OBJECTIVE
Interval History: No major events overnight. Denies chest pain or SOB, when I saw him he was on room air.   [facility] today.    Review of Systems   Constitutional: Negative for chills, fever and malaise/fatigue.   Eyes: Negative for visual disturbance.   Cardiovascular: Negative for chest pain, irregular heartbeat, leg swelling and palpitations.   Respiratory: Negative for cough and shortness of breath.    Gastrointestinal: Negative for abdominal pain, heartburn, nausea and vomiting.   Neurological: Negative for dizziness, headaches and light-headedness.   Psychiatric/Behavioral: Negative for altered mental status.     Objective:     Vital Signs (Most Recent):  Temp: 98 °F (36.7 °C) (02/14/22 0725)  Pulse: 60 (02/14/22 0750)  Resp: 18 (02/14/22 0725)  BP: (!) 145/67 (02/14/22 0725)  SpO2: 95 % (02/14/22 0725) Vital Signs (24h Range):  Temp:  [97.6 °F (36.4 °C)-98.2 °F (36.8 °C)] 98 °F (36.7 °C)  Pulse:  [60-76] 60  Resp:  [16-24] 18  SpO2:  [91 %-98 %] 95 %  BP: (115-147)/(62-67) 145/67     Weight: 103.4 kg (227 lb 15.3 oz)  Body mass index is 33.66 kg/m².     SpO2: 95 %  O2 Device (Oxygen Therapy): nasal cannula      Intake/Output Summary (Last 24 hours) at 2/14/2022 0757  Last data filed at 2/14/2022 0300  Gross per 24 hour   Intake 680 ml   Output 1450 ml   Net -770 ml       Lines/Drains/Airways     Peripheral Intravenous Line                 Peripheral IV - Single Lumen 02/11/22 1843 20 G Right Antecubital 2 days         Peripheral IV - Single Lumen 02/13/22 22 G Left;Posterior Hand 1 day         Peripheral IV - Single Lumen 02/13/22 1830 20 G Anterior;Right Upper Arm <1 day                Physical Exam  Vitals and nursing note reviewed.   Constitutional:       General: He is not in acute distress.     Appearance: He is obese. He is not ill-appearing.   HENT:      Head: Normocephalic and atraumatic.      Mouth/Throat:      Mouth: Mucous membranes are moist.   Eyes:      Conjunctiva/sclera: Conjunctivae normal.    Cardiovascular:      Rate and Rhythm: Normal rate and regular rhythm.      Pulses:           Radial pulses are 2+ on the right side and 1+ on the left side.        Popliteal pulses are 1+ on the right side and 1+ on the left side.      Heart sounds: Normal heart sounds, S1 normal and S2 normal. No murmur heard.  No friction rub. No gallop.    Pulmonary:      Effort: Pulmonary effort is normal. No respiratory distress.      Breath sounds: Normal breath sounds. No wheezing.   Abdominal:      General: There is no distension.      Palpations: Abdomen is soft.      Tenderness: There is no abdominal tenderness.   Musculoskeletal:      Right lower leg: No edema.      Left lower leg: No edema.   Skin:     General: Skin is warm and dry.   Neurological:      General: No focal deficit present.      Mental Status: He is alert and oriented to person, place, and time.   Psychiatric:         Mood and Affect: Mood normal.         Significant Labs: All pertinent lab results from the last 24 hours have been reviewed.    Significant Imaging: reviewed

## 2022-02-14 NOTE — CONSULTS
[facility] - [facility] Stepdown  Interventional Cardiology  Consult Note    Patient Name: Lonnie Taylor  MRN: [id number]  Admission Date: 2/11/2022  Hospital Length of Stay: 3 days  Code Status: Full Code   Attending Provider: Triny Chandler MD  Consulting Provider: Reginald Griffith MD  Primary Care Physician: Isiah You MD  Principal Problem:NSTEMI (non-ST elevated myocardial infarction)    Patient information was obtained from patient and ER records.     Inpatient consult to Interventional Cardiology  Consult performed by: Reginald Griffith MD  Consult ordered by: Miguel Angel Overton MD        Subjective:     Chief Complaint:  Chest pain      HPI:  Mr. Lonnie Taylor is a 73 yo M with pmhx s/f CAD s/p Cabg (2v In 6/22/2006), PCI 2/9/18 and 3/2020, reports LHC in 7/2020 and 9/2021 which showed patent grafts.  He also has, PAD, obesity, significant tobacco use (still 1ppd), HTN, HLD who presents for chest discomfort . He woke up feeling asympatomatic but began feeling severe chest discomfort described as a burning sensation like indigestion. It was substernal with no radiation. Denies alleviating or exacerbating factors.  He did have diaphoresis (cold sweats) but no other associated symptoms. He tried  4x sl NTG (unsure of how old they were) with no alleviation. He then called EMS, who gave him a slNTG which helped it subside and it immediately resolved after a 2nd dose of ntg. This presentation was different from his previous presentations, which felt more like pressure. Trop peakeduo to 21          Past Medical History:   Diagnosis Date    CAD (coronary artery disease)     Dr Martinez    Carotid artery disease     Chronic kidney disease, stage III (moderate)     Depression     Dyslipidemia     Hepatic cyst     Hepatic cyst     High-density lipoprotein deficiency     HTN (hypertension)     Hx of colonic polyps     Insomnia     PVD (peripheral vascular disease)     stented    S/P AAA repair         Past Surgical History:   Procedure Laterality Date    ABDOMINAL AORTIC ANEURYSM REPAIR      ABDOMINAL AORTIC ANEURYSM REPAIR      ANGIOGRAPHY OF LOWER EXTREMITY Left 10/23/2018    Procedure: Angiogram Extremity Unilateral;  Surgeon: Gregor Cunha MD;  Location: [facility] CATH;  Service: Cardiology;  Laterality: Left;  LLE intervention-Will start with 4/5 slender sheath left radial and take diagnostic angio of LLE to determine whether brachial access or tibial access will be required    CATARACT EXTRACTION W/ INTRAOCULAR LENS  IMPLANT, BILATERAL      CATARACT SX Bilateral     CATHETERIZATION OF BOTH LEFT AND RIGHT HEART N/A 7/10/2020    Procedure: CATHETERIZATION, HEART, BOTH LEFT AND RIGHT;  Surgeon: Gregor Cunha MD;  Location: [facility] CATH;  Service: Cardiology;  Laterality: N/A;  LHC + RHC +/- PCI -access left radial artery and left brachial vein    CHOLECYSTECTOMY      COLONOSCOPY N/A 10/7/2015    Procedure: COLONOSCOPY;  Surgeon: Genaro You MD;  Location: [facility] ENDO ([address] [place] [address]);  Service: Endoscopy;  Laterality: N/A;    CORONARY ANGIOPLASTY WITH STENT PLACEMENT      CORONARY ARTERY BYPASS GRAFT      HERNIA REPAIR      LAMINECTOMY      MAGNETIC RESONANCE IMAGING N/A 6/4/2021    Procedure: MRI (MAGNETIC RESONANCE IMAGING) LUMBAR SPINE;  Surgeon: Asa Diagnostic Provider;  Location: [facility];  Service: General;  Laterality: N/A;  In MRI @ 9:10 per Krys, To follow WC RM1    PATELLA SURGERY      PERCUTANEOUS TRANSLUMINAL ANGIOPLASTY (PTA) OF PERIPHERAL VESSEL N/A 9/18/2018    Procedure: PTA, PERIPHERAL BLOOD VESSEL;  Surgeon: Gregor Cunha MD;  Location: [facility] CATH;  Service: Cardiology;  Laterality: N/A;  Site change:  right popliteal artery access using US guidance.    PERCUTANEOUS TRANSLUMINAL ANGIOPLASTY (PTA) OF PERIPHERAL VESSEL Left 7/25/2019    Procedure: PTA, PERIPHERAL VESSEL;  Surgeon: Gregor Cunha MD;  Location: [facility] CATH;  Service: Cardiology;  Laterality: Left;     "VASECTOMY         Review of patient's allergies indicates:   Allergen Reactions    Aggrastat concentrate [tirofiban] Shortness Of Breath and Other (See Comments)     Fever and chills    Brilinta [ticagrelor] Shortness Of Breath    Cymbalta [duloxetine] Nausea Only    Bupropion Other (See Comments)     "turns into zombie" withdrawn, not talking, outbursts       PTA Medications   Medication Sig    albuterol (VENTOLIN HFA) 90 mcg/actuation inhaler Inhale 2 puffs into the lungs every 6 (six) hours as needed for Wheezing. Rescue    amLODIPine (NORVASC) 10 MG tablet Take 5 mg by mouth once daily.     aspirin (ECOTRIN) 81 MG EC tablet Take 81 mg by mouth once daily.    cilostazoL (PLETAL) 100 MG Tab Take 100 mg by mouth 2 (two) times daily.     clopidogrel (PLAVIX) 75 mg tablet Take 75 mg by mouth once daily.    CRESTOR 20 mg tablet Take 20 mg by mouth once daily.     doxazosin (CARDURA) 4 MG tablet Take 4 mg by mouth every 12 (twelve) hours.     fenofibrate (TRICOR) 145 MG tablet Take 1 tablet (145 mg total) by mouth once daily.    furosemide (LASIX) 20 MG tablet 20 mg once daily.     gabapentin (NEURONTIN) 300 MG capsule 3 (three) times daily.    isosorbide mononitrate (IMDUR) 30 MG 24 hr tablet Take 1 tablet (30 mg total) by mouth once daily.    nitroGLYCERIN (NITROSTAT) 0.4 MG SL tablet Place 1 tablet (0.4 mg total) under the tongue every 5 (five) minutes as needed for Chest pain.    pantoprazole (PROTONIX) 40 MG tablet Take 40 mg by mouth once daily.    ranolazine (RANEXA) 500 MG Tb12 Take 500 mg by mouth 2 (two) times daily. 11/15/20     Family History     Problem Relation (Age of Onset)    Heart disease Mother, Father    Lung cancer Brother        Tobacco Use    Smoking status: Current Every Day Smoker     Packs/day: 1.00     Years: 50.00     Pack years: 50.00    Smokeless tobacco: Never Used    Tobacco comment: Currently smoke 1 ppd   Substance and Sexual Activity    Alcohol use: No    Drug " use: Yes     Types: Marijuana     Comment: OCCASSIONALLY    Sexual activity: Not on file     Review of Systems   Constitutional: Negative.   HENT: Negative.    Eyes: Negative.    Cardiovascular: Positive for chest pain.   Respiratory: Negative.    Endocrine: Negative.    Skin: Negative.    Musculoskeletal: Negative.    Gastrointestinal: Negative.    Genitourinary: Negative.    Neurological: Negative.      Objective:     Vital Signs (Most Recent):  Temp: 98 °F (36.7 °C) (02/14/22 0725)  Pulse: 60 (02/14/22 0750)  Resp: 18 (02/14/22 0725)  BP: (!) 145/67 (02/14/22 0725)  SpO2: 95 % (02/14/22 0725) Vital Signs (24h Range):  Temp:  [97.6 °F (36.4 °C)-98.2 °F (36.8 °C)] 98 °F (36.7 °C)  Pulse:  [60-69] 60  Resp:  [16-24] 18  SpO2:  [91 %-98 %] 95 %  BP: (115-147)/(62-67) 145/67     Weight: 103.4 kg (227 lb 15.3 oz)  Body mass index is 33.66 kg/m².    SpO2: 95 %  O2 Device (Oxygen Therapy): nasal cannula      Intake/Output Summary (Last 24 hours) at 2/14/2022 0853  Last data filed at 2/14/2022 0800  Gross per 24 hour   Intake 440 ml   Output 1800 ml   Net -1360 ml       Lines/Drains/Airways     Peripheral Intravenous Line                 Peripheral IV - Single Lumen 02/11/22 1843 20 G Right Antecubital 2 days         Peripheral IV - Single Lumen 02/13/22 22 G Left;Posterior Hand 1 day         Peripheral IV - Single Lumen 02/13/22 1830 20 G Anterior;Right Upper Arm <1 day                Physical Exam  Constitutional:       Appearance: Normal appearance.   HENT:      Head: Normocephalic.      Nose: Nose normal.   Eyes:      Extraocular Movements: Extraocular movements intact.      Pupils: Pupils are equal, round, and reactive to light.   Cardiovascular:      Rate and Rhythm: Normal rate and regular rhythm.      Pulses: Normal pulses.      Heart sounds: Normal heart sounds.   Pulmonary:      Effort: Pulmonary effort is normal.      Breath sounds: Normal breath sounds.   Abdominal:      General: Abdomen is flat. Bowel sounds  are normal.      Palpations: Abdomen is soft.   Musculoskeletal:         General: Normal range of motion.   Skin:     General: Skin is warm.      Capillary Refill: Capillary refill takes less than 2 seconds.   Neurological:      General: No focal deficit present.      Mental Status: He is alert and oriented to person, place, and time.         Significant Labs: All pertinent lab results from the last 24 hours have been reviewed.    Significant Imaging: EKG: NSR    Assessment and Plan:     * NSTEMI (non-ST elevated myocardial infarction)  --LHC +/- PCI, patient is a YANG candidate  - Anti-platelet Therapy: ASA / Prasugrel   - Access: Right radial  - Catheters: Romario  - Creatinine/CrCl: 1.25  - Allergies: No shellfish / Iodine allergy  - Pre-Hydration: NS  - Pre-Op Med: Bendaryl 50mg pO   - All patient's questions were answered.  -The risks, benefits and alternatives of the procedure were explained to the patient.   -The risks of coronary angiography include but are not limited to: bleeding, infection, heart rhythm abnormalities, allergic reactions, kidney injury and potential need for dialysis, stroke and death.   - Should stenting be indicated, the patient has agreed to dual anti-platelet therapy for 1-consecutive year with a drug-eluting stent and a minimum of 1-month with the use of a bare metal stent  - Additionally, pt is aware that non-compliance is likely to result in stent clotting with heart attack, heart failure, and/or death  -The risks of moderate sedation include hypotension, respiratory depression, arrhythmias, bronchospasm, and death.   - Informed consent was obtained and the  patient is agreeable to proceed with the procedure.             VTE Risk Mitigation (From admission, onward)         Ordered     heparin 25,000 units in dextrose 5% (100 units/ml) IV bolus from bag - ADDITIONAL PRN BOLUS - 60 units/kg (max bolus 4000 units)  As needed (PRN)        Question:  Heparin Infusion Adjustment (DO NOT  MODIFY ANSWER)  Answer:  \\[email or web address]\epic\Images\Pharmacy\HeparinInfusions\heparin LOW INTENSITY nomogram for [facility] [id number].pdf    02/11/22 1931     heparin 25,000 units in dextrose 5% (100 units/ml) IV bolus from bag - ADDITIONAL PRN BOLUS - 30 units/kg (max bolus 4000 units)  As needed (PRN)        Question:  Heparin Infusion Adjustment (DO NOT MODIFY ANSWER)  Answer:  \\[email or web address]\epic\Images\Pharmacy\HeparinInfusions\heparin LOW INTENSITY nomogram for [facility] [id number].pdf    02/11/22 1931     IP VTE HIGH RISK PATIENT  Once         02/11/22 2149     Reason for No Pharmacological VTE Prophylaxis  Once        Question:  Reasons:  Answer:  IV Heparin w/in 24 hrs. Pre or Post-Op    02/11/22 2149     Place sequential compression device  Until discontinued         02/11/22 2149     heparin 25,000 units in dextrose 5% 250 mL (100 units/mL) infusion LOW INTENSITY nomogram - [facility]  Continuous        Question Answer Comment   Heparin Infusion Adjustment (DO NOT MODIFY ANSWER) \\[email or web address]\epic\Images\Pharmacy\HeparinInfusions\heparin LOW INTENSITY nomogram for [facility] [id number].pdf    Begin at (in units/kg/hr) 12        02/11/22 1931                Thank you for your consult. I will follow-up with patient. Please contact us if you have any additional questions.    Reginald Griffith MD  Interventional Cardiology   [facility] - Cardiology Stepdown

## 2022-02-15 VITALS
RESPIRATION RATE: 18 BRPM | HEART RATE: 64 BPM | OXYGEN SATURATION: 94 % | WEIGHT: 228.38 LBS | DIASTOLIC BLOOD PRESSURE: 74 MMHG | SYSTOLIC BLOOD PRESSURE: 177 MMHG | BODY MASS INDEX: 33.83 KG/M2 | HEIGHT: 69 IN | TEMPERATURE: 97 F

## 2022-02-15 LAB
ANION GAP SERPL CALC-SCNC: 9 MMOL/L (ref 8–16)
BASOPHILS # BLD AUTO: 0.05 K/UL (ref 0–0.2)
BASOPHILS NFR BLD: 0.8 % (ref 0–1.9)
BUN SERPL-MCNC: 17 MG/DL (ref 8–23)
CALCIUM SERPL-MCNC: 9.6 MG/DL (ref 8.7–10.5)
CHLORIDE SERPL-SCNC: 104 MMOL/L (ref 95–110)
CO2 SERPL-SCNC: 24 MMOL/L (ref 23–29)
CREAT SERPL-MCNC: 1.6 MG/DL (ref 0.5–1.4)
DIFFERENTIAL METHOD: ABNORMAL
EOSINOPHIL # BLD AUTO: 0.2 K/UL (ref 0–0.5)
EOSINOPHIL NFR BLD: 2.8 % (ref 0–8)
ERYTHROCYTE [DISTWIDTH] IN BLOOD BY AUTOMATED COUNT: 13.8 % (ref 11.5–14.5)
EST. GFR  (AFRICAN AMERICAN): 48.3 ML/MIN/1.73 M^2
EST. GFR  (NON AFRICAN AMERICAN): 41.8 ML/MIN/1.73 M^2
GLUCOSE SERPL-MCNC: 92 MG/DL (ref 70–110)
HCT VFR BLD AUTO: 43.9 % (ref 40–54)
HGB BLD-MCNC: 13.8 G/DL (ref 14–18)
IMM GRANULOCYTES # BLD AUTO: 0.02 K/UL (ref 0–0.04)
IMM GRANULOCYTES NFR BLD AUTO: 0.3 % (ref 0–0.5)
LYMPHOCYTES # BLD AUTO: 1.8 K/UL (ref 1–4.8)
LYMPHOCYTES NFR BLD: 27.9 % (ref 18–48)
MCH RBC QN AUTO: 30.5 PG (ref 27–31)
MCHC RBC AUTO-ENTMCNC: 31.4 G/DL (ref 32–36)
MCV RBC AUTO: 97 FL (ref 82–98)
MONOCYTES # BLD AUTO: 0.8 K/UL (ref 0.3–1)
MONOCYTES NFR BLD: 11.5 % (ref 4–15)
NEUTROPHILS # BLD AUTO: 3.7 K/UL (ref 1.8–7.7)
NEUTROPHILS NFR BLD: 56.7 % (ref 38–73)
NRBC BLD-RTO: 0 /100 WBC
PLATELET # BLD AUTO: 118 K/UL (ref 150–450)
PMV BLD AUTO: 11.3 FL (ref 9.2–12.9)
POTASSIUM SERPL-SCNC: 3.7 MMOL/L (ref 3.5–5.1)
RBC # BLD AUTO: 4.53 M/UL (ref 4.6–6.2)
SODIUM SERPL-SCNC: 137 MMOL/L (ref 136–145)
WBC # BLD AUTO: 6.52 K/UL (ref 3.9–12.7)

## 2022-02-15 PROCEDURE — 99238 HOSP IP/OBS DSCHRG MGMT 30/<: CPT | Mod: HCNC,GC,, | Performed by: HOSPITALIST

## 2022-02-15 PROCEDURE — 25000003 PHARM REV CODE 250: Mod: HCNC | Performed by: STUDENT IN AN ORGANIZED HEALTH CARE EDUCATION/TRAINING PROGRAM

## 2022-02-15 PROCEDURE — 25000003 PHARM REV CODE 250: Mod: HCNC | Performed by: HOSPITALIST

## 2022-02-15 PROCEDURE — 94761 N-INVAS EAR/PLS OXIMETRY MLT: CPT | Mod: HCNC

## 2022-02-15 PROCEDURE — 36415 COLL VENOUS BLD VENIPUNCTURE: CPT | Mod: HCNC

## 2022-02-15 PROCEDURE — 99238 PR HOSPITAL DISCHARGE DAY,<30 MIN: ICD-10-PCS | Mod: HCNC,GC,, | Performed by: HOSPITALIST

## 2022-02-15 PROCEDURE — 99233 SBSQ HOSP IP/OBS HIGH 50: CPT | Mod: HCNC,,, | Performed by: INTERNAL MEDICINE

## 2022-02-15 PROCEDURE — 99233 PR SUBSEQUENT HOSPITAL CARE,LEVL III: ICD-10-PCS | Mod: HCNC,,, | Performed by: INTERNAL MEDICINE

## 2022-02-15 PROCEDURE — 1111F DSCHRG MED/CURRENT MED MERGE: CPT | Mod: HCNC,CPTII,GC, | Performed by: HOSPITALIST

## 2022-02-15 PROCEDURE — 1111F PR DISCHARGE MEDS RECONCILED W/ CURRENT OUTPATIENT MED LIST: ICD-10-PCS | Mod: HCNC,CPTII,GC, | Performed by: HOSPITALIST

## 2022-02-15 PROCEDURE — 25000003 PHARM REV CODE 250: Mod: HCNC | Performed by: INTERNAL MEDICINE

## 2022-02-15 PROCEDURE — 80048 BASIC METABOLIC PNL TOTAL CA: CPT | Mod: HCNC

## 2022-02-15 RX ORDER — CARVEDILOL 6.25 MG/1
6.25 TABLET ORAL 2 TIMES DAILY
Qty: 180 TABLET | Refills: 3 | Status: ON HOLD | OUTPATIENT
Start: 2022-02-15 | End: 2022-03-31 | Stop reason: SDUPTHER

## 2022-02-15 RX ORDER — CILOSTAZOL 50 MG/1
100 TABLET ORAL 2 TIMES DAILY
Status: DISCONTINUED | OUTPATIENT
Start: 2022-02-15 | End: 2022-02-15

## 2022-02-15 RX ORDER — SPIRONOLACTONE 25 MG/1
25 TABLET ORAL DAILY
Qty: 90 TABLET | Refills: 3 | Status: ON HOLD | OUTPATIENT
Start: 2022-02-15 | End: 2022-03-31 | Stop reason: SDUPTHER

## 2022-02-15 RX ORDER — ISOSORBIDE MONONITRATE 60 MG/1
60 TABLET, EXTENDED RELEASE ORAL DAILY
Qty: 90 TABLET | Refills: 3 | Status: ON HOLD | OUTPATIENT
Start: 2022-02-15 | End: 2022-03-31 | Stop reason: SDUPTHER

## 2022-02-15 RX ORDER — LISINOPRIL 20 MG/1
20 TABLET ORAL DAILY
Status: DISCONTINUED | OUTPATIENT
Start: 2022-02-15 | End: 2022-02-15 | Stop reason: HOSPADM

## 2022-02-15 RX ORDER — AMLODIPINE BESYLATE 5 MG/1
5 TABLET ORAL DAILY
Qty: 30 TABLET | Refills: 11 | Status: SHIPPED | OUTPATIENT
Start: 2022-02-15 | End: 2022-03-31

## 2022-02-15 RX ORDER — LISINOPRIL 20 MG/1
20 TABLET ORAL DAILY
Qty: 90 TABLET | Refills: 3 | Status: SHIPPED | OUTPATIENT
Start: 2022-02-15 | End: 2022-03-31

## 2022-02-15 RX ORDER — ROSUVASTATIN CALCIUM 20 MG/1
40 TABLET, COATED ORAL DAILY
Qty: 180 TABLET | Refills: 3 | Status: SHIPPED | OUTPATIENT
Start: 2022-02-15 | End: 2022-03-31

## 2022-02-15 RX ORDER — NITROGLYCERIN 0.4 MG/1
0.4 TABLET SUBLINGUAL EVERY 5 MIN PRN
Qty: 25 TABLET | Refills: 6 | Status: ON HOLD | OUTPATIENT
Start: 2022-02-15 | End: 2022-03-31 | Stop reason: SDUPTHER

## 2022-02-15 RX ADMIN — GABAPENTIN 300 MG: 300 CAPSULE ORAL at 09:02

## 2022-02-15 RX ADMIN — SPIRONOLACTONE 25 MG: 25 TABLET ORAL at 09:02

## 2022-02-15 RX ADMIN — AMLODIPINE BESYLATE 5 MG: 5 TABLET ORAL at 09:02

## 2022-02-15 RX ADMIN — PANTOPRAZOLE SODIUM 40 MG: 40 TABLET, DELAYED RELEASE ORAL at 09:02

## 2022-02-15 RX ADMIN — PRASUGREL 10 MG: 10 TABLET, FILM COATED ORAL at 09:02

## 2022-02-15 RX ADMIN — RANOLAZINE 500 MG: 500 TABLET, EXTENDED RELEASE ORAL at 09:02

## 2022-02-15 RX ADMIN — CARVEDILOL 6.25 MG: 6.25 TABLET, FILM COATED ORAL at 09:02

## 2022-02-15 RX ADMIN — ISOSORBIDE MONONITRATE 30 MG: 30 TABLET, EXTENDED RELEASE ORAL at 09:02

## 2022-02-15 RX ADMIN — ATORVASTATIN CALCIUM 80 MG: 20 TABLET, FILM COATED ORAL at 09:02

## 2022-02-15 RX ADMIN — ASPIRIN 81 MG: 81 TABLET, COATED ORAL at 09:02

## 2022-02-15 RX ADMIN — LISINOPRIL 20 MG: 20 TABLET ORAL at 09:02

## 2022-02-15 RX ADMIN — FUROSEMIDE 20 MG: 20 TABLET ORAL at 09:02

## 2022-02-15 RX ADMIN — CILOSTAZOL 100 MG: 50 TABLET ORAL at 09:02

## 2022-02-15 RX ADMIN — FENOFIBRATE 145 MG: 145 TABLET, FILM COATED ORAL at 09:02

## 2022-02-15 NOTE — ASSESSMENT & PLAN NOTE
73 yo with known CAD with previous CABG and PCI (most recent 3/2020) and multiple risk factors presenting with possible cardiac chest pain that is made more concerning with immediate relief with slNTG and a troponin finding of 3.8 >> 21. ECG and CXR unremarkable.  See HPI for coronary anatomy  Currently asymptomatic, hemodynamically stable, and chest pain free  I would treat for ACS for reasons mentioned above.    Echo with EF decreased to 40% from 55% in 2019 and with segmental LV WMA, grade III ventricular diastolic dysfunction.     LHC:  Ost LM to mid % stenosed, Ost RCA to prox % stenosed, mild diffuse disease in grafts to 1st ck and distal LAD. LVEDP 35mmHg.  No interventions were done.    Plan:  - Optimize medical therapy  - Continue antiplatelet therapy with aspirin and prasugrel for 1 year.   - Stop cilostazol as it is contraindicated in heart failure, indicated by high LVEDP.  - Start Xarelto 2.5mg daily for peripheral arterial disease - stopped outpatient but was taking long term prior to this admission  - Continue current GDMT: Coreg 6.25mg BID, Lisinopril 20mg (uptitrate as tolerated), spironolactone 25mg \  - Increase Imdur to 60mg daily  - Ranolazine 500mg BID  - Diuresis with furosemide 20mg daily  - High intensity statin, fibrate  - F/u with outpatient cardiologist Dr Cunha at [facility]  - Thank you for your consult, we will sign off

## 2022-02-15 NOTE — SUBJECTIVE & OBJECTIVE
Interval History: No major events overnight. Underwent LHC yesterday without intervention done. Denies chest pain or SOB.    Review of Systems   Constitutional: Negative for chills, fever and malaise/fatigue.   Eyes: Negative for visual disturbance.   Cardiovascular: Negative for chest pain, irregular heartbeat, leg swelling and palpitations.   Respiratory: Negative for cough and shortness of breath.    Gastrointestinal: Negative for abdominal pain, heartburn, nausea and vomiting.   Neurological: Negative for dizziness, headaches and light-headedness.   Psychiatric/Behavioral: Negative for altered mental status.     Objective:     Vital Signs (Most Recent):  Temp: 97.2 °F (36.2 °C) (02/15/22 0812)  Pulse: 64 (02/15/22 0817)  Resp: 20 (02/15/22 0812)  BP: (!) 159/71 (02/15/22 0812)  SpO2: (!) 93 % (02/15/22 0812) Vital Signs (24h Range):  Temp:  [97.2 °F (36.2 °C)-98.8 °F (37.1 °C)] 97.2 °F (36.2 °C)  Pulse:  [53-65] 64  Resp:  [17-20] 20  SpO2:  [93 %-98 %] 93 %  BP: (124-165)/(58-73) 159/71     Weight: 103.6 kg (228 lb 6.3 oz)  Body mass index is 33.73 kg/m².     SpO2: (!) 93 %  O2 Device (Oxygen Therapy): nasal cannula      Intake/Output Summary (Last 24 hours) at 2/15/2022 0912  Last data filed at 2/15/2022 0800  Gross per 24 hour   Intake 980 ml   Output 700 ml   Net 280 ml       Lines/Drains/Airways     Peripheral Intravenous Line                 Peripheral IV - Single Lumen 02/11/22 1843 20 G Right Antecubital 3 days         Peripheral IV - Single Lumen 02/13/22 22 G Left;Posterior Hand 2 days         Peripheral IV - Single Lumen 02/13/22 1830 20 G Anterior;Right Upper Arm 1 day                Physical Exam  Vitals and nursing note reviewed.   Constitutional:       General: He is not in acute distress.     Appearance: He is obese. He is not ill-appearing.   HENT:      Head: Normocephalic and atraumatic.      Mouth/Throat:      Mouth: Mucous membranes are moist.   Eyes:      Conjunctiva/sclera: Conjunctivae  normal.   Cardiovascular:      Rate and Rhythm: Normal rate and regular rhythm.      Pulses:           Radial pulses are 2+ on the right side and 1+ on the left side.        Popliteal pulses are 1+ on the right side and 1+ on the left side.      Heart sounds: Normal heart sounds, S1 normal and S2 normal. No murmur heard.  No friction rub. No gallop.    Pulmonary:      Effort: Pulmonary effort is normal. No respiratory distress.      Breath sounds: Normal breath sounds. No wheezing.   Abdominal:      General: There is no distension.      Palpations: Abdomen is soft.      Tenderness: There is no abdominal tenderness.   Musculoskeletal:      Right lower leg: No edema.      Left lower leg: No edema.   Skin:     General: Skin is warm and dry.   Neurological:      General: No focal deficit present.      Mental Status: He is alert and oriented to person, place, and time.   Psychiatric:         Mood and Affect: Mood normal.         Significant Labs: All pertinent lab results from the last 24 hours have been reviewed.    Significant Imaging: reviewed

## 2022-02-15 NOTE — PLAN OF CARE
Problem: Adult Inpatient Plan of Care  Goal: Plan of Care Review  Outcome: Ongoing, Progressing  Goal: Patient-Specific Goal (Individualized)  Outcome: Ongoing, Progressing  Goal: Absence of Hospital-Acquired Illness or Injury  Outcome: Ongoing, Progressing  Goal: Optimal Comfort and Wellbeing  Outcome: Ongoing, Progressing  Goal: Readiness for Transition of Care  Outcome: Ongoing, Progressing     Problem: Adjustment to Illness (Acute Coronary Syndrome)  Goal: Optimal Adaptation to Illness  Outcome: Ongoing, Progressing     Problem: Dysrhythmia (Acute Coronary Syndrome)  Goal: Normalized Cardiac Rhythm  Outcome: Ongoing, Progressing   Cardiology Step Down. See progress note and flowsheet. Plan: Continue to monitor patient and report any abnormalities in labs, vitals signs, and body system functions to physician as indicated. Patient was given education on their disease process and medications.

## 2022-02-15 NOTE — PLAN OF CARE
[facility] - Cardiology Step[facility]  Discharge Final Note    Primary Care Provider: Isiah You MD    Expected Discharge Date: 2/15/2022    Final Discharge Note (most recent)     Final Note - 02/15/22 0929        Final Note    Assessment Type Final Discharge Note     Anticipated Discharge Disposition Home or Self Care     Hospital Resources/Appts/Education Provided Appointments scheduled and added to AVS        Post-Acute Status    Post-Acute Authorization Other     Other Status No Post-Acute Service Needs               Contact Info     Mary Lyn NP   Specialty: Family Medicine    [address] [place] [address]  [place] [address]   Phone: [phone]       Next Steps: Follow up on 2/22/2022    Instructions: hospital follow up at 10AM         Kenya Naik RN, CM   Ext: [phone]

## 2022-02-15 NOTE — NURSING TRANSFER
Nursing Transfer Note      2/14/2022     Reason patient is being transferred: [facility]     Transfer To: cath lab    Transfer via bed    Transfer with  to O2, cardiac monitoring    Transported by RN    Medicines sent: none      Chart send with patient: Yes    Notified: spouse

## 2022-02-15 NOTE — CONSULTS
"Unable to see pt today in hospital. Information packet sent to patient re: Phase 2 cardiac rehab, which includes "Your Guide to Living with Heart Disease".  Letter was also sent to patient.    Valentin Davis RN  Cardiac Rehab Nurse  "

## 2022-02-15 NOTE — DISCHARGE INSTRUCTIONS
STOP CILOSTAZOL and CLOPIDOGREL (PLAVIX)    START CARVEDILOL twice daily, LISINOPRIL once daily, PRASUGREL once daily, XARELTO once daily, SPIRONOLACTONE once daily    INCREASED DOSE OF ISOSORBIDE to 60 MG once daily    INCREASED DOSE OF ROSUVASTATIN (Crestor) to 40 MG once daily    Continue Amlodipine at 5 mg daily for now (your cardiologist might want to increase dose of lisinopril to 40 mg and stop amlodipine completely but will defer that to your next outpatient visit with them and they can discuss that with you then)

## 2022-02-15 NOTE — NURSING TRANSFER
Nursing Transfer Note      2/14/2022     Reason patient is being transferred: back from [facility]    Transfer To: room 353    Transfer via bed    Transfer with to O2, cardiac monitoring    Transported by RN    Medicines sent: NS gtt    Any special needs or follow-up needed: vasc band    Chart send with patient: Yes    Notified: spouse    Patient reassessed at: 1900 02/14/2022    Upon arrival to floor: cardiac monitor applied, patient oriented to room, call bell in reach and bed in lowest position     VSS, Rt radial pucture site CDI with vasc band on.

## 2022-02-15 NOTE — BRIEF OP NOTE
Brief Operative Note:    : Delfino Castellon MD     Referring Physician: Self,Aaareferral     All Operators: Surgeon(s):  MD Kourtney Miranda MD Barret Murphy, MD Kiran Garikapati, MD     Preoperative Diagnosis: NSTEMI (non-ST elevated myocardial infarction) [I21.4]     Postop Diagnosis: NSTEMI (non-ST elevated myocardial infarction) [I21.4]    Treatments/Procedures: Procedure(s) (LRB):  Angiogram, Coronary, with Left Heart Cath (Left)  Bypass graft study    Access: Right radial artery    Findings:Severe native coronary artery disease with patent SVG and LIMA grafts were present.     See catheterization report for full details.    Intervention: None    See catheterization report for full details.    Closure device: Vasc Band        Plan:  - Post cath protocol   - IVF @ 100 cc/kg/hr x 4 hours  - Continue aspirin 81 mg daily indefinitely  - Continue high intensity statin therapy (LDL goal < 70)  - Risk factor reduction (BP <130/80 mmHg, glycemic control, etc)  - Follow up with outpatient cardiologist    Estimated Blood loss: 20 cc    Specimens removed: No    Chito Pappas MD  [facility]  Cardiovascular Disease, PGY-IV

## 2022-02-15 NOTE — PLAN OF CARE
mL given. Spouse at bedside. Pt educated on fall risk and remained free from falls/trauma/injury. Denies chest pain, SOB, palpitations, dizziness, pain, or discomfort. Plan of care reviewed with pt, all questions answered. Bed locked in lowest position, call bell within reach, no acute distress noted, will continue to monitor.

## 2022-02-15 NOTE — PROGRESS NOTES
[facility] - [facility] Stepdown  Cardiology  Progress Note    Patient Name: Lonnie Taylor  MRN: [id number]  Admission Date: 2/11/2022  Hospital Length of Stay: 4 days  Code Status: Full Code   Attending Physician: Triny Chandler MD   Primary Care Physician: Isiah You MD  Expected Discharge Date: 2/15/2022  Principal Problem:NSTEMI (non-ST elevated myocardial infarction)    Subjective:     Hospital Course:   72 year old male witth CAD s/p CABG and PCI in the past, admitted for severe cardiac chest pain relieved by NTG, with troponin peak at 21 but no significant EKG changes. Cardiology consulted and following for NSTEMI. LHC 2/14 showed diffuse disease and no intervention was performed. Grafts were patent. Ostial to prox % occluded. LVEDP elevated to 35mmHg.      Interval History: No major events overnight. Underwent LHC yesterday without intervention done. Denies chest pain or SOB.    Review of Systems   Constitutional: Negative for chills, fever and malaise/fatigue.   Eyes: Negative for visual disturbance.   Cardiovascular: Negative for chest pain, irregular heartbeat, leg swelling and palpitations.   Respiratory: Negative for cough and shortness of breath.    Gastrointestinal: Negative for abdominal pain, heartburn, nausea and vomiting.   Neurological: Negative for dizziness, headaches and light-headedness.   Psychiatric/Behavioral: Negative for altered mental status.     Objective:     Vital Signs (Most Recent):  Temp: 97.2 °F (36.2 °C) (02/15/22 0812)  Pulse: 64 (02/15/22 0817)  Resp: 20 (02/15/22 0812)  BP: (!) 159/71 (02/15/22 0812)  SpO2: (!) 93 % (02/15/22 0812) Vital Signs (24h Range):  Temp:  [97.2 °F (36.2 °C)-98.8 °F (37.1 °C)] 97.2 °F (36.2 °C)  Pulse:  [53-65] 64  Resp:  [17-20] 20  SpO2:  [93 %-98 %] 93 %  BP: (124-165)/(58-73) 159/71     Weight: 103.6 kg (228 lb 6.3 oz)  Body mass index is 33.73 kg/m².     SpO2: (!) 93 %  O2 Device (Oxygen Therapy): nasal cannula      Intake/Output  Summary (Last 24 hours) at 2/15/2022 0912  Last data filed at 2/15/2022 0800  Gross per 24 hour   Intake 980 ml   Output 700 ml   Net 280 ml       Lines/Drains/Airways       Peripheral Intravenous Line                   Peripheral IV - Single Lumen 02/11/22 1843 20 G Right Antecubital 3 days         Peripheral IV - Single Lumen 02/13/22 22 G Left;Posterior Hand 2 days         Peripheral IV - Single Lumen 02/13/22 1830 20 G Anterior;Right Upper Arm 1 day                    Physical Exam  Vitals and nursing note reviewed.   Constitutional:       General: He is not in acute distress.     Appearance: He is obese. He is not ill-appearing.   HENT:      Head: Normocephalic and atraumatic.      Mouth/Throat:      Mouth: Mucous membranes are moist.   Eyes:      Conjunctiva/sclera: Conjunctivae normal.   Cardiovascular:      Rate and Rhythm: Normal rate and regular rhythm.      Pulses:           Radial pulses are 2+ on the right side and 1+ on the left side.        Popliteal pulses are 1+ on the right side and 1+ on the left side.      Heart sounds: Normal heart sounds, S1 normal and S2 normal. No murmur heard.  No friction rub. No gallop.    Pulmonary:      Effort: Pulmonary effort is normal. No respiratory distress.      Breath sounds: Normal breath sounds. No wheezing.   Abdominal:      General: There is no distension.      Palpations: Abdomen is soft.      Tenderness: There is no abdominal tenderness.   Musculoskeletal:      Right lower leg: No edema.      Left lower leg: No edema.   Skin:     General: Skin is warm and dry.   Neurological:      General: No focal deficit present.      Mental Status: He is alert and oriented to person, place, and time.   Psychiatric:         Mood and Affect: Mood normal.         Significant Labs: All pertinent lab results from the last 24 hours have been reviewed.    Significant Imaging: reviewed    Assessment and Plan:     * NSTEMI (non-ST elevated myocardial infarction)  73 yo with known  CAD with previous CABG and PCI (most recent 3/2020) and multiple risk factors presenting with possible cardiac chest pain that is made more concerning with immediate relief with slNTG and a troponin finding of 3.8 >> 21. ECG and CXR unremarkable.  See HPI for coronary anatomy  Currently asymptomatic, hemodynamically stable, and chest pain free  I would treat for ACS for reasons mentioned above.    Echo with EF decreased to 40% from 55% in 2019 and with segmental LV WMA, grade III ventricular diastolic dysfunction.     LHC:  Ost LM to mid % stenosed, Ost RCA to prox % stenosed, mild diffuse disease in grafts to 1st ck and distal LAD. LVEDP 35mmHg.  No interventions were done.    Plan:  - Optimize medical therapy  - Continue antiplatelet therapy with aspirin and prasugrel for 1 year.   - Stop cilostazol as it is contraindicated in heart failure, indicated by high LVEDP.  - Start Xarelto 2.5mg daily for peripheral arterial disease - stopped outpatient but was taking long term prior to this admission  - Continue current GDMT: Coreg 6.25mg BID, Lisinopril 20mg (uptitrate as tolerated), spironolactone 25mg \  - Increase Imdur to 60mg daily  - Ranolazine 500mg BID  - Diuresis with furosemide 20mg daily  - High intensity statin, fibrate  - F/u with outpatient cardiologist Dr Cunha at [facility]  - Thank you for your consult, we will sign off    PVD (peripheral vascular disease)  Stop cilostazol due to HF   DAPT with aspirin + prasugrel + AC with low dose Xarelto 2.5mg daily        VTE Risk Mitigation (From admission, onward)           Ordered     heparin (porcine) 750 Units, verapamiL 1.25 mg, nitroGLYCERIN 1.25 mg in sodium chloride 0.9% 250 mL  As needed (PRN)         02/14/22 1747     IP VTE HIGH RISK PATIENT  Once         02/11/22 2149     Reason for No Pharmacological VTE Prophylaxis  Once        Question:  Reasons:  Answer:  IV Heparin w/in 24 hrs. Pre or Post-Op    02/11/22 2149     Place sequential  compression device  Until discontinued         02/11/22 7497                    Meredith Stevens MD  Cardiology  [facility] - Cardiology Step[facility]  I have personally taken the history and examined the patient and agree with the resident's note as stated above.  With high EDP and low LVEF, cilostazol is contra-indicated -I told this to pt and family. Prasugrel a bit better than clopidogrel , he can re-start the low dose Xarelto he was on . And increase the isosorbide to 60 and use low dose Furosimide 20 mg. As out pt if Cr stable etc, he can switch to Entresto later.

## 2022-02-15 NOTE — PLAN OF CARE
Received awake, alert, following commands, vss, no c/o pain. Discharge instructions given: verbalized understanding. Discharged at this time with wife.  Problem: Adult Inpatient Plan of Care  Goal: Plan of Care Review  Outcome: Met  Goal: Patient-Specific Goal (Individualized)  Outcome: Met  Goal: Absence of Hospital-Acquired Illness or Injury  Outcome: Met  Goal: Optimal Comfort and Wellbeing  Outcome: Met  Goal: Readiness for Transition of Care  Outcome: Met     Problem: Adjustment to Illness (Acute Coronary Syndrome)  Goal: Optimal Adaptation to Illness  Outcome: Met     Problem: Dysrhythmia (Acute Coronary Syndrome)  Goal: Normalized Cardiac Rhythm  Outcome: Met     Problem: Cardiac-Related Pain (Acute Coronary Syndrome)  Goal: Absence of Cardiac-Related Pain  Outcome: Met     Problem: Hemodynamic Instability (Acute Coronary Syndrome)  Goal: Effective Cardiac Pump Function  Outcome: Met     Problem: Tissue Perfusion (Acute Coronary Syndrome)  Goal: Adequate Tissue Perfusion  Outcome: Met     Problem: Arrhythmia/Dysrhythmia (Cardiac Catheterization)  Goal: Stable Heart Rate and Rhythm  Outcome: Met     Problem: Bleeding (Cardiac Catheterization)  Goal: Absence of Bleeding  Outcome: Met     Problem: Contrast-Induced Injury Risk (Cardiac Catheterization)  Goal: Absence of Contrast-Induced Injury  Outcome: Met     Problem: Embolism (Cardiac Catheterization)  Goal: Absence of Embolism Signs and Symptoms  Outcome: Met     Problem: Ongoing Anesthesia/Sedation Effects (Cardiac Catheterization)  Goal: Anesthesia/Sedation Recovery  Outcome: Met     Problem: Pain (Cardiac Catheterization)  Goal: Acceptable Pain Control  Outcome: Met     Problem: Vascular Access Protection (Cardiac Catheterization)  Goal: Absence of Vascular Access Complication  Outcome: Met     Problem: Fall Injury Risk  Goal: Absence of Fall and Fall-Related Injury  Outcome: Met     Problem: Skin Injury Risk Increased  Goal: Skin Health and  Integrity  Outcome: Met

## 2022-02-16 ENCOUNTER — PATIENT OUTREACH (OUTPATIENT)
Dept: ADMINISTRATIVE | Facility: CLINIC | Age: 72
End: 2022-02-16
Payer: MEDICARE

## 2022-02-16 NOTE — DISCHARGE SUMMARY
[facility] - Cardiology [facility] Medicine  Discharge Summary      Patient Name: Lonnie Taylor  MRN: [id number]  Patient Class: IP- Inpatient  Admission Date: 2/11/2022  Hospital Length of Stay: 4 days  Discharge Date and Time:  02/15/2022 6:24 PM  Attending Physician: No att. providers found   Discharging Provider: Pierre Kelly MD  Primary Care Provider: Isiah You MD  [facility] Medicine Team: [facility] HOSP MED V Pierre Kelly MD    HPI:   72-year-old male presenting with chest discomfort since about 9 this morning which had been refractory to antacids and nitroglycerin at home.  He had thought it was heartburn due to dinner his wife had brought home the previous night which he said contained a lot of green onions.  His symptoms occurred while he was moving around doing his normal morning activities.  He also notes that he was up late fiddling with a new GoPro camera that he bought for his upcoming trip to [place].  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  After his symptoms persisted throughout the morning and into mid day he became frustrated and decided to call for an ambulance.  He was given a nitroglycerin which considerably relieved his chest pressure/discomfort, and a 2nd nitroglycerin resolved it.  After coming to the ED he was shocked to learn that he had had a heart attack; he did not suspect that that had been what was going on based on how subtle his symptoms were.  He has not had any similar exertional or anginal symptoms in the recent past.      Procedure(s) (LRB):  Angiogram, Coronary, with Left Heart Cath (Left)  Bypass graft study      Hospital Course:   Pt was diagnosed with NSTEMI and admitted to  for further evaluation.  He was started on ACS protocol including Prasugrel and Heparin gtt, with LHC planned for 2/14/22 which showed  patent grafts from prior CABG and no new stenosis. Also found to have new HFrEF on TTE and thus started on GDMT. Symptoms resolved. Medications were changed for optimization. Cardiology recommended starting xarelto on discharge for PVD and stopped cilostazol. Patient was discharged home on 2/15 with close cardiology follow up with Dr Cunha and PCP.     Patient instructions:     STOP CILOSTAZOL and CLOPIDOGREL (PLAVIX)    START CARVEDILOL twice daily, LISINOPRIL once daily, PRASUGREL once daily, XARELTO once daily, SPIRONOLACTONE once daily    INCREASED DOSE OF ISOSORBIDE to 60 MG once daily    INCREASED DOSE OF ROSUVASTATIN (Crestor) to 40 MG once daily    Continue Amlodipine at 5 mg daily for now (your cardiologist might want to increase dose of lisinopril to 40 mg and stop amlodipine completely but will defer that to your next outpatient visit with them and they can discuss that with you then)    Follow up for cardiac rehab           Goals of Care Treatment Preferences:  Code Status: Full Code    Vitals:    02/15/22 0727 02/15/22 0812 02/15/22 0817 02/15/22 1125   BP: (!) 165/72 (!) 159/71  (!) 177/74   BP Location:  Left arm  Right arm   Patient Position: Lying Sitting  Sitting   Pulse: 60 65 64 64   Resp: 18 20  18   Temp: 98.2 °F (36.8 °C) 97.2 °F (36.2 °C)  97.3 °F (36.3 °C)   TempSrc: Oral Oral  Oral   SpO2: (!) 94% (!) 93%  (!) 94%   Weight:       Height:           Consults:   Consults (From admission, onward)        Status Ordering Provider     Inpatient consult to Cardiac Rehab  Once        Provider:  (Not yet assigned)    Completed HÉCTOR JAUREGUI     Inpatient consult to Registered Dietitian/Nutritionist  Once        Provider:  (Not yet assigned)    Completed HÉCTOR JAUREGUI     Inpatient consult to Interventional Cardiology  Once        Provider:  (Not yet assigned)    Completed HÉCTOR JAUREGUI     Inpatient consult to Interventional Cardiology  Once        Provider:  (Not yet assigned)     Completed HÉCTOR JAUREGUI     Inpatient consult to Cardiology  Once        Provider:  (Not yet assigned)    Completed BARRON ABRAHAM        Physical Exam:  Gen- well-developed, well-nourished, NAD  CVS- S1 and S2 present, RRR, no murmur  Resp- CTA b/l, no work of breathing, on room air  Abd- BS+, soft, NT, ND  Ext- no clubbing, cyanosis, or edema    No new Assessment & Plan notes have been filed under this hospital service since the last note was generated.  Service: Hospital Medicine    Final Active Diagnoses:    Diagnosis Date Noted POA    PRINCIPAL PROBLEM:  NSTEMI (non-ST elevated myocardial infarction) [I21.4] 02/11/2022 Yes    Pulmonary emphysema [J43.9] 12/02/2019 Yes    Tobacco abuse [Z72.0] 09/18/2018 Yes    S/P AAA repair [Z98.890, Z86.79] 09/18/2018 Not Applicable    Morbid obesity [E66.01] 01/16/2018 Yes    Chronic kidney disease, stage III (moderate) [N18.30] 09/16/2015 Yes    Primary hypertension [I10] 11/30/2012 Yes    Coronary artery disease involving native coronary artery with angina pectoris [I25.119] 11/30/2012 Yes    PVD (peripheral vascular disease) [I73.9] 11/30/2012 Yes      Problems Resolved During this Admission:       Discharged Condition: fair    Disposition: Home or Self Care    Follow Up:   Follow-up Information     aMry Lyn NP On 2/22/2022.    Specialty: Family Medicine  Why: hospital follow up at 10AM   Contact information:  [address] [place] [id number] [phone]                       Patient Instructions:      Ambulatory referral/consult to Cardiac Rehab   Standing Status: Future   Referral Priority: Routine Referral Type: Consultation   Referral Reason: Specialty Services Required   Requested Specialty: Cardiac Rehabilitation   Number of Visits Requested: 1     Ambulatory referral/consult to Cardiology   Standing Status: Future   Referral Priority: Routine Referral Type: Consultation   Referral Reason: Specialty Services Required   Referred  to Provider: XAVIER DANIELS Requested Specialty: Cardiology   Number of Visits Requested: 1       Significant Diagnostic Studies: Labs:   BMP:   Recent Labs   Lab 02/14/22  0247 02/15/22  0741   GLU 78 92    137   K 3.6 3.7    104   CO2 24 24   BUN 18 17   CREATININE 1.6* 1.6*   CALCIUM 10.1 9.6   MG 1.7  --    , CMP   Recent Labs   Lab 02/14/22  0247 02/15/22  0741    137   K 3.6 3.7    104   CO2 24 24   GLU 78 92   BUN 18 17   CREATININE 1.6* 1.6*   CALCIUM 10.1 9.6   ANIONGAP 10 9   ESTGFRAFRICA 48.3* 48.3*   EGFRNONAA 41.8* 41.8*    and CBC   Recent Labs   Lab 02/14/22 0247 02/14/22 0247 02/14/22  2349   WBC 5.62  --  6.52   HGB 13.6*  --  13.8*   HCT 42.3   < > 43.9   *  --  118*    < > = values in this interval not displayed.       Pending Diagnostic Studies:     Procedure Component Value Units Date/Time    APTT [[id number]] Collected: 02/13/22 0301    Order Status: Sent Lab Status: In process Updated: 02/13/22 0301    Specimen: Blood     EKG 12-lead [[id number]]     Order Status: Sent Lab Status: No result          Medications:  Reconciled Home Medications:      Medication List      START taking these medications    carvediloL 6.25 MG tablet  Commonly known as: COREG  Take 1 tablet (6.25 mg total) by mouth 2 (two) times daily.     lisinopriL 20 MG tablet  Commonly known as: PRINIVIL,ZESTRIL  Take 1 tablet (20 mg total) by mouth once daily.     prasugreL 10 mg Tab  Commonly known as: EFFIENT  Take 1 tablet (10 mg total) by mouth once daily.     spironolactone 25 MG tablet  Commonly known as: ALDACTONE  Take 1 tablet (25 mg total) by mouth once daily.     XARELTO 2.5 mg Tab  Generic drug: rivaroxaban  Take 1 tablet (2.5 mg total) by mouth daily with dinner or evening meal.        CHANGE how you take these medications    amLODIPine 5 MG tablet  Commonly known as: NORVASC  Take 1 tablet (5 mg total) by mouth once daily.  What changed:   · how much to take  · how to take  this  · when to take this  · Another medication with the same name was removed. Continue taking this medication, and follow the directions you see here.     isosorbide mononitrate 60 MG 24 hr tablet  Commonly known as: IMDUR  Take 1 tablet (60 mg total) by mouth once daily.  What changed:   · medication strength  · how much to take     rosuvastatin 20 MG tablet  Commonly known as: CRESTOR  Take 2 tablets (40 mg total) by mouth once daily.  What changed: how much to take        CONTINUE taking these medications    albuterol 90 mcg/actuation inhaler  Commonly known as: VENTOLIN HFA  Inhale 2 puffs into the lungs every 6 (six) hours as needed for Wheezing. Rescue     aspirin 81 MG EC tablet  Commonly known as: ECOTRIN  Take 81 mg by mouth once daily.     fenofibrate 145 MG tablet  Commonly known as: TRICOR  Take 1 tablet (145 mg total) by mouth once daily.     furosemide 20 MG tablet  Commonly known as: LASIX  20 mg once daily.     gabapentin 300 MG capsule  Commonly known as: NEURONTIN  3 (three) times daily.     nitroGLYCERIN 0.4 MG SL tablet  Commonly known as: NITROSTAT  Place 1 tablet (0.4 mg total) under the tongue every 5 (five) minutes as needed for Chest pain.     pantoprazole 40 MG tablet  Commonly known as: PROTONIX  Take 40 mg by mouth once daily.     ranolazine 500 MG Tb12  Commonly known as: RANEXA  Take 500 mg by mouth 2 (two) times daily. 11/15/20        STOP taking these medications    cilostazoL 100 MG Tab  Commonly known as: PLETAL     clopidogreL 75 mg tablet  Commonly known as: PLAVIX     doxazosin 4 MG tablet  Commonly known as: CARDURA            Indwelling Lines/Drains at time of discharge:   Lines/Drains/Airways     None                 Time spent on the discharge of patient: 50 minutes         Pierre Kelly MD  Department of Hospital Medicine  [facility] - Cardiology Stepdown

## 2022-02-16 NOTE — PATIENT INSTRUCTIONS
Patient Education       Heart Attack Discharge Instructions   About this topic   A heart attack happens when an artery that gives blood flow to a part of the heart gets blocked or partly blocked. This is most often from either fatty deposits or blood clots. The part of the heart where blood flow is blocked does not get enough oxygen. That part of the heart muscle is damaged or dies. The amount of heart muscle that dies depends on how long and how much of the heart muscle was without oxygen.     Heart attack treatments work best when given right away. Some people are treated with drugs. Others need special heart procedures or surgery. Your doctor will work with you to choose the best treatment options.  What care is needed at home?   · Ask your doctor what you need to do when you go home. Make sure you ask questions if you do not understand what the doctor says. This way you will know what you need to do.  · Get lots of rest. Sleep when you feel tired. Avoid doing tiring activities.  · It is normal to have a low mood or feel worried for a few weeks. It is good to stay around people who you can talk to.  · Avoid situations that may make you angry or stressed.  · Your doctor may give you drugs to treat any chest pain you have. Always keep these drugs with you. If these drugs do not help your chest pain, call for emergency help right away.  · Early treatment of the signs of heart attack may prevent heart damage or another heart attack.  · If you had surgery, talk to your doctor about how to care for your cut site. Ask your doctor about:  ? When you should change your bandages, if you have any. Wash your hands before you touch your wound or dressing.  ? When you may take a bath or shower  ? If you need to be careful with lifting things over 10 pounds (4.5 kg)  ? When you may go back to your normal activities like work, driving, or sex  What follow-up care is needed?   · Your condition needs to be watched closely. Your  doctor may ask you to make visits to the office to check on your progress. Be sure to keep these visits. You may need to have other tests.  · If you have stitches or staples, you will need to have them taken out. Your doctor will often want to do this in 1 to 2 weeks. If the doctor used skin glue, the glue will fall off on its own.  · You doctor may have you go see a specialist. You may need to see a heart doctor called a cardiologist.  · Your doctor may order a heart rehab program for you after you leave the hospital. This is an important part of your care. Share your discharge information with the rehab staff so they can plan a program to help you recover. Let your doctor know if you need help finding a program.  What drugs may be needed?   The doctor may order drugs to:  · Lower blood pressure  · Prevent or break up blood clots  · Lower your heart's need for oxygen  · Control heartbeats  · Lower cholesterol  · Help with pain and help you relax  · Prevent infection  · Open your arteries and ease your chest pain  Check with your doctor before you take drugs like ibuprofen, naproxen, vitamins, or hormone replacement therapy.  Will physical activity be limited?   · You may have to limit your activity. You may be able to slowly add to your activities to get back to your normal levels. Talk to your doctor about the right amount of activity for you. Ask if you need cardiac rehab.  · Stop any activity that causes chest pain.  · Ask your doctor when it is ok to resume sexual activity. Do not take any drugs or herbal remedies to help with erection problems without talking to your doctor first.  · If you had surgery, you may need to rest for a few weeks after the procedure. Ask your doctor what activities are good for you.  What changes to diet are needed?   Eating a healthy diet is important during this time. Ask to see a dietitian for help with a plan that is right for you. In general, eating healthy means:  · Eat whole  grain foods and foods high in fiber.  · Choose many different fruits and vegetables. Fresh or frozen is best.  · Cut back on solid fats like butter or margarine. Eat less fatty or processed foods.  · Eat more low fat or lean meats like chicken, fish, or turkey. Eat less red meat.  · Avoid beer, wine, and mixed drinks (alcohol) for at least 2 weeks. Ask your doctor when it is ok to drink limited alcohol.  · Avoid caffeine.  · Limit your use of salt.  What problems could happen?   · Heart failure  · Another heart attack  · Irregular beating of the heart. This can be a minor problem or a very serious problem such as ventricular fibrillation that can cause death.  · Kidney failure  · Brain damage  · Shock  · Death  What can be done to prevent this health problem?   · Keep a normal weight. If you are too heavy, lose weight.  · Keep blood pressure, cholesterol, and high blood sugar under control.  · Stop smoking. It can harm your heart, stomach, and lungs.  · Reduce stress. Ask your doctor about ways to manage stress.  · Exercise regularly. A 30-minute workout each day will help keep your heart healthy  When do I need to call the doctor?   Activate the emergency medical system right away if you have signs of a heart attack. Call 911 in the [place] or [place]. The sooner treatment begins, the better your chances for recovery. Call for emergency help right away if you have:  · Signs of heart attack:  ? Chest pain  ? Trouble breathing  ? Fast heartbeat  ? Feeling dizzy       Call your doctor if you have had surgery or any other procedure and have:  · Fever of 100.4°F (38°C) or higher, chills  · Signs of wound infection. These include swelling, redness, warmth around the wound; too much pain when touched; yellowish, greenish, or bloody discharge; foul smell coming from the cut site; cut site opens up.  · Low mood or feel very worried for more than 3 weeks  · Bleeding from your urine, stool, or gums  · Have more swelling  of your feet or legs  Helpful tips   · Try to reduce stress by learning a few ways to relax or learn an exercise routine.  · Walking is the best form of exercise. Try to walk a little farther each day.  · Your family may want to learn CPR.  Teach Back: Helping You Understand   The Teach Back Method helps you understand the information we are giving you. After talking with the staff, tell them in your own words what you were just told. This helps to make sure the staff has covered each thing clearly. It also helps to explain things that may have been a bit confusing. Before going home, make sure you are able to do these:  · I can tell you about my condition.  · I can tell you when I can go back to my normal activities.  · I can tell you what I will do if I have signs of a heart attack or stroke.  Where can I learn more?   American Academy of Family Physicians  https://familydoctor.org/condition/heart-attack/  Centers for Disease Control and Prevention  https://www.cdc.gov/heartdisease/heart_attack.htm   NHS Choices  https://www.nhs.uk/conditions/Heart-attack/   Last Reviewed Date   2020-10-16  Consumer Information Use and Disclaimer   This information is not specific medical advice and does not replace information you receive from your health care provider. This is only a brief summary of general information. It does NOT include all information about conditions, illnesses, injuries, tests, procedures, treatments, therapies, discharge instructions or life-style choices that may apply to you. You must talk with your health care provider for complete information about your health and treatment options. This information should not be used to decide whether or not to accept your health care providers advice, instructions or recommendations. Only your health care provider has the knowledge and training to provide advice that is right for you.  Copyright   Copyright © 2021 [employer] and its affiliates and/or  licensors. All rights reserved.    [facility] teaching reviewed with Lonnie Taylor . He verbalized understanding.    Education was provided based on the patient's discharge diagnosis using the attached [facility] patient education as a reference.

## 2022-02-16 NOTE — PHYSICIAN QUERY
PT Name: Lonnie Taylor  MR #: [id number]     DOCUMENTATION CLARIFICATION     CDS/: Manjinder Pedroza Jr, RN CCDS               Contact information:[email or web address]  This form is a permanent document in the medical record.     Query Date: February 16, 2022    By submitting this query, we are merely seeking further clarification of documentation.  Please utilize your independent clinical judgment when addressing the question(s) below.    The Medical Record contains the following   Indicators Supporting Clinical Findings Location in Medical Record   x Heart Failure documented Combined CHF 2/12 Hospital Medicine Progress Note   x BNP 85      x EF/Echo The left ventricle is normal in size with moderately decreased systolic function.  The estimated ejection fraction is 40%.   2/12 Hospital Medicine Progress Note   x Radiology findings Pulmonary vasculature and hilar contours are within normal limits.    2/11 Chest X Ray   x Subjective/Objective Respiratory Conditions This morning, he denies chest pain but reports some SOB for which he requests a breathing treatment     Respiratory: positive for dyspnea on exertion   2/12 Hospital Medicine Progress Note      2/12 Hospital Medicine Progress Note   x Recent/Current MI PRINCIPAL PROBLEM:  NSTEMI (non-ST elevated myocardial infarction)   2/15 Discharge Summary    Heart Transplant, LVAD     x Edema, JVD Ext- no clubbing, cyanosis, or edema 2/12 Hospital Medicine Progress Note    Ascites     x Diuretics/Meds furosemide tablet 20 mg Daily 2/12 MAR    Other Treatment      Other Diastolic dysfucntion, LVEDP 35 mmHg     2/14 Cardiac Cath     Heart failure is a clinical diagnosis which includes symptomatic fluid retention, elevated intracardiac pressures, and/or the inability of the heart to deliver adequate blood flow.    Heart Failure with reduced Ejection Fraction (HFrEF) or Systolic Heart Failure (loses ability to contract normally, EF is <40%)    Heart Failure with  preserved Ejection Fraction (HFpEF) or Diastolic Heart Failure (stiff ventricles, does not relax properly, EF is >50%)     Heart Failure with Combined Systolic and Diastolic Failure (stiff ventricles, does not relax properly and EF is <50%)    Mid-range or mildly reduced ejection fraction (HFmrEF) is classified as systolic heart failure.   Common clues to acute exacerbation:  Rapidly progressive symptoms (w/in 2 weeks of presentation), using IV diuretics, using supplemental O2, pulmonary edema on Xray, new or worsening pleural effusion, +JVD or other signs of volume overload, MI w/in 4 weeks, and/or BNP >500  The clinical guidelines noted are only system guidelines, and do not replace the providers clinical judgment.    Provider, please specify the type and acuity of           Combined CHF                [ X  ]  Chronic Combined Systolic and Diastolic Heart Failure - pre-existing and stable     [   ]  Acute on Chronic Combined Systolic and Diastolic Heart Failure - worsening of CHF signs/symptoms in preexisting CHF     [   ]  Other type and acuity of Heart Failure (please specify):      [   ]  Clinically Undetermined           Please document in your progress notes daily for the duration of treatment until resolved and include in your discharge summary.    References:  American Heart Association editorial staff. (2017, May). Ejection Fraction Heart Failure Measurement. American Heart Association. https://www.heart.org/en/health-topics/heart-failure/diagnosing-heart-failure/ejection-fraction-heart-failure-measurement#:~:text=Ejection%20fraction%20(EF)%20is%20a,pushed%20out%20with%20each%20heartbeat  RAÚL Harkins (2020, December 15). Heart failure with preserved ejection fraction: Clinical manifestations and diagnosis. [employer]. https://www.[employer].SumUp/contents/heart-failure-with-preserved-ejection-fraction-clinical-manifestations-and-diagnosis.  ICD-10-CM/PCS Coding Clinic Third Quarter ICD-10, Effective with  discharges: September 8, 2020 [facility] § Heart failure with mid-range or mildly reduced ejection fraction (2020).  Form No. 57470

## 2022-02-16 NOTE — HOSPITAL COURSE
Pt was diagnosed with NSTEMI and admitted to  for further evaluation.  He was started on ACS protocol including Prasugrel and Heparin gtt, with LHC planned for 2/14/22 which showed patent grafts from prior CABG and no new stenosis. Also found to have new HFrEF on TTE and thus started on GDMT. Symptoms resolved. Medications were changed for optimization and patient was discharged home on 2/15 with close cardiology follow up with Dr Cunha and PCP.     Patient instructions:     STOP CILOSTAZOL and CLOPIDOGREL (PLAVIX)    START CARVEDILOL twice daily, LISINOPRIL once daily, PRASUGREL once daily, XARELTO once daily, SPIRONOLACTONE once daily    INCREASED DOSE OF ISOSORBIDE to 60 MG once daily    INCREASED DOSE OF ROSUVASTATIN (Crestor) to 40 MG once daily    Continue Amlodipine at 5 mg daily for now (your cardiologist might want to increase dose of lisinopril to 40 mg and stop amlodipine completely but will defer that to your next outpatient visit with them and they can discuss that with you then)    Follow up for cardiac rehab

## 2022-02-21 ENCOUNTER — TELEPHONE (OUTPATIENT)
Dept: FAMILY MEDICINE | Facility: CLINIC | Age: 72
End: 2022-02-21
Payer: MEDICARE

## 2022-02-21 NOTE — TELEPHONE ENCOUNTER
Phone pt about up coming appt pt states that he will f/u with his cardiologists therefore to cancel  with [facility].

## 2022-02-23 ENCOUNTER — TELEPHONE (OUTPATIENT)
Dept: CARDIAC REHAB | Facility: CLINIC | Age: 72
End: 2022-02-23
Payer: MEDICARE

## 2022-03-03 LAB — POCT GLUCOSE: 86 MG/DL (ref 70–110)

## 2022-03-08 DIAGNOSIS — N18.30 CHRONIC KIDNEY DISEASE, STAGE III (MODERATE): ICD-10-CM

## 2022-03-08 DIAGNOSIS — I10 HYPERTENSION: ICD-10-CM

## 2022-03-08 DIAGNOSIS — I25.10 CAD (CORONARY ARTERY DISEASE): Primary | ICD-10-CM

## 2022-03-28 ENCOUNTER — HOSPITAL ENCOUNTER (INPATIENT)
Facility: HOSPITAL | Age: 72
LOS: 2 days | Discharge: HOME | DRG: 280 | End: 2022-03-31
Attending: EMERGENCY MEDICINE | Admitting: EMERGENCY MEDICINE
Payer: MEDICARE

## 2022-03-28 DIAGNOSIS — R07.9 CHEST PAIN: ICD-10-CM

## 2022-03-28 DIAGNOSIS — Z72.0 TOBACCO ABUSE: ICD-10-CM

## 2022-03-28 DIAGNOSIS — I21.4 NSTEMI (NON-ST ELEVATION MYOCARDIAL INFARCTION): ICD-10-CM

## 2022-03-28 DIAGNOSIS — R09.02 HYPOXIA: ICD-10-CM

## 2022-03-28 DIAGNOSIS — I21.4 NON-ST ELEVATION MYOCARDIAL INFARCTION (NSTEMI): ICD-10-CM

## 2022-03-28 DIAGNOSIS — I50.9 ACUTE ON CHRONIC CONGESTIVE HEART FAILURE, UNSPECIFIED HEART FAILURE TYPE: Primary | ICD-10-CM

## 2022-03-28 DIAGNOSIS — I21.4 NSTEMI (NON-ST ELEVATED MYOCARDIAL INFARCTION): ICD-10-CM

## 2022-03-28 DIAGNOSIS — Z71.6 TOBACCO ABUSE COUNSELING: ICD-10-CM

## 2022-03-28 PROBLEM — Z98.890 S/P AAA REPAIR: Chronic | Status: ACTIVE | Noted: 2018-09-18

## 2022-03-28 PROBLEM — J43.9 PULMONARY EMPHYSEMA: Chronic | Status: ACTIVE | Noted: 2019-12-02

## 2022-03-28 PROBLEM — I73.9 PAD (PERIPHERAL ARTERY DISEASE): Chronic | Status: ACTIVE | Noted: 2019-07-25

## 2022-03-28 PROBLEM — F32.9 MAJOR DEPRESSIVE DISORDER WITH SINGLE EPISODE: Chronic | Status: ACTIVE | Noted: 2018-01-16

## 2022-03-28 PROBLEM — I72.3 ILIAC ARTERY ANEURYSM, BILATERAL: Chronic | Status: ACTIVE | Noted: 2021-06-03

## 2022-03-28 PROBLEM — I25.2 HISTORY OF NON-ST ELEVATION MYOCARDIAL INFARCTION (NSTEMI): Chronic | Status: ACTIVE | Noted: 2019-12-02

## 2022-03-28 PROBLEM — Z86.79 S/P AAA REPAIR: Chronic | Status: ACTIVE | Noted: 2018-09-18

## 2022-03-28 LAB
ALBUMIN SERPL BCP-MCNC: 3.4 G/DL (ref 3.5–5.2)
ALP SERPL-CCNC: 36 U/L (ref 55–135)
ALT SERPL W/O P-5'-P-CCNC: 17 U/L (ref 10–44)
ANION GAP SERPL CALC-SCNC: 12 MMOL/L (ref 8–16)
AST SERPL-CCNC: 84 U/L (ref 10–40)
BASOPHILS # BLD AUTO: 0.03 K/UL (ref 0–0.2)
BASOPHILS NFR BLD: 0.3 % (ref 0–1.9)
BILIRUB SERPL-MCNC: 0.7 MG/DL (ref 0.1–1)
BNP SERPL-MCNC: 1504 PG/ML (ref 0–99)
BUN SERPL-MCNC: 23 MG/DL (ref 8–23)
CALCIUM SERPL-MCNC: 10.4 MG/DL (ref 8.7–10.5)
CHLORIDE SERPL-SCNC: 99 MMOL/L (ref 95–110)
CO2 SERPL-SCNC: 29 MMOL/L (ref 23–29)
CREAT SERPL-MCNC: 1.8 MG/DL (ref 0.5–1.4)
DIFFERENTIAL METHOD: ABNORMAL
EOSINOPHIL # BLD AUTO: 0.1 K/UL (ref 0–0.5)
EOSINOPHIL NFR BLD: 0.7 % (ref 0–8)
ERYTHROCYTE [DISTWIDTH] IN BLOOD BY AUTOMATED COUNT: 14.6 % (ref 11.5–14.5)
EST. GFR  (AFRICAN AMERICAN): 41.9 ML/MIN/1.73 M^2
EST. GFR  (NON AFRICAN AMERICAN): 36.3 ML/MIN/1.73 M^2
GLUCOSE SERPL-MCNC: 82 MG/DL (ref 70–110)
HCT VFR BLD AUTO: 43.4 % (ref 40–54)
HGB BLD-MCNC: 13.9 G/DL (ref 14–18)
IMM GRANULOCYTES # BLD AUTO: 0.03 K/UL (ref 0–0.04)
IMM GRANULOCYTES NFR BLD AUTO: 0.3 % (ref 0–0.5)
LYMPHOCYTES # BLD AUTO: 1.9 K/UL (ref 1–4.8)
LYMPHOCYTES NFR BLD: 20 % (ref 18–48)
MCH RBC QN AUTO: 29.8 PG (ref 27–31)
MCHC RBC AUTO-ENTMCNC: 32 G/DL (ref 32–36)
MCV RBC AUTO: 93 FL (ref 82–98)
MONOCYTES # BLD AUTO: 0.8 K/UL (ref 0.3–1)
MONOCYTES NFR BLD: 7.8 % (ref 4–15)
NEUTROPHILS # BLD AUTO: 6.8 K/UL (ref 1.8–7.7)
NEUTROPHILS NFR BLD: 70.9 % (ref 38–73)
NRBC BLD-RTO: 0 /100 WBC
PLATELET # BLD AUTO: 188 K/UL (ref 150–450)
PMV BLD AUTO: 10.9 FL (ref 9.2–12.9)
POTASSIUM SERPL-SCNC: 4 MMOL/L (ref 3.5–5.1)
PROT SERPL-MCNC: 6.9 G/DL (ref 6–8.4)
RBC # BLD AUTO: 4.67 M/UL (ref 4.6–6.2)
SODIUM SERPL-SCNC: 140 MMOL/L (ref 136–145)
TROPONIN I SERPL DL<=0.01 NG/ML-MCNC: 31.03 NG/ML (ref 0–0.03)
WBC # BLD AUTO: 9.59 K/UL (ref 3.9–12.7)

## 2022-03-28 PROCEDURE — 93005 ELECTROCARDIOGRAM TRACING: CPT

## 2022-03-28 PROCEDURE — 99285 EMERGENCY DEPT VISIT HI MDM: CPT | Mod: 25

## 2022-03-28 PROCEDURE — 99284 EMERGENCY DEPT VISIT MOD MDM: CPT | Mod: HCNC,CS,, | Performed by: EMERGENCY MEDICINE

## 2022-03-28 PROCEDURE — 93010 ELECTROCARDIOGRAM REPORT: CPT | Mod: 76,,, | Performed by: INTERNAL MEDICINE

## 2022-03-28 PROCEDURE — 84484 ASSAY OF TROPONIN QUANT: CPT

## 2022-03-28 PROCEDURE — 83880 ASSAY OF NATRIURETIC PEPTIDE: CPT

## 2022-03-28 PROCEDURE — 96374 THER/PROPH/DIAG INJ IV PUSH: CPT

## 2022-03-28 PROCEDURE — 80053 COMPREHEN METABOLIC PANEL: CPT

## 2022-03-28 PROCEDURE — 94640 AIRWAY INHALATION TREATMENT: CPT

## 2022-03-28 PROCEDURE — 99900035 HC TECH TIME PER 15 MIN (STAT)

## 2022-03-28 PROCEDURE — 99284 PR EMERGENCY DEPT VISIT,LEVEL IV: ICD-10-PCS | Mod: HCNC,CS,, | Performed by: EMERGENCY MEDICINE

## 2022-03-28 PROCEDURE — 93010 EKG 12-LEAD: ICD-10-PCS | Mod: ,,, | Performed by: INTERNAL MEDICINE

## 2022-03-28 PROCEDURE — 94761 N-INVAS EAR/PLS OXIMETRY MLT: CPT

## 2022-03-28 PROCEDURE — 25000003 PHARM REV CODE 250: Performed by: EMERGENCY MEDICINE

## 2022-03-28 PROCEDURE — 25000242 PHARM REV CODE 250 ALT 637 W/ HCPCS

## 2022-03-28 PROCEDURE — 85025 COMPLETE CBC W/AUTO DIFF WBC: CPT

## 2022-03-28 PROCEDURE — 93010 ELECTROCARDIOGRAM REPORT: CPT | Mod: ,,, | Performed by: INTERNAL MEDICINE

## 2022-03-28 PROCEDURE — 25000003 PHARM REV CODE 250

## 2022-03-28 PROCEDURE — 63600175 PHARM REV CODE 636 W HCPCS

## 2022-03-28 RX ORDER — MAG HYDROX/ALUMINUM HYD/SIMETH 200-200-20
5 SUSPENSION, ORAL (FINAL DOSE FORM) ORAL
Status: COMPLETED | OUTPATIENT
Start: 2022-03-28 | End: 2022-03-28

## 2022-03-28 RX ORDER — IPRATROPIUM BROMIDE AND ALBUTEROL SULFATE 2.5; .5 MG/3ML; MG/3ML
3 SOLUTION RESPIRATORY (INHALATION)
Status: COMPLETED | OUTPATIENT
Start: 2022-03-28 | End: 2022-03-28

## 2022-03-28 RX ORDER — FUROSEMIDE 10 MG/ML
80 INJECTION INTRAMUSCULAR; INTRAVENOUS
Status: COMPLETED | OUTPATIENT
Start: 2022-03-28 | End: 2022-03-29

## 2022-03-28 RX ORDER — ASPIRIN 325 MG
325 TABLET ORAL
Status: COMPLETED | OUTPATIENT
Start: 2022-03-28 | End: 2022-03-28

## 2022-03-28 RX ORDER — PREDNISONE 20 MG/1
60 TABLET ORAL
Status: COMPLETED | OUTPATIENT
Start: 2022-03-28 | End: 2022-03-28

## 2022-03-28 RX ADMIN — ASPIRIN 325 MG ORAL TABLET 325 MG: 325 PILL ORAL at 10:03

## 2022-03-28 RX ADMIN — IPRATROPIUM BROMIDE AND ALBUTEROL SULFATE 3 ML: 2.5; .5 SOLUTION RESPIRATORY (INHALATION) at 10:03

## 2022-03-28 RX ADMIN — ALUMINUM HYDROXIDE, MAGNESIUM HYDROXIDE, AND SIMETHICONE 5 ML: 200; 200; 20 SUSPENSION ORAL at 10:03

## 2022-03-28 RX ADMIN — PREDNISONE 60 MG: 20 TABLET ORAL at 10:03

## 2022-03-29 DIAGNOSIS — I50.9 ACUTE DECOMPENSATED HEART FAILURE: Primary | ICD-10-CM

## 2022-03-29 LAB
ABO + RH BLD: NORMAL
APTT BLDCRRT: 28.1 SEC (ref 21–32)
APTT BLDCRRT: 30.1 SEC (ref 21–32)
APTT BLDCRRT: 32.8 SEC (ref 21–32)
APTT BLDCRRT: 33.1 SEC (ref 21–32)
ASCENDING AORTA: 4.43 CM
AV INDEX (PROSTH): 0.99
AV MEAN GRADIENT: 4 MMHG
AV PEAK GRADIENT: 9 MMHG
AV VALVE AREA: 4.95 CM2
AV VELOCITY RATIO: 0.82
BASOPHILS # BLD AUTO: 0.02 K/UL (ref 0–0.2)
BASOPHILS NFR BLD: 0.3 % (ref 0–1.9)
BLD GP AB SCN CELLS X3 SERPL QL: NORMAL
BSA FOR ECHO PROCEDURE: 2.19 M2
CTP QC/QA: YES
CV ECHO LV RWT: 0.36 CM
DIFFERENTIAL METHOD: ABNORMAL
DOP CALC AO PEAK VEL: 1.46 M/S
DOP CALC AO VTI: 24.67 CM
DOP CALC LVOT AREA: 5 CM2
DOP CALC LVOT DIAMETER: 2.53 CM
DOP CALC LVOT PEAK VEL: 1.2 M/S
DOP CALC LVOT STROKE VOLUME: 122.1 CM3
DOP CALCLVOT PEAK VEL VTI: 24.3 CM
E WAVE DECELERATION TIME: 211.49 MSEC
E/A RATIO: 2.38
E/E' RATIO: 17 M/S
ECHO LV POSTERIOR WALL: 1.06 CM (ref 0.6–1.1)
EJECTION FRACTION: 35 %
EOSINOPHIL # BLD AUTO: 0 K/UL (ref 0–0.5)
EOSINOPHIL NFR BLD: 0 % (ref 0–8)
ERYTHROCYTE [DISTWIDTH] IN BLOOD BY AUTOMATED COUNT: 14.5 % (ref 11.5–14.5)
ESTIMATED AVG GLUCOSE: 117 MG/DL (ref 68–131)
FRACTIONAL SHORTENING: 14 % (ref 28–44)
HBA1C MFR BLD: 5.7 % (ref 4–5.6)
HCT VFR BLD AUTO: 43.2 % (ref 40–54)
HGB BLD-MCNC: 14.2 G/DL (ref 14–18)
IMM GRANULOCYTES # BLD AUTO: 0.02 K/UL (ref 0–0.04)
IMM GRANULOCYTES NFR BLD AUTO: 0.3 % (ref 0–0.5)
INR PPP: 1.2 (ref 0.8–1.2)
INTERVENTRICULAR SEPTUM: 0.91 CM (ref 0.6–1.1)
IVRT: 85.63 MSEC
LA MAJOR: 6.08 CM
LA MINOR: 5.8 CM
LA WIDTH: 4.67 CM
LEFT ATRIUM SIZE: 4.66 CM
LEFT ATRIUM VOLUME INDEX MOD: 43.1 ML/M2
LEFT ATRIUM VOLUME INDEX: 51.3 ML/M2
LEFT ATRIUM VOLUME MOD: 92.2 CM3
LEFT ATRIUM VOLUME: 109.82 CM3
LEFT INTERNAL DIMENSION IN SYSTOLE: 5.06 CM (ref 2.1–4)
LEFT VENTRICLE DIASTOLIC VOLUME INDEX: 80.08 ML/M2
LEFT VENTRICLE DIASTOLIC VOLUME: 171.38 ML
LEFT VENTRICLE MASS INDEX: 109 G/M2
LEFT VENTRICLE SYSTOLIC VOLUME INDEX: 56.7 ML/M2
LEFT VENTRICLE SYSTOLIC VOLUME: 121.44 ML
LEFT VENTRICULAR INTERNAL DIMENSION IN DIASTOLE: 5.87 CM (ref 3.5–6)
LEFT VENTRICULAR MASS: 233.25 G
LV LATERAL E/E' RATIO: 14.88 M/S
LV SEPTAL E/E' RATIO: 19.83 M/S
LYMPHOCYTES # BLD AUTO: 0.7 K/UL (ref 1–4.8)
LYMPHOCYTES NFR BLD: 9.5 % (ref 18–48)
MAGNESIUM SERPL-MCNC: 1.6 MG/DL (ref 1.6–2.6)
MCH RBC QN AUTO: 30.3 PG (ref 27–31)
MCHC RBC AUTO-ENTMCNC: 32.9 G/DL (ref 32–36)
MCV RBC AUTO: 92 FL (ref 82–98)
MONOCYTES # BLD AUTO: 0.2 K/UL (ref 0.3–1)
MONOCYTES NFR BLD: 2.7 % (ref 4–15)
MV A" WAVE DURATION": 14.84 MSEC
MV PEAK A VEL: 0.5 M/S
MV PEAK E VEL: 1.19 M/S
MV STENOSIS PRESSURE HALF TIME: 61.33 MS
MV VALVE AREA P 1/2 METHOD: 3.59 CM2
NEUTROPHILS # BLD AUTO: 6.5 K/UL (ref 1.8–7.7)
NEUTROPHILS NFR BLD: 87.2 % (ref 38–73)
NRBC BLD-RTO: 0 /100 WBC
PISA MRMAX VEL: 0.05 M/S
PISA RADIUS: 0.44 CM
PISA TR MAX VEL: 3.28 M/S
PISA TR VN NYQUIST: 0.01 M/S
PLATELET # BLD AUTO: 179 K/UL (ref 150–450)
PMV BLD AUTO: 11.6 FL (ref 9.2–12.9)
PROTHROMBIN TIME: 11.9 SEC (ref 9–12.5)
PULM VEIN S/D RATIO: 0.57
PV PEAK D VEL: 0.75 M/S
PV PEAK S VEL: 0.43 M/S
QEF: 41 %
RA MAJOR: 5.33 CM
RA PRESSURE: 3 MMHG
RA WIDTH: 3.56 CM
RBC # BLD AUTO: 4.69 M/UL (ref 4.6–6.2)
RIGHT VENTRICULAR END-DIASTOLIC DIMENSION: 3.63 CM
RV TISSUE DOPPLER FREE WALL SYSTOLIC VELOCITY 1 (APICAL 4 CHAMBER VIEW): 9.56 CM/S
SARS-COV-2 RDRP RESP QL NAA+PROBE: NEGATIVE
SINUS: 4.11 CM
STJ: 3.46 CM
TDI LATERAL: 0.08 M/S
TDI SEPTAL: 0.06 M/S
TDI: 0.07 M/S
TR MAX PG: 43 MMHG
TRICUSPID ANNULAR PLANE SYSTOLIC EXCURSION: 1.62 CM
TROPONIN I SERPL DL<=0.01 NG/ML-MCNC: 15 NG/ML (ref 0–0.03)
TROPONIN I SERPL DL<=0.01 NG/ML-MCNC: 16.14 NG/ML (ref 0–0.03)
TROPONIN I SERPL DL<=0.01 NG/ML-MCNC: 20.16 NG/ML (ref 0–0.03)
TV REST PULMONARY ARTERY PRESSURE: 46 MMHG
WBC # BLD AUTO: 7.45 K/UL (ref 3.9–12.7)

## 2022-03-29 PROCEDURE — 85025 COMPLETE CBC W/AUTO DIFF WBC: CPT

## 2022-03-29 PROCEDURE — 99222 1ST HOSP IP/OBS MODERATE 55: CPT | Mod: ,,, | Performed by: INTERNAL MEDICINE

## 2022-03-29 PROCEDURE — 86901 BLOOD TYPING SEROLOGIC RH(D): CPT | Performed by: INTERNAL MEDICINE

## 2022-03-29 PROCEDURE — 94761 N-INVAS EAR/PLS OXIMETRY MLT: CPT

## 2022-03-29 PROCEDURE — 25000242 PHARM REV CODE 250 ALT 637 W/ HCPCS: Performed by: INTERNAL MEDICINE

## 2022-03-29 PROCEDURE — 63600175 PHARM REV CODE 636 W HCPCS

## 2022-03-29 PROCEDURE — 63600175 PHARM REV CODE 636 W HCPCS: Performed by: EMERGENCY MEDICINE

## 2022-03-29 PROCEDURE — S4991 NICOTINE PATCH NONLEGEND: HCPCS | Performed by: STUDENT IN AN ORGANIZED HEALTH CARE EDUCATION/TRAINING PROGRAM

## 2022-03-29 PROCEDURE — 85730 THROMBOPLASTIN TIME PARTIAL: CPT

## 2022-03-29 PROCEDURE — 25000003 PHARM REV CODE 250: Performed by: INTERNAL MEDICINE

## 2022-03-29 PROCEDURE — 99223 1ST HOSP IP/OBS HIGH 75: CPT | Mod: ,,, | Performed by: INTERNAL MEDICINE

## 2022-03-29 PROCEDURE — 84484 ASSAY OF TROPONIN QUANT: CPT | Mod: 91 | Performed by: STUDENT IN AN ORGANIZED HEALTH CARE EDUCATION/TRAINING PROGRAM

## 2022-03-29 PROCEDURE — 63600175 PHARM REV CODE 636 W HCPCS: Performed by: INTERNAL MEDICINE

## 2022-03-29 PROCEDURE — 25000003 PHARM REV CODE 250

## 2022-03-29 PROCEDURE — 83036 HEMOGLOBIN GLYCOSYLATED A1C: CPT | Performed by: INTERNAL MEDICINE

## 2022-03-29 PROCEDURE — 25000003 PHARM REV CODE 250: Performed by: STUDENT IN AN ORGANIZED HEALTH CARE EDUCATION/TRAINING PROGRAM

## 2022-03-29 PROCEDURE — 93005 ELECTROCARDIOGRAM TRACING: CPT

## 2022-03-29 PROCEDURE — 85730 THROMBOPLASTIN TIME PARTIAL: CPT | Mod: 91 | Performed by: STUDENT IN AN ORGANIZED HEALTH CARE EDUCATION/TRAINING PROGRAM

## 2022-03-29 PROCEDURE — 99223 PR INITIAL HOSPITAL CARE,LEVL III: ICD-10-PCS | Mod: ,,, | Performed by: INTERNAL MEDICINE

## 2022-03-29 PROCEDURE — 93010 ELECTROCARDIOGRAM REPORT: CPT | Mod: ,,, | Performed by: INTERNAL MEDICINE

## 2022-03-29 PROCEDURE — 84484 ASSAY OF TROPONIN QUANT: CPT | Mod: 91

## 2022-03-29 PROCEDURE — 20600001 HC STEP DOWN PRIVATE ROOM

## 2022-03-29 PROCEDURE — 93010 EKG 12-LEAD: ICD-10-PCS | Mod: ,,, | Performed by: INTERNAL MEDICINE

## 2022-03-29 PROCEDURE — 99222 PR INITIAL HOSPITAL CARE,LEVL II: ICD-10-PCS | Mod: ,,, | Performed by: INTERNAL MEDICINE

## 2022-03-29 PROCEDURE — 36415 COLL VENOUS BLD VENIPUNCTURE: CPT | Performed by: STUDENT IN AN ORGANIZED HEALTH CARE EDUCATION/TRAINING PROGRAM

## 2022-03-29 PROCEDURE — U0002 COVID-19 LAB TEST NON-CDC: HCPCS

## 2022-03-29 PROCEDURE — 94640 AIRWAY INHALATION TREATMENT: CPT

## 2022-03-29 PROCEDURE — 85610 PROTHROMBIN TIME: CPT

## 2022-03-29 PROCEDURE — 36415 COLL VENOUS BLD VENIPUNCTURE: CPT

## 2022-03-29 PROCEDURE — 83735 ASSAY OF MAGNESIUM: CPT | Performed by: INTERNAL MEDICINE

## 2022-03-29 RX ORDER — PRASUGREL 10 MG/1
10 TABLET, FILM COATED ORAL DAILY
Status: DISCONTINUED | OUTPATIENT
Start: 2022-03-29 | End: 2022-03-31 | Stop reason: HOSPADM

## 2022-03-29 RX ORDER — NITROGLYCERIN 0.4 MG/1
0.4 TABLET SUBLINGUAL EVERY 5 MIN PRN
Status: DISCONTINUED | OUTPATIENT
Start: 2022-03-29 | End: 2022-03-31 | Stop reason: HOSPADM

## 2022-03-29 RX ORDER — SPIRONOLACTONE 25 MG/1
25 TABLET ORAL DAILY
Status: DISCONTINUED | OUTPATIENT
Start: 2022-03-29 | End: 2022-03-31 | Stop reason: HOSPADM

## 2022-03-29 RX ORDER — FUROSEMIDE 10 MG/ML
40 INJECTION INTRAMUSCULAR; INTRAVENOUS
Status: DISCONTINUED | OUTPATIENT
Start: 2022-03-29 | End: 2022-03-30

## 2022-03-29 RX ORDER — PANTOPRAZOLE SODIUM 40 MG/1
40 TABLET, DELAYED RELEASE ORAL DAILY
Status: DISCONTINUED | OUTPATIENT
Start: 2022-03-29 | End: 2022-03-31 | Stop reason: HOSPADM

## 2022-03-29 RX ORDER — SODIUM CHLORIDE 0.9 % (FLUSH) 0.9 %
10 SYRINGE (ML) INJECTION
Status: DISCONTINUED | OUTPATIENT
Start: 2022-03-29 | End: 2022-03-31 | Stop reason: HOSPADM

## 2022-03-29 RX ORDER — ASPIRIN 81 MG/1
81 TABLET ORAL DAILY
Status: DISCONTINUED | OUTPATIENT
Start: 2022-03-29 | End: 2022-03-31 | Stop reason: HOSPADM

## 2022-03-29 RX ORDER — ONDANSETRON 4 MG/1
8 TABLET, ORALLY DISINTEGRATING ORAL EVERY 8 HOURS PRN
Status: DISCONTINUED | OUTPATIENT
Start: 2022-03-29 | End: 2022-03-31 | Stop reason: HOSPADM

## 2022-03-29 RX ORDER — AMLODIPINE BESYLATE 5 MG/1
5 TABLET ORAL DAILY
Status: DISCONTINUED | OUTPATIENT
Start: 2022-03-29 | End: 2022-03-30

## 2022-03-29 RX ORDER — CARVEDILOL 6.25 MG/1
6.25 TABLET ORAL 2 TIMES DAILY
Status: DISCONTINUED | OUTPATIENT
Start: 2022-03-29 | End: 2022-03-31 | Stop reason: HOSPADM

## 2022-03-29 RX ORDER — HEPARIN SODIUM,PORCINE/D5W 25000/250
0-40 INTRAVENOUS SOLUTION INTRAVENOUS CONTINUOUS
Status: DISCONTINUED | OUTPATIENT
Start: 2022-03-29 | End: 2022-03-31

## 2022-03-29 RX ORDER — IPRATROPIUM BROMIDE AND ALBUTEROL SULFATE 2.5; .5 MG/3ML; MG/3ML
3 SOLUTION RESPIRATORY (INHALATION)
Status: DISCONTINUED | OUTPATIENT
Start: 2022-03-29 | End: 2022-03-31 | Stop reason: HOSPADM

## 2022-03-29 RX ORDER — ISOSORBIDE MONONITRATE 60 MG/1
60 TABLET, EXTENDED RELEASE ORAL DAILY
Status: DISCONTINUED | OUTPATIENT
Start: 2022-03-29 | End: 2022-03-31 | Stop reason: HOSPADM

## 2022-03-29 RX ORDER — ATORVASTATIN CALCIUM 20 MG/1
80 TABLET, FILM COATED ORAL DAILY
Refills: 3 | Status: DISCONTINUED | OUTPATIENT
Start: 2022-03-29 | End: 2022-03-31 | Stop reason: HOSPADM

## 2022-03-29 RX ORDER — FENOFIBRATE 145 MG/1
145 TABLET, FILM COATED ORAL DAILY
Status: DISCONTINUED | OUTPATIENT
Start: 2022-03-29 | End: 2022-03-31 | Stop reason: HOSPADM

## 2022-03-29 RX ORDER — TALC
6 POWDER (GRAM) TOPICAL NIGHTLY PRN
Status: DISCONTINUED | OUTPATIENT
Start: 2022-03-29 | End: 2022-03-31 | Stop reason: HOSPADM

## 2022-03-29 RX ORDER — POLYETHYLENE GLYCOL 3350 17 G/17G
17 POWDER, FOR SOLUTION ORAL 2 TIMES DAILY PRN
Status: DISCONTINUED | OUTPATIENT
Start: 2022-03-29 | End: 2022-03-31 | Stop reason: HOSPADM

## 2022-03-29 RX ORDER — NICOTINE 7MG/24HR
1 PATCH, TRANSDERMAL 24 HOURS TRANSDERMAL DAILY
Status: DISCONTINUED | OUTPATIENT
Start: 2022-03-29 | End: 2022-03-31 | Stop reason: HOSPADM

## 2022-03-29 RX ORDER — LISINOPRIL 20 MG/1
20 TABLET ORAL DAILY
Status: DISCONTINUED | OUTPATIENT
Start: 2022-03-29 | End: 2022-03-29

## 2022-03-29 RX ORDER — RANOLAZINE 500 MG/1
500 TABLET, EXTENDED RELEASE ORAL 2 TIMES DAILY
Status: DISCONTINUED | OUTPATIENT
Start: 2022-03-29 | End: 2022-03-31 | Stop reason: HOSPADM

## 2022-03-29 RX ORDER — SODIUM CHLORIDE 0.9 % (FLUSH) 0.9 %
3 SYRINGE (ML) INJECTION
Status: DISCONTINUED | OUTPATIENT
Start: 2022-03-29 | End: 2022-03-31 | Stop reason: HOSPADM

## 2022-03-29 RX ORDER — GABAPENTIN 300 MG/1
300 CAPSULE ORAL 3 TIMES DAILY
Status: DISCONTINUED | OUTPATIENT
Start: 2022-03-29 | End: 2022-03-31 | Stop reason: HOSPADM

## 2022-03-29 RX ORDER — PREDNISONE 20 MG/1
40 TABLET ORAL DAILY
Status: DISCONTINUED | OUTPATIENT
Start: 2022-03-29 | End: 2022-03-31 | Stop reason: HOSPADM

## 2022-03-29 RX ADMIN — GABAPENTIN 300 MG: 300 CAPSULE ORAL at 08:03

## 2022-03-29 RX ADMIN — PREDNISONE 40 MG: 20 TABLET ORAL at 08:03

## 2022-03-29 RX ADMIN — CARVEDILOL 6.25 MG: 6.25 TABLET, FILM COATED ORAL at 08:03

## 2022-03-29 RX ADMIN — FUROSEMIDE 40 MG: 10 INJECTION, SOLUTION INTRAMUSCULAR; INTRAVENOUS at 08:03

## 2022-03-29 RX ADMIN — HEPARIN SODIUM 12 UNITS/KG/HR: 5000 INJECTION INTRAVENOUS; SUBCUTANEOUS at 02:03

## 2022-03-29 RX ADMIN — IPRATROPIUM BROMIDE AND ALBUTEROL SULFATE 3 ML: 2.5; .5 SOLUTION RESPIRATORY (INHALATION) at 07:03

## 2022-03-29 RX ADMIN — NICOTINE 1 PATCH: 7 PATCH, EXTENDED RELEASE TRANSDERMAL at 03:03

## 2022-03-29 RX ADMIN — IPRATROPIUM BROMIDE AND ALBUTEROL SULFATE 3 ML: 2.5; .5 SOLUTION RESPIRATORY (INHALATION) at 08:03

## 2022-03-29 RX ADMIN — FENOFIBRATE 145 MG: 145 TABLET, FILM COATED ORAL at 08:03

## 2022-03-29 RX ADMIN — HEPARIN SODIUM 15 UNITS/KG/HR: 5000 INJECTION INTRAVENOUS; SUBCUTANEOUS at 06:03

## 2022-03-29 RX ADMIN — GABAPENTIN 300 MG: 300 CAPSULE ORAL at 03:03

## 2022-03-29 RX ADMIN — LISINOPRIL 20 MG: 20 TABLET ORAL at 08:03

## 2022-03-29 RX ADMIN — ATORVASTATIN CALCIUM 80 MG: 20 TABLET, FILM COATED ORAL at 08:03

## 2022-03-29 RX ADMIN — ASPIRIN 81 MG: 81 TABLET, COATED ORAL at 08:03

## 2022-03-29 RX ADMIN — PANTOPRAZOLE SODIUM 40 MG: 40 TABLET, DELAYED RELEASE ORAL at 08:03

## 2022-03-29 RX ADMIN — RANOLAZINE 500 MG: 500 TABLET, EXTENDED RELEASE ORAL at 08:03

## 2022-03-29 RX ADMIN — ISOSORBIDE MONONITRATE 60 MG: 60 TABLET, EXTENDED RELEASE ORAL at 08:03

## 2022-03-29 RX ADMIN — AMLODIPINE BESYLATE 5 MG: 5 TABLET ORAL at 08:03

## 2022-03-29 RX ADMIN — FUROSEMIDE 80 MG: 10 INJECTION, SOLUTION INTRAVENOUS at 12:03

## 2022-03-29 RX ADMIN — PRASUGREL 10 MG: 10 TABLET, FILM COATED ORAL at 11:03

## 2022-03-29 RX ADMIN — SPIRONOLACTONE 25 MG: 25 TABLET, FILM COATED ORAL at 08:03

## 2022-03-29 NOTE — ASSESSMENT & PLAN NOTE
EF 40%. BNP 1500 on admission with CXR suggestive of pulmonary edema.  Continue current GDMT: Coreg 6.25mg BID, spironolactone 25mg, change lisinopril to Entresto (stopped this AM, will start Entresto 3/30 PM)  · Recommend diureses with Lasix 40IV BID  · Repeat echo  · Strict I/Os  · Low Na diet

## 2022-03-29 NOTE — PROGRESS NOTES
[facility] - Cardiology Stepdown  Cardiology  Progress Note    Patient Name: Lonnie Taylor  MRN: [id number]  Admission Date: 3/28/2022  Hospital Length of Stay: 0 days  Code Status: Full Code   Attending Physician: Tremayne Stahl MD   Primary Care Physician: Isiah You MD  Expected Discharge Date:   Principal Problem:NSTEMI (non-ST elevated myocardial infarction)    Subjective:     Hospital Course:   No notes on file    Interval History: Admitted to hospital medicine for NSTEMI and started on ACS protocol with heparin drip. Diuresed for ADHF    ROS  Objective:     Vital Signs (Most Recent):  Temp: 97.2 °F (36.2 °C) (03/29/22 0759)  Pulse: 84 (03/29/22 0806)  Resp: 18 (03/29/22 0806)  BP: (!) 175/82 (03/29/22 0759)  SpO2: (!) 94 % (03/29/22 0806)   Vital Signs (24h Range):  Temp:  [97.2 °F (36.2 °C)-98.6 °F (37 °C)] 97.2 °F (36.2 °C)  Pulse:  [65-90] 84  Resp:  [12-29] 18  SpO2:  [91 %-100 %] 94 %  BP: (120-175)/(67-82) 175/82     Weight: 99.3 kg (218 lb 14.7 oz)  Body mass index is 32.33 kg/m².     SpO2: (!) 94 %  O2 Device (Oxygen Therapy): room air      Intake/Output Summary (Last 24 hours) at 3/29/2022 1100  Last data filed at 3/29/2022 0943  Gross per 24 hour   Intake 109.44 ml   Output --   Net 109.44 ml       Lines/Drains/Airways       Peripheral Intravenous Line  Duration                  Peripheral IV - Single Lumen 03/28/22 2247 20 G Right Antecubital <1 day                    Physical Exam  Vitals and nursing note reviewed.   Constitutional:       General: He is not in acute distress.     Appearance: He is not ill-appearing, toxic-appearing or diaphoretic.   HENT:      Head: Normocephalic.   Cardiovascular:      Rate and Rhythm: Normal rate and regular rhythm.      Heart sounds: No murmur heard.     Comments: Slightly elevated JVP around 3cm above clavicle  Pulmonary:      Effort: Pulmonary effort is normal.      Breath sounds: No wheezing.   Abdominal:      Palpations: Abdomen is soft.       Tenderness: There is no guarding.   Skin:     General: Skin is warm and dry.   Neurological:      Mental Status: He is alert. Mental status is at baseline.      Cranial Nerves: No dysarthria.   Psychiatric:         Mood and Affect: Mood normal.         Behavior: Behavior normal.       Significant Labs: All pertinent lab results from the last 24 hours have been reviewed.    Significant Imaging: EKG: reviewed    Assessment and Plan:       * NSTEMI (non-ST elevated myocardial infarction)  Briefly this is Mr. Taylor who is a 72-year-old male with a past medical history of CAD with previous CABG and PCI (most recent 3/2020) and multiple cardiac risk factors presenting with chest pain, found to have NSTEMI on admission with trop of 31.  Most recent LHC 2/14/22 with Ost LM to mid % stenosed, Ost RCA to prox % stenosed, mild diffuse disease in grafts to 1st ck and distal LAD, yet remain patent. LVEDP 35mmHg. No interventions were done at the time. He continues to smoke daily and has poor dietary compliance. I would treat for ACS given significant elevation in troponin and chest pain with known significant CAD as well for ADHF. Currently hemodynamically stable and chest pain free.    Recommendations:   Continue DAPT (ASA, prasugrel)   recommend medical management (ACS protocol) with heparin gtt x 48 hours    EKG + NTG 0.4mg SL PRN q5m for chest pain (max 3 doses)   Continue high intensity statin + fenofibrate    Discontinued lisinopril + will start washout period before transitioning to Entresto 3/30 PM   Continue Coreg 6.25mg bid and uptitrate to target HR 60; recommend uptitration after initiation of Entresto with likely concurrent discontinuation of amlodipine   Consider increasing Imdur to 120 mg daily in future once transitioned to Entresto   Continue ranolazine 500mg BID   F/u official TTE this morning   continue tele monitoring         Acute decompensated heart failure  EF 40%. BNP 1500 on  admission with CXR suggestive of pulmonary edema.  Continue current GDMT: Coreg 6.25mg BID, spironolactone 25mg, change lisinopril to Entresto (stopped this AM, will start Entresto 3/30 PM)  · Recommend diureses with Lasix 40IV BID  · Repeat echo  · Strict I/Os  · Low Na diet    PAD (peripheral artery disease)  · cilostazol discontinued prior admission due to HF   · DAPT with aspirin + prasugrel + AC with low dose Xarelto 2.5mg daily once off heparin drip        VTE Risk Mitigation (From admission, onward)         Ordered     IP VTE HIGH RISK PATIENT  Once         03/29/22 0143     Place sequential compression device  Until discontinued         03/29/22 0143     heparin 25,000 units in dextrose 5% (100 units/ml) IV bolus from bag - ADDITIONAL PRN BOLUS - 60 units/kg (max bolus 4000 units)  As needed (PRN)        Question:  Heparin Infusion Adjustment (DO NOT MODIFY ANSWER)  Answer:  [email or web address]\Images\Pharmacy\HeparinInfusions\heparin LOW INTENSITY nomogram for [facility] [id number]    03/29/22 0039     heparin 25,000 units in dextrose 5% (100 units/ml) IV bolus from bag - ADDITIONAL PRN BOLUS - 30 units/kg (max bolus 4000 units)  As needed (PRN)        Question:  Heparin Infusion Adjustment (DO NOT MODIFY ANSWER)  Answer:  [email or web address]\Images\Pharmacy\HeparinInfusions\heparin LOW INTENSITY nomogram for [facility] [id number]    03/29/22 0039     Place sequential compression device  Until discontinued         03/29/22 0135     Place sequential compression device  Until discontinued         03/29/22 0134     heparin 25,000 units in dextrose 5% 250 mL (100 units/mL) infusion LOW INTENSITY nomogram - [facility]  Continuous        Question Answer Comment   Heparin Infusion Adjustment (DO NOT MODIFY ANSWER) [email or web address]\[email or web address]\Images\Pharmacy\HeparinInfusions\heparin LOW INTENSITY nomogram for [facility] [id number]    Begin at (in units/kg/hr) 12        03/29/22 0039                Rosalba Zamora MD  Cardiology  [facility] -  Cardiology Stepdown

## 2022-03-29 NOTE — CONSULTS
"[facility] - Emergency Dept  Cardiology  Consult Note    Patient Name: Lonnie Taylor  MRN: [id number]  Admission Date: 3/28/2022  Hospital Length of Stay: 0 days  Code Status: Prior   Attending Provider: Tremayne Stahl MD   Consulting Provider: Roxana Jimenez MD  Primary Care Physician: Isiah You MD  Principal Problem:NSTEMI (non-ST elevated myocardial infarction)    Patient information was obtained from patient, past medical records and ER records.     Inpatient consult to Cardiology  Consult performed by: Roxana Jimenez MD  Consult ordered by: Leah Manrique MD  Reason for consult: Chest discomfort, NSTEMI        Subjective:     Chief Complaint:  Indigestion     HPI:   Mr. Lonnie Taylor is a 73 yo M with pmhx of CAD s/p Cabg (2v In 6/22/2006), PCI 2/9/18 and 3/2020, reports LHC in 7/2020 and 9/2021 which showed patent grafts. He also has known PAD f(or which he was recently started on xarelto 2.5), obesity, significant tobacco use (still 1ppd), HTN, CKD3, COPD and HLD. He was recently admitted with an NSTEMI with peak trop of 21. He had a LHC with revealed severely diffuse CAD with patent LIMA to LAD and patent SVG to OM. No interventions were done at that time and decision was made to maximize medical management, including risk factor reduction.     Today he presents with complaints of chest discomfort. He reports having indigestion/burning sensation for the past 2 days after admitting to eating heavily seasoned food. He decided to come to the ED bc pain was ongoing for 2 days and did no subside despite NTG and prilosec. No radiation of pain. He does report increased shortness of breath from his baseline. Otherwise, he denies palpitations, headaches, visual changes, fatigue, abdominal pain, or PND. He does occasionally get lower extremity edema depending on what he eats. He admits to poor dietary compliance and eats "very seasoned foods".    Bedside echo showed EF 40% with some inferior " wall hypokinesis unchanged from prior echo. Slight ST elevations in AVR with no reciprocal ST depressions. Labs remerkable for elevated trop of 31, BNP of 1500. IVC 1.9 cm and appears >50% collapsible suggestive of CVP 6-10.    LHC 2/14/22  · Severe native vessel CAD  · Patent LIMA to LAD  · Patent SVG to OM  · Diastolic dysfucntion, LVEDP 35 mmHg    TTE 2/12/22  · The left ventricle is normal in size with moderately decreased systolic function.  · The estimated ejection fraction is 40%.  · There are segmental left ventricular wall motion abnormalities.  · Grade III left ventricular diastolic dysfunction.  · Mild left atrial enlargement.  · Normal right ventricular size with normal right ventricular systolic function.  · Mild right atrial enlargement.  · Mild mitral regurgitation.  · Mild aortic regurgitation.  · Normal central venous pressure (3 mmHg).  · The estimated PA systolic pressure is 19 mmHg.  · The ascending aorta is dilated.  · Posterior pericardial effusion.          Past Medical History:   Diagnosis Date    CAD (coronary artery disease)     Dr Martinez    Carotid artery disease     Chronic kidney disease, stage III (moderate)     Depression     Dyslipidemia     Hepatic cyst     Hepatic cyst     High-density lipoprotein deficiency     HTN (hypertension)     Hx of colonic polyps     Insomnia     PVD (peripheral vascular disease)     stented    S/P AAA repair        Past Surgical History:   Procedure Laterality Date    ABDOMINAL AORTIC ANEURYSM REPAIR      ABDOMINAL AORTIC ANEURYSM REPAIR      ANGIOGRAM, CORONARY, WITH LEFT HEART CATHETERIZATION Left 2/14/2022    Procedure: Angiogram, Coronary, with Left Heart Cath;  Surgeon: Delfino Castellon MD;  Location: [facility] CATH LAB;  Service: Cardiology;  Laterality: Left;    ANGIOGRAPHY OF LOWER EXTREMITY Left 10/23/2018    Procedure: Angiogram Extremity Unilateral;  Surgeon: Gregor Cunha MD;  Location: [facility] CATH;  Service: Cardiology;   Laterality: Left;  LLE intervention-Will start with 4/5 slender sheath left radial and take diagnostic angio of LLE to determine whether brachial access or tibial access will be required    CATARACT EXTRACTION W/ INTRAOCULAR LENS  IMPLANT, BILATERAL      CATARACT SX Bilateral     CATHETERIZATION OF BOTH LEFT AND RIGHT HEART N/A 7/10/2020    Procedure: CATHETERIZATION, HEART, BOTH LEFT AND RIGHT;  Surgeon: Gregor Cunha MD;  Location: [facility] CATH;  Service: Cardiology;  Laterality: N/A;  LHC + RHC +/- PCI -access left radial artery and left brachial vein    CHOLECYSTECTOMY      COLONOSCOPY N/A 10/7/2015    Procedure: COLONOSCOPY;  Surgeon: Genaro You MD;  Location: [facility] ENDO (4TH FLR);  Service: Endoscopy;  Laterality: N/A;    CORONARY ANGIOPLASTY WITH STENT PLACEMENT      CORONARY ARTERY BYPASS GRAFT      CORONARY BYPASS GRAFT ANGIOGRAPHY  2/14/2022    Procedure: Bypass graft study;  Surgeon: Delfino Castellon MD;  Location: [facility] CATH LAB;  Service: Cardiology;;    HERNIA REPAIR      LAMINECTOMY      MAGNETIC RESONANCE IMAGING N/A 6/4/2021    Procedure: MRI (MAGNETIC RESONANCE IMAGING) LUMBAR SPINE;  Surgeon: Asa Diagnostic Provider;  Location: [facility] MARLA;  Service: General;  Laterality: N/A;  In MRI @ 9:10 per Krys, To follow WC RM1    PATELLA SURGERY      PERCUTANEOUS TRANSLUMINAL ANGIOPLASTY (PTA) OF PERIPHERAL VESSEL N/A 9/18/2018    Procedure: PTA, PERIPHERAL BLOOD VESSEL;  Surgeon: Gregor Cunha MD;  Location: [facility] CATH;  Service: Cardiology;  Laterality: N/A;  Site change:  right popliteal artery access using US guidance.    PERCUTANEOUS TRANSLUMINAL ANGIOPLASTY (PTA) OF PERIPHERAL VESSEL Left 7/25/2019    Procedure: PTA, PERIPHERAL VESSEL;  Surgeon: Gregor Cunha MD;  Location: [facility] CATH;  Service: Cardiology;  Laterality: Left;    VASECTOMY         Review of patient's allergies indicates:   Allergen Reactions    Aggrastat concentrate [tirofiban] Shortness Of Breath and Other  "(See Comments)     Fever and chills    Brilinta [ticagrelor] Shortness Of Breath    Cymbalta [duloxetine] Nausea Only    Bupropion Other (See Comments)     "turns into zombie" withdrawn, not talking, outbursts       No current facility-administered medications on file prior to encounter.     Current Outpatient Medications on File Prior to Encounter   Medication Sig    albuterol (VENTOLIN HFA) 90 mcg/actuation inhaler Inhale 2 puffs into the lungs every 6 (six) hours as needed for Wheezing. Rescue    amLODIPine (NORVASC) 5 MG tablet Take 1 tablet (5 mg total) by mouth once daily.    aspirin (ECOTRIN) 81 MG EC tablet Take 81 mg by mouth once daily.    carvediloL (COREG) 6.25 MG tablet Take 1 tablet (6.25 mg total) by mouth 2 (two) times daily.    fenofibrate (TRICOR) 145 MG tablet Take 1 tablet (145 mg total) by mouth once daily.    furosemide (LASIX) 20 MG tablet 20 mg once daily.     gabapentin (NEURONTIN) 300 MG capsule 3 (three) times daily.    isosorbide mononitrate (IMDUR) 60 MG 24 hr tablet Take 1 tablet (60 mg total) by mouth once daily.    lisinopriL (PRINIVIL,ZESTRIL) 20 MG tablet Take 1 tablet (20 mg total) by mouth once daily.    nitroGLYCERIN (NITROSTAT) 0.4 MG SL tablet Place 1 tablet (0.4 mg total) under the tongue every 5 (five) minutes as needed for Chest pain. (Patient not taking: Reported on 2/16/2022)    pantoprazole (PROTONIX) 40 MG tablet Take 40 mg by mouth once daily.    prasugreL (EFFIENT) 10 mg Tab Take 1 tablet (10 mg total) by mouth once daily.    ranolazine (RANEXA) 500 MG Tb12 Take 500 mg by mouth 2 (two) times daily. 11/15/20    rivaroxaban (XARELTO) 2.5 mg Tab Take 1 tablet (2.5 mg total) by mouth daily with dinner or evening meal.    rosuvastatin (CRESTOR) 20 MG tablet Take 2 tablets (40 mg total) by mouth once daily.    spironolactone (ALDACTONE) 25 MG tablet Take 1 tablet (25 mg total) by mouth once daily.     Family History       Problem Relation (Age of Onset)    " Heart disease Mother, Father    Lung cancer Brother          Tobacco Use    Smoking status: Current Every Day Smoker     Packs/day: 1.00     Years: 50.00     Pack years: 50.00    Smokeless tobacco: Never Used    Tobacco comment: Currently smoke 1 ppd   Substance and Sexual Activity    Alcohol use: No    Drug use: Yes     Types: Marijuana     Comment: OCCASSIONALLY    Sexual activity: Not on file     Review of Systems   Constitutional: Negative for decreased appetite, diaphoresis, fever and malaise/fatigue.   HENT: Negative.     Eyes: Negative.    Cardiovascular:  Positive for chest pain. Negative for dyspnea on exertion, irregular heartbeat, leg swelling, orthopnea, palpitations, paroxysmal nocturnal dyspnea and syncope.   Respiratory:  Positive for shortness of breath.    Endocrine: Negative.    Hematologic/Lymphatic: Negative.    Skin: Negative.    Musculoskeletal: Negative.    Gastrointestinal:  Positive for heartburn.   Genitourinary: Negative.    Neurological: Negative.    Objective:     Vital Signs (Most Recent):  Temp: 97.8 °F (36.6 °C) (03/28/22 2118)  Pulse: 75 (03/28/22 2311)  Resp: 12 (03/28/22 2231)  BP: (!) 157/78 (03/28/22 2202)  SpO2: 95 % (03/28/22 2311)   Vital Signs (24h Range):  Temp:  [97.8 °F (36.6 °C)] 97.8 °F (36.6 °C)  Pulse:  [67-75] 75  Resp:  [12-20] 12  SpO2:  [95 %-100 %] 95 %  BP: (120-157)/(70-78) 157/78     Weight: 99.8 kg (220 lb)  Body mass index is 32.49 kg/m².    SpO2: 95 %  O2 Device (Oxygen Therapy): room air    No intake or output data in the 24 hours ending 03/29/22 0050    Lines/Drains/Airways       Peripheral Intravenous Line  Duration                  Peripheral IV - Single Lumen 03/28/22 2247 20 G Right Antecubital <1 day                    Physical Exam  Vitals and nursing note reviewed.   Constitutional:       Appearance: Normal appearance.   HENT:      Head: Normocephalic and atraumatic.   Neck:      Vascular: JVD present.   Cardiovascular:      Rate and Rhythm:  Normal rate and regular rhythm.      Pulses: Normal pulses.      Heart sounds: Normal heart sounds.   Pulmonary:      Effort: Pulmonary effort is normal.      Breath sounds: Normal breath sounds.   Abdominal:      General: Abdomen is flat. There is no distension.      Palpations: Abdomen is soft.   Musculoskeletal:      Cervical back: Normal range of motion and neck supple.      Right lower leg: No edema.      Left lower leg: No edema.   Skin:     General: Skin is warm.   Neurological:      General: No focal deficit present.      Mental Status: He is alert.     Significant Labs: All pertinent lab results from the last 24 hours have been reviewed.        Assessment and Plan:     * NSTEMI (non-ST elevated myocardial infarction)  Briefly this is Mr. Taylor who is a 72-year-old male with a past medical history of CAD with previous CABG and PCI (most recent 3/2020) and multiple cardiac risk factors presenting with chest pain x 2 days, found to have NSTEMI on admission with trop of 31.  Most recent LHC 2/14/22 with Ost LM to mid % stenosed, Ost RCA to prox % stenosed, mild diffuse disease in grafts to 1st ck and distal LAD, yet remain patent. LVEDP 35mmHg. No interventions were done at the time. He continues to smoke daily and has poor dietary compliance. I would treat for ACS given significant elevation in troponin and chest pain with known significant CAD as well for ADHF. Currently hemodynamically stable and chest pain free.    Recommendations:   Continue DAPT (ASA, prasugrel)   recommend medical management (ACS protocol) with heparin gtt x 48 hours    Continue to trend troponin q 4-6 hours until peak, obtain repeat ECG if patient has recurrence of chest pain  o NTG 0.4mg SL PRN q5m for chest pain (max 3 doses)   high intensity statin >> atorvastatin 80mg daily, fenofibrate   Continue Coreg 6.25mg bid and uptitrate to target HR 60   Continue Lisinopril 20mg daily- uptitrate anti-hypertensives as  tolerated for target SBP <130   Consider increasing Imdur to 120 mg daily   Continue ranolazine 500mg BID   bedside TTE suggestive of EF ~40% with inferolateral hypokinesis (unchanged from prior) >> recommend official TTE in am to reassess LV/RV function. Consider additional ischemic assessment in significant changes noted on official TTE.  o Creatinine 1.8 today (up from baseline 1.5-1.6) -- consider IC consult if chest pain persists despite optimization of antianginals    continue tele monitoring  Admit to internal medicine and Cardiology consult team will follow in the am    Acute decompensated heart failure  EF 40%. BNP 1500 on admission with CXR suggestive of pulmonary edema.  Continue current GDMT: Coreg 6.25mg BID, Lisinopril 20mg (uptitrate as tolerated), spironolactone 25mg  · Recommend diureses with Lasix 40IV daily  · Repeat echo  · Strict I/Os  · Low Na diet    PAD (peripheral artery disease)  · cilostazol discontinued prior admission due to HF   · DAPT with aspirin + prasugrel + AC with low dose Xarelto 2.5mg daily        VTE Risk Mitigation (From admission, onward)         Ordered     heparin 25,000 units in dextrose 5% (100 units/ml) IV bolus from bag - ADDITIONAL PRN BOLUS - 60 units/kg (max bolus 4000 units)  As needed (PRN)        Question:  Heparin Infusion Adjustment (DO NOT MODIFY ANSWER)  Answer:  \\[email or web address]\epic\Images\Pharmacy\HeparinInfusions\heparin LOW INTENSITY nomogram for [facility] [id number]    03/29/22 0039     heparin 25,000 units in dextrose 5% (100 units/ml) IV bolus from bag - ADDITIONAL PRN BOLUS - 30 units/kg (max bolus 4000 units)  As needed (PRN)        Question:  Heparin Infusion Adjustment (DO NOT MODIFY ANSWER)  Answer:  \\[email or web address]\epic\Images\Pharmacy\HeparinInfusions\heparin LOW INTENSITY nomogram for [facility] [id number]    03/29/22 0039     heparin 25,000 units in dextrose 5% (100 units/ml) IV bolus from bag INITIAL BOLUS (max bolus 4000 units)  Once        Question:   Heparin Infusion Adjustment (DO NOT MODIFY ANSWER)  Answer:  [email or web address]\epic\Images\Pharmacy\HeparinInfusions\heparin LOW INTENSITY nomogram for [facility] [id number]    03/29/22 0039     heparin 25,000 units in dextrose 5% 250 mL (100 units/mL) infusion LOW INTENSITY nomogram - [facility]  Continuous        Question Answer Comment   Heparin Infusion Adjustment (DO NOT MODIFY ANSWER) [email or web address]\epic\Images\Pharmacy\HeparinInfusions\heparin LOW INTENSITY nomogram for [facility] [id number]    Begin at (in units/kg/hr) 12        03/29/22 0039              Thank you for your consult. I will follow-up with patient. Please contact us if you have any additional questions.    Roxana Jimenez MD  Cardiology   [facility] - Emergency Dept

## 2022-03-29 NOTE — ED PROVIDER NOTES
"Encounter Date: 3/28/2022       History     Chief Complaint   Patient presents with    Shortness of Breath     Starting tonight, hx copd, pt sating 95% on RA, placed on 2 L sating 98%. Pt states 1 episode of CP yesterday that resolved today, Pt believes Cp caused by jalapeno's he ate yesterday.  Hx onf NSTEMI in february     Pt is a 71 yo male with a PMH of NSTEMI last month, PVD, PAD, CKD3, COPD, AAA, and tobacco abuse that presents with a one day hx of chest pain and SOB. Pt reports last night he ate a burger with some jalapeno's and afterwards started developing some chest pain that was not relieved by prilosec and "a lot" of jagdeep seltzer. Reports not sleeping much but eventually the chest discomfort dissipated. However, after the chest pain subsided, he started to have increased SOB more so than his baseline which is what ultimately brought him into the ED. Reports associated chills, and hot sweats today but otherwise denying any headaches, visual changes, fatigue, abdominal pain, or PND. Pt was admitted in February for NSTEMI but states his chest pain feels different and contributes it to eating the jalapenos last night. Pt does report 50+ pack year smoking hx but is not on home O2. Denies any recent sick contacts.         Review of patient's allergies indicates:   Allergen Reactions    Aggrastat concentrate [tirofiban] Shortness Of Breath and Other (See Comments)     Fever and chills    Brilinta [ticagrelor] Shortness Of Breath    Cymbalta [duloxetine] Nausea Only    Bupropion Other (See Comments)     "turns into zombie" withdrawn, not talking, outbursts     Past Medical History:   Diagnosis Date    CAD (coronary artery disease)     Dr Martinez    Carotid artery disease     Chronic kidney disease, stage III (moderate)     Depression     Dyslipidemia     Hepatic cyst     Hepatic cyst     High-density lipoprotein deficiency     HTN (hypertension)     Hx of colonic polyps     Insomnia     PVD " (peripheral vascular disease)     stented    S/P AAA repair      Past Surgical History:   Procedure Laterality Date    ABDOMINAL AORTIC ANEURYSM REPAIR      ABDOMINAL AORTIC ANEURYSM REPAIR      ANGIOGRAM, CORONARY, WITH LEFT HEART CATHETERIZATION Left 2/14/2022    Procedure: Angiogram, Coronary, with Left Heart Cath;  Surgeon: Delfino Castellon MD;  Location: [facility] CATH LAB;  Service: Cardiology;  Laterality: Left;    ANGIOGRAPHY OF LOWER EXTREMITY Left 10/23/2018    Procedure: Angiogram Extremity Unilateral;  Surgeon: Gregor Cunha MD;  Location: [facility] CATH;  Service: Cardiology;  Laterality: Left;  LLE intervention-Will start with 4/5 slender sheath left radial and take diagnostic angio of LLE to determine whether brachial access or tibial access will be required    CATARACT EXTRACTION W/ INTRAOCULAR LENS  IMPLANT, BILATERAL      CATARACT SX Bilateral     CATHETERIZATION OF BOTH LEFT AND RIGHT HEART N/A 7/10/2020    Procedure: CATHETERIZATION, HEART, BOTH LEFT AND RIGHT;  Surgeon: Gregor Cunha MD;  Location: [facility] CATH;  Service: Cardiology;  Laterality: N/A;  LHC + RHC +/- PCI -access left radial artery and left brachial vein    CHOLECYSTECTOMY      COLONOSCOPY N/A 10/7/2015    Procedure: COLONOSCOPY;  Surgeon: Genaro You MD;  Location: [facility] ENDO ([address] [place] [address]);  Service: Endoscopy;  Laterality: N/A;    CORONARY ANGIOPLASTY WITH STENT PLACEMENT      CORONARY ARTERY BYPASS GRAFT      CORONARY BYPASS GRAFT ANGIOGRAPHY  2/14/2022    Procedure: Bypass graft study;  Surgeon: Delfino Castellon MD;  Location: [facility] CATH LAB;  Service: Cardiology;;    HERNIA REPAIR      LAMINECTOMY      MAGNETIC RESONANCE IMAGING N/A 6/4/2021    Procedure: MRI (MAGNETIC RESONANCE IMAGING) LUMBAR SPINE;  Surgeon: Asa Diagnostic Provider;  Location: [facility];  Service: General;  Laterality: N/A;  In MRI @ 9:10 per Krys, To follow WC RM1    PATELLA SURGERY      PERCUTANEOUS TRANSLUMINAL ANGIOPLASTY (PTA) OF  PERIPHERAL VESSEL N/A 9/18/2018    Procedure: PTA, PERIPHERAL BLOOD VESSEL;  Surgeon: Gregor Cunha MD;  Location: [facility] CATH;  Service: Cardiology;  Laterality: N/A;  Site change:  right popliteal artery access using US guidance.    PERCUTANEOUS TRANSLUMINAL ANGIOPLASTY (PTA) OF PERIPHERAL VESSEL Left 7/25/2019    Procedure: PTA, PERIPHERAL VESSEL;  Surgeon: Gregor Cunha MD;  Location: [facility] CATH;  Service: Cardiology;  Laterality: Left;    VASECTOMY       Family History   Problem Relation Age of Onset    Heart disease Mother     Heart disease Father     Lung cancer Brother      Social History     Tobacco Use    Smoking status: Current Every Day Smoker     Packs/day: 1.00     Years: 50.00     Pack years: 50.00    Smokeless tobacco: Never Used    Tobacco comment: Currently smoke 1 ppd   Substance Use Topics    Alcohol use: No    Drug use: Yes     Types: Marijuana     Comment: OCCASSIONALLY     Review of Systems   Constitutional: Positive for chills and diaphoresis. Negative for fatigue and fever.   HENT: Negative.    Eyes: Negative.    Respiratory: Positive for cough, chest tightness and shortness of breath.    Cardiovascular: Positive for chest pain. Negative for palpitations and leg swelling.   Gastrointestinal: Negative.    Endocrine: Negative.    Genitourinary: Negative.    Musculoskeletal: Negative.    Skin: Negative.    Neurological: Negative.    Hematological: Negative.    Psychiatric/Behavioral: Negative.        Physical Exam     Initial Vitals [03/28/22 2118]   BP Pulse Resp Temp SpO2   120/70 70 18 97.8 °F (36.6 °C) 98 %      MAP       --         Physical Exam    Constitutional: He appears well-developed and well-nourished. He is not diaphoretic. No distress.   HENT:   Head: Normocephalic and atraumatic.   Eyes: Conjunctivae and EOM are normal. Pupils are equal, round, and reactive to light.   Cardiovascular: Normal rate, regular rhythm, normal heart sounds and intact distal pulses. Exam  reveals no gallop and no friction rub.    No murmur heard.  Pulmonary/Chest: No respiratory distress. He has wheezes. He has rhonchi. He has rales.   Abdominal: Abdomen is soft. Bowel sounds are normal. He exhibits no distension. There is no abdominal tenderness.   Old surgical scar from AAA surgery   Musculoskeletal:         General: No tenderness or edema.     Neurological: He is alert and oriented to person, place, and time. He has normal reflexes. No cranial nerve deficit or sensory deficit.   Skin: Skin is warm and dry. Capillary refill takes 2 to 3 seconds. No rash noted. No erythema.   Psychiatric: He has a normal mood and affect. His behavior is normal. Judgment and thought content normal.         ED Course   Procedures  Labs Reviewed   CBC W/ AUTO DIFFERENTIAL - Abnormal; Notable for the following components:       Result Value    Hemoglobin 13.9 (*)     RDW 14.6 (*)     All other components within normal limits   COMPREHENSIVE METABOLIC PANEL - Abnormal; Notable for the following components:    Creatinine 1.8 (*)     Albumin 3.4 (*)     Alkaline Phosphatase 36 (*)     AST 84 (*)     eGFR if  41.9 (*)     eGFR if non  36.3 (*)     All other components within normal limits   TROPONIN I - Abnormal; Notable for the following components:    Troponin I 31.034 (*)     All other components within normal limits   B-TYPE NATRIURETIC PEPTIDE - Abnormal; Notable for the following components:    BNP 1,504 (*)     All other components within normal limits   APTT    Narrative:     (if patient is on warfarin prior to heparin therapy)   PROTIME-INR    Narrative:     (if patient is on warfarin prior to heparin therapy)   TROPONIN I   APTT   CBC W/ AUTO DIFFERENTIAL   HEMOGLOBIN A1C   MAGNESIUM   SARS-COV-2 RDRP GENE    Narrative:     This test utilizes isothermal nucleic acid amplification   technology to detect the SARS-CoV-2 RdRp nucleic acid segment.   The analytical sensitivity (limit of  detection) is 125 genome   equivalents/mL.   A POSITIVE result implies infection with the SARS-CoV-2 virus;   the patient is presumed to be contagious.     A NEGATIVE result means that SARS-CoV-2 nucleic acids are not   present above the limit of detection. A NEGATIVE result should be   treated as presumptive. It does not rule out the possibility of   COVID-19 and should not be the sole basis for treatment decisions.   If COVID-19 is strongly suspected based on clinical and exposure   history, re-testing using an alternate molecular assay should be   considered.   This test is only for use under the Food and Drug   Administration s Emergency Use Authorization (EUA).   Commercial kits are provided by [employer].   Performance characteristics of the EUA have been independently   verified by [facility] Department of   Pathology and Laboratory Medicine.   _________________________________________________________________   The authorized Fact Sheet for Healthcare Providers and the authorized Fact   Sheet for Patients of the ID NOW COVID-19 are available on the FDA   website:     https://www.fda.gov/media/522104/download  https://www.fda.gov/media/782164/download   TYPE & SCREEN     EKG Readings: (Independently Interpreted)   New changes of concern since last admission. Concern for St depression in lead II along with possible ST elevations in aVR. Otherwise NSR       Imaging Results          X-Ray Chest AP Portable (Final result)  Result time 03/28/22 23:20:47    Final result by Salma Loyola MD (03/28/22 23:20:47)                 Impression:      Stable appearing chronic findings without definite evidence of superimposed acute intrathoracic abnormality on this single radiographic view of the chest.      Electronically signed by: Salma Loyola MD  Date:    03/28/2022  Time:    23:20             Narrative:    EXAMINATION:  XR CHEST AP PORTABLE    CLINICAL HISTORY:  Chest Pain;    TECHNIQUE:  Single  frontal view of the chest was performed.    COMPARISON:  02/11/2012    FINDINGS:  Cardiac monitoring leads overlie the chest.  There is unchanged enlargement of the cardiomediastinal silhouette with atherosclerosis and tortuosity of the thoracic aorta.  Mediastinal structures remain midline.  There is postoperative change of median sternotomy.  The lungs are symmetrically expanded with diffuse coarse interstitial attenuation which appears unchanged from prior examination.  There are a few bibasilar linear opacities likely relate to atelectasis and/or scarring.  No evidence of new confluent airspace consolidation, significant volume of pleural fluid or pneumothorax.  Osseous structures are intact.                                 Medications   heparin 25,000 units in dextrose 5% 250 mL (100 units/mL) infusion LOW INTENSITY nomogram - [facility] (12 Units/kg/hr × 82.3 kg (Adjusted) Intravenous New Bag 3/29/22 0241)   heparin 25,000 units in dextrose 5% (100 units/ml) IV bolus from bag - ADDITIONAL PRN BOLUS - 60 units/kg (max bolus 4000 units) (has no administration in time range)   heparin 25,000 units in dextrose 5% (100 units/ml) IV bolus from bag - ADDITIONAL PRN BOLUS - 30 units/kg (max bolus 4000 units) (has no administration in time range)   amLODIPine tablet 5 mg (has no administration in time range)   aspirin EC tablet 81 mg (has no administration in time range)   carvediloL tablet 6.25 mg (has no administration in time range)   fenofibrate tablet 145 mg (has no administration in time range)   gabapentin capsule 300 mg (has no administration in time range)   isosorbide mononitrate 24 hr tablet 60 mg (has no administration in time range)   lisinopriL tablet 20 mg (has no administration in time range)   pantoprazole EC tablet 40 mg (has no administration in time range)   prasugreL tablet 10 mg (has no administration in time range)   ranolazine 12 hr tablet 500 mg (has no administration in time range)   atorvastatin  tablet 80 mg (has no administration in time range)   spironolactone tablet 25 mg (has no administration in time range)   sodium chloride 0.9% flush 10 mL (has no administration in time range)   furosemide injection 40 mg (has no administration in time range)   nitroGLYCERIN SL tablet 0.4 mg (has no administration in time range)   sodium chloride 0.9% flush 3 mL (has no administration in time range)   predniSONE tablet 40 mg (has no administration in time range)   albuterol-ipratropium 2.5 mg-0.5 mg/3 mL nebulizer solution 3 mL (has no administration in time range)   ondansetron disintegrating tablet 8 mg (has no administration in time range)   melatonin tablet 6 mg (has no administration in time range)   polyethylene glycol packet 17 g (has no administration in time range)   albuterol-ipratropium 2.5 mg-0.5 mg/3 mL nebulizer solution 3 mL (3 mLs Nebulization Given 3/28/22 2233)   predniSONE tablet 60 mg (60 mg Oral Given 3/28/22 2237)   aspirin tablet 325 mg (325 mg Oral Given 3/28/22 2237)   aluminum-magnesium hydroxide-simethicone 200-200-20 mg/5 mL suspension 5 mL (5 mLs Oral Given 3/28/22 2251)   furosemide injection 80 mg (80 mg Intravenous Given 3/29/22 0007)   heparin 25,000 units in dextrose 5% (100 units/ml) IV bolus from bag INITIAL BOLUS (max bolus 4000 units) (4,000 Units Intravenous Bolus from Bag 3/29/22 0241)     Medical Decision Making:   Initial Assessment:   Pt presenting with chest pain and increased SOB. Pt reports chest pain is now resolved since last night but his SOB has progressed. With his recent NSTEMI, ACS will need to be ruled out. Chest pain could be reflux related given pt had spicy food for dinner last night. On exam, pt is wheezing on exam so COPD exacerbation is also high on my differential.   Differential Diagnosis:   COPD exacerbation, ACS, PNA, GERD  Clinical Tests:   Lab Tests: Ordered  Radiological Study: Ordered  ED Management:  CBC, CMP, CXR, EKG, trop, BNP. Will start with  duonebs and one time dose of prednisone. Pt given dose of maalox as well for chest burning. Pt also loaded with ASA 325mg. Pts BNP elevated to 1500 and trop elevated to 31. EKG concerning for new ST depressions in lead II along with possible ST elevations in aVR. CXR showing progression of pulmonary edema when compared to previous admission study. Cards consulted and rec starting ACS protocol for NSTEMI. Also agree with diuresis so given 80mg IV of lasix in the ED. Heparin gtt initiated and pt admitted to hospital medicine for further treatment.             Attending Attestation:   Physician Attestation Statement for Resident:  As the supervising MD   Physician Attestation Statement: I have personally seen and examined this patient.   I agree with the above history. -:   As the supervising MD I agree with the above PE.    As the supervising MD I agree with the above treatment, course, plan, and disposition.  I have reviewed and agree with the residents interpretation of the following: lab data, x-rays and EKG.  I have reviewed the following: old records at this facility.                         Clinical Impression:   Final diagnoses:  [R07.9] Chest pain  [I50.9] Acute on chronic congestive heart failure, unspecified heart failure type (Primary)  [R09.02] Hypoxia          ED Disposition Condition    Admit               Jeanine Roa MD  Resident  03/29/22 [id number]       Leah Manrique MD  03/29/22 [id number]

## 2022-03-29 NOTE — ASSESSMENT & PLAN NOTE
Chronic, will continue to monitor closely, slightly above baseline currently; may be 2/2 cardiorenal syndrome

## 2022-03-29 NOTE — HPI
71 yo male with CAD, NSTEMI last month, PVD, PAD, AAA, COPD, tobacco abuse, CKD presenting for chest pain, heart burn and shortness of breath. He states that yesterday he ate a burger with jalapeno's and started having severe midsternal heart burn that he took prilosec and jagdeep seltzer which did not help. He was not able to sleep much but the pain went away by itself. However, he did have some shortness of breath afterwards which made him come to the ED tonight for evaluation. He currently denies chest pain, shortness of breath. The chest pain felt differently than his NSTEMI back in February.     Workup in the ED revealed a troponin of 31, BNP of 1500. ED started duonebs and steroids, lasix; Cardiology consulted who recommended heparin drip, ASA loading, diuresis.

## 2022-03-29 NOTE — ASSESSMENT & PLAN NOTE
Acute on Chronic Systolic Heart Failure  - CHF pathway started  - uncontrolled  - s/s corresponding to heart failure exacerbation  - last ECHO with Results for orders placed during the hospital encounter of 02/11/22    Echo    Interpretation Summary  · The left ventricle is normal in size with moderately decreased systolic function.  · The estimated ejection fraction is 40%.  · There are segmental left ventricular wall motion abnormalities.  · Grade III left ventricular diastolic dysfunction.  · Mild left atrial enlargement.  · Normal right ventricular size with normal right ventricular systolic function.  · Mild right atrial enlargement.  · Mild mitral regurgitation.  · Mild aortic regurgitation.  · Normal central venous pressure (3 mmHg).  · The estimated PA systolic pressure is 19 mmHg.  · The ascending aorta is dilated.  · Posterior pericardial effusion.    - BNP elevated to ~1500, troponin ~31  - will continue diuresis with lasix 40 IV BID  - strict I/Os, fluid restricted diet  - monitor daily weights, telemetry  - replace K and Mg above 4 and 2 resp  - monitor oxygenation  - Continue Beta Blocker, ACE/ARB and Furosemide

## 2022-03-29 NOTE — PLAN OF CARE
Received patient from ED at around 0215 in a wheelchair by transport . On arrival to the unit patient is AOX4 . Oriented to the unit .Plan  of care reviewed with the patient . Fall precautions in place , call light in reach , bed in lowest position . Denies of having chest pain , SOB at ROSHAN but has frequent cough .  Heparin gtt started at 12 U/Kg/hr and bolus given as ordered . SPO2 maintained at RA . NPO since came in the unit . All questions answered , will cont to monitor .

## 2022-03-29 NOTE — PROGRESS NOTES
"[facility]) nurse navigator notified of HFTCC candidate in need of education and introduction to 4-6 week program.      PT aao x 3 while lying in bed  with wife at bedside. Introduced self to pt as HFTCC nurse navigator.     Patient given "Home Care Guide for Heart Failure Patients" , "[facility]" flyer and "Daily weight and symptom tracker".  Encouraged pt and caregiver to review information.      Reviewed the following key points of HFTCC program with pt and family:   1.) Take your medications as directed.    2.) Weight yourself daily   3.) Follow low salt and limited fluid diet.    4.) Stop smoking and start exercising   5.) Go to your appointments and call your team.      Pt reminded to follow Symptom tracker and to call at the onset of symptoms according to tracker.     Reviewed plan for follow up once discharged to include phone calls, in person and virtual visits to assist pt optimizing their heart failure medication regimen and encouraging healthy lifestyle modifications.  Reminded pt that program will assist them over the next 4-6 weeks and then patient will be transferred to long term care provider .  Reminded pt how to contact HFTCC navigator via phone and or via [facility].     Pt given appointment or instructed appointment will be printed on hospital discharge paperwork.     Pt also reminded RN will call 48-72 hours after discharge to check on them.     PT and family verbalize read back of information given.  Encouraged pt and family to read over information often and contact team with any questions or concerns.       "

## 2022-03-29 NOTE — ASSESSMENT & PLAN NOTE
· cilostazol discontinued prior admission due to HF   · DAPT with aspirin + prasugrel + AC with low dose Xarelto 2.5mg daily once off heparin drip

## 2022-03-29 NOTE — CONSULTS
Letter regarding Phase II cardiac rehab was sent to patient.  Patient was previously contacted via info packet/letter.  Rehab staff to attempt to contact patient, but unable to due to disconnection of phone.  Wendi Jacobsen RN  Cardiac Rehab Nurse

## 2022-03-29 NOTE — ED NOTES
"Lonnie Taylor, an 72 y.o. male presents to the ED complaining of SOB that began earlier today and has continued throughout the day. Hx of COPD. Tried utilizing various home medications with minimal relief. Denies HA, NVD,. Endorses CP yesterday and mildly today. Denies CP while here int he ED. Pt does not use oxygen at home chronically. Used his home nebulizer with the albuterol liquid 3x today with no relief.      LOC: The patient is awake, alert and aware of environment with an appropriate affect, the patient is oriented x 3 and speaking appropriately.   APPEARANCE: Patient appears comfortable and in no acute distress, patient is clean and well groomed.  SKIN: The skin is warm and dry, color consistent with ethnicity.   MUSCULOSKELETAL: Patient moving all extremities spontaneously, no swelling noted.  RESPIRATORY: SOB on exertion, cough that is chronic.   CARDIAC: Patient has a normal rate and regular rhythm, no edema noted, capillary refill < 3 seconds.   GASTRO: Soft and non tender to palpation, no distention noted.   : Pt denies any pain or frequency with urination.  NEURO: Pt opens eyes spontaneously, behavior appropriate to situation, follows commands.        Chief Complaint   Patient presents with    Shortness of Breath     Starting tonight, hx copd, pt sating 95% on RA, placed on 2 L sating 98%. Pt states 1 episode of CP yesterday that resolved today, Pt believes Cp caused by jalapeno's he ate yesterday.  Hx onf NSTEMI in february     Review of patient's allergies indicates:   Allergen Reactions    Aggrastat concentrate [tirofiban] Shortness Of Breath and Other (See Comments)     Fever and chills    Brilinta [ticagrelor] Shortness Of Breath    Cymbalta [duloxetine] Nausea Only    Bupropion Other (See Comments)     "turns into zombie" withdrawn, not talking, outbursts     Past Medical History:   Diagnosis Date    CAD (coronary artery disease)     Dr Martinez    Carotid artery disease     Chronic " kidney disease, stage III (moderate)     Depression     Dyslipidemia     Hepatic cyst     Hepatic cyst     High-density lipoprotein deficiency     HTN (hypertension)     Hx of colonic polyps     Insomnia     PVD (peripheral vascular disease)     stented    S/P AAA repair

## 2022-03-29 NOTE — PROVIDER PROGRESS NOTES - EMERGENCY DEPT.
Encounter Date: 3/28/2022    ED Physician Progress Notes         EKG - STEMI Decision  Initial Reading: No STEMI present.  Response: No Action Needed.

## 2022-03-29 NOTE — SUBJECTIVE & OBJECTIVE
Past Medical History:   Diagnosis Date    CAD (coronary artery disease)     Dr Martinez    Carotid artery disease     Chronic kidney disease, stage III (moderate)     Depression     Dyslipidemia     Hepatic cyst     Hepatic cyst     High-density lipoprotein deficiency     HTN (hypertension)     Hx of colonic polyps     Insomnia     PVD (peripheral vascular disease)     stented    S/P AAA repair        Past Surgical History:   Procedure Laterality Date    ABDOMINAL AORTIC ANEURYSM REPAIR      ABDOMINAL AORTIC ANEURYSM REPAIR      ANGIOGRAM, CORONARY, WITH LEFT HEART CATHETERIZATION Left 2/14/2022    Procedure: Angiogram, Coronary, with Left Heart Cath;  Surgeon: Delfino Castellon MD;  Location: [facility] CATH LAB;  Service: Cardiology;  Laterality: Left;    ANGIOGRAPHY OF LOWER EXTREMITY Left 10/23/2018    Procedure: Angiogram Extremity Unilateral;  Surgeon: Gregor Cunha MD;  Location: [facility] CATH;  Service: Cardiology;  Laterality: Left;  LLE intervention-Will start with 4/5 slender sheath left radial and take diagnostic angio of LLE to determine whether brachial access or tibial access will be required    CATARACT EXTRACTION W/ INTRAOCULAR LENS  IMPLANT, BILATERAL      CATARACT SX Bilateral     CATHETERIZATION OF BOTH LEFT AND RIGHT HEART N/A 7/10/2020    Procedure: CATHETERIZATION, HEART, BOTH LEFT AND RIGHT;  Surgeon: Gregor Cunha MD;  Location: [facility] CATH;  Service: Cardiology;  Laterality: N/A;  LHC + RHC +/- PCI -access left radial artery and left brachial vein    CHOLECYSTECTOMY      COLONOSCOPY N/A 10/7/2015    Procedure: COLONOSCOPY;  Surgeon: Genaro You MD;  Location: [facility] ENDO (4TH FLR);  Service: Endoscopy;  Laterality: N/A;    CORONARY ANGIOPLASTY WITH STENT PLACEMENT      CORONARY ARTERY BYPASS GRAFT      CORONARY BYPASS GRAFT ANGIOGRAPHY  2/14/2022    Procedure: Bypass graft study;  Surgeon: Delfino Castellon MD;  Location: [facility] CATH LAB;  Service: Cardiology;;    HERNIA REPAIR       "LAMINECTOMY      MAGNETIC RESONANCE IMAGING N/A 6/4/2021    Procedure: MRI (MAGNETIC RESONANCE IMAGING) LUMBAR SPINE;  Surgeon: Asa Diagnostic Provider;  Location: [facility] MARLA;  Service: General;  Laterality: N/A;  In MRI @ 9:10 per Krys, To follow WC RM1    PATELLA SURGERY      PERCUTANEOUS TRANSLUMINAL ANGIOPLASTY (PTA) OF PERIPHERAL VESSEL N/A 9/18/2018    Procedure: PTA, PERIPHERAL BLOOD VESSEL;  Surgeon: Gregor Cunha MD;  Location: [facility] CATH;  Service: Cardiology;  Laterality: N/A;  Site change:  right popliteal artery access using US guidance.    PERCUTANEOUS TRANSLUMINAL ANGIOPLASTY (PTA) OF PERIPHERAL VESSEL Left 7/25/2019    Procedure: PTA, PERIPHERAL VESSEL;  Surgeon: Gregor Cunha MD;  Location: [facility] CATH;  Service: Cardiology;  Laterality: Left;    VASECTOMY         Review of patient's allergies indicates:   Allergen Reactions    Aggrastat concentrate [tirofiban] Shortness Of Breath and Other (See Comments)     Fever and chills    Brilinta [ticagrelor] Shortness Of Breath    Cymbalta [duloxetine] Nausea Only    Bupropion Other (See Comments)     "turns into zombie" withdrawn, not talking, outbursts       No current facility-administered medications on file prior to encounter.     Current Outpatient Medications on File Prior to Encounter   Medication Sig    albuterol (VENTOLIN HFA) 90 mcg/actuation inhaler Inhale 2 puffs into the lungs every 6 (six) hours as needed for Wheezing. Rescue    amLODIPine (NORVASC) 5 MG tablet Take 1 tablet (5 mg total) by mouth once daily.    aspirin (ECOTRIN) 81 MG EC tablet Take 81 mg by mouth once daily.    carvediloL (COREG) 6.25 MG tablet Take 1 tablet (6.25 mg total) by mouth 2 (two) times daily.    fenofibrate (TRICOR) 145 MG tablet Take 1 tablet (145 mg total) by mouth once daily.    furosemide (LASIX) 20 MG tablet 20 mg once daily.     gabapentin (NEURONTIN) 300 MG capsule 3 (three) times daily.    isosorbide mononitrate (IMDUR) 60 MG 24 hr tablet Take 1 tablet " (60 mg total) by mouth once daily.    lisinopriL (PRINIVIL,ZESTRIL) 20 MG tablet Take 1 tablet (20 mg total) by mouth once daily.    nitroGLYCERIN (NITROSTAT) 0.4 MG SL tablet Place 1 tablet (0.4 mg total) under the tongue every 5 (five) minutes as needed for Chest pain. (Patient not taking: Reported on 2/16/2022)    pantoprazole (PROTONIX) 40 MG tablet Take 40 mg by mouth once daily.    prasugreL (EFFIENT) 10 mg Tab Take 1 tablet (10 mg total) by mouth once daily.    ranolazine (RANEXA) 500 MG Tb12 Take 500 mg by mouth 2 (two) times daily. 11/15/20    rivaroxaban (XARELTO) 2.5 mg Tab Take 1 tablet (2.5 mg total) by mouth daily with dinner or evening meal.    rosuvastatin (CRESTOR) 20 MG tablet Take 2 tablets (40 mg total) by mouth once daily.    spironolactone (ALDACTONE) 25 MG tablet Take 1 tablet (25 mg total) by mouth once daily.     Family History       Problem Relation (Age of Onset)    Heart disease Mother, Father    Lung cancer Brother          Tobacco Use    Smoking status: Current Every Day Smoker     Packs/day: 1.00     Years: 50.00     Pack years: 50.00    Smokeless tobacco: Never Used    Tobacco comment: Currently smoke 1 ppd   Substance and Sexual Activity    Alcohol use: No    Drug use: Yes     Types: Marijuana     Comment: OCCASSIONALLY    Sexual activity: Not on file     Review of Systems   Constitutional: Negative for decreased appetite, diaphoresis, fever and malaise/fatigue.   HENT: Negative.     Eyes: Negative.    Cardiovascular:  Positive for chest pain. Negative for dyspnea on exertion, irregular heartbeat, leg swelling, orthopnea, palpitations, paroxysmal nocturnal dyspnea and syncope.   Respiratory:  Positive for shortness of breath.    Endocrine: Negative.    Hematologic/Lymphatic: Negative.    Skin: Negative.    Musculoskeletal: Negative.    Gastrointestinal:  Positive for heartburn.   Genitourinary: Negative.    Neurological: Negative.    Objective:     Vital Signs (Most Recent):  Temp:  97.8 °F (36.6 °C) (03/28/22 2118)  Pulse: 75 (03/28/22 2311)  Resp: 12 (03/28/22 2231)  BP: (!) 157/78 (03/28/22 2202)  SpO2: 95 % (03/28/22 2311)   Vital Signs (24h Range):  Temp:  [97.8 °F (36.6 °C)] 97.8 °F (36.6 °C)  Pulse:  [67-75] 75  Resp:  [12-20] 12  SpO2:  [95 %-100 %] 95 %  BP: (120-157)/(70-78) 157/78     Weight: 99.8 kg (220 lb)  Body mass index is 32.49 kg/m².    SpO2: 95 %  O2 Device (Oxygen Therapy): room air    No intake or output data in the 24 hours ending 03/29/22 0050    Lines/Drains/Airways       Peripheral Intravenous Line  Duration                  Peripheral IV - Single Lumen 03/28/22 2247 20 G Right Antecubital <1 day                    Physical Exam  Vitals and nursing note reviewed.   Constitutional:       Appearance: Normal appearance.   HENT:      Head: Normocephalic and atraumatic.   Neck:      Vascular: JVD present.   Cardiovascular:      Rate and Rhythm: Normal rate and regular rhythm.      Pulses: Normal pulses.      Heart sounds: Normal heart sounds.   Pulmonary:      Effort: Pulmonary effort is normal.      Breath sounds: Normal breath sounds.   Abdominal:      General: Abdomen is flat. There is no distension.      Palpations: Abdomen is soft.   Musculoskeletal:      Cervical back: Normal range of motion and neck supple.      Right lower leg: No edema.      Left lower leg: No edema.   Skin:     General: Skin is warm.   Neurological:      General: No focal deficit present.      Mental Status: He is alert.     Significant Labs: All pertinent lab results from the last 24 hours have been reviewed.

## 2022-03-29 NOTE — ASSESSMENT & PLAN NOTE
Briefly this is Mr. Taylor who is a 72-year-old male with a past medical history of CAD with previous CABG and PCI (most recent 3/2020) and multiple cardiac risk factors presenting with chest pain, found to have NSTEMI on admission with trop of 31.  Most recent LHC 2/14/22 with Ost LM to mid % stenosed, Ost RCA to prox % stenosed, mild diffuse disease in grafts to 1st ck and distal LAD, yet remain patent. LVEDP 35mmHg. No interventions were done at the time. He continues to smoke daily and has poor dietary compliance. I would treat for ACS given significant elevation in troponin and chest pain with known significant CAD as well for ADHF. Currently hemodynamically stable and chest pain free.    Recommendations:   Continue DAPT (ASA, prasugrel)   recommend medical management (ACS protocol) with heparin gtt x 48 hours    Continue to trend troponin q 4-6 hours until peak, obtain repeat ECG if patient has recurrence of chest pain  o NTG 0.4mg SL PRN q5m for chest pain (max 3 doses)   high intensity statin >> atorvastatin 80mg daily, fenofibrate   Continue Coreg 6.25mg bid and uptitrate to target HR 60   Continue Lisinopril 20mg daily- uptitrate anti-hypertensives as tolerated for target SBP <130   Consider increasing Imdur to 120 mg daily   Continue ranolazine 500mg BID   bedside TTE suggestive of EF ~40% with inferolateral hypokinesis (unchanged from prior) >> recommend official TTE in am to reassess LV/RV function. Consider additional ischemic assessment in significant changes noted on official TTE.  o Creatinine 1.8 today (up from baseline 1.5-1.6) -- consider IC consult if chest pain persists despite optimization of antianginals    continue tele monitoring   Admit to internal medicine and Cardiology consult team will follow in the am

## 2022-03-29 NOTE — NURSING TRANSFER
Nursing Transfer Note      3/29/2022     Reason patient is being transferred: TTE    Transfer To: echo lab    Transfer via stretcher    Transfer with cardiac monitoring, IV pole     [facility]    Medicines sent: heparin infusion    Chart send with patient: No

## 2022-03-29 NOTE — ASSESSMENT & PLAN NOTE
· cilostazol discontinued prior admission due to HF   · DAPT with aspirin + prasugrel + AC with low dose Xarelto 2.5mg daily

## 2022-03-29 NOTE — ASSESSMENT & PLAN NOTE
Briefly this is Mr. Taylor who is a 72-year-old male with a past medical history of CAD with previous CABG and PCI (most recent 3/2020) and multiple cardiac risk factors presenting with chest pain, found to have NSTEMI on admission with trop of 31.  Most recent LHC 2/14/22 with Ost LM to mid % stenosed, Ost RCA to prox % stenosed, mild diffuse disease in grafts to 1st ck and distal LAD, yet remain patent. LVEDP 35mmHg. No interventions were done at the time. He continues to smoke daily and has poor dietary compliance. I would treat for ACS given significant elevation in troponin and chest pain with known significant CAD as well for ADHF. Currently hemodynamically stable and chest pain free.    Recommendations:   Continue DAPT (ASA, prasugrel)   recommend medical management (ACS protocol) with heparin gtt x 48 hours    EKG + NTG 0.4mg SL PRN q5m for chest pain (max 3 doses)   Continue high intensity statin + fenofibrate    Discontinued lisinopril + will start washout period before transitioning to Entresto 3/30 PM   Continue Coreg 6.25mg bid and uptitrate to target HR 60; recommend uptitration after initiation of Entresto with likely concurrent discontinuation of amlodipine   Consider increasing Imdur to 120 mg daily in future once transitioned to Entresto   Continue ranolazine 500mg BID   F/u official TTE this morning   continue tele monitoring

## 2022-03-29 NOTE — H&P
[facility] - Emergency Dept  [facility] Medicine  History & Physical    Patient Name: Lonnie Taylor  MRN: [id number]  Patient Class: IP- Inpatient  Admission Date: 3/28/2022  Attending Physician: Tremayne Stahl MD   Primary Care Provider: Isiah You MD         Patient information was obtained from patient, past medical records and ER records.     Subjective:     Principal Problem:NSTEMI (non-ST elevated myocardial infarction)    Chief Complaint:   Chief Complaint   Patient presents with    Shortness of Breath     Starting tonight, hx copd, pt sating 95% on RA, placed on 2 L sating 98%. Pt states 1 episode of CP yesterday that resolved today, Pt believes Cp caused by jalapeno's he ate yesterday.  Hx onf NSTEMI in february        HPI: 71 yo male with CAD, NSTEMI last month, PVD, PAD, AAA, COPD, tobacco abuse, CKD presenting for chest pain, heart burn and shortness of breath. He states that yesterday he ate a burger with jalapeno's and started having severe midsternal heart burn that he took prilosec and jagdeep seltzer which did not help. He was not able to sleep much but the pain went away by itself. However, he did have some shortness of breath afterwards which made him come to the ED tonight for evaluation. He currently denies chest pain, shortness of breath. The chest pain felt differently than his NSTEMI back in February.     Workup in the ED revealed a troponin of 31, BNP of 1500. ED started duonebs and steroids, lasix; Cardiology consulted who recommended heparin drip, ASA loading, diuresis.      Past Medical History:   Diagnosis Date    CAD (coronary artery disease)     Dr Martinez    Carotid artery disease     Chronic kidney disease, stage III (moderate)     Depression     Dyslipidemia     Hepatic cyst     Hepatic cyst     High-density lipoprotein deficiency     HTN (hypertension)     Hx of colonic polyps     Insomnia     PVD (peripheral vascular disease)     stented    S/P AAA repair        Past  Surgical History:   Procedure Laterality Date    ABDOMINAL AORTIC ANEURYSM REPAIR      ABDOMINAL AORTIC ANEURYSM REPAIR      ANGIOGRAM, CORONARY, WITH LEFT HEART CATHETERIZATION Left 2/14/2022    Procedure: Angiogram, Coronary, with Left Heart Cath;  Surgeon: Delfino Castellon MD;  Location: [facility] CATH LAB;  Service: Cardiology;  Laterality: Left;    ANGIOGRAPHY OF LOWER EXTREMITY Left 10/23/2018    Procedure: Angiogram Extremity Unilateral;  Surgeon: Gregor Cunha MD;  Location: [facility] CATH;  Service: Cardiology;  Laterality: Left;  LLE intervention-Will start with 4/5 slender sheath left radial and take diagnostic angio of LLE to determine whether brachial access or tibial access will be required    CATARACT EXTRACTION W/ INTRAOCULAR LENS  IMPLANT, BILATERAL      CATARACT SX Bilateral     CATHETERIZATION OF BOTH LEFT AND RIGHT HEART N/A 7/10/2020    Procedure: CATHETERIZATION, HEART, BOTH LEFT AND RIGHT;  Surgeon: Gregor Cunha MD;  Location: [facility] CATH;  Service: Cardiology;  Laterality: N/A;  LHC + RHC +/- PCI -access left radial artery and left brachial vein    CHOLECYSTECTOMY      COLONOSCOPY N/A 10/7/2015    Procedure: COLONOSCOPY;  Surgeon: Genaro Yuo MD;  Location: [facility] ENDO (4TH FLR);  Service: Endoscopy;  Laterality: N/A;    CORONARY ANGIOPLASTY WITH STENT PLACEMENT      CORONARY ARTERY BYPASS GRAFT      CORONARY BYPASS GRAFT ANGIOGRAPHY  2/14/2022    Procedure: Bypass graft study;  Surgeon: Delfino Castellon MD;  Location: [facility] CATH LAB;  Service: Cardiology;;    HERNIA REPAIR      LAMINECTOMY      MAGNETIC RESONANCE IMAGING N/A 6/4/2021    Procedure: MRI (MAGNETIC RESONANCE IMAGING) LUMBAR SPINE;  Surgeon: Asa Diagnostic Provider;  Location: [facility];  Service: General;  Laterality: N/A;  In MRI @ 9:10 per Krys, To follow WC RM1    PATELLA SURGERY      PERCUTANEOUS TRANSLUMINAL ANGIOPLASTY (PTA) OF PERIPHERAL VESSEL N/A 9/18/2018    Procedure: PTA, PERIPHERAL BLOOD  "VESSEL;  Surgeon: Gregor Cunha MD;  Location: [facility] CATH;  Service: Cardiology;  Laterality: N/A;  Site change:  right popliteal artery access using US guidance.    PERCUTANEOUS TRANSLUMINAL ANGIOPLASTY (PTA) OF PERIPHERAL VESSEL Left 7/25/2019    Procedure: PTA, PERIPHERAL VESSEL;  Surgeon: Gregor Cunha MD;  Location: [facility] CATH;  Service: Cardiology;  Laterality: Left;    VASECTOMY         Review of patient's allergies indicates:   Allergen Reactions    Aggrastat concentrate [tirofiban] Shortness Of Breath and Other (See Comments)     Fever and chills    Brilinta [ticagrelor] Shortness Of Breath    Cymbalta [duloxetine] Nausea Only    Bupropion Other (See Comments)     "turns into zombie" withdrawn, not talking, outbursts       No current facility-administered medications on file prior to encounter.     Current Outpatient Medications on File Prior to Encounter   Medication Sig    albuterol (VENTOLIN HFA) 90 mcg/actuation inhaler Inhale 2 puffs into the lungs every 6 (six) hours as needed for Wheezing. Rescue    amLODIPine (NORVASC) 5 MG tablet Take 1 tablet (5 mg total) by mouth once daily.    aspirin (ECOTRIN) 81 MG EC tablet Take 81 mg by mouth once daily.    carvediloL (COREG) 6.25 MG tablet Take 1 tablet (6.25 mg total) by mouth 2 (two) times daily.    fenofibrate (TRICOR) 145 MG tablet Take 1 tablet (145 mg total) by mouth once daily.    furosemide (LASIX) 20 MG tablet 20 mg once daily.     gabapentin (NEURONTIN) 300 MG capsule 3 (three) times daily.    isosorbide mononitrate (IMDUR) 60 MG 24 hr tablet Take 1 tablet (60 mg total) by mouth once daily.    lisinopriL (PRINIVIL,ZESTRIL) 20 MG tablet Take 1 tablet (20 mg total) by mouth once daily.    nitroGLYCERIN (NITROSTAT) 0.4 MG SL tablet Place 1 tablet (0.4 mg total) under the tongue every 5 (five) minutes as needed for Chest pain. (Patient not taking: Reported on 2/16/2022)    pantoprazole (PROTONIX) 40 MG tablet Take 40 mg by mouth " once daily.    prasugreL (EFFIENT) 10 mg Tab Take 1 tablet (10 mg total) by mouth once daily.    ranolazine (RANEXA) 500 MG Tb12 Take 500 mg by mouth 2 (two) times daily. 11/15/20    rivaroxaban (XARELTO) 2.5 mg Tab Take 1 tablet (2.5 mg total) by mouth daily with dinner or evening meal.    rosuvastatin (CRESTOR) 20 MG tablet Take 2 tablets (40 mg total) by mouth once daily.    spironolactone (ALDACTONE) 25 MG tablet Take 1 tablet (25 mg total) by mouth once daily.     Family History       Problem Relation (Age of Onset)    Heart disease Mother, Father    Lung cancer Brother          Tobacco Use    Smoking status: Current Every Day Smoker     Packs/day: 1.00     Years: 50.00     Pack years: 50.00    Smokeless tobacco: Never Used    Tobacco comment: Currently smoke 1 ppd   Substance and Sexual Activity    Alcohol use: No    Drug use: Yes     Types: Marijuana     Comment: OCCASSIONALLY    Sexual activity: Not on file     Review of Systems   Constitutional:  Negative for activity change, appetite change, chills and fever.   HENT:  Negative for congestion, hearing loss and rhinorrhea.    Eyes:  Negative for discharge, itching and visual disturbance.   Respiratory:  Positive for shortness of breath. Negative for apnea and cough.    Cardiovascular:  Positive for chest pain and leg swelling. Negative for palpitations.   Gastrointestinal:  Negative for abdominal distention, abdominal pain, constipation, diarrhea, nausea and vomiting.        Heart burn   Endocrine: Negative for cold intolerance and heat intolerance.   Genitourinary:  Negative for dysuria and hematuria.   Musculoskeletal:  Negative for back pain, neck pain and neck stiffness.   Skin:  Negative for rash and wound.   Neurological:  Negative for dizziness, seizures, light-headedness and headaches.   Psychiatric/Behavioral:  Negative for agitation, confusion and suicidal ideas.    Objective:     Vital Signs (Most Recent):  Temp: 97.8 °F (36.6 °C)  (03/28/22 2118)  Pulse: 75 (03/28/22 2311)  Resp: 12 (03/28/22 2231)  BP: (!) 157/78 (03/28/22 2202)  SpO2: 95 % (03/28/22 2311)   Vital Signs (24h Range):  Temp:  [97.8 °F (36.6 °C)] 97.8 °F (36.6 °C)  Pulse:  [67-75] 75  Resp:  [12-20] 12  SpO2:  [95 %-100 %] 95 %  BP: (120-157)/(70-78) 157/78     Weight: 99.8 kg (220 lb)  Body mass index is 32.49 kg/m².    Physical Exam  Vitals reviewed.   Constitutional:       General: He is not in acute distress.     Appearance: He is well-developed.   HENT:      Head: Normocephalic and atraumatic.      Nose: Nose normal. No rhinorrhea.      Mouth/Throat:      Mouth: Mucous membranes are moist.   Eyes:      General: No scleral icterus.        Right eye: No discharge.         Left eye: No discharge.      Pupils: Pupils are equal, round, and reactive to light.   Neck:      Vascular: No JVD.   Cardiovascular:      Rate and Rhythm: Normal rate and regular rhythm.      Heart sounds: Normal heart sounds. No murmur heard.    No friction rub.   Pulmonary:      Effort: Pulmonary effort is normal. No respiratory distress.      Breath sounds: Wheezing and rales present.   Abdominal:      General: Bowel sounds are normal. There is no distension.      Palpations: Abdomen is soft.      Tenderness: There is no abdominal tenderness.   Musculoskeletal:         General: No deformity. Normal range of motion.      Cervical back: Normal range of motion and neck supple.      Right lower leg: Edema present.      Left lower leg: Edema present.   Skin:     General: Skin is warm and dry.   Neurological:      General: No focal deficit present.      Mental Status: He is alert and oriented to person, place, and time.   Psychiatric:         Mood and Affect: Mood normal.         Behavior: Behavior normal.         CRANIAL NERVES     CN III, IV, VI   Pupils are equal, round, and reactive to light.     Significant Labs: All pertinent labs within the past 24 hours have been reviewed.  CBC:   Recent Labs   Lab  "03/28/22 2244   WBC 9.59   HGB 13.9*   HCT 43.4        CMP:   Recent Labs   Lab 03/28/22 2244      K 4.0   CL 99   CO2 29   GLU 82   BUN 23   CREATININE 1.8*   CALCIUM 10.4   PROT 6.9   ALBUMIN 3.4*   BILITOT 0.7   ALKPHOS 36*   AST 84*   ALT 17   ANIONGAP 12   EGFRNONAA 36.3*     Cardiac Markers:   Recent Labs   Lab 03/28/22 2244   BNP 1,504*       Troponin:   Recent Labs   Lab 03/28/22 2244   TROPONINI 31.034*       Significant Imaging: I have reviewed all pertinent imaging results/findings within the past 24 hours.    Assessment/Plan:     * NSTEMI (non-ST elevated myocardial infarction)  Troponin elevation, cardiology consulted  Per recs,   · "Continue DAPT (ASA, prasugrel)  · recommend medical management (ACS protocol) with heparin gtt x 48 hours   · Continue to trend troponin q 4-6 hours until peak, obtain repeat ECG if patient has recurrence of chest pain  ? NTG 0.4mg SL PRN q5m for chest pain (max 3 doses)  · high intensity statin >> atorvastatin 80mg daily, fenofibrate  · Continue Coreg 6.25mg bid and uptitrate to target HR 60  · Continue Lisinopril 20mg daily- uptitrate anti-hypertensives as tolerated for target SBP <130  · Consider increasing Imdur to 120 mg daily  · Continue ranolazine 500mg BID  · bedside TTE suggestive of EF ~40% with inferolateral hypokinesis (unchanged from prior) >> recommend official TTE in am to reassess LV/RV function. Consider additional ischemic assessment in significant changes noted on official TTE.  ? Creatinine 1.8 today (up from baseline 1.5-1.6) -- consider IC consult if chest pain persists despite optimization of antianginals   · continue tele monitoring  "  Will order ECHO and adjust meds as possible      Acute decompensated heart failure  Acute on Chronic Systolic Heart Failure  - CHF pathway started  - uncontrolled  - s/s corresponding to heart failure exacerbation  - last ECHO with Results for orders placed during the hospital encounter of " 02/11/22    Echo    Interpretation Summary  · The left ventricle is normal in size with moderately decreased systolic function.  · The estimated ejection fraction is 40%.  · There are segmental left ventricular wall motion abnormalities.  · Grade III left ventricular diastolic dysfunction.  · Mild left atrial enlargement.  · Normal right ventricular size with normal right ventricular systolic function.  · Mild right atrial enlargement.  · Mild mitral regurgitation.  · Mild aortic regurgitation.  · Normal central venous pressure (3 mmHg).  · The estimated PA systolic pressure is 19 mmHg.  · The ascending aorta is dilated.  · Posterior pericardial effusion.    - BNP elevated to ~1500, troponin ~31  - will continue diuresis with lasix 40 IV BID  - strict I/Os, fluid restricted diet  - monitor daily weights, telemetry  - replace K and Mg above 4 and 2 resp  - monitor oxygenation  - Continue Beta Blocker, ACE/ARB and Furosemide             Pulmonary emphysema  Wheezing on auscultation  Received steroids and duonebs in ED  Will continue treatment with steroids and duonebs      PAD (peripheral artery disease)  See plan above      Dyslipidemia  Continue statin, chronic, controlled      Tobacco abuse  Counseled on cessation      Chronic kidney disease, stage III (moderate)  Chronic, will continue to monitor closely, slightly above baseline currently; may be 2/2 cardiorenal syndrome      Primary hypertension  Chronic, controlled, continue home BP medications      Coronary artery disease involving native coronary artery with angina pectoris  Chronic, see plan above      VTE Risk Mitigation (From admission, onward)         Ordered     IP VTE HIGH RISK PATIENT  Once         03/29/22 0143     Place sequential compression device  Until discontinued         03/29/22 0143     heparin 25,000 units in dextrose 5% (100 units/ml) IV bolus from bag - ADDITIONAL PRN BOLUS - 60 units/kg (max bolus 4000 units)  As needed (PRN)         Question:  Heparin Infusion Adjustment (DO NOT MODIFY ANSWER)  Answer:  \\[email or web address]\epic\Images\Pharmacy\HeparinInfusions\heparin LOW INTENSITY nomogram for [facility] [id number].pdf    03/29/22 0039     heparin 25,000 units in dextrose 5% (100 units/ml) IV bolus from bag - ADDITIONAL PRN BOLUS - 30 units/kg (max bolus 4000 units)  As needed (PRN)        Question:  Heparin Infusion Adjustment (DO NOT MODIFY ANSWER)  Answer:  \\[email or web address]\epic\Images\Pharmacy\HeparinInfusions\heparin LOW INTENSITY nomogram for [facility] [id number].pdf    03/29/22 0039     Place sequential compression device  Until discontinued         03/29/22 0135     Place sequential compression device  Until discontinued         03/29/22 0134     heparin 25,000 units in dextrose 5% (100 units/ml) IV bolus from bag INITIAL BOLUS (max bolus 4000 units)  Once        Question:  Heparin Infusion Adjustment (DO NOT MODIFY ANSWER)  Answer:  \\[email or web address]\epic\Images\Pharmacy\HeparinInfusions\heparin LOW INTENSITY nomogram for [facility] [id number].pdf    03/29/22 0039     heparin 25,000 units in dextrose 5% 250 mL (100 units/mL) infusion LOW INTENSITY nomogram - [facility]  Continuous        Question Answer Comment   Heparin Infusion Adjustment (DO NOT MODIFY ANSWER) \\[email or web address]\epic\Images\Pharmacy\HeparinInfusions\heparin LOW INTENSITY nomogram for [facility] [id number].pdf    Begin at (in units/kg/hr) 12        03/29/22 0039                   Merlin Chowdhury MD  Department of Hospital Medicine   [facility] - Emergency Dept

## 2022-03-29 NOTE — ASSESSMENT & PLAN NOTE
Wheezing on auscultation  Received steroids and duonebs in ED  Will continue treatment with steroids and duonebs

## 2022-03-29 NOTE — PLAN OF CARE
[facility] - Cardiology Stepdown  Initial Discharge Assessment       Primary Care Provider: Isiah You MD    Admission Diagnosis: Hypoxia [R09.02]  Non-ST elevation myocardial infarction (NSTEMI) [I21.4]  NSTEMI (non-ST elevation myocardial infarction) [I21.4]  Chest pain [R07.9]  Acute on chronic congestive heart failure, unspecified heart failure type [I50.9]    Admission Date: 3/28/2022  Expected Discharge Date:     Discharge Barriers Identified: None    Payor: [employer] MEDICARE / Plan: [employer] MEDICARE HMO / Product Type: Capitation /     Extended Emergency Contact Information  Primary Emergency Contact: Ginny Taylor  Address: [address] [place] [address] [facility]  Home Phone: [phone]  Work Phone: [phone]  Mobile Phone: [phone]  Relation: Spouse    Discharge Plan A: Home  Discharge Plan B: Home with family      [id number]  [place] [address] & [address]  [place] [address]  Phone: [phone] Fax: [phone]    [facility] - [facility] [address] [facility]  [address] [facility] [address]  Phone: [phone] Fax: [phone]    [facility]  [address]  [facility] [address]  Phone: [phone] Fax: [phone]      Initial Assessment (most recent)     Adult Discharge Assessment - 03/29/22 1345        Discharge Assessment    Assessment Type Discharge Planning Assessment     Confirmed/corrected address, phone number and insurance Yes     Confirmed Demographics Correct on Facesheet     Source of Information patient     Communicated RICKY with patient/caregiver Date not available/Unable to determine     Lives With spouse     Do you expect to return to your current living situation? Yes     Do you have help at home or someone to help you manage your care at home? Yes     Who are your caregiver(s) and  their phone number(s)? Ginny (wife) [phone]     Prior to hospitilization cognitive status: Alert/Oriented     Current cognitive status: Alert/Oriented     Walking or Climbing Stairs Difficulty none     Dressing/Bathing Difficulty none     Equipment Currently Used at Home none     Readmission within 30 days? No     Patient currently being followed by outpatient case management? No     Do you currently have service(s) that help you manage your care at home? No     Do you have prescription coverage? Yes     Coverage [employer]     Do you have any problems affording any of your prescribed medications? TBD     Who is going to help you get home at discharge? Wife     How do you get to doctors appointments? family or friend will provide     Are you on dialysis? No     Do you take coumadin? No     Discharge Plan A Home     Discharge Plan B Home with family     DME Needed Upon Discharge  none     Discharge Plan discussed with: Patient     Discharge Barriers Identified None                    Pt admitted 3/28/2022  9:34 PM    For Hypoxia [R09.02]  Non-ST elevation myocardial infarction (NSTEMI) [I21.4]  NSTEMI (non-ST elevation myocardial infarction) [I21.4]  Chest pain [R07.9]  Acute on chronic congestive heart failure, unspecified heart failure type [I50.9]       CM to bedside for assessment. Information obtained from patient.  Pt lives with wife in house.  Wife will bring patient home at discharge. Will have support from family at d/c. Anticipate d/c to home with no needs.    Pt is followed by Dr Cunha for cardiology at [facility].      PCP is Isiah You MD  [phone] (p)  [phone] (f)      Payor: [employer] MEDICARE / Plan: HUMANA MEDICARE HMO / Product Type: Capitation /       [id number] - [place] - [address] [place] [address] AT [facility] & [address]  [facility] [address]  Phone: [phone] Fax: [phone]    [facility] Mail  Delivery - [place], [place] - [address]  [address] [facility] [address]  Phone: [phone] Fax: [phone]    [facility] [address] [place] [address]  Phone: [phone] Fax: [phone]      Extended Emergency Contact Information  Primary Emergency Contact: Ginny Taylor  Address: [address] [place] [address] [address]  Home Phone: [phone]  Work Phone: [phone]  Mobile Phone: [phone]  Relation: Spouse    No future appointments.      Julie Haase RN  Case Management [phone]

## 2022-03-29 NOTE — SUBJECTIVE & OBJECTIVE
Past Medical History:   Diagnosis Date    CAD (coronary artery disease)     Dr Martinez    Carotid artery disease     Chronic kidney disease, stage III (moderate)     Depression     Dyslipidemia     Hepatic cyst     Hepatic cyst     High-density lipoprotein deficiency     HTN (hypertension)     Hx of colonic polyps     Insomnia     PVD (peripheral vascular disease)     stented    S/P AAA repair        Past Surgical History:   Procedure Laterality Date    ABDOMINAL AORTIC ANEURYSM REPAIR      ABDOMINAL AORTIC ANEURYSM REPAIR      ANGIOGRAM, CORONARY, WITH LEFT HEART CATHETERIZATION Left 2/14/2022    Procedure: Angiogram, Coronary, with Left Heart Cath;  Surgeon: Delfino Castellon MD;  Location: [facility] CATH LAB;  Service: Cardiology;  Laterality: Left;    ANGIOGRAPHY OF LOWER EXTREMITY Left 10/23/2018    Procedure: Angiogram Extremity Unilateral;  Surgeon: Gregor Cunha MD;  Location: [facility] CATH;  Service: Cardiology;  Laterality: Left;  LLE intervention-Will start with 4/5 slender sheath left radial and take diagnostic angio of LLE to determine whether brachial access or tibial access will be required    CATARACT EXTRACTION W/ INTRAOCULAR LENS  IMPLANT, BILATERAL      CATARACT SX Bilateral     CATHETERIZATION OF BOTH LEFT AND RIGHT HEART N/A 7/10/2020    Procedure: CATHETERIZATION, HEART, BOTH LEFT AND RIGHT;  Surgeon: Gregor Cunha MD;  Location: [facility] CATH;  Service: Cardiology;  Laterality: N/A;  LHC + RHC +/- PCI -access left radial artery and left brachial vein    CHOLECYSTECTOMY      COLONOSCOPY N/A 10/7/2015    Procedure: COLONOSCOPY;  Surgeon: Genaro You MD;  Location: [facility] ENDO (4TH FLR);  Service: Endoscopy;  Laterality: N/A;    CORONARY ANGIOPLASTY WITH STENT PLACEMENT      CORONARY ARTERY BYPASS GRAFT      CORONARY BYPASS GRAFT ANGIOGRAPHY  2/14/2022    Procedure: Bypass graft study;  Surgeon: Delfino Castellon MD;  Location: [facility] CATH LAB;  Service: Cardiology;;    HERNIA REPAIR       "LAMINECTOMY      MAGNETIC RESONANCE IMAGING N/A 6/4/2021    Procedure: MRI (MAGNETIC RESONANCE IMAGING) LUMBAR SPINE;  Surgeon: Asa Diagnostic Provider;  Location: [facility];  Service: General;  Laterality: N/A;  In MRI @ 9:10 per Krys, To follow WC RM1    PATELLA SURGERY      PERCUTANEOUS TRANSLUMINAL ANGIOPLASTY (PTA) OF PERIPHERAL VESSEL N/A 9/18/2018    Procedure: PTA, PERIPHERAL BLOOD VESSEL;  Surgeon: Gregor Cunha MD;  Location: [facility] CATH;  Service: Cardiology;  Laterality: N/A;  Site change:  right popliteal artery access using US guidance.    PERCUTANEOUS TRANSLUMINAL ANGIOPLASTY (PTA) OF PERIPHERAL VESSEL Left 7/25/2019    Procedure: PTA, PERIPHERAL VESSEL;  Surgeon: Gregor Cunha MD;  Location: [facility] CATH;  Service: Cardiology;  Laterality: Left;    VASECTOMY         Review of patient's allergies indicates:   Allergen Reactions    Aggrastat concentrate [tirofiban] Shortness Of Breath and Other (See Comments)     Fever and chills    Brilinta [ticagrelor] Shortness Of Breath    Cymbalta [duloxetine] Nausea Only    Bupropion Other (See Comments)     "turns into zombie" withdrawn, not talking, outbursts       No current facility-administered medications on file prior to encounter.     Current Outpatient Medications on File Prior to Encounter   Medication Sig    albuterol (VENTOLIN HFA) 90 mcg/actuation inhaler Inhale 2 puffs into the lungs every 6 (six) hours as needed for Wheezing. Rescue    amLODIPine (NORVASC) 5 MG tablet Take 1 tablet (5 mg total) by mouth once daily.    aspirin (ECOTRIN) 81 MG EC tablet Take 81 mg by mouth once daily.    carvediloL (COREG) 6.25 MG tablet Take 1 tablet (6.25 mg total) by mouth 2 (two) times daily.    fenofibrate (TRICOR) 145 MG tablet Take 1 tablet (145 mg total) by mouth once daily.    furosemide (LASIX) 20 MG tablet 20 mg once daily.     gabapentin (NEURONTIN) 300 MG capsule 3 (three) times daily.    isosorbide mononitrate (IMDUR) 60 MG 24 hr tablet Take 1 tablet " (60 mg total) by mouth once daily.    lisinopriL (PRINIVIL,ZESTRIL) 20 MG tablet Take 1 tablet (20 mg total) by mouth once daily.    nitroGLYCERIN (NITROSTAT) 0.4 MG SL tablet Place 1 tablet (0.4 mg total) under the tongue every 5 (five) minutes as needed for Chest pain. (Patient not taking: Reported on 2/16/2022)    pantoprazole (PROTONIX) 40 MG tablet Take 40 mg by mouth once daily.    prasugreL (EFFIENT) 10 mg Tab Take 1 tablet (10 mg total) by mouth once daily.    ranolazine (RANEXA) 500 MG Tb12 Take 500 mg by mouth 2 (two) times daily. 11/15/20    rivaroxaban (XARELTO) 2.5 mg Tab Take 1 tablet (2.5 mg total) by mouth daily with dinner or evening meal.    rosuvastatin (CRESTOR) 20 MG tablet Take 2 tablets (40 mg total) by mouth once daily.    spironolactone (ALDACTONE) 25 MG tablet Take 1 tablet (25 mg total) by mouth once daily.     Family History       Problem Relation (Age of Onset)    Heart disease Mother, Father    Lung cancer Brother          Tobacco Use    Smoking status: Current Every Day Smoker     Packs/day: 1.00     Years: 50.00     Pack years: 50.00    Smokeless tobacco: Never Used    Tobacco comment: Currently smoke 1 ppd   Substance and Sexual Activity    Alcohol use: No    Drug use: Yes     Types: Marijuana     Comment: OCCASSIONALLY    Sexual activity: Not on file     Review of Systems   Constitutional:  Negative for activity change, appetite change, chills and fever.   HENT:  Negative for congestion, hearing loss and rhinorrhea.    Eyes:  Negative for discharge, itching and visual disturbance.   Respiratory:  Positive for shortness of breath. Negative for apnea and cough.    Cardiovascular:  Positive for chest pain and leg swelling. Negative for palpitations.   Gastrointestinal:  Negative for abdominal distention, abdominal pain, constipation, diarrhea, nausea and vomiting.        Heart burn   Endocrine: Negative for cold intolerance and heat intolerance.   Genitourinary:  Negative for dysuria  and hematuria.   Musculoskeletal:  Negative for back pain, neck pain and neck stiffness.   Skin:  Negative for rash and wound.   Neurological:  Negative for dizziness, seizures, light-headedness and headaches.   Psychiatric/Behavioral:  Negative for agitation, confusion and suicidal ideas.    Objective:     Vital Signs (Most Recent):  Temp: 97.8 °F (36.6 °C) (03/28/22 2118)  Pulse: 75 (03/28/22 2311)  Resp: 12 (03/28/22 2231)  BP: (!) 157/78 (03/28/22 2202)  SpO2: 95 % (03/28/22 2311)   Vital Signs (24h Range):  Temp:  [97.8 °F (36.6 °C)] 97.8 °F (36.6 °C)  Pulse:  [67-75] 75  Resp:  [12-20] 12  SpO2:  [95 %-100 %] 95 %  BP: (120-157)/(70-78) 157/78     Weight: 99.8 kg (220 lb)  Body mass index is 32.49 kg/m².    Physical Exam  Vitals reviewed.   Constitutional:       General: He is not in acute distress.     Appearance: He is well-developed.   HENT:      Head: Normocephalic and atraumatic.      Nose: Nose normal. No rhinorrhea.      Mouth/Throat:      Mouth: Mucous membranes are moist.   Eyes:      General: No scleral icterus.        Right eye: No discharge.         Left eye: No discharge.      Pupils: Pupils are equal, round, and reactive to light.   Neck:      Vascular: No JVD.   Cardiovascular:      Rate and Rhythm: Normal rate and regular rhythm.      Heart sounds: Normal heart sounds. No murmur heard.    No friction rub.   Pulmonary:      Effort: Pulmonary effort is normal. No respiratory distress.      Breath sounds: Wheezing and rales present.   Abdominal:      General: Bowel sounds are normal. There is no distension.      Palpations: Abdomen is soft.      Tenderness: There is no abdominal tenderness.   Musculoskeletal:         General: No deformity. Normal range of motion.      Cervical back: Normal range of motion and neck supple.      Right lower leg: Edema present.      Left lower leg: Edema present.   Skin:     General: Skin is warm and dry.   Neurological:      General: No focal deficit present.       Mental Status: He is alert and oriented to person, place, and time.   Psychiatric:         Mood and Affect: Mood normal.         Behavior: Behavior normal.         CRANIAL NERVES     CN III, IV, VI   Pupils are equal, round, and reactive to light.     Significant Labs: All pertinent labs within the past 24 hours have been reviewed.  CBC:   Recent Labs   Lab 03/28/22 2244   WBC 9.59   HGB 13.9*   HCT 43.4        CMP:   Recent Labs   Lab 03/28/22 2244      K 4.0   CL 99   CO2 29   GLU 82   BUN 23   CREATININE 1.8*   CALCIUM 10.4   PROT 6.9   ALBUMIN 3.4*   BILITOT 0.7   ALKPHOS 36*   AST 84*   ALT 17   ANIONGAP 12   EGFRNONAA 36.3*     Cardiac Markers:   Recent Labs   Lab 03/28/22 2244   BNP 1,504*       Troponin:   Recent Labs   Lab 03/28/22 2244   TROPONINI 31.034*       Significant Imaging: I have reviewed all pertinent imaging results/findings within the past 24 hours.

## 2022-03-29 NOTE — ASSESSMENT & PLAN NOTE
EF 40%. BNP 1500 on admission with CXR suggestive of pulmonary edema.  Continue current GDMT: Coreg 6.25mg BID, Lisinopril 20mg (uptitrate as tolerated), spironolactone 25mg  · Recommend diureses with Lasix 40IV daily  · Repeat echo  · Strict I/Os  · Low Na diet

## 2022-03-29 NOTE — HPI
"Mr. Lonnie Taylor is a 71 yo M with pmhx of CAD s/p Cabg (2v In 6/22/2006), PCI 2/9/18 and 3/2020, reports LHC in 7/2020 and 9/2021 which showed patent grafts. He also has known PAD f(or which he was recently started on xarelto 2.5), obesity, significant tobacco use (still 1ppd), HTN, CKD3, COPD and HLD. He was recently admitted with an NSTEMI with peak trop of 21. He had a LHC with revealed severely diffuse CAD with patent LIMA to LAD and patent SVG to OM. No interventions were done at that time and decision was made to maximize medical management, including risk factor reduction.     Today he presents with complaints of chest discomfort. He reports having indigestion/burning sensation for the past 2 days after admitting to eating heavily seasoned food. He decided to come to the ED bc pain was ongoing for 2 days and did no subside despite NTG and prilosec. No radiation of pain. He does report increased shortness of breath from his baseline. Otherwise, he denies palpitations, headaches, visual changes, fatigue, abdominal pain, or PND. He does occasionally get lower extremity edema depending on what he eats. He admits to poor dietary compliance and eats "very seasoned foods".    Bedside echo showed EF 40% with some inferior wall hypokinesis unchanged from prior echo. Slight ST elevations in AVR with no reciprocal ST depressions. Labs remerkable for elevated trop of 31, BNP of 1500. IVC 1.9 cm and appears >50% collapsible suggestive of CVP 6-10.    LHC 2/14/22  Severe native vessel CAD  Patent BECK to LAD  Patent SVG to OM  Diastolic dysfucntion, LVEDP 35 mmHg    TTE 2/12/22  The left ventricle is normal in size with moderately decreased systolic function.  The estimated ejection fraction is 40%.  There are segmental left ventricular wall motion abnormalities.  Grade III left ventricular diastolic dysfunction.  Mild left atrial enlargement.  Normal right ventricular size with normal right ventricular systolic " function.  Mild right atrial enlargement.  Mild mitral regurgitation.  Mild aortic regurgitation.  Normal central venous pressure (3 mmHg).  The estimated PA systolic pressure is 19 mmHg.  The ascending aorta is dilated.  Posterior pericardial effusion.

## 2022-03-29 NOTE — PLAN OF CARE
Hospital Medicine Plan of Care Note    Admission H&P dated earlier this morning reviewed, and agree with assessment and plan as documented. Pt seen and examined this morning on ANGE flores.     CP resolved, feeling back to baseline. Troponins downtrending. Continuing ACS protocol and cardiology recommendations. Echo pending. Continuing to monitor on tele.    Tobacco abuse and cessation counseling  - Pt with 1 ppd smoking history for most of adult life  - Motivated to quit  - Counseled pt >10 minutes on the importance of smoking cessation in relation to health       Tremayne Stahl MD  Attending Physician  Department of Hospital Medicine  Epic secure chat preferred, or SpectraLink ext. [phone]  3/29/2022

## 2022-03-29 NOTE — ASSESSMENT & PLAN NOTE
"Troponin elevation, cardiology consulted  Per recs,   · "Continue DAPT (ASA, prasugrel)  · recommend medical management (ACS protocol) with heparin gtt x 48 hours   · Continue to trend troponin q 4-6 hours until peak, obtain repeat ECG if patient has recurrence of chest pain  ? NTG 0.4mg SL PRN q5m for chest pain (max 3 doses)  · high intensity statin >> atorvastatin 80mg daily, fenofibrate  · Continue Coreg 6.25mg bid and uptitrate to target HR 60  · Continue Lisinopril 20mg daily- uptitrate anti-hypertensives as tolerated for target SBP <130  · Consider increasing Imdur to 120 mg daily  · Continue ranolazine 500mg BID  · bedside TTE suggestive of EF ~40% with inferolateral hypokinesis (unchanged from prior) >> recommend official TTE in am to reassess LV/RV function. Consider additional ischemic assessment in significant changes noted on official TTE.  ? Creatinine 1.8 today (up from baseline 1.5-1.6) -- consider IC consult if chest pain persists despite optimization of antianginals   · continue tele monitoring  "  Will order ECHO and adjust meds as possible    "

## 2022-03-30 LAB
ANION GAP SERPL CALC-SCNC: 14 MMOL/L (ref 8–16)
APTT BLDCRRT: 34.6 SEC (ref 21–32)
APTT BLDCRRT: 37.5 SEC (ref 21–32)
APTT BLDCRRT: 37.6 SEC (ref 21–32)
APTT BLDCRRT: 44.1 SEC (ref 21–32)
BASOPHILS # BLD AUTO: 0.02 K/UL (ref 0–0.2)
BASOPHILS NFR BLD: 0.2 % (ref 0–1.9)
BUN SERPL-MCNC: 34 MG/DL (ref 8–23)
CALCIUM SERPL-MCNC: 9.9 MG/DL (ref 8.7–10.5)
CHLORIDE SERPL-SCNC: 99 MMOL/L (ref 95–110)
CO2 SERPL-SCNC: 25 MMOL/L (ref 23–29)
CREAT SERPL-MCNC: 1.9 MG/DL (ref 0.5–1.4)
DIFFERENTIAL METHOD: ABNORMAL
EOSINOPHIL # BLD AUTO: 0 K/UL (ref 0–0.5)
EOSINOPHIL NFR BLD: 0.1 % (ref 0–8)
ERYTHROCYTE [DISTWIDTH] IN BLOOD BY AUTOMATED COUNT: 14.6 % (ref 11.5–14.5)
EST. GFR  (AFRICAN AMERICAN): 39.3 ML/MIN/1.73 M^2
EST. GFR  (NON AFRICAN AMERICAN): 34 ML/MIN/1.73 M^2
GLUCOSE SERPL-MCNC: 100 MG/DL (ref 70–110)
HCT VFR BLD AUTO: 40.4 % (ref 40–54)
HGB BLD-MCNC: 13.3 G/DL (ref 14–18)
IMM GRANULOCYTES # BLD AUTO: 0.02 K/UL (ref 0–0.04)
IMM GRANULOCYTES NFR BLD AUTO: 0.2 % (ref 0–0.5)
LYMPHOCYTES # BLD AUTO: 2 K/UL (ref 1–4.8)
LYMPHOCYTES NFR BLD: 21.8 % (ref 18–48)
MCH RBC QN AUTO: 29.5 PG (ref 27–31)
MCHC RBC AUTO-ENTMCNC: 32.9 G/DL (ref 32–36)
MCV RBC AUTO: 90 FL (ref 82–98)
MONOCYTES # BLD AUTO: 0.8 K/UL (ref 0.3–1)
MONOCYTES NFR BLD: 9 % (ref 4–15)
NEUTROPHILS # BLD AUTO: 6.4 K/UL (ref 1.8–7.7)
NEUTROPHILS NFR BLD: 68.7 % (ref 38–73)
NRBC BLD-RTO: 0 /100 WBC
PLATELET # BLD AUTO: 186 K/UL (ref 150–450)
PMV BLD AUTO: 11.5 FL (ref 9.2–12.9)
POTASSIUM SERPL-SCNC: 3.4 MMOL/L (ref 3.5–5.1)
RBC # BLD AUTO: 4.51 M/UL (ref 4.6–6.2)
SODIUM SERPL-SCNC: 138 MMOL/L (ref 136–145)
WBC # BLD AUTO: 9.24 K/UL (ref 3.9–12.7)

## 2022-03-30 PROCEDURE — 36415 COLL VENOUS BLD VENIPUNCTURE: CPT | Performed by: STUDENT IN AN ORGANIZED HEALTH CARE EDUCATION/TRAINING PROGRAM

## 2022-03-30 PROCEDURE — 85025 COMPLETE CBC W/AUTO DIFF WBC: CPT

## 2022-03-30 PROCEDURE — 99233 PR SUBSEQUENT HOSPITAL CARE,LEVL III: ICD-10-PCS | Mod: ,,, | Performed by: STUDENT IN AN ORGANIZED HEALTH CARE EDUCATION/TRAINING PROGRAM

## 2022-03-30 PROCEDURE — 99233 SBSQ HOSP IP/OBS HIGH 50: CPT | Mod: ,,, | Performed by: STUDENT IN AN ORGANIZED HEALTH CARE EDUCATION/TRAINING PROGRAM

## 2022-03-30 PROCEDURE — 25000003 PHARM REV CODE 250: Performed by: STUDENT IN AN ORGANIZED HEALTH CARE EDUCATION/TRAINING PROGRAM

## 2022-03-30 PROCEDURE — 94640 AIRWAY INHALATION TREATMENT: CPT

## 2022-03-30 PROCEDURE — 99232 PR SUBSEQUENT HOSPITAL CARE,LEVL II: ICD-10-PCS | Mod: ,,, | Performed by: INTERNAL MEDICINE

## 2022-03-30 PROCEDURE — 99232 SBSQ HOSP IP/OBS MODERATE 35: CPT | Mod: ,,, | Performed by: INTERNAL MEDICINE

## 2022-03-30 PROCEDURE — 85730 THROMBOPLASTIN TIME PARTIAL: CPT | Mod: 91 | Performed by: STUDENT IN AN ORGANIZED HEALTH CARE EDUCATION/TRAINING PROGRAM

## 2022-03-30 PROCEDURE — 63600175 PHARM REV CODE 636 W HCPCS: Performed by: INTERNAL MEDICINE

## 2022-03-30 PROCEDURE — 63600175 PHARM REV CODE 636 W HCPCS

## 2022-03-30 PROCEDURE — 85730 THROMBOPLASTIN TIME PARTIAL: CPT

## 2022-03-30 PROCEDURE — S4991 NICOTINE PATCH NONLEGEND: HCPCS | Performed by: STUDENT IN AN ORGANIZED HEALTH CARE EDUCATION/TRAINING PROGRAM

## 2022-03-30 PROCEDURE — 20600001 HC STEP DOWN PRIVATE ROOM

## 2022-03-30 PROCEDURE — 94761 N-INVAS EAR/PLS OXIMETRY MLT: CPT

## 2022-03-30 PROCEDURE — 25000003 PHARM REV CODE 250: Performed by: INTERNAL MEDICINE

## 2022-03-30 PROCEDURE — 99900035 HC TECH TIME PER 15 MIN (STAT)

## 2022-03-30 PROCEDURE — 36415 COLL VENOUS BLD VENIPUNCTURE: CPT

## 2022-03-30 PROCEDURE — 80048 BASIC METABOLIC PNL TOTAL CA: CPT | Performed by: INTERNAL MEDICINE

## 2022-03-30 PROCEDURE — 25000242 PHARM REV CODE 250 ALT 637 W/ HCPCS: Performed by: INTERNAL MEDICINE

## 2022-03-30 PROCEDURE — 25000003 PHARM REV CODE 250

## 2022-03-30 RX ORDER — HYDROXYZINE HYDROCHLORIDE 25 MG/1
25 TABLET, FILM COATED ORAL 3 TIMES DAILY PRN
Status: DISCONTINUED | OUTPATIENT
Start: 2022-03-30 | End: 2022-03-31 | Stop reason: HOSPADM

## 2022-03-30 RX ORDER — POTASSIUM CHLORIDE 20 MEQ/1
40 TABLET, EXTENDED RELEASE ORAL ONCE
Status: COMPLETED | OUTPATIENT
Start: 2022-03-30 | End: 2022-03-30

## 2022-03-30 RX ORDER — FUROSEMIDE 40 MG/1
40 TABLET ORAL 2 TIMES DAILY
Status: DISCONTINUED | OUTPATIENT
Start: 2022-03-30 | End: 2022-03-31 | Stop reason: HOSPADM

## 2022-03-30 RX ADMIN — PRASUGREL 10 MG: 10 TABLET, FILM COATED ORAL at 09:03

## 2022-03-30 RX ADMIN — FENOFIBRATE 145 MG: 145 TABLET, FILM COATED ORAL at 08:03

## 2022-03-30 RX ADMIN — POTASSIUM CHLORIDE 40 MEQ: 1500 TABLET, EXTENDED RELEASE ORAL at 08:03

## 2022-03-30 RX ADMIN — AMLODIPINE BESYLATE 5 MG: 5 TABLET ORAL at 08:03

## 2022-03-30 RX ADMIN — RANOLAZINE 500 MG: 500 TABLET, EXTENDED RELEASE ORAL at 09:03

## 2022-03-30 RX ADMIN — CARVEDILOL 6.25 MG: 6.25 TABLET, FILM COATED ORAL at 08:03

## 2022-03-30 RX ADMIN — HEPARIN SODIUM 19 UNITS/KG/HR: 5000 INJECTION INTRAVENOUS; SUBCUTANEOUS at 01:03

## 2022-03-30 RX ADMIN — ATORVASTATIN CALCIUM 80 MG: 20 TABLET, FILM COATED ORAL at 08:03

## 2022-03-30 RX ADMIN — FUROSEMIDE 40 MG: 10 INJECTION, SOLUTION INTRAMUSCULAR; INTRAVENOUS at 08:03

## 2022-03-30 RX ADMIN — FUROSEMIDE 40 MG: 40 TABLET ORAL at 05:03

## 2022-03-30 RX ADMIN — SACUBITRIL AND VALSARTAN 1 TABLET: 24; 26 TABLET, FILM COATED ORAL at 09:03

## 2022-03-30 RX ADMIN — NICOTINE 1 PATCH: 7 PATCH, EXTENDED RELEASE TRANSDERMAL at 08:03

## 2022-03-30 RX ADMIN — GABAPENTIN 300 MG: 300 CAPSULE ORAL at 09:03

## 2022-03-30 RX ADMIN — GABAPENTIN 300 MG: 300 CAPSULE ORAL at 08:03

## 2022-03-30 RX ADMIN — IPRATROPIUM BROMIDE AND ALBUTEROL SULFATE 3 ML: 2.5; .5 SOLUTION RESPIRATORY (INHALATION) at 02:03

## 2022-03-30 RX ADMIN — IPRATROPIUM BROMIDE AND ALBUTEROL SULFATE 3 ML: 2.5; .5 SOLUTION RESPIRATORY (INHALATION) at 08:03

## 2022-03-30 RX ADMIN — CARVEDILOL 6.25 MG: 6.25 TABLET, FILM COATED ORAL at 09:03

## 2022-03-30 RX ADMIN — ISOSORBIDE MONONITRATE 60 MG: 60 TABLET, EXTENDED RELEASE ORAL at 08:03

## 2022-03-30 RX ADMIN — HEPARIN SODIUM 19 UNITS/KG/HR: 5000 INJECTION INTRAVENOUS; SUBCUTANEOUS at 12:03

## 2022-03-30 RX ADMIN — IPRATROPIUM BROMIDE AND ALBUTEROL SULFATE 3 ML: 2.5; .5 SOLUTION RESPIRATORY (INHALATION) at 07:03

## 2022-03-30 RX ADMIN — RANOLAZINE 500 MG: 500 TABLET, EXTENDED RELEASE ORAL at 08:03

## 2022-03-30 RX ADMIN — PANTOPRAZOLE SODIUM 40 MG: 40 TABLET, DELAYED RELEASE ORAL at 08:03

## 2022-03-30 RX ADMIN — SPIRONOLACTONE 25 MG: 25 TABLET, FILM COATED ORAL at 08:03

## 2022-03-30 RX ADMIN — GABAPENTIN 300 MG: 300 CAPSULE ORAL at 03:03

## 2022-03-30 RX ADMIN — HYDROXYZINE HYDROCHLORIDE 25 MG: 25 TABLET, FILM COATED ORAL at 06:03

## 2022-03-30 RX ADMIN — ASPIRIN 81 MG: 81 TABLET, COATED ORAL at 08:03

## 2022-03-30 RX ADMIN — PREDNISONE 40 MG: 20 TABLET ORAL at 08:03

## 2022-03-30 NOTE — AI DETERIORATION ALERT
"RAPID RESPONSE NURSE AI ALERT       AI alert received.    Chart Reviewed: 03/30/2022, 12:22 PM    MRN: [id number]  Bed: 326/326 A    Dx: NSTEMI (non-ST elevated myocardial infarction)    Lonnie Taylor has a past medical history of CAD (coronary artery disease), Carotid artery disease, Chronic kidney disease, stage III (moderate), Depression, Dyslipidemia, Hepatic cyst, Hepatic cyst, High-density lipoprotein deficiency, HTN (hypertension), colonic polyps, Insomnia, PVD (peripheral vascular disease), and S/P AAA repair.    Last VS: BP (!) 124/58 (BP Location: Left arm, Patient Position: Lying)   Pulse 61   Temp 97.9 °F (36.6 °C) (Oral)   Resp 18   Ht 5' 9" (1.753 m)   Wt 100 kg (220 lb 7.4 oz)   SpO2 (!) 92%   BMI 32.56 kg/m²     24H Vital Sign Range:  Temp:  [96.1 °F (35.6 °C)-98 °F (36.7 °C)]   Pulse:  [54-77]   Resp:  [16-20]   BP: (101-157)/(52-70)   SpO2:  [91 %-100 %]     Level of Consciousness (AVPU): alert    Recent Labs     03/28/22 2244 03/29/22  0552 03/30/22  0259   WBC 9.59 7.45 9.24   HGB 13.9* 14.2 13.3*   HCT 43.4 43.2 40.4    179 186       Recent Labs     03/28/22 2244 03/29/22  0552 03/30/22  0259     --  138   K 4.0  --  3.4*   CL 99  --  99   CO2 29  --  25   CREATININE 1.8*  --  1.9*   GLU 82  --  100   MG  --  1.6  --         No results for input(s): PH, PCO2, PO2, HCO3, POCSATURATED, BE in the last 72 hours.     OXYGEN:  Flow (L/min): 2  O2 Device (Oxygen Therapy): room air    MEWS score: 4    Bedside Tino SARGENT contacted reports  not accurate, VS repeated HR 61. No additional concerns verbalized at this time. Instructed to call [phone] for further concerns or assistance.    Heide Chung RN        "

## 2022-03-30 NOTE — SUBJECTIVE & OBJECTIVE
Interval History: NAEON. Continues on heparin drip. Chest pressure has resolved. Is coughing and wheezing some.     ROS  Objective:     Vital Signs (Most Recent):  Temp: 97.1 °F (36.2 °C) (03/30/22 0756)  Pulse: 69 (03/30/22 0756)  Resp: 18 (03/30/22 0756)  BP: (!) 146/67 (03/30/22 0756)  SpO2: 97 % (03/30/22 0756)   Vital Signs (24h Range):  Temp:  [96.1 °F (35.6 °C)-98 °F (36.7 °C)] 97.1 °F (36.2 °C)  Pulse:  [54-77] 69  Resp:  [16-20] 18  SpO2:  [91 %-100 %] 97 %  BP: (114-157)/(58-70) 146/67     Weight: 100 kg (220 lb 7.4 oz)  Body mass index is 32.56 kg/m².     SpO2: 97 %  O2 Device (Oxygen Therapy): room air      Intake/Output Summary (Last 24 hours) at 3/30/2022 0838  Last data filed at 3/30/2022 0500  Gross per 24 hour   Intake 622 ml   Output --   Net 622 ml       Lines/Drains/Airways       Peripheral Intravenous Line  Duration                  Peripheral IV - Single Lumen 03/28/22 2247 20 G Right Antecubital 1 day                    Physical Exam  Vitals and nursing note reviewed.   Constitutional:       General: He is not in acute distress.     Appearance: He is not ill-appearing, toxic-appearing or diaphoretic.   HENT:      Head: Normocephalic.   Cardiovascular:      Rate and Rhythm: Normal rate and regular rhythm.   Pulmonary:      Effort: Pulmonary effort is normal. No respiratory distress.      Breath sounds: Wheezing present.   Abdominal:      Palpations: Abdomen is soft.      Tenderness: There is no guarding.   Musculoskeletal:      Right lower leg: No edema.      Left lower leg: No edema.   Skin:     General: Skin is warm and dry.   Neurological:      Mental Status: He is alert. Mental status is at baseline.      Cranial Nerves: No dysarthria.   Psychiatric:         Mood and Affect: Mood normal.         Behavior: Behavior normal.       Significant Labs: All pertinent lab results from the last 24 hours have been reviewed.    Significant Imaging:  na

## 2022-03-30 NOTE — PROGRESS NOTES
[facility] - Cardiology [facility]  Progress Note    Patient Name: Lonnie Taylor  MRN: [id number]  Patient Class: IP- Inpatient   Admission Date: 3/28/2022  Length of Stay: 1 days  Attending Physician: Tremayne Stahl MD  Primary Care Provider: Isiah You MD        Subjective:     Principal Problem:NSTEMI (non-ST elevated myocardial infarction)        HPI:  71 yo male with CAD, NSTEMI last month, PVD, PAD, AAA, COPD, tobacco abuse, CKD presenting for chest pain, heart burn and shortness of breath. He states that yesterday he ate a burger with jalapeno's and started having severe midsternal heart burn that he took prilosec and jagdeep seltzer which did not help. He was not able to sleep much but the pain went away by itself. However, he did have some shortness of breath afterwards which made him come to the ED tonight for evaluation. He currently denies chest pain, shortness of breath. The chest pain felt differently than his NSTEMI back in February.     Workup in the ED revealed a troponin of 31, BNP of 1500. ED started duonebs and steroids, lasix; Cardiology consulted who recommended heparin drip, ASA loading, diuresis.      Overview/Hospital Course:  Pt admitted to  team G and started on ACS protocol. CP resolved and troponin downtreded. Echo performed, which showed EF 35% with G3DD. Cardiology following for medical management. Will complete 48h heparin gtt on morning of 3/31.      Interval History: Pt seen and examined this morning on rounds. ANGE. Grossly asymptomatic this morning. Smoking cessation again encouraged. Updated that he needs to complete the 48h heparin gtt and transition tomorrow to oral meds, and may hopefully discharge after final cardiology recs. Care plan reviewed. Otherwise, doing well and with no further complaints at this time.        Objective:     Vital Signs (Most Recent):  Temp: 97.9 °F (36.6 °C) (03/30/22 1208)  Pulse: 61 (03/30/22 1208)  Resp: 18 (03/30/22  1208)  BP: (!) 124/58 (03/30/22 1208)  SpO2: (!) 92 % (03/30/22 1208)   Vital Signs (24h Range):  Temp:  [96.6 °F (35.9 °C)-98 °F (36.7 °C)] 97.9 °F (36.6 °C)  Pulse:  [54-77] 61  Resp:  [16-20] 18  SpO2:  [91 %-100 %] 92 %  BP: (101-157)/(52-69) 124/58     Weight: 100 kg (220 lb 7.4 oz)  Body mass index is 32.56 kg/m².    Intake/Output Summary (Last 24 hours) at 3/30/2022 1417  Last data filed at 3/30/2022 1249  Gross per 24 hour   Intake 972 ml   Output 400 ml   Net 572 ml        Physical Exam  Gen: in NAD, appears stated age; pt is obese  Neuro: AAOx4, CN2-12 grossly intact BL; motor, sensory, and strength grossly intact BL  HEENT: NTNC, EOMI, PERRLA, MMM; no thyromegaly or lymphadenopathy; no JVD appreciated  CVS: RRR, no m/r/g; S1/S2 auscultated with no S3 or S4; capillary refill < 2 sec  Resp: expiratory wheezes in all lung fields BL; no belabored breathing or accessory muscle use appreciated   Abd: BS+ in all 4 quadrants; NTND, soft to palpation; no organomegaly appreciated   Extrem: pulses full, equal, and regular over all 4 extremities; no UE or LE edema BL      Significant Labs: All pertinent labs within the past 24 hours have been reviewed.    Significant Imaging: I have reviewed all pertinent imaging results/findings within the past 24 hours.      Assessment/Plan:      * NSTEMI (non-ST elevated myocardial infarction)  - Interval history and physical exam findings as described above  - Troponins now downtrending  - Continue ACS protocol   - Will complete 48h heparin gtt on morning of 3/31  - Cardiology following for medical management  - Will continue to monitor on tele    Acute decompensated heart failure  Acute on Chronic Systolic Heart Failure  - CHF pathway started  - uncontrolled  - s/s corresponding to heart failure exacerbation  - EF 35% with G2DD  - Cardiology following for medical management    Pulmonary emphysema  Wheezing on auscultation  Received steroids and duonebs in ED  Will continue  treatment with steroids and duonebs      PAD (peripheral artery disease)  See plan above      Dyslipidemia  Continue statin, chronic, controlled      Tobacco abuse  Counseled on cessation      Chronic kidney disease, stage III (moderate)  Chronic, will continue to monitor closely, slightly above baseline currently; may be 2/2 cardiorenal syndrome      Primary hypertension  Chronic, controlled, continue home BP medications      Coronary artery disease involving native coronary artery with angina pectoris  Chronic, see plan above        VTE Risk Mitigation (From admission, onward)         Ordered     IP VTE HIGH RISK PATIENT  Once         03/29/22 0143     Place sequential compression device  Until discontinued         03/29/22 0143     heparin 25,000 units in dextrose 5% (100 units/ml) IV bolus from bag - ADDITIONAL PRN BOLUS - 60 units/kg (max bolus 4000 units)  As needed (PRN)        Question:  Heparin Infusion Adjustment (DO NOT MODIFY ANSWER)  Answer:  \\[email or web address]\epic\Images\Pharmacy\HeparinInfusions\heparin LOW INTENSITY nomogram for [facility] [id number].pdf    03/29/22 0039     heparin 25,000 units in dextrose 5% (100 units/ml) IV bolus from bag - ADDITIONAL PRN BOLUS - 30 units/kg (max bolus 4000 units)  As needed (PRN)        Question:  Heparin Infusion Adjustment (DO NOT MODIFY ANSWER)  Answer:  \[email or web address]\epic\Images\Pharmacy\HeparinInfusions\heparin LOW INTENSITY nomogram for [facility] [id number].pdf    03/29/22 0039     Place sequential compression device  Until discontinued         03/29/22 0135     Place sequential compression device  Until discontinued         03/29/22 0134     heparin 25,000 units in dextrose 5% 250 mL (100 units/mL) infusion LOW INTENSITY nomogram - [facility]  Continuous        Question Answer Comment   Heparin Infusion Adjustment (DO NOT MODIFY ANSWER) \\[email or web address]\epic\Images\Pharmacy\HeparinInfusions\heparin LOW INTENSITY nomogram for [facility] [id number].pdf    Begin at (in units/kg/hr) 12        03/29/22  0039                Discharge Planning   RICKY: 3/31/2022     Code Status: Full Code   Is the patient medically ready for discharge?: No    Reason for patient still in hospital (select all that apply): Patient trending condition  Discharge Plan A: Home          Tremayne Stahl MD  Attending Physician  Department of Hospital Medicine  Epic secure chat preferred, or Spect[employer]Link ext. [phone]  3/30/2022

## 2022-03-30 NOTE — ASSESSMENT & PLAN NOTE
Acute on Chronic Systolic Heart Failure  - CHF pathway started  - uncontrolled  - s/s corresponding to heart failure exacerbation  - EF 35% with G2DD  - Cardiology following for medical management

## 2022-03-30 NOTE — HOSPITAL COURSE
Pt admitted to  team G and started on ACS protocol. CP resolved and troponin downtreded. Echo performed, which showed EF 35% with G3DD. Cardiology following for medical management. Will complete 48h heparin gtt on morning of 3/31. Completed heparin drip. Dicussed with cardio, stable for discharge home with cardiology follow up and cardiac rehab. Home with . Meds as below.

## 2022-03-30 NOTE — ASSESSMENT & PLAN NOTE
EF 40%. BNP 1500 on admission with CXR suggestive of pulmonary edema.  Continue current GDMT: Coreg 6.25mg BID, spironolactone 25mg, changed to Entresto  · Continue diureses with Lasix 40IV BID; likely can transition to PO tomorrow  · Starting Entresto this evening  · Repeat troponin prior to discharge to ensure remains downtrending  · Strict I/Os  · Low Na diet

## 2022-03-30 NOTE — SUBJECTIVE & OBJECTIVE
Interval History: Pt seen and examined this morning on [facility]. Grossly asymptomatic this morning. Smoking cessation again encouraged. Updated that he needs to complete the 48h heparin gtt and transition tomorrow to oral meds, and may hopefully discharge after final cardiology recs. Care plan reviewed. Otherwise, doing well and with no further complaints at this time.        Objective:     Vital Signs (Most Recent):  Temp: 97.9 °F (36.6 °C) (03/30/22 1208)  Pulse: 61 (03/30/22 1208)  Resp: 18 (03/30/22 1208)  BP: (!) 124/58 (03/30/22 1208)  SpO2: (!) 92 % (03/30/22 1208)   Vital Signs (24h Range):  Temp:  [96.6 °F (35.9 °C)-98 °F (36.7 °C)] 97.9 °F (36.6 °C)  Pulse:  [54-77] 61  Resp:  [16-20] 18  SpO2:  [91 %-100 %] 92 %  BP: (101-157)/(52-69) 124/58     Weight: 100 kg (220 lb 7.4 oz)  Body mass index is 32.56 kg/m².    Intake/Output Summary (Last 24 hours) at 3/30/2022 1417  Last data filed at 3/30/2022 1249  Gross per 24 hour   Intake 972 ml   Output 400 ml   Net 572 ml        Physical Exam  Gen: in NAD, appears stated age; pt is obese  Neuro: AAOx4, CN2-12 grossly intact BL; motor, sensory, and strength grossly intact BL  HEENT: NTNC, EOMI, PERRLA, MMM; no thyromegaly or lymphadenopathy; no JVD appreciated  CVS: RRR, no m/r/g; S1/S2 auscultated with no S3 or S4; capillary refill < 2 sec  Resp: expiratory wheezes in all lung fields BL; no belabored breathing or accessory muscle use appreciated   Abd: BS+ in all 4 quadrants; NTND, soft to palpation; no organomegaly appreciated   Extrem: pulses full, equal, and regular over all 4 extremities; no UE or LE edema BL      Significant Labs: All pertinent labs within the past 24 hours have been reviewed.    Significant Imaging: I have reviewed all pertinent imaging results/findings within the past 24 hours.

## 2022-03-30 NOTE — CARE UPDATE
As of this afternoon, patient's clinical picture appears to have reached euvolemia.     - Transitioned to PO lasix  - starting Entresto  - stopped amlodipine to allow for uptitration of mortality-benefit drugs  - if hypertensive, uptitrate carvedilol to achieve control; uptitrate regardless as tolerated for target HR 60 (can be done on outpatient basis to allow time for Entresto to reach effect)    Discussed with Cardiology fellow Dr. Katiuska Borden

## 2022-03-30 NOTE — CONSULTS
Food and Nutrition Education      Diet Education: Low sodium/fluid restriction  Time Spent: 22 minutes   Learners: Pt      Nutrition Education provided with handouts: Heart failure Nutrition Therapy      RD reviewed sodium restriction and foods high in sodium. Reviewed how to add flavors to food without adding sodium.  Reviewed fluid restriction and foods considered fluids. Encouraged pt to monitor weights daily. Pt verbalized understanding. Educational materials left with Pt.     Additional comments: Pt reports a good appetite currently.Per RN documentation pt consuming an average of ~% of all meals. Reports his current diet PTA mainly consists of pasta and he does follow a low sodium diet at home. He reports his UBW falls at ~220#. Reports PTA he tried to lose 10 pounds and went down to 210 pounds.  States that he wants to eat healthier. No issues chewing/swallowing at this time. No n/v/d/c. Per pt, no significant weight changes recently besides unintentional weight loss.NFPE completed 3/30/22, no s/s of malnutrition at this time. RD will monitor.       All questions and concerns answered. Dietitian's contact information provided.      Follow-Up: Yes     Please Re-consult as needed     Thank you!    By Gia VÁZQUEZ

## 2022-03-30 NOTE — ASSESSMENT & PLAN NOTE
- Interval history and physical exam findings as described above  - Troponins now downtrending  - Continue ACS protocol   - Will complete 48h heparin gtt on morning of 3/31  - Cardiology following for medical management  - Will continue to monitor on tele

## 2022-03-31 VITALS
RESPIRATION RATE: 18 BRPM | SYSTOLIC BLOOD PRESSURE: 101 MMHG | OXYGEN SATURATION: 99 % | BODY MASS INDEX: 32.24 KG/M2 | DIASTOLIC BLOOD PRESSURE: 60 MMHG | WEIGHT: 217.69 LBS | HEIGHT: 69 IN | TEMPERATURE: 97 F | HEART RATE: 56 BPM

## 2022-03-31 LAB
ANION GAP SERPL CALC-SCNC: 10 MMOL/L (ref 8–16)
APTT BLDCRRT: 57.4 SEC (ref 21–32)
APTT BLDCRRT: 58.7 SEC (ref 21–32)
BASOPHILS # BLD AUTO: 0.02 K/UL (ref 0–0.2)
BASOPHILS NFR BLD: 0.2 % (ref 0–1.9)
BNP SERPL-MCNC: 363 PG/ML (ref 0–99)
BUN SERPL-MCNC: 38 MG/DL (ref 8–23)
CALCIUM SERPL-MCNC: 9.6 MG/DL (ref 8.7–10.5)
CHLORIDE SERPL-SCNC: 99 MMOL/L (ref 95–110)
CO2 SERPL-SCNC: 28 MMOL/L (ref 23–29)
CREAT SERPL-MCNC: 1.8 MG/DL (ref 0.5–1.4)
DIFFERENTIAL METHOD: ABNORMAL
EOSINOPHIL # BLD AUTO: 0 K/UL (ref 0–0.5)
EOSINOPHIL NFR BLD: 0.1 % (ref 0–8)
ERYTHROCYTE [DISTWIDTH] IN BLOOD BY AUTOMATED COUNT: 14.6 % (ref 11.5–14.5)
EST. GFR  (AFRICAN AMERICAN): 41.9 ML/MIN/1.73 M^2
EST. GFR  (NON AFRICAN AMERICAN): 36.3 ML/MIN/1.73 M^2
GLUCOSE SERPL-MCNC: 111 MG/DL (ref 70–110)
HCT VFR BLD AUTO: 43.4 % (ref 40–54)
HGB BLD-MCNC: 13.6 G/DL (ref 14–18)
IMM GRANULOCYTES # BLD AUTO: 0.03 K/UL (ref 0–0.04)
IMM GRANULOCYTES NFR BLD AUTO: 0.4 % (ref 0–0.5)
LYMPHOCYTES # BLD AUTO: 1.5 K/UL (ref 1–4.8)
LYMPHOCYTES NFR BLD: 17.5 % (ref 18–48)
MCH RBC QN AUTO: 29.8 PG (ref 27–31)
MCHC RBC AUTO-ENTMCNC: 31.3 G/DL (ref 32–36)
MCV RBC AUTO: 95 FL (ref 82–98)
MONOCYTES # BLD AUTO: 0.6 K/UL (ref 0.3–1)
MONOCYTES NFR BLD: 6.8 % (ref 4–15)
NEUTROPHILS # BLD AUTO: 6.3 K/UL (ref 1.8–7.7)
NEUTROPHILS NFR BLD: 75 % (ref 38–73)
NRBC BLD-RTO: 0 /100 WBC
PLATELET # BLD AUTO: 197 K/UL (ref 150–450)
PMV BLD AUTO: 12 FL (ref 9.2–12.9)
POTASSIUM SERPL-SCNC: 4 MMOL/L (ref 3.5–5.1)
RBC # BLD AUTO: 4.56 M/UL (ref 4.6–6.2)
SODIUM SERPL-SCNC: 137 MMOL/L (ref 136–145)
TROPONIN I SERPL DL<=0.01 NG/ML-MCNC: 9.66 NG/ML (ref 0–0.03)
WBC # BLD AUTO: 8.4 K/UL (ref 3.9–12.7)

## 2022-03-31 PROCEDURE — 84484 ASSAY OF TROPONIN QUANT: CPT | Performed by: STUDENT IN AN ORGANIZED HEALTH CARE EDUCATION/TRAINING PROGRAM

## 2022-03-31 PROCEDURE — 25000242 PHARM REV CODE 250 ALT 637 W/ HCPCS: Performed by: INTERNAL MEDICINE

## 2022-03-31 PROCEDURE — 80048 BASIC METABOLIC PNL TOTAL CA: CPT | Performed by: INTERNAL MEDICINE

## 2022-03-31 PROCEDURE — 25000003 PHARM REV CODE 250: Performed by: INTERNAL MEDICINE

## 2022-03-31 PROCEDURE — 99239 HOSP IP/OBS DSCHRG MGMT >30: CPT | Mod: ,,, | Performed by: INTERNAL MEDICINE

## 2022-03-31 PROCEDURE — 99232 SBSQ HOSP IP/OBS MODERATE 35: CPT | Mod: ,,, | Performed by: INTERNAL MEDICINE

## 2022-03-31 PROCEDURE — 25000003 PHARM REV CODE 250

## 2022-03-31 PROCEDURE — 85730 THROMBOPLASTIN TIME PARTIAL: CPT | Mod: 91 | Performed by: STUDENT IN AN ORGANIZED HEALTH CARE EDUCATION/TRAINING PROGRAM

## 2022-03-31 PROCEDURE — 99239 PR HOSPITAL DISCHARGE DAY,>30 MIN: ICD-10-PCS | Mod: ,,, | Performed by: INTERNAL MEDICINE

## 2022-03-31 PROCEDURE — 85025 COMPLETE CBC W/AUTO DIFF WBC: CPT

## 2022-03-31 PROCEDURE — 94640 AIRWAY INHALATION TREATMENT: CPT

## 2022-03-31 PROCEDURE — 85730 THROMBOPLASTIN TIME PARTIAL: CPT

## 2022-03-31 PROCEDURE — S4991 NICOTINE PATCH NONLEGEND: HCPCS | Performed by: STUDENT IN AN ORGANIZED HEALTH CARE EDUCATION/TRAINING PROGRAM

## 2022-03-31 PROCEDURE — 63600175 PHARM REV CODE 636 W HCPCS

## 2022-03-31 PROCEDURE — 99232 PR SUBSEQUENT HOSPITAL CARE,LEVL II: ICD-10-PCS | Mod: ,,, | Performed by: INTERNAL MEDICINE

## 2022-03-31 PROCEDURE — 1111F PR DISCHARGE MEDS RECONCILED W/ CURRENT OUTPATIENT MED LIST: ICD-10-PCS | Mod: CPTII,,, | Performed by: INTERNAL MEDICINE

## 2022-03-31 PROCEDURE — 63600175 PHARM REV CODE 636 W HCPCS: Performed by: INTERNAL MEDICINE

## 2022-03-31 PROCEDURE — 1111F DSCHRG MED/CURRENT MED MERGE: CPT | Mod: CPTII,,, | Performed by: INTERNAL MEDICINE

## 2022-03-31 PROCEDURE — 83880 ASSAY OF NATRIURETIC PEPTIDE: CPT

## 2022-03-31 PROCEDURE — 25000003 PHARM REV CODE 250: Performed by: STUDENT IN AN ORGANIZED HEALTH CARE EDUCATION/TRAINING PROGRAM

## 2022-03-31 PROCEDURE — 94761 N-INVAS EAR/PLS OXIMETRY MLT: CPT

## 2022-03-31 RX ORDER — ATORVASTATIN CALCIUM 80 MG/1
80 TABLET, FILM COATED ORAL DAILY
Qty: 30 TABLET | Refills: 11 | Status: SHIPPED | OUTPATIENT
Start: 2022-03-31 | End: 2024-04-01

## 2022-03-31 RX ORDER — SPIRONOLACTONE 25 MG/1
25 TABLET ORAL DAILY
Qty: 90 TABLET | Refills: 3 | Status: SHIPPED | OUTPATIENT
Start: 2022-03-31 | End: 2022-10-11

## 2022-03-31 RX ORDER — NITROGLYCERIN 0.4 MG/1
0.4 TABLET SUBLINGUAL EVERY 5 MIN PRN
Qty: 25 TABLET | Refills: 6 | Status: SHIPPED | OUTPATIENT
Start: 2022-03-31 | End: 2023-09-26 | Stop reason: SDUPTHER

## 2022-03-31 RX ORDER — CARVEDILOL 6.25 MG/1
6.25 TABLET ORAL 2 TIMES DAILY
Qty: 180 TABLET | Refills: 11 | Status: SHIPPED | OUTPATIENT
Start: 2022-03-31 | End: 2024-04-02

## 2022-03-31 RX ORDER — ASPIRIN 81 MG/1
81 TABLET ORAL DAILY
Qty: 30 TABLET | Refills: 11 | Status: SHIPPED | OUTPATIENT
Start: 2022-03-31

## 2022-03-31 RX ORDER — FUROSEMIDE 40 MG/1
40 TABLET ORAL 2 TIMES DAILY
Qty: 60 TABLET | Refills: 11 | Status: SHIPPED | OUTPATIENT
Start: 2022-03-31 | End: 2022-04-06

## 2022-03-31 RX ORDER — ISOSORBIDE MONONITRATE 60 MG/1
60 TABLET, EXTENDED RELEASE ORAL DAILY
Qty: 90 TABLET | Refills: 3 | Status: SHIPPED | OUTPATIENT
Start: 2022-03-31 | End: 2022-10-11 | Stop reason: CLARIF

## 2022-03-31 RX ORDER — PRASUGREL 10 MG/1
10 TABLET, FILM COATED ORAL DAILY
Qty: 30 TABLET | Refills: 11 | Status: ON HOLD | OUTPATIENT
Start: 2022-03-31 | End: 2023-09-13

## 2022-03-31 RX ORDER — PREDNISONE 20 MG/1
40 TABLET ORAL DAILY
Qty: 4 TABLET | Refills: 0 | Status: SHIPPED | OUTPATIENT
Start: 2022-04-01 | End: 2022-04-03

## 2022-03-31 RX ORDER — NICOTINE 7MG/24HR
1 PATCH, TRANSDERMAL 24 HOURS TRANSDERMAL DAILY
Qty: 14 PATCH | Refills: 0 | Status: SHIPPED | OUTPATIENT
Start: 2022-04-01 | End: 2022-10-11

## 2022-03-31 RX ADMIN — PREDNISONE 40 MG: 20 TABLET ORAL at 10:03

## 2022-03-31 RX ADMIN — RANOLAZINE 500 MG: 500 TABLET, EXTENDED RELEASE ORAL at 10:03

## 2022-03-31 RX ADMIN — CARVEDILOL 6.25 MG: 6.25 TABLET, FILM COATED ORAL at 10:03

## 2022-03-31 RX ADMIN — GABAPENTIN 300 MG: 300 CAPSULE ORAL at 10:03

## 2022-03-31 RX ADMIN — FENOFIBRATE 145 MG: 145 TABLET, FILM COATED ORAL at 10:03

## 2022-03-31 RX ADMIN — NICOTINE 1 PATCH: 7 PATCH, EXTENDED RELEASE TRANSDERMAL at 10:03

## 2022-03-31 RX ADMIN — ASPIRIN 81 MG: 81 TABLET, COATED ORAL at 10:03

## 2022-03-31 RX ADMIN — HEPARIN SODIUM 21 UNITS/KG/HR: 5000 INJECTION INTRAVENOUS; SUBCUTANEOUS at 01:03

## 2022-03-31 RX ADMIN — ISOSORBIDE MONONITRATE 60 MG: 60 TABLET, EXTENDED RELEASE ORAL at 10:03

## 2022-03-31 RX ADMIN — PRASUGREL 10 MG: 10 TABLET, FILM COATED ORAL at 10:03

## 2022-03-31 RX ADMIN — FUROSEMIDE 40 MG: 40 TABLET ORAL at 10:03

## 2022-03-31 RX ADMIN — SACUBITRIL AND VALSARTAN 1 TABLET: 24; 26 TABLET, FILM COATED ORAL at 10:03

## 2022-03-31 RX ADMIN — PANTOPRAZOLE SODIUM 40 MG: 40 TABLET, DELAYED RELEASE ORAL at 10:03

## 2022-03-31 RX ADMIN — SPIRONOLACTONE 25 MG: 25 TABLET, FILM COATED ORAL at 10:03

## 2022-03-31 RX ADMIN — ATORVASTATIN CALCIUM 80 MG: 20 TABLET, FILM COATED ORAL at 10:03

## 2022-03-31 RX ADMIN — IPRATROPIUM BROMIDE AND ALBUTEROL SULFATE 3 ML: 2.5; .5 SOLUTION RESPIRATORY (INHALATION) at 08:03

## 2022-03-31 NOTE — PLAN OF CARE
"Patient has been scheduled for a [facility] hospital follow up appointment with Agata Nixon MD on Monday April 4, 2022 at 11:20 AM.  Please arrive approximately 15 minutes before your scheduled appointment time and ensure that you have a valid government issued ID and your insurance card. ePre-Check is available and completion prior to your arrival will assist with a quicker registration process.     Three Options to Check-In for Your Appointment     With [facility] Mobile Check-In simply complete ePre-Check before your appointment and click "I'm Here" in the racquel when you park.  Don't see the Mobile Check-In option? In some locations you can call from the parking area to let us know you've arrived. Just look for the banners with the phone number to call.  Or Visit the registration desk to check-in for your appointment.     Masks are required for all patients and visitors.     [facility]  [address]   [place] [address]  [phone]                            Hermila Martinez  Special Services  Case Management  Ext. [phone]  "

## 2022-03-31 NOTE — CARE UPDATE
RAPID RESPONSE NURSE ROUND       Rounding completed with charge RNSharon. No concerns verbalized at this time. Instructed to call [phone] for further concerns or assistance.

## 2022-03-31 NOTE — ASSESSMENT & PLAN NOTE
Briefly this is Mr. Taylor who is a 72-year-old male with a past medical history of CAD with previous CABG and PCI (most recent 3/2020) and multiple cardiac risk factors presenting with chest pain, found to have NSTEMI on admission with trop of 31.  Most recent LHC 2/14/22 with Ost LM to mid % stenosed, Ost RCA to prox % stenosed, mild diffuse disease in grafts to 1st ck and distal LAD, yet remain patent. LVEDP 35mmHg. No interventions were done at the time. He continues to smoke daily and has poor dietary compliance. I would treat for ACS given significant elevation in troponin and chest pain with known significant CAD as well for ADHF. Currently hemodynamically stable and chest pain free.    Recommendations:   Continue DAPT (ASA, prasugrel)   Has completed 48h of heparin drip   Continue high intensity statin + fenofibrate    Transitioned from lisinopril>>Entresto this admission, continue at discharge   Continue Coreg 6.25mg bid and uptitrate to target HR 60; recommend uptitration after initiation of Entresto with likely concurrent discontinuation of amlodipine   Consider increasing Imdur to 120 mg daily in future once transitioned to Entresto   Continue ranolazine 500mg BID   Appears stable for discharge from cardiac standpoint, please arrange close followup with primary cardiologist and heart failure transitional clinic

## 2022-03-31 NOTE — ASSESSMENT & PLAN NOTE
EF 40%. BNP 1500 on admission with CXR suggestive of pulmonary edema.  Continue current GDMT: Coreg 6.25mg BID, spironolactone 25mg, changed to Entresto  · Transitioned to home 40mg lasix PO BID yesterday; appears euvolemic  · Started Entresto this admission (discontinued lisinopril and amlodipine)  · Recommend outpatient consideration of Jardiance for mortality benefit in HFrEF  · Strict I/Os  · Low Na diet  · Please f/u in transitional heart failure clinic - has appt scheduled for 4/5  · Referral to cardiac rehab placed by primary team

## 2022-03-31 NOTE — CARE UPDATE
RAPID RESPONSE NURSE ROUND       Rounding completed with charge RN, Adarsh. No concerns verbalized at this time. Instructed to call [phone] for further concerns or assistance.

## 2022-03-31 NOTE — PLAN OF CARE
Pt is AAOx4. POC reviewed at bedside. VS stable, afebrile throughout shift.  Pt denies any chest pain or palpitation, no s/s of respiratory distress.  Independent in room. Pt has remained free from injury during this shift. Bed in low locked position. Call light, urinal and personal belongings within reach. Side rails up x2.  Pt was advice to call nurse with any questions or concerns.

## 2022-03-31 NOTE — NURSING
Pt alert and oriented x4. PERRLA intact. Vital signs stable. No report of pain. Sitting up in bed on room air with no difficulties.  Educated pt earlier on meds being administered and s/s to report. Cardiology ordered stat x ray before pt could d/c. X ray completed and received the approval for pt to d/c. Pt asked if he could be prescribed nicotine patches for home. Notified Dr. Morales. Educated pt on discharge instructions regarding meds, s/s to report, HF, and follow up appointments. Pt understood instructions. Removed IVs and tele box. Left pt sitting on the couch with call light and personal belongings in reach awaiting pharmacy to deliver nicotine patches.

## 2022-03-31 NOTE — NURSING
Patient AAOx4 with stable vital signs. Patient remained free off injury today despite independent ambulation, however pt did experience anxiety this shift after being told that a loved one passed. Vitals MD KARMA contacted, PRN anxiety medication administered, anxiety resolved with medication and emotional support. Plan of care and medications reviewed with patient, patient verbalized understanding of all teaching and has no questions or concerns. Will continue to monitor,Tino Washburn RN.

## 2022-03-31 NOTE — SUBJECTIVE & OBJECTIVE
Interval History: NAEON. Heparin drip finishing 48h this morning. Breathing improved. Feels UOP has decreased, but likely 2/2 transitioning from IV to PO lasix.    ROS  Objective:     Vital Signs (Most Recent):  Temp: 98.7 °F (37.1 °C) (03/31/22 0800)  Pulse: 68 (03/31/22 0800)  Resp: 18 (03/31/22 0800)  BP: (!) 143/68 (03/31/22 0800)  SpO2: 96 % (03/31/22 0800)   Vital Signs (24h Range):  Temp:  [96.8 °F (36 °C)-98.7 °F (37.1 °C)] 98.7 °F (37.1 °C)  Pulse:  [56-68] 68  Resp:  [17-20] 18  SpO2:  [91 %-98 %] 96 %  BP: (101-146)/(52-68) 143/68     Weight: 98.8 kg (217 lb 11.3 oz)  Body mass index is 32.15 kg/m².     SpO2: 96 %  O2 Device (Oxygen Therapy): room air      Intake/Output Summary (Last 24 hours) at 3/31/2022 0817  Last data filed at 3/31/2022 0600  Gross per 24 hour   Intake 1140 ml   Output 1450 ml   Net -310 ml       Lines/Drains/Airways       Peripheral Intravenous Line  Duration                  Peripheral IV - Single Lumen 03/30/22 2116 20 G Left Antecubital <1 day                    Physical Exam  Vitals and nursing note reviewed.   Constitutional:       General: He is not in acute distress.     Appearance: He is not ill-appearing, toxic-appearing or diaphoretic.   HENT:      Head: Normocephalic.   Cardiovascular:      Rate and Rhythm: Normal rate and regular rhythm.      Heart sounds: No murmur heard.     Comments: JVP observed 1cm above the clavicle with patient at 90 degrees  Pulmonary:      Effort: Pulmonary effort is normal.      Breath sounds: No wheezing or rales.   Musculoskeletal:      Right lower leg: Edema (trace) present.      Left lower leg: Edema (trace) present.   Skin:     General: Skin is warm and dry.   Neurological:      Mental Status: He is alert. Mental status is at baseline.      Cranial Nerves: No dysarthria.   Psychiatric:         Mood and Affect: Mood normal.         Behavior: Behavior normal.       Significant Labs: All pertinent lab results from the last 24 hours have been  reviewed.    Significant Imaging:  no new imaging

## 2022-03-31 NOTE — DISCHARGE SUMMARY
[facility] - Cardiology [facility] Medicine  Discharge Summary      Patient Name: Lonnie Taylor  MRN: [id number]  Patient Class: IP- Inpatient  Admission Date: 3/28/2022  Hospital Length of Stay: 2 days  Discharge Date and Time:  03/31/2022   Attending Physician: Darrell Morales MD   Discharging Provider: Darrell Morales MD  Primary Care Provider: Isiah You MD  Hospital Medicine Team: [facility] Darrell Morales MD    HPI:   73 yo male with CAD, NSTEMI last month, PVD, PAD, AAA, COPD, tobacco abuse, CKD presenting for chest pain, heart burn and shortness of breath. He states that yesterday he ate a burger with jalapeno's and started having severe midsternal heart burn that he took prilosec and jagdeep seltzer which did not help. He was not able to sleep much but the pain went away by itself. However, he did have some shortness of breath afterwards which made him come to the ED tonight for evaluation. He currently denies chest pain, shortness of breath. The chest pain felt differently than his NSTEMI back in February.     Workup in the ED revealed a troponin of 31, BNP of 1500. ED started duonebs and steroids, lasix; Cardiology consulted who recommended heparin drip, ASA loading, diuresis.      * No surgery found *      Hospital Course:   Pt admitted to  team G and started on ACS protocol. CP resolved and troponin downtreded. Echo performed, which showed EF 35% with G3DD. Cardiology following for medical management. Will complete 48h heparin gtt on morning of 3/31. Completed heparin drip. Dicussed with cardio, stable for discharge home with cardiology follow up and cardiac rehab. Home with . Meds as below.       Goals of Care Treatment Preferences:  Code Status: Full Code      Consults:   Consults (From admission, onward)        Status Ordering Provider     COPD PharmD consult  Once        Provider:  (Not yet assigned)    Acknowledged PINKY BASS     Inpatient consult to Cardiac Rehab  Once         Provider:  (Not yet assigned)    Completed KALI PINKY     Inpatient consult to Social Work/Case Management  Once        Provider:  (Not yet assigned)    Acknowledged KALI PINKY     Inpatient consult to Registered Dietitian/Nutritionist  Once        Provider:  (Not yet assigned)    Completed KLAI PINKY     Inpatient consult to Cardiology  Once        Provider:  (Not yet assigned)    Completed NIKOLAY RODRIGUEZ          No new Assessment & Plan notes have been filed under this hospital service since the last note was generated.  Service: Hospital Medicine    Final Active Diagnoses:    Diagnosis Date Noted POA    PRINCIPAL PROBLEM:  NSTEMI (non-ST elevated myocardial infarction) [I21.4] 02/11/2022 Yes    Acute decompensated heart failure [I50.9] 03/29/2022 Yes    Tobacco abuse counseling [Z71.6]  Not Applicable    Pulmonary emphysema [J43.9] 12/02/2019 Yes     Chronic    PAD (peripheral artery disease) [I73.9] 07/25/2019 Yes     Chronic    Tobacco abuse [Z72.0] 09/18/2018 Yes    Dyslipidemia [E78.5] 09/18/2018 Yes    Chronic kidney disease, stage III (moderate) [N18.30] 09/16/2015 Yes     Chronic    Coronary artery disease involving native coronary artery with angina pectoris [I25.119] 11/30/2012 Yes     Chronic    Primary hypertension [I10] 11/30/2012 Yes     Chronic      Problems Resolved During this Admission:       Discharged Condition: stable    Disposition: Home or Self Care    Follow Up:   Follow-up Information     Isiah You MD. Schedule an appointment as soon as possible for a visit in 1 week(s).    Specialty: Internal Medicine  Why: Post hospital follow up  Contact information:  [address] [place] [id number] [phone]                       Patient Instructions:      Ambulatory referral/consult to Cardiology   Standing Status: Future   Referral Priority: Urgent Referral Type: Consultation   Referral Reason: Specialty Services Required   Requested Specialty: Cardiology    Number of Visits Requested: 1     Ambulatory referral/consult to Cardiac Rehab   Standing Status: Future   Referral Priority: Routine Referral Type: Consultation   Referral Reason: Specialty Services Required   Requested Specialty: Cardiac Rehabilitation   Number of Visits Requested: 1       Significant Diagnostic Studies: all reviewed     Pending Diagnostic Studies:     None         Medications:  Reconciled Home Medications:      Medication List      START taking these medications    atorvastatin 80 MG tablet  Commonly known as: LIPITOR  Take 1 tablet (80 mg total) by mouth once daily.  Replaces: rosuvastatin 20 MG tablet     ENTRESTO 24-26 mg per tablet  Generic drug: sacubitriL-valsartan  Take 1 tablet by mouth 2 (two) times daily.     nicotine 7 mg/24 hr  Commonly known as: NICODERM CQ  Place 1 patch onto the skin once daily.  Start taking on: April 1, 2022     predniSONE 20 MG tablet  Commonly known as: DELTASONE  Take 2 tablets (40 mg total) by mouth once daily. for 2 days  Start taking on: April 1, 2022        CHANGE how you take these medications    furosemide 40 MG tablet  Commonly known as: LASIX  Take 1 tablet (40 mg total) by mouth 2 (two) times daily.  What changed:   · medication strength  · how much to take  · how to take this  · when to take this        CONTINUE taking these medications    albuterol 90 mcg/actuation inhaler  Commonly known as: VENTOLIN HFA  Inhale 2 puffs into the lungs every 6 (six) hours as needed for Wheezing. Rescue     aspirin 81 MG EC tablet  Commonly known as: ECOTRIN  Take 1 tablet (81 mg total) by mouth once daily.     carvediloL 6.25 MG tablet  Commonly known as: COREG  Take 1 tablet (6.25 mg total) by mouth 2 (two) times daily.     fenofibrate 145 MG tablet  Commonly known as: TRICOR  Take 1 tablet (145 mg total) by mouth once daily.     gabapentin 300 MG capsule  Commonly known as: NEURONTIN  3 (three) times daily.     isosorbide mononitrate 60 MG 24 hr tablet  Commonly  known as: IMDUR  Take 1 tablet (60 mg total) by mouth once daily.     nitroGLYCERIN 0.4 MG SL tablet  Commonly known as: NITROSTAT  Place 1 tablet (0.4 mg total) under the tongue every 5 (five) minutes as needed for Chest pain.     pantoprazole 40 MG tablet  Commonly known as: PROTONIX  Take 40 mg by mouth once daily.     prasugreL 10 mg Tab  Commonly known as: EFFIENT  Take 1 tablet (10 mg total) by mouth once daily.     ranolazine 500 MG Tb12  Commonly known as: RANEXA  Take 500 mg by mouth 2 (two) times daily. 11/15/20     spironolactone 25 MG tablet  Commonly known as: ALDACTONE  Take 1 tablet (25 mg total) by mouth once daily.     XARELTO 2.5 mg Tab  Generic drug: rivaroxaban  Take 1 tablet (2.5 mg total) by mouth daily with dinner or evening meal.        STOP taking these medications    amLODIPine 5 MG tablet  Commonly known as: NORVASC     lisinopriL 20 MG tablet  Commonly known as: PRINIVIL,ZESTRIL     rosuvastatin 20 MG tablet  Commonly known as: CRESTOR  Replaced by: atorvastatin 80 MG tablet            Indwelling Lines/Drains at time of discharge:   Lines/Drains/Airways     None                 Time spent on the discharge of patient: > 30 minutes         Darrell Morales MD  Department of Hospital Medicine  [facility] - Cardiology Stepdown

## 2022-03-31 NOTE — PLAN OF CARE
"[facility]      HOME HEALTH ORDERS  FACE TO FACE ENCOUNTER    Patient Name: Lonnie Taylor  YOB: 1950    PCP: Isiah You MD   PCP Address: [address] / [place]  PCP Phone Number: [phone]  PCP Fax: [phone]    Encounter Date: 3/28/22    Admit to Home Health    Diagnoses:  Active Hospital Problems    Diagnosis  POA    *NSTEMI (non-ST elevated myocardial infarction) [I21.4]  Yes    Acute decompensated heart failure [I50.9]  Yes    Tobacco abuse counseling [Z71.6]  Not Applicable    Pulmonary emphysema [J43.9]  Yes     Chronic     Ct chest results dated 10/14/2020      PAD (peripheral artery disease) [I73.9]  Yes     Chronic    Tobacco abuse [Z72.0]  Yes    Dyslipidemia [E78.5]  Yes    Chronic kidney disease, stage III (moderate) [N18.30]  Yes     Chronic    Coronary artery disease involving native coronary artery with angina pectoris [I25.119]  Yes     Chronic    Primary hypertension [I10]  Yes     Chronic      Resolved Hospital Problems   No resolved problems to display.       Follow Up Appointments:  Future Appointments   Date Time Provider Department Center   4/5/2022 11:00 AM LAB, APPOINTMENT [facility] V[facility]   4/5/2022 11:30 AM Teresita Dahl PA-C [facility]   4/5/2022 11:30 AM [facility] HEART FAILURE NURSE [facility]       Allergies:  Review of patient's allergies indicates:   Allergen Reactions    Aggrastat concentrate [tirofiban] Shortness Of Breath and Other (See Comments)     Fever and chills    Brilinta [ticagrelor] Shortness Of Breath    Cymbalta [duloxetine] Nausea Only    Bupropion Other (See Comments)     "turns into zombie" withdrawn, not talking, outbursts       Medications: Review discharge medications with patient and family and provide education.      Current Discharge Medication List      START taking these medications    Details   atorvastatin (LIPITOR) 80 MG tablet Take 1 tablet " (80 mg total) by mouth once daily.  Qty: 30 tablet, Refills: 11    Comments: Bedside delivery all meds      predniSONE (DELTASONE) 20 MG tablet Take 2 tablets (40 mg total) by mouth once daily. for 2 days  Qty: 4 tablet, Refills: 0      sacubitriL-valsartan (ENTRESTO) 24-26 mg per tablet Take 1 tablet by mouth 2 (two) times daily.  Qty: 60 tablet, Refills: 11         CONTINUE these medications which have CHANGED    Details   aspirin (ECOTRIN) 81 MG EC tablet Take 1 tablet (81 mg total) by mouth once daily.  Qty: 30 tablet, Refills: 11    Comments: Bedside delivery all meds      carvediloL (COREG) 6.25 MG tablet Take 1 tablet (6.25 mg total) by mouth 2 (two) times daily.  Qty: 180 tablet, Refills: 11    Comments: .      furosemide (LASIX) 40 MG tablet Take 1 tablet (40 mg total) by mouth 2 (two) times daily.  Qty: 60 tablet, Refills: 11      isosorbide mononitrate (IMDUR) 60 MG 24 hr tablet Take 1 tablet (60 mg total) by mouth once daily.  Qty: 90 tablet, Refills: 3      nitroGLYCERIN (NITROSTAT) 0.4 MG SL tablet Place 1 tablet (0.4 mg total) under the tongue every 5 (five) minutes as needed for Chest pain.  Qty: 25 tablet, Refills: 6      prasugreL (EFFIENT) 10 mg Tab Take 1 tablet (10 mg total) by mouth once daily.  Qty: 30 tablet, Refills: 11    Comments: Price check please- pls feed free to Teams- Anat Bernard      spironolactone (ALDACTONE) 25 MG tablet Take 1 tablet (25 mg total) by mouth once daily.  Qty: 90 tablet, Refills: 3    Comments: .         CONTINUE these medications which have NOT CHANGED    Details   albuterol (VENTOLIN HFA) 90 mcg/actuation inhaler Inhale 2 puffs into the lungs every 6 (six) hours as needed for Wheezing. Rescue  Qty: 1 Inhaler, Refills: 12      fenofibrate (TRICOR) 145 MG tablet Take 1 tablet (145 mg total) by mouth once daily.      gabapentin (NEURONTIN) 300 MG capsule 3 (three) times daily.      pantoprazole (PROTONIX) 40 MG tablet Take 40 mg by mouth once daily.       ranolazine (RANEXA) 500 MG Tb12 Take 500 mg by mouth 2 (two) times daily. 11/15/20      rivaroxaban (XARELTO) 2.5 mg Tab Take 1 tablet (2.5 mg total) by mouth daily with dinner or evening meal.  Qty: 90 tablet, Refills: 3         STOP taking these medications       amLODIPine (NORVASC) 5 MG tablet Comments:   Reason for Stopping:         lisinopriL (PRINIVIL,ZESTRIL) 20 MG tablet Comments:   Reason for Stopping:         rosuvastatin (CRESTOR) 20 MG tablet Comments:   Reason for Stopping:                 I have seen and examined this patient within the last 30 days. My clinical findings that support the need for the home health skilled services and home bound status are the following:no   Weakness/numbness causing balance and gait disturbance due to Heart Failure making it taxing to leave home.     Diet:   cardiac diet and fluid restriction 1500 cc per day    Labs: As per PC    Referrals/ Consults  Physical Therapy to evaluate and treat. Evaluate for home safety and equipment needs; Establish/upgrade home exercise program. Perform / instruct on therapeutic exercises, gait training, transfer training, and Range of Motion.  Occupational Therapy to evaluate and treat. Evaluate home environment for safety and equipment needs. Perform/Instruct on transfers, ADL training, ROM, and therapeutic exercises.   to evaluate for community resources/long-range planning.  Aide to provide assistance with personal care, ADLs, and vital signs.    Activities:   activity as tolerated    Nursing:   Agency to admit patient within 24 hours of hospital discharge unless specified on physician order or at patient request    SN to complete comprehensive assessment including routine vital signs. Instruct on disease process and s/s of complications to report to MD. Review/verify medication list sent home with the patient at time of discharge  and instruct patient/caregiver as needed. Frequency may be adjusted depending on start of  care date.     Skilled nurse to perform up to 3 visits PRN for symptoms related to diagnosis    Notify MD if SBP > 160 or < 90; DBP > 90 or < 50; HR > 120 or < 50; Temp > 101; O2 < 88%; Other:       Ok to schedule additional visits based on staff availability and patient request on consecutive days within the home health episode.    When multiple disciplines ordered:    Start of Care occurs on Sunday - Wednesday schedule remaining discipline evaluations as ordered on separate consecutive days following the start of care.    Thursday SOC -schedule subsequent evaluations Friday and Monday the following week.     Friday - Saturday SOC - schedule subsequent discipline evaluations on consecutive days starting Monday of the following week.    For all post-discharge communication and subsequent orders please contact patient's primary care physician. If unable to reach primary care physician or do not receive response within 30 minutes, please contact hospitalist on call for clinical staff order clarification      Home Health Aide:  Physical Therapy Three times weekly, Occupational Therapy Three times weekly, Medical Social Work Weekly and Home Health Aide Three times weekly    Wound Care Orders  no    I certify that this patient is confined to his home and needs physical therapy and occupational therapy.

## 2022-03-31 NOTE — PROGRESS NOTES
[facility]  Cardiology  Progress Note    Patient Name: Lonnie Taylor  MRN: [id number]  Admission Date: 3/28/2022  Hospital Length of Stay: 2 days  Code Status: Full Code   Attending Physician: Darrell Morales MD   Primary Care Physician: Isiah You MD  Expected Discharge Date: 3/31/2022  Principal Problem:NSTEMI (non-ST elevated myocardial infarction)    Subjective:     Hospital Course:   No notes on file    Interval History: NAEON. Heparin drip finishing 48h this morning. Breathing improved. Feels UOP has decreased, but likely 2/2 transitioning from IV to PO lasix.    ROS  Objective:     Vital Signs (Most Recent):  Temp: 98.7 °F (37.1 °C) (03/31/22 0800)  Pulse: 68 (03/31/22 0800)  Resp: 18 (03/31/22 0800)  BP: (!) 143/68 (03/31/22 0800)  SpO2: 96 % (03/31/22 0800)   Vital Signs (24h Range):  Temp:  [96.8 °F (36 °C)-98.7 °F (37.1 °C)] 98.7 °F (37.1 °C)  Pulse:  [56-68] 68  Resp:  [17-20] 18  SpO2:  [91 %-98 %] 96 %  BP: (101-146)/(52-68) 143/68     Weight: 98.8 kg (217 lb 11.3 oz)  Body mass index is 32.15 kg/m².     SpO2: 96 %  O2 Device (Oxygen Therapy): room air      Intake/Output Summary (Last 24 hours) at 3/31/2022 0817  Last data filed at 3/31/2022 0600  Gross per 24 hour   Intake 1140 ml   Output 1450 ml   Net -310 ml       Lines/Drains/Airways       Peripheral Intravenous Line  Duration                  Peripheral IV - Single Lumen 03/30/22 2116 20 G Left Antecubital <1 day                    Physical Exam  Vitals and nursing note reviewed.   Constitutional:       General: He is not in acute distress.     Appearance: He is not ill-appearing, toxic-appearing or diaphoretic.   HENT:      Head: Normocephalic.   Cardiovascular:      Rate and Rhythm: Normal rate and regular rhythm.      Heart sounds: No murmur heard.     Comments: JVP observed 1cm above the clavicle with patient at 90 degrees  Pulmonary:      Effort: Pulmonary effort is normal.      Breath sounds: No wheezing or rales.    Musculoskeletal:      Right lower leg: Edema (trace) present.      Left lower leg: Edema (trace) present.   Skin:     General: Skin is warm and dry.   Neurological:      Mental Status: He is alert. Mental status is at baseline.      Cranial Nerves: No dysarthria.   Psychiatric:         Mood and Affect: Mood normal.         Behavior: Behavior normal.       Significant Labs: All pertinent lab results from the last 24 hours have been reviewed.    Significant Imaging:  no new imaging    Assessment and Plan:       * NSTEMI (non-ST elevated myocardial infarction)  Briefly this is Mr. Taylor who is a 72-year-old male with a past medical history of CAD with previous CABG and PCI (most recent 3/2020) and multiple cardiac risk factors presenting with chest pain, found to have NSTEMI on admission with trop of 31.  Most recent LHC 2/14/22 with Ost LM to mid % stenosed, Ost RCA to prox % stenosed, mild diffuse disease in grafts to 1st ck and distal LAD, yet remain patent. LVEDP 35mmHg. No interventions were done at the time. He continues to smoke daily and has poor dietary compliance. I would treat for ACS given significant elevation in troponin and chest pain with known significant CAD as well for ADHF. Currently hemodynamically stable and chest pain free.    Recommendations:   Continue DAPT (ASA, prasugrel)   Has completed 48h of heparin drip   Continue high intensity statin + fenofibrate    Transitioned from lisinopril>>Entresto this admission, continue at discharge   Continue Coreg 6.25mg bid and uptitrate to target HR 60; recommend uptitration after initiation of Entresto with likely concurrent discontinuation of amlodipine   Consider increasing Imdur to 120 mg daily in future once transitioned to Entresto   Continue ranolazine 500mg BID   Appears stable for discharge from cardiac standpoint, please arrange close followup with primary cardiologist and heart failure transitional clinic         Acute  decompensated heart failure  EF 40%. BNP 1500 on admission with CXR suggestive of pulmonary edema.  Continue current GDMT: Coreg 6.25mg BID, spironolactone 25mg, changed to Entresto  · Transitioned to home 40mg lasix PO BID yesterday; appears euvolemic  · Started Entresto this admission (discontinued lisinopril and amlodipine)  · Recommend outpatient consideration of Jardiance for mortality benefit in HFrEF  · Strict I/Os  · Low Na diet  · Please f/u in transitional heart failure clinic - has appt scheduled for 4/5  · Referral to cardiac rehab placed by primary team    PAD (peripheral artery disease)  · cilostazol discontinued prior admission due to HF   · DAPT with aspirin + prasugrel + AC with low dose Xarelto 2.5mg daily once off heparin drip        VTE Risk Mitigation (From admission, onward)         Ordered     IP VTE HIGH RISK PATIENT  Once         03/29/22 0143     Place sequential compression device  Until discontinued         03/29/22 0143     Place sequential compression device  Until discontinued         03/29/22 0135     Place sequential compression device  Until discontinued         03/29/22 0134                Rosalba Zamora MD  Cardiology  [facility]

## 2022-04-01 ENCOUNTER — TELEPHONE (OUTPATIENT)
Dept: CARDIOLOGY | Facility: CLINIC | Age: 72
End: 2022-04-01
Payer: MEDICARE

## 2022-04-01 NOTE — TELEPHONE ENCOUNTER
"Heart [facility]) hospital discharge 48-72 hour phone follow up completed.     Most Recent Hospital Discharge Date: 3/31/22  Last admission Diagnosis/chief complaint:chest pain, heart burn    TCC RN Navigator spoke with  pt    Current Patient reported weight: will start tomorrow    Current Patient reported blood pressure and heart rate: will start tomorrow.     Pt reports the following:  []  Shortness of Breath with Activity  []  Shortness of Breath at rest   []  Fatigue  []  Edema   [] Chest pain or tightness  [] Weight Increase since discharge  [x] None of the above    Medications:    Discharge medication reviewed with pt.  Pt reports having medication list available and has all medications at home for use per list.     Education:   Confirmed pt received "Home Care Guide for Heart Failure Patients" while admitted.   Reviewed key points as listed below.      Recommend 2 -3 gram sodium restriction and 1500 cc-2000 cc fluid restriction.   Encourage physical activity with graded exercise program.   Requested patient to weigh themselves daily, and to notify us if their weight increases by more than 3 lbs in 1 day or 5 lbs in 3 days.   o Reminded patient to use "Daily weight and symptom tracker" to record and guide patient on when and how to call [facility]. PT may also use symptom tracker if no scale available  o Pt reports being in the GREEN (color) Zone. If in yellow/red, reminded that they should be calling [facility] today or now.     Watch for these Signs and Symptoms: If any of these occur, contact [facility] immediately:   Increase in shortness of breath with movement   Increase in swelling in your legs and ankles   Weight gain of more than 3 pounds in a day or 5 pounds in 3 days.   Difficulty breathing when you are lying down   Worsening fatigue or tiredness   Stomach bloating, a full feeling or a loss of appetite   Increased coughing--especially when you are lying down      Pt was " able to verbalize back to RN in their own words correct diet/fluid restrictions, necessity for exercise, warning signs and symptoms, when and how to contact their TCC team.      Pt educated on follow-up plan while in H[facility] program to include:   Week 1 -  F/u appt with Provider and RN (Date) 4/5/22   Week 2-5 - In person/ Virtual/ phone call check ins    Week 5-7 - Pt will discharge from [facility] and transition to longterm care provider (Cardiology/PCP/ Advanced Heart Failure).      Patient active on myChart? yes      Pt given the following contact information for ease of communication: [phone] (Mon-Fri, 8a-5p) & for urgent issues on the weekend to page the Heart Transplant MD on call.  Pt also encouraged utilize [facility] messaging as well.      Will follow up with pt at first clinic visit and RN navigator available for pt questions, issues or concerns.

## 2022-04-05 ENCOUNTER — LAB VISIT (OUTPATIENT)
Dept: LAB | Facility: HOSPITAL | Age: 72
End: 2022-04-05
Payer: MEDICARE

## 2022-04-05 ENCOUNTER — CLINICAL SUPPORT (OUTPATIENT)
Dept: CARDIOLOGY | Facility: CLINIC | Age: 72
End: 2022-04-05
Payer: MEDICARE

## 2022-04-05 ENCOUNTER — OFFICE VISIT (OUTPATIENT)
Dept: CARDIOLOGY | Facility: CLINIC | Age: 72
End: 2022-04-05
Payer: MEDICARE

## 2022-04-05 VITALS
HEIGHT: 69 IN | WEIGHT: 217.88 LBS | BODY MASS INDEX: 32.27 KG/M2 | OXYGEN SATURATION: 98 % | HEART RATE: 62 BPM | SYSTOLIC BLOOD PRESSURE: 105 MMHG | DIASTOLIC BLOOD PRESSURE: 50 MMHG

## 2022-04-05 DIAGNOSIS — Z86.79 S/P AAA REPAIR: Chronic | ICD-10-CM

## 2022-04-05 DIAGNOSIS — I73.9 PVD (PERIPHERAL VASCULAR DISEASE): Chronic | ICD-10-CM

## 2022-04-05 DIAGNOSIS — I50.9 ACUTE DECOMPENSATED HEART FAILURE: ICD-10-CM

## 2022-04-05 DIAGNOSIS — Z72.0 TOBACCO ABUSE: ICD-10-CM

## 2022-04-05 DIAGNOSIS — I25.10 CORONARY ARTERY DISEASE INVOLVING NATIVE CORONARY ARTERY OF NATIVE HEART WITHOUT ANGINA PECTORIS: ICD-10-CM

## 2022-04-05 DIAGNOSIS — N18.30 STAGE 3 CHRONIC KIDNEY DISEASE, UNSPECIFIED WHETHER STAGE 3A OR 3B CKD: Chronic | ICD-10-CM

## 2022-04-05 DIAGNOSIS — I25.2 HISTORY OF NON-ST ELEVATION MYOCARDIAL INFARCTION (NSTEMI): Chronic | ICD-10-CM

## 2022-04-05 DIAGNOSIS — Z98.890 S/P AAA REPAIR: Chronic | ICD-10-CM

## 2022-04-05 DIAGNOSIS — Z95.1 S/P CABG (CORONARY ARTERY BYPASS GRAFT): ICD-10-CM

## 2022-04-05 DIAGNOSIS — E78.5 DYSLIPIDEMIA: ICD-10-CM

## 2022-04-05 DIAGNOSIS — I50.42 CHRONIC COMBINED SYSTOLIC AND DIASTOLIC HEART FAILURE: Primary | ICD-10-CM

## 2022-04-05 DIAGNOSIS — J43.9 PULMONARY EMPHYSEMA, UNSPECIFIED EMPHYSEMA TYPE: Chronic | ICD-10-CM

## 2022-04-05 DIAGNOSIS — I10 PRIMARY HYPERTENSION: Chronic | ICD-10-CM

## 2022-04-05 LAB
ALBUMIN SERPL BCP-MCNC: 3.5 G/DL (ref 3.5–5.2)
ALP SERPL-CCNC: 42 U/L (ref 55–135)
ALT SERPL W/O P-5'-P-CCNC: 16 U/L (ref 10–44)
ANION GAP SERPL CALC-SCNC: 13 MMOL/L (ref 8–16)
AST SERPL-CCNC: 16 U/L (ref 10–40)
BASOPHILS # BLD AUTO: 0.02 K/UL (ref 0–0.2)
BASOPHILS NFR BLD: 0.2 % (ref 0–1.9)
BILIRUB SERPL-MCNC: 0.7 MG/DL (ref 0.1–1)
BNP SERPL-MCNC: 143 PG/ML (ref 0–99)
BUN SERPL-MCNC: 64 MG/DL (ref 8–23)
CALCIUM SERPL-MCNC: 9.8 MG/DL (ref 8.7–10.5)
CHLORIDE SERPL-SCNC: 99 MMOL/L (ref 95–110)
CO2 SERPL-SCNC: 27 MMOL/L (ref 23–29)
CREAT SERPL-MCNC: 2.4 MG/DL (ref 0.5–1.4)
DIFFERENTIAL METHOD: ABNORMAL
EOSINOPHIL # BLD AUTO: 0.1 K/UL (ref 0–0.5)
EOSINOPHIL NFR BLD: 1.5 % (ref 0–8)
ERYTHROCYTE [DISTWIDTH] IN BLOOD BY AUTOMATED COUNT: 15.2 % (ref 11.5–14.5)
EST. GFR  (AFRICAN AMERICAN): 29.6 ML/MIN/1.73 M^2
EST. GFR  (NON AFRICAN AMERICAN): 25.6 ML/MIN/1.73 M^2
GLUCOSE SERPL-MCNC: 84 MG/DL (ref 70–110)
HCT VFR BLD AUTO: 47.6 % (ref 40–54)
HGB BLD-MCNC: 15.2 G/DL (ref 14–18)
IMM GRANULOCYTES # BLD AUTO: 0.03 K/UL (ref 0–0.04)
IMM GRANULOCYTES NFR BLD AUTO: 0.3 % (ref 0–0.5)
LYMPHOCYTES # BLD AUTO: 2.3 K/UL (ref 1–4.8)
LYMPHOCYTES NFR BLD: 26.7 % (ref 18–48)
MAGNESIUM SERPL-MCNC: 2.1 MG/DL (ref 1.6–2.6)
MCH RBC QN AUTO: 29.3 PG (ref 27–31)
MCHC RBC AUTO-ENTMCNC: 31.9 G/DL (ref 32–36)
MCV RBC AUTO: 92 FL (ref 82–98)
MONOCYTES # BLD AUTO: 0.9 K/UL (ref 0.3–1)
MONOCYTES NFR BLD: 10.1 % (ref 4–15)
NEUTROPHILS # BLD AUTO: 5.3 K/UL (ref 1.8–7.7)
NEUTROPHILS NFR BLD: 61.2 % (ref 38–73)
NRBC BLD-RTO: 0 /100 WBC
PHOSPHATE SERPL-MCNC: 4.4 MG/DL (ref 2.7–4.5)
PLATELET # BLD AUTO: 211 K/UL (ref 150–450)
PMV BLD AUTO: 11.8 FL (ref 9.2–12.9)
POTASSIUM SERPL-SCNC: 5 MMOL/L (ref 3.5–5.1)
PROT SERPL-MCNC: 6.6 G/DL (ref 6–8.4)
RBC # BLD AUTO: 5.18 M/UL (ref 4.6–6.2)
SODIUM SERPL-SCNC: 139 MMOL/L (ref 136–145)
WBC # BLD AUTO: 8.64 K/UL (ref 3.9–12.7)

## 2022-04-05 PROCEDURE — 3008F BODY MASS INDEX DOCD: CPT | Mod: CPTII,S$GLB,,

## 2022-04-05 PROCEDURE — 84100 ASSAY OF PHOSPHORUS: CPT

## 2022-04-05 PROCEDURE — 83735 ASSAY OF MAGNESIUM: CPT

## 2022-04-05 PROCEDURE — 1159F MED LIST DOCD IN RCRD: CPT | Mod: CPTII,S$GLB,,

## 2022-04-05 PROCEDURE — 1159F PR MEDICATION LIST DOCUMENTED IN MEDICAL RECORD: ICD-10-PCS | Mod: CPTII,S$GLB,,

## 2022-04-05 PROCEDURE — 99999 PR PBB SHADOW E&M-EST. PATIENT-LVL V: ICD-10-PCS | Mod: PBBFAC,,,

## 2022-04-05 PROCEDURE — 99499 RISK ADDL DX/OHS AUDIT: ICD-10-PCS | Mod: HCNC,S$GLB,,

## 2022-04-05 PROCEDURE — 4010F PR ACE/ARB THEARPY RXD/TAKEN: ICD-10-PCS | Mod: CPTII,S$GLB,,

## 2022-04-05 PROCEDURE — 4010F ACE/ARB THERAPY RXD/TAKEN: CPT | Mod: CPTII,S$GLB,,

## 2022-04-05 PROCEDURE — 99999 PR PBB SHADOW E&M-EST. PATIENT-LVL V: CPT | Mod: PBBFAC,,,

## 2022-04-05 PROCEDURE — 1126F AMNT PAIN NOTED NONE PRSNT: CPT | Mod: CPTII,S$GLB,,

## 2022-04-05 PROCEDURE — 85025 COMPLETE CBC W/AUTO DIFF WBC: CPT

## 2022-04-05 PROCEDURE — 99499 UNLISTED E&M SERVICE: CPT | Mod: HCNC,S$GLB,,

## 2022-04-05 PROCEDURE — 80053 COMPREHEN METABOLIC PANEL: CPT

## 2022-04-05 PROCEDURE — 1160F PR REVIEW ALL MEDS BY PRESCRIBER/CLIN PHARMACIST DOCUMENTED: ICD-10-PCS | Mod: CPTII,S$GLB,,

## 2022-04-05 PROCEDURE — 3288F FALL RISK ASSESSMENT DOCD: CPT | Mod: CPTII,S$GLB,,

## 2022-04-05 PROCEDURE — 1111F PR DISCHARGE MEDS RECONCILED W/ CURRENT OUTPATIENT MED LIST: ICD-10-PCS | Mod: CPTII,S$GLB,,

## 2022-04-05 PROCEDURE — 1160F RVW MEDS BY RX/DR IN RCRD: CPT | Mod: CPTII,S$GLB,,

## 2022-04-05 PROCEDURE — 3078F PR MOST RECENT DIASTOLIC BLOOD PRESSURE < 80 MM HG: ICD-10-PCS | Mod: CPTII,S$GLB,,

## 2022-04-05 PROCEDURE — 99214 OFFICE O/P EST MOD 30 MIN: CPT | Mod: S$GLB,,,

## 2022-04-05 PROCEDURE — 1126F PR PAIN SEVERITY QUANTIFIED, NO PAIN PRESENT: ICD-10-PCS | Mod: CPTII,S$GLB,,

## 2022-04-05 PROCEDURE — 3008F PR BODY MASS INDEX (BMI) DOCUMENTED: ICD-10-PCS | Mod: CPTII,S$GLB,,

## 2022-04-05 PROCEDURE — 3044F PR MOST RECENT HEMOGLOBIN A1C LEVEL <7.0%: ICD-10-PCS | Mod: CPTII,S$GLB,,

## 2022-04-05 PROCEDURE — 83880 ASSAY OF NATRIURETIC PEPTIDE: CPT

## 2022-04-05 PROCEDURE — 3074F PR MOST RECENT SYSTOLIC BLOOD PRESSURE < 130 MM HG: ICD-10-PCS | Mod: CPTII,S$GLB,,

## 2022-04-05 PROCEDURE — 3078F DIAST BP <80 MM HG: CPT | Mod: CPTII,S$GLB,,

## 2022-04-05 PROCEDURE — 99214 PR OFFICE/OUTPT VISIT, EST, LEVL IV, 30-39 MIN: ICD-10-PCS | Mod: S$GLB,,,

## 2022-04-05 PROCEDURE — 3074F SYST BP LT 130 MM HG: CPT | Mod: CPTII,S$GLB,,

## 2022-04-05 PROCEDURE — 1111F DSCHRG MED/CURRENT MED MERGE: CPT | Mod: CPTII,S$GLB,,

## 2022-04-05 PROCEDURE — 1101F PT FALLS ASSESS-DOCD LE1/YR: CPT | Mod: CPTII,S$GLB,,

## 2022-04-05 PROCEDURE — 3044F HG A1C LEVEL LT 7.0%: CPT | Mod: CPTII,S$GLB,,

## 2022-04-05 PROCEDURE — 3288F PR FALLS RISK ASSESSMENT DOCUMENTED: ICD-10-PCS | Mod: CPTII,S$GLB,,

## 2022-04-05 PROCEDURE — 1101F PR PT FALLS ASSESS DOC 0-1 FALLS W/OUT INJ PAST YR: ICD-10-PCS | Mod: CPTII,S$GLB,,

## 2022-04-05 PROCEDURE — 36415 COLL VENOUS BLD VENIPUNCTURE: CPT

## 2022-04-05 NOTE — PROGRESS NOTES
HF TCC Provider Note (Initial Clinic) Consult Note    Date of original referral: 3/29/22  Age: 72 y.o.  Gender: male  Ethnicity: White  Number of admissions for CHF within the preceding year: 1   Duration of CHF: ~2005  Type of Congestive Heart Failure: Combined   Etiology: Ischemic   Social history: in process of quitting smoking, quit on hospital discharge. Denies alcohol use/IVDU  Enrolled in Infusion suite: no    Diagnostic Labs:   EKG - 03/29/2022  CXR - 03/31/2022  ECHO - 03/29/2022  Stress test -   Stress echo -   Pharmacologic stress -   Cardiac catheterization - 02/14/2022   Cardiac MRI -     Lab Results   Component Value Date     03/31/2022     03/30/2022    K 4.0 03/31/2022    K 3.4 (L) 03/30/2022    CL 99 03/31/2022    CL 99 03/30/2022    CO2 28 03/31/2022    CO2 25 03/30/2022     (H) 03/31/2022     03/30/2022    BUN 38 (H) 03/31/2022    BUN 34 (H) 03/30/2022    CREATININE 1.8 (H) 03/31/2022    CREATININE 1.9 (H) 03/30/2022    CALCIUM 9.6 03/31/2022    CALCIUM 9.9 03/30/2022    PROT 6.9 03/28/2022    PROT 7.1 02/11/2022    ALBUMIN 3.4 (L) 03/28/2022    ALBUMIN 3.8 02/11/2022    BILITOT 0.7 03/28/2022    BILITOT 0.4 02/11/2022    ALKPHOS 36 (L) 03/28/2022    ALKPHOS 39 (L) 02/11/2022    AST 84 (H) 03/28/2022    AST 29 02/11/2022    ALT 17 03/28/2022    ALT 13 02/11/2022    ANIONGAP 10 03/31/2022    ANIONGAP 14 03/30/2022    ESTGFRAFRICA 41.9 (A) 03/31/2022    ESTGFRAFRICA 39.3 (A) 03/30/2022    EGFRNONAA 36.3 (A) 03/31/2022    EGFRNONAA 34.0 (A) 03/30/2022       Lab Results   Component Value Date    WBC 8.40 03/31/2022    WBC 9.24 03/30/2022    RBC 4.56 (L) 03/31/2022    RBC 4.51 (L) 03/30/2022    HGB 13.6 (L) 03/31/2022    HGB 13.3 (L) 03/30/2022    HCT 43.4 03/31/2022    HCT 40.4 03/30/2022    MCV 95 03/31/2022    MCV 90 03/30/2022    MCH 29.8 03/31/2022    MCH 29.5 03/30/2022    MCHC 31.3 (L) 03/31/2022    MCHC 32.9 03/30/2022    RDW 14.6 (H) 03/31/2022    RDW 14.6 (H)  03/30/2022     03/31/2022     03/30/2022    MPV 12.0 03/31/2022    MPV 11.5 03/30/2022    IMMGR 0.4 03/31/2022    IMMGR 0.2 03/30/2022    IGABS 0.03 03/31/2022    IGABS 0.02 03/30/2022    LYMPH 1.5 03/31/2022    LYMPH 17.5 (L) 03/31/2022    MONO 0.6 03/31/2022    MONO 6.8 03/31/2022    EOS 0.0 03/31/2022    EOS 0.0 03/30/2022    BASO 0.02 03/31/2022    BASO 0.02 03/30/2022    NRBC 0 03/31/2022    NRBC 0 03/30/2022    GRAN 6.3 03/31/2022    GRAN 75.0 (H) 03/31/2022    EOSINOPHIL 0.1 03/31/2022    EOSINOPHIL 0.1 03/30/2022    BASOPHIL 0.2 03/31/2022    BASOPHIL 0.2 03/30/2022       Lab Results   Component Value Date     (H) 03/31/2022    BNP 1,504 (H) 03/28/2022    MG 1.6 03/29/2022    MG 1.7 02/14/2022    PHOS 2.9 12/06/2021    PHOS 3.1 01/22/2021    TROPONINI 9.662 (H) 03/31/2022    TROPONINI 16.137 (H) 03/29/2022    HGBA1C 5.7 (H) 03/29/2022    HGBA1C 5.3 02/11/2022    TSH 0.228 (L) 02/12/2022    TSH 3.405 01/08/2019    FREET4 0.98 02/12/2022    FREET4 1.05 11/10/2017       Lab Results   Component Value Date    CHOL 139 02/11/2022    TRIG 87 02/11/2022    HDL 37 (L) 02/11/2022    LDLCALC 84.6 02/11/2022    CHOLHDL 26.6 02/11/2022    TOTALCHOLEST 3.8 02/11/2022    NONHDLCHOL 102 02/11/2022    COLORU Yellow 06/24/2020    APPEARANCEUA Clear 06/24/2020    PHUR 5.0 06/24/2020    SPECGRAV 1.020 06/24/2020    PROTEINUA Negative 06/24/2020    GLUCUA Negative 06/24/2020    KETONESU Negative 06/24/2020    BILIRUBINUA Negative 06/24/2020    OCCULTUA Negative 06/24/2020    NITRITE Negative 06/24/2020    LEUKOCYTESUR Negative 06/24/2020       No implanted cardiac devices    Current Outpatient Medications on File Prior to Visit   Medication Sig Dispense Refill    albuterol (VENTOLIN HFA) 90 mcg/actuation inhaler Inhale 2 puffs into the lungs every 6 (six) hours as needed for Wheezing. Rescue 1 Inhaler 12    aspirin (ECOTRIN) 81 MG EC tablet Take 1 tablet (81 mg total) by mouth once daily. 30 tablet 11     atorvastatin (LIPITOR) 80 MG tablet Take 1 tablet (80 mg total) by mouth once daily. 30 tablet 11    carvediloL (COREG) 6.25 MG tablet Take 1 tablet (6.25 mg total) by mouth 2 (two) times daily. 180 tablet 11    fenofibrate (TRICOR) 145 MG tablet Take 1 tablet (145 mg total) by mouth once daily.      furosemide (LASIX) 40 MG tablet Take 1 tablet (40 mg total) by mouth 2 (two) times daily. 60 tablet 11    gabapentin (NEURONTIN) 300 MG capsule 3 (three) times daily.      isosorbide mononitrate (IMDUR) 60 MG 24 hr tablet Take 1 tablet (60 mg total) by mouth once daily. 90 tablet 3    nicotine (NICODERM CQ) 7 mg/24 hr Place 1 patch onto the skin once daily. 14 patch 0    nitroGLYCERIN (NITROSTAT) 0.4 MG SL tablet Place 1 tablet (0.4 mg total) under the tongue every 5 (five) minutes as needed for Chest pain. 25 tablet 6    pantoprazole (PROTONIX) 40 MG tablet Take 40 mg by mouth once daily.      prasugreL (EFFIENT) 10 mg Tab Take 1 tablet (10 mg total) by mouth once daily. 30 tablet 11    ranolazine (RANEXA) 500 MG Tb12 Take 500 mg by mouth 2 (two) times daily. 11/15/20      rivaroxaban (XARELTO) 2.5 mg Tab Take 1 tablet (2.5 mg total) by mouth daily with dinner or evening meal. 90 tablet 3    sacubitriL-valsartan (ENTRESTO) 24-26 mg per tablet Take 1 tablet by mouth 2 (two) times daily. 60 tablet 11    spironolactone (ALDACTONE) 25 MG tablet Take 1 tablet (25 mg total) by mouth once daily. 90 tablet 3    [DISCONTINUED] amLODIPine (NORVASC) 5 MG tablet Take 1 tablet (5 mg total) by mouth once daily. 30 tablet 11    [DISCONTINUED] lisinopriL (PRINIVIL,ZESTRIL) 20 MG tablet Take 1 tablet (20 mg total) by mouth once daily. 90 tablet 3    [DISCONTINUED] rosuvastatin (CRESTOR) 20 MG tablet Take 2 tablets (40 mg total) by mouth once daily. 180 tablet 3     No current facility-administered medications on file prior to visit.         HPI:  Patient distance walked limited by claudication rather than SOB and pace  normal   Patient sleeps on 0 number of pillows, sleeping in recliner for the past 3-4 years for comfort, unable to lay flat at baseline   Patient wakes up SOB, has to get out of bed, associated cough- n/a   Palpitations - denies    Dizzy, light-headed, pre-syncope or syncope- endorses weakness with associated fatigue 2 days ago. Denies dizziness/lightheadednes, pre-syncope, syncope   Since discharge frequency of performing weights, home weight and weight change- performing daily weights, weight downtrending 217lbs today (223lbs on hospital discharge)   Other information felt pertinent to HPI: Mr. Lonnie Taylor is a 71 yo male with a PMHx of CAD s/p CABG (x2 in 2006) and PCI, HTN, HLD, PAD, AAA with aorto bifemoral bypass, COPD, tobacco abuse, CKD who presents to first HFTCC visit accompanied by wife following recent admission for NSTEMI. He was recently admitted last month with an NSTEMI with peak trop of 21. He had a LHC (2/14/22) at that time with revealed severely diffuse CAD with patent LIMA to LAD and patent SVG to OM. No interventions were done at that time and decision was made to maximize medical management, including risk factor reduction. Pt admitted to  team G and started on ACS protocol. CP resolved and troponin downtreded. Echo performed, which showed EF 35% with G3DD. Cardiology following for medical management. Will complete 48h heparin gtt on morning of 3/31. Completed heparin drip. Entresto started after lisinopril washout period during admission. Dicussed with cardio, stable for discharge home with cardiology follow up and cardiac rehab. Home with .    4/5/22: Today his main complaint is chronic claudication noted when walking to clinic. Established with vascular surgery. Otherwise denies worsening SOB/LE swelling since hospital discharge. Denies CP since hospital discharge. He reports 2 days ago, he experienced fatigue/weakness throughout the day which he attributes to being inactive. He  admits that he does not have the same appetite as he did previously due to flavorlessness of SHAYNE meals.     PHYSICAL:   Vitals:    04/05/22 1138   BP: (!) 105/50   Pulse: 62      Wt Readings from Last 3 Encounters:   03/31/22 98.8 kg (217 lb 11.3 oz)   02/15/22 103.6 kg (228 lb 6.3 oz)   06/04/21 102.1 kg (225 lb)       JVD: no   Heart rhythm: regular  Cardiac murmur: No    S3: no  S4: no  Lungs: clear  Hepatojugular reflux: no  Edema: no      Echo 3/29/22:  · The left ventricle is mildly enlarged with moderately decreased systolic function. The estimated ejection fraction is 35%.  · The quantitatively derived ejection fraction is 41%.  · There are segmental left ventricular wall motion abnormalities.  · Normal right ventricular size with normal right ventricular systolic function.  · Grade III left ventricular diastolic dysfunction.  · Biatrial enlargement.  · Moderate mitral regurgitation.  · Mild-to-moderate aortic regurgitation.  · The estimated PA systolic pressure is 46 mmHg.  · Normal central venous pressure (3 mmHg).  · The sinuses of Valsalva is mildly dilated.  · The ascending aorta is mildly dilated.    ASSESSMENT: Chronic combined systolic and diastolic HF    PLAN:      Patient Instructions:    Instruct the patient to notify this clinic if HH, a physician or an advanced care provider wants to change medication one of their HF medications    Activity and Diet restrictions:   o Recommend 2-3 gram sodium restriction and 1500cc- 2000cc fluid restriction.  o Encourage physical activity with graded exercise program.  o Requested patient to weigh themselves daily, and to notify us if their weight increases by more than 3 lbs in 1 day or 5 lbs in 3 days.    Assigned dry weight on home scale: unsure  Medication changes (include current dose and changed dose): NYHA Class II symptoms. Endorses PND symptoms at baseline requiring to sleep in recliner. No evidence of increased fluid volume on exam. Cr bumped  1.8->2.4 (BUN/Cr 26). Reduce lasix from 40mg BID to 40mg daily with close monitoring of fluid volume. Pt reports tentative plan for repeat LHC with possible [facility]. Cardiac rehab orders placed following LHC. BP soft in clinic today, but otherwise denies dizziness/lightheadedness. Continue to monitor BP. Likely will improve with decreasing lasix to 40mg daily (as entresto also provides additional diuresis) in case component of hypovolemia contributing. Plan to start SGLT2i in future for additional GDMT.    Discussed importance of quitting smoking. Patient voices motivation to quit. Wishes to quit on his own. Offered enrolling in smoking cessation program in future as needed to aid in quitting.    Upcoming labs and date anticipated: RTC in 2 weeks for repeat labs and follow up.    Teresita Dahl PA-C

## 2022-04-05 NOTE — PROGRESS NOTES
"Heart Failure Transitional Care Clinic(HFTCC) First Week Visit     Pt presents to clinic  and accompanied by wife.     Most Recent Hospital Discharge Date: 4/5/22  Last admission Diagnosis/chief complaint:chest pain, heart burn        Visit Vitals:     Wt Readings from Last 3 Encounters:   04/05/22 98.8 kg (217 lb 14.4 oz)   03/31/22 98.8 kg (217 lb 11.3 oz)   02/15/22 103.6 kg (228 lb 6.3 oz)     Temp Readings from Last 3 Encounters:   03/31/22 97.2 °F (36.2 °C) (Tympanic)   02/15/22 97.3 °F (36.3 °C) (Oral)   06/04/21 97.7 °F (36.5 °C) (Temporal)     BP Readings from Last 3 Encounters:   04/05/22 (!) 105/50   03/31/22 101/60   02/15/22 (!) 177/74     Pulse Readings from Last 3 Encounters:   04/05/22 62   03/31/22 (!) 56   02/15/22 64            Pt reports the following:  []  Shortness of Breath with activity  []  Shortness of Breath at rest/ sleeping on 2-3 pillows "some days"  []  Fatigue  []  Edema   [] Chest pain or tightness  [] Weight Increase since discharge  [x] None of the above     Pt reports being in the green (color) Zone. If in yellow/red, reminded that they should be calling HFTCC today or now.      Medications:   · Medication reconciliation completed today per MA.  Pt reports having all medications available and understands how to take them appropriately. Reminded pt to call prior to making any changes to medications.      Education:    [x]  Confirmed pt has  "Home Care Guide for Heart Failure Patients".   Reviewed key points as listed below.      · Recommend 2 gram sodium restriction and 1500cc fluid restriction.  · Encourage physical activity with graded exercise program.  · Requested patient to weigh themselves daily, and to notify us if their weight increases by more than 3 lbs in 1 day or 5 lbs in 3 days.      [x]  Reviewed "Daily Weight and Symptom Tracker".  Reviewed with patient when and how to call  HFTCC according to "Yellow Zone" and "Red Zone".                  [] Pt given list of " "low/high sodium food list                   Watch for these Signs and Symptoms: If any of these occur, contact HFTCC immediately:   Increase in shortness of breath with movement   Increase in swelling in your legs and ankles   Weight gain of more than 3 pounds in a night or 5 pounds in 3 days.   Difficulty breathing when you are lying down   Worsening fatigue or tiredness   Stomach bloating, a full feeling or a loss of appetite   Increased coughing--especially when you are lying down     MyChart and Care Companion:              Patient active on myChart? Yes, patient uses regularly.    Contacting our Team:              Reviewed with pt how to contact HFTCC RN via phone or [employer] messaging.      HF TCC Program Plan:  Pt educated on follow-up plan while in HFTCC program.   [x]  PT given /reviewed upcoming appointment/check in dates. These will include weekly contact with RN or visits with providers over the next 4-6 weeks.                   [x]  Pt educated that they will transitioned to long term care provider team at week 4-6.  This team will be either Cardiology, PCP or Advanced Heart Failure depending on needs.          Pt was able to verbalize back to RN in their own words correct diet/fluid restrictions, necessity for exercise, warning signs and symptoms, when and how to contact their TCC team .       Plan:     Refer to provider note for changes.       [x]  Pt given AVS with follow up appointment within 2 week and medication detail list for ease of use.     [x]  Explained to pt they will have a phone "check in" by RN in/on 1 week     [] Pt met with Sukhdeep Wilson Dietician today after visit.  Refer to his note for details.     Will follow up with pt at next clinic visit and RN navigator available for pt questions, issues or concerns.     Please refer to provider visit for additional details and assessment.    "

## 2022-04-06 RX ORDER — FUROSEMIDE 40 MG/1
40 TABLET ORAL DAILY
Qty: 30 TABLET | Refills: 11 | Status: SHIPPED | OUTPATIENT
Start: 2022-04-06 | End: 2022-10-11 | Stop reason: CLARIF

## 2022-04-06 NOTE — TELEPHONE ENCOUNTER
Labs received and reviewed with Teresita BARDALES.  Cre noted to be slightly up from prior.  Plan is to decrease lasix to 20 mg daily from BID.      Called and discussed with pt and pt verbalizes read back of plan and in agreement of plan.    Pt  encouraged to call [facility] navigator at [phone] with any questions problems or concerns.  Pt  verbalized understanding and in agreement of plan.

## 2022-04-08 ENCOUNTER — TELEPHONE (OUTPATIENT)
Dept: CARDIAC REHAB | Facility: CLINIC | Age: 72
End: 2022-04-08
Payer: MEDICARE

## 2022-04-13 ENCOUNTER — TELEPHONE (OUTPATIENT)
Dept: CARDIOLOGY | Facility: CLINIC | Age: 72
End: 2022-04-13
Payer: MEDICARE

## 2022-04-19 ENCOUNTER — LAB VISIT (OUTPATIENT)
Dept: LAB | Facility: HOSPITAL | Age: 72
End: 2022-04-19
Attending: INTERNAL MEDICINE
Payer: MEDICARE

## 2022-04-19 DIAGNOSIS — E78.5 HYPERLIPEMIA: ICD-10-CM

## 2022-04-19 DIAGNOSIS — I10 HTN (HYPERTENSION): ICD-10-CM

## 2022-04-19 LAB
ANION GAP SERPL CALC-SCNC: 9 MMOL/L (ref 8–16)
BNP SERPL-MCNC: 478 PG/ML (ref 0–99)
BUN SERPL-MCNC: 20 MG/DL (ref 8–23)
CALCIUM SERPL-MCNC: 9 MG/DL (ref 8.7–10.5)
CHLORIDE SERPL-SCNC: 108 MMOL/L (ref 95–110)
CO2 SERPL-SCNC: 25 MMOL/L (ref 23–29)
CREAT SERPL-MCNC: 1.6 MG/DL (ref 0.5–1.4)
EST. GFR  (AFRICAN AMERICAN): 48.3 ML/MIN/1.73 M^2
EST. GFR  (NON AFRICAN AMERICAN): 41.8 ML/MIN/1.73 M^2
GLUCOSE SERPL-MCNC: 99 MG/DL (ref 70–110)
POTASSIUM SERPL-SCNC: 4.3 MMOL/L (ref 3.5–5.1)
SODIUM SERPL-SCNC: 142 MMOL/L (ref 136–145)

## 2022-04-19 PROCEDURE — 83880 ASSAY OF NATRIURETIC PEPTIDE: CPT | Performed by: INTERNAL MEDICINE

## 2022-04-19 PROCEDURE — 80048 BASIC METABOLIC PNL TOTAL CA: CPT | Performed by: INTERNAL MEDICINE

## 2022-04-19 PROCEDURE — 36415 COLL VENOUS BLD VENIPUNCTURE: CPT | Mod: PO | Performed by: INTERNAL MEDICINE

## 2022-04-26 ENCOUNTER — TELEPHONE (OUTPATIENT)
Dept: CARDIOLOGY | Facility: CLINIC | Age: 72
End: 2022-04-26

## 2022-04-28 ENCOUNTER — TELEPHONE (OUTPATIENT)
Dept: CARDIOLOGY | Facility: CLINIC | Age: 72
End: 2022-04-28
Payer: MEDICARE

## 2022-05-02 ENCOUNTER — TELEPHONE (OUTPATIENT)
Dept: CARDIOLOGY | Facility: CLINIC | Age: 72
End: 2022-05-02
Payer: MEDICARE

## 2022-05-11 ENCOUNTER — TELEPHONE (OUTPATIENT)
Dept: CARDIOLOGY | Facility: CLINIC | Age: 72
End: 2022-05-11
Payer: MEDICARE

## 2022-05-11 NOTE — TELEPHONE ENCOUNTER
Attempted to call pt to discuss discharge from HFTCC and to schedule cardiology f.u but unable to reach pt.  Left message with plan for cardiology follow up.  Will schedule and mail to pt.

## 2022-07-06 ENCOUNTER — TELEPHONE (OUTPATIENT)
Dept: FAMILY MEDICINE | Facility: CLINIC | Age: 72
End: 2022-07-06
Payer: MEDICARE

## 2022-07-06 NOTE — TELEPHONE ENCOUNTER
Spoke with pharmacy , informed patient still had remaining refills at [facility]. Patient contacted and informed that he is now with [facility] and dr Goode didn't fill his blood pressure medication. Encouraged patient to reach out his pharmacy to update his medication profile and providers.

## 2022-07-06 NOTE — TELEPHONE ENCOUNTER
----- Message from Cassie Duncan sent at 7/6/2022 12:54 PM CDT -----  Contact: Samuel cabello from [facility] mail in [phone]--  Requesting an RX refill or new RX.    Is this a refill or new RX: --New RX--    RX name and strength (copy/paste from chart):    1.carvediloL (COREG) 6.25 MG tablet  2.isosorbide mononitrate (IMDUR) 60 MG 24 hr tablet    Is this a 30 day or 90 day RX: --30--days--    Pharmacy name and phone # (copy/paste from chart):    [facility] (Now [facility]) - [place], [place] - [address]  [address]  [facility] [address]  Phone: [phone] Fax: [phone]

## 2022-07-06 NOTE — TELEPHONE ENCOUNTER
Always seems to be a problem when people or switching to the mail order    Please double check patient has all of the medications that he needs sent to the mail order.  Perhaps we can assist if having issues getting Cardiology to refill

## 2022-08-04 ENCOUNTER — PES CALL (OUTPATIENT)
Dept: ADMINISTRATIVE | Facility: CLINIC | Age: 72
End: 2022-08-04
Payer: MEDICARE

## 2022-08-06 NOTE — PROGRESS NOTES
"Lonnie Taylor presented for a  Medicare AWV and comprehensive Health Risk Assessment today. The following components were reviewed and updated:    · Medical history  · Family History  · Social history  · Allergies and Current Medications  · Health Risk Assessment  · Health Maintenance  · Care Team     ** See Completed Assessments for Annual Wellness Visit within the encounter summary.**       The following assessments were completed:  · Living Situation  · CAGE  · Depression Screening  · Timed Get Up and Go  · Whisper Test  · Cognitive Function Screening  · Nutrition Screening  · ADL Screening  · PAQ Screening    Vitals:    01/31/20 1043   BP: (!) 120/52   BP Location: Left arm   Patient Position: Sitting   BP Method: Large (Manual)   Pulse: 72   Temp: 97.9 °F (36.6 °C)   TempSrc: Oral   SpO2: 95%   Weight: 103.6 kg (228 lb 6.3 oz)   Height: 5' 9" (1.753 m)     Body mass index is 33.73 kg/m².  Physical Exam   Constitutional: He is oriented to person, place, and time. He appears well-developed and well-nourished.   HENT:   Head: Normocephalic and atraumatic.   Cardiovascular: Normal rate, regular rhythm and normal heart sounds.   Pulmonary/Chest: Effort normal and breath sounds normal. No respiratory distress.   Neurological: He is alert and oriented to person, place, and time.   Skin: He is not diaphoretic. No pallor.   Psychiatric: He has a normal mood and affect. His speech is normal and behavior is normal.           Diagnoses and health risks identified today and associated recommendations/orders:    1. Encounter for preventive health examination    2. NSTEMI (non-ST elevated myocardial infarction)    3. Atherosclerosis of native artery of both lower extremities with intermittent claudication    4. PVD (peripheral vascular disease)    5. PAD (peripheral artery disease)    6. Claudication, class II    7. Severe obesity (BMI 35.0-35.9 with comorbidity)    8. Chronic kidney disease, stage III (moderate)    9. COPD " exacerbation    10. Colon cancer screening  - Case request GI: COLONOSCOPY    11. Screening for cancer  - CT Chest Lung Screening Low Dose; Future    12. Thrombocytopenia, unspecified    13. Screening for AAA (abdominal aortic aneurysm)  - US Abdominal Aorta; Future    14. Tobacco abuse  - CT Chest Lung Screening Low Dose; Future    15. Personal history of nicotine dependence   - CT Chest Lung Screening Low Dose; Future    Problem List Items Addressed This Visit     Tobacco abuse    Relevant Orders    CT Chest Lung Screening Low Dose    Thrombocytopenia, unspecified    Current Assessment & Plan     Stable. Continue to monitor         Severe obesity (BMI 35.0-35.9 with comorbidity)    Current Assessment & Plan     The patient is asked to make an attempt to improve diet and exercise patterns to aid in medical management of this problem.           PVD (peripheral vascular disease)    Current Assessment & Plan     Stable. Continue to monitor         PAD (peripheral artery disease)    Current Assessment & Plan     Stable. Continue to monitor         NSTEMI (non-ST elevated myocardial infarction)    Current Assessment & Plan     Stable. Continue to monitor         COPD exacerbation    Current Assessment & Plan     Stable. Continue to monitor         Claudication, class II    Current Assessment & Plan     Stable. Continue to monitor           Chronic kidney disease, stage III (moderate)    Current Assessment & Plan     Stable. Continue to monitor         Atherosclerosis of native artery of both lower extremities with intermittent claudication    Current Assessment & Plan     Stable. Continue to monitor           Other Visit Diagnoses     Encounter for preventive health examination    -  Primary    Colon cancer screening        Relevant Orders    Case request GI: COLONOSCOPY (Completed)    Screening for cancer        Relevant Orders    CT Chest Lung Screening Low Dose    Screening for AAA (abdominal aortic aneurysm)         Relevant Orders    US Abdominal Aorta    Personal history of nicotine dependence         Relevant Orders    CT Chest Lung Screening Low Dose          Provided Lonnie with a 5-10 year written screening schedule and personal prevention plan. Recommendations were developed using the USPSTF age appropriate recommendations. Education, counseling, and referrals were provided as needed. After Visit Summary printed and given to patient which includes a list of additional screenings\tests needed.    Follow up in about 1 year (around 1/31/2021).    Chaim Davidson, CESARP-C  I offered to discuss end of life issues, including information on how to make advance directives that the patient could use to name someone who would make medical decisions on their behalf if they became too ill to make themselves.    ___Patient declined  _X_Patient is interested, I provided paper work and offered to discuss.   - - -

## 2022-10-07 ENCOUNTER — PATIENT MESSAGE (OUTPATIENT)
Dept: FAMILY MEDICINE | Facility: CLINIC | Age: 72
End: 2022-10-07
Payer: MEDICARE

## 2022-10-07 ENCOUNTER — NURSE TRIAGE (OUTPATIENT)
Dept: ADMINISTRATIVE | Facility: CLINIC | Age: 72
End: 2022-10-07
Payer: MEDICARE

## 2022-10-07 NOTE — TELEPHONE ENCOUNTER
Called patient's wife to inform her that patient needs to go to the ER, she vu and said if it gets any worse they will take patient.

## 2022-10-07 NOTE — TELEPHONE ENCOUNTER
Pt calling stating that he is so SOB that he is having to use his nebulizer/inhaler all the time to get a little relief. Reports he is having bad anxiety with it albuterol and not able to sleep. Pt endorses struggling for each breath and severe difficulty breathing at this time. Per protocol advised to CALL 911 NOW. verbalized understanding. Denies any further questions or concerns at this time, advised to call back if they have any that come up. Advised pt to call back with any other concerns or worsening symptoms. Verbalized understanding and will route message to provider.     Reason for Disposition   SEVERE difficulty breathing (e.g., struggling for each breath, speaks in single words, pulse > 120)    Protocols used: Breathing Difficulty-A-[place]

## 2022-10-10 NOTE — TELEPHONE ENCOUNTER
Tried calling patient   Phone rang then answered and no one said anything  Left message to call clinic

## 2022-10-11 ENCOUNTER — HOSPITAL ENCOUNTER (OUTPATIENT)
Facility: HOSPITAL | Age: 72
Discharge: HOME | End: 2022-10-13
Attending: EMERGENCY MEDICINE | Admitting: STUDENT IN AN ORGANIZED HEALTH CARE EDUCATION/TRAINING PROGRAM
Payer: MEDICARE

## 2022-10-11 DIAGNOSIS — J44.1 COPD EXACERBATION: Primary | ICD-10-CM

## 2022-10-11 DIAGNOSIS — R06.02 SHORTNESS OF BREATH: ICD-10-CM

## 2022-10-11 DIAGNOSIS — R07.9 CHEST PAIN: ICD-10-CM

## 2022-10-11 PROBLEM — E66.09 CLASS 1 OBESITY DUE TO EXCESS CALORIES WITH SERIOUS COMORBIDITY AND BODY MASS INDEX (BMI) OF 32.0 TO 32.9 IN ADULT: Status: ACTIVE | Noted: 2018-01-16

## 2022-10-11 PROBLEM — E78.2 MIXED HYPERLIPIDEMIA: Status: ACTIVE | Noted: 2022-10-11

## 2022-10-11 PROBLEM — I50.9 ACUTE DECOMPENSATED HEART FAILURE: Status: RESOLVED | Noted: 2022-03-29 | Resolved: 2022-10-11

## 2022-10-11 PROBLEM — I21.4 NSTEMI (NON-ST ELEVATED MYOCARDIAL INFARCTION): Status: RESOLVED | Noted: 2022-02-11 | Resolved: 2022-10-11

## 2022-10-11 PROBLEM — E66.811 CLASS 1 OBESITY DUE TO EXCESS CALORIES WITH SERIOUS COMORBIDITY AND BODY MASS INDEX (BMI) OF 32.0 TO 32.9 IN ADULT: Status: ACTIVE | Noted: 2018-01-16

## 2022-10-11 PROBLEM — K21.9 GASTROESOPHAGEAL REFLUX DISEASE WITHOUT ESOPHAGITIS: Status: ACTIVE | Noted: 2022-10-11

## 2022-10-11 PROBLEM — I50.42 CHRONIC COMBINED SYSTOLIC AND DIASTOLIC HEART FAILURE: Status: ACTIVE | Noted: 2022-10-11

## 2022-10-11 LAB
ALBUMIN SERPL BCP-MCNC: 3.5 G/DL (ref 3.5–5.2)
ALP SERPL-CCNC: 33 U/L (ref 55–135)
ALT SERPL W/O P-5'-P-CCNC: 12 U/L (ref 10–44)
ANION GAP SERPL CALC-SCNC: 7 MMOL/L (ref 8–16)
AST SERPL-CCNC: 17 U/L (ref 10–40)
BASOPHILS # BLD AUTO: 0.05 K/UL (ref 0–0.2)
BASOPHILS NFR BLD: 0.8 % (ref 0–1.9)
BILIRUB SERPL-MCNC: 1.1 MG/DL (ref 0.1–1)
BNP SERPL-MCNC: 628 PG/ML (ref 0–99)
BUN SERPL-MCNC: 22 MG/DL (ref 8–23)
CALCIUM SERPL-MCNC: 9.2 MG/DL (ref 8.7–10.5)
CHLORIDE SERPL-SCNC: 111 MMOL/L (ref 95–110)
CO2 SERPL-SCNC: 21 MMOL/L (ref 23–29)
CREAT SERPL-MCNC: 1.3 MG/DL (ref 0.5–1.4)
DIFFERENTIAL METHOD: ABNORMAL
EOSINOPHIL # BLD AUTO: 0.1 K/UL (ref 0–0.5)
EOSINOPHIL NFR BLD: 1.4 % (ref 0–8)
ERYTHROCYTE [DISTWIDTH] IN BLOOD BY AUTOMATED COUNT: 17.1 % (ref 11.5–14.5)
EST. GFR  (NO RACE VARIABLE): 57.6 ML/MIN/1.73 M^2
GLUCOSE SERPL-MCNC: 91 MG/DL (ref 70–110)
HCT VFR BLD AUTO: 40.9 % (ref 40–54)
HCV AB SERPL QL IA: NORMAL
HGB BLD-MCNC: 13.1 G/DL (ref 14–18)
HIV 1+2 AB+HIV1 P24 AG SERPL QL IA: NORMAL
IMM GRANULOCYTES # BLD AUTO: 0.01 K/UL (ref 0–0.04)
IMM GRANULOCYTES NFR BLD AUTO: 0.2 % (ref 0–0.5)
LYMPHOCYTES # BLD AUTO: 1.4 K/UL (ref 1–4.8)
LYMPHOCYTES NFR BLD: 23 % (ref 18–48)
MCH RBC QN AUTO: 28.9 PG (ref 27–31)
MCHC RBC AUTO-ENTMCNC: 32 G/DL (ref 32–36)
MCV RBC AUTO: 90 FL (ref 82–98)
MONOCYTES # BLD AUTO: 0.5 K/UL (ref 0.3–1)
MONOCYTES NFR BLD: 7.9 % (ref 4–15)
NEUTROPHILS # BLD AUTO: 4.1 K/UL (ref 1.8–7.7)
NEUTROPHILS NFR BLD: 66.7 % (ref 38–73)
NRBC BLD-RTO: 0 /100 WBC
PLATELET # BLD AUTO: 167 K/UL (ref 150–450)
PMV BLD AUTO: 12.4 FL (ref 9.2–12.9)
POTASSIUM SERPL-SCNC: 4 MMOL/L (ref 3.5–5.1)
PROT SERPL-MCNC: 6.5 G/DL (ref 6–8.4)
RBC # BLD AUTO: 4.54 M/UL (ref 4.6–6.2)
SODIUM SERPL-SCNC: 139 MMOL/L (ref 136–145)
TROPONIN I SERPL DL<=0.01 NG/ML-MCNC: 0.01 NG/ML (ref 0–0.03)
TROPONIN I SERPL DL<=0.01 NG/ML-MCNC: 0.02 NG/ML (ref 0–0.03)
WBC # BLD AUTO: 6.21 K/UL (ref 3.9–12.7)

## 2022-10-11 PROCEDURE — 83880 ASSAY OF NATRIURETIC PEPTIDE: CPT | Performed by: PHYSICIAN ASSISTANT

## 2022-10-11 PROCEDURE — 96375 TX/PRO/DX INJ NEW DRUG ADDON: CPT

## 2022-10-11 PROCEDURE — 96365 THER/PROPH/DIAG IV INF INIT: CPT

## 2022-10-11 PROCEDURE — 93005 ELECTROCARDIOGRAM TRACING: CPT

## 2022-10-11 PROCEDURE — 96366 THER/PROPH/DIAG IV INF ADDON: CPT

## 2022-10-11 PROCEDURE — 25000242 PHARM REV CODE 250 ALT 637 W/ HCPCS: Performed by: PHYSICIAN ASSISTANT

## 2022-10-11 PROCEDURE — 25000003 PHARM REV CODE 250: Performed by: PHYSICIAN ASSISTANT

## 2022-10-11 PROCEDURE — 94640 AIRWAY INHALATION TREATMENT: CPT

## 2022-10-11 PROCEDURE — 63600175 PHARM REV CODE 636 W HCPCS: Performed by: PHYSICIAN ASSISTANT

## 2022-10-11 PROCEDURE — 93010 EKG 12-LEAD: ICD-10-PCS | Mod: ,,, | Performed by: INTERNAL MEDICINE

## 2022-10-11 PROCEDURE — 94640 AIRWAY INHALATION TREATMENT: CPT | Mod: XB

## 2022-10-11 PROCEDURE — 99285 EMERGENCY DEPT VISIT HI MDM: CPT | Mod: ,,, | Performed by: PHYSICIAN ASSISTANT

## 2022-10-11 PROCEDURE — G0378 HOSPITAL OBSERVATION PER HR: HCPCS

## 2022-10-11 PROCEDURE — 93010 ELECTROCARDIOGRAM REPORT: CPT | Mod: ,,, | Performed by: INTERNAL MEDICINE

## 2022-10-11 PROCEDURE — 99220 PR INITIAL OBSERVATION CARE,LEVL III: CPT | Mod: ,,, | Performed by: STUDENT IN AN ORGANIZED HEALTH CARE EDUCATION/TRAINING PROGRAM

## 2022-10-11 PROCEDURE — 85025 COMPLETE CBC W/AUTO DIFF WBC: CPT | Performed by: PHYSICIAN ASSISTANT

## 2022-10-11 PROCEDURE — 84484 ASSAY OF TROPONIN QUANT: CPT | Mod: 91 | Performed by: PHYSICIAN ASSISTANT

## 2022-10-11 PROCEDURE — 80053 COMPREHEN METABOLIC PANEL: CPT | Performed by: PHYSICIAN ASSISTANT

## 2022-10-11 PROCEDURE — 63700000 PHARM REV CODE 250 ALT 637 W/O HCPCS: Performed by: STUDENT IN AN ORGANIZED HEALTH CARE EDUCATION/TRAINING PROGRAM

## 2022-10-11 PROCEDURE — 99220 PR INITIAL OBSERVATION CARE,LEVL III: ICD-10-PCS | Mod: ,,, | Performed by: STUDENT IN AN ORGANIZED HEALTH CARE EDUCATION/TRAINING PROGRAM

## 2022-10-11 PROCEDURE — 86803 HEPATITIS C AB TEST: CPT | Performed by: PHYSICIAN ASSISTANT

## 2022-10-11 PROCEDURE — 63600175 PHARM REV CODE 636 W HCPCS: Performed by: STUDENT IN AN ORGANIZED HEALTH CARE EDUCATION/TRAINING PROGRAM

## 2022-10-11 PROCEDURE — 25000003 PHARM REV CODE 250: Performed by: STUDENT IN AN ORGANIZED HEALTH CARE EDUCATION/TRAINING PROGRAM

## 2022-10-11 PROCEDURE — 94761 N-INVAS EAR/PLS OXIMETRY MLT: CPT

## 2022-10-11 PROCEDURE — 99285 EMERGENCY DEPT VISIT HI MDM: CPT | Mod: 25

## 2022-10-11 PROCEDURE — 87389 HIV-1 AG W/HIV-1&-2 AB AG IA: CPT | Performed by: PHYSICIAN ASSISTANT

## 2022-10-11 PROCEDURE — 99285 PR EMERGENCY DEPT VISIT,LEVEL V: ICD-10-PCS | Mod: ,,, | Performed by: PHYSICIAN ASSISTANT

## 2022-10-11 RX ORDER — IPRATROPIUM BROMIDE AND ALBUTEROL SULFATE 2.5; .5 MG/3ML; MG/3ML
3 SOLUTION RESPIRATORY (INHALATION) EVERY 6 HOURS PRN
Status: DISCONTINUED | OUTPATIENT
Start: 2022-10-11 | End: 2022-10-13

## 2022-10-11 RX ORDER — ACETAMINOPHEN 325 MG/1
650 TABLET ORAL EVERY 4 HOURS PRN
Status: DISCONTINUED | OUTPATIENT
Start: 2022-10-11 | End: 2022-10-13 | Stop reason: HOSPADM

## 2022-10-11 RX ORDER — PROCHLORPERAZINE EDISYLATE 5 MG/ML
5 INJECTION INTRAMUSCULAR; INTRAVENOUS EVERY 6 HOURS PRN
Status: DISCONTINUED | OUTPATIENT
Start: 2022-10-11 | End: 2022-10-13 | Stop reason: HOSPADM

## 2022-10-11 RX ORDER — SPIRONOLACTONE 25 MG/1
25 TABLET ORAL DAILY
Status: DISCONTINUED | OUTPATIENT
Start: 2022-10-12 | End: 2022-10-13 | Stop reason: HOSPADM

## 2022-10-11 RX ORDER — MAG HYDROX/ALUMINUM HYD/SIMETH 200-200-20
30 SUSPENSION, ORAL (FINAL DOSE FORM) ORAL 4 TIMES DAILY PRN
Status: DISCONTINUED | OUTPATIENT
Start: 2022-10-11 | End: 2022-10-13 | Stop reason: HOSPADM

## 2022-10-11 RX ORDER — AZITHROMYCIN 250 MG/1
500 TABLET, FILM COATED ORAL DAILY
Status: DISCONTINUED | OUTPATIENT
Start: 2022-10-11 | End: 2022-10-13 | Stop reason: HOSPADM

## 2022-10-11 RX ORDER — GUAIFENESIN 600 MG/1
600 TABLET, EXTENDED RELEASE ORAL 2 TIMES DAILY
Status: DISCONTINUED | OUTPATIENT
Start: 2022-10-11 | End: 2022-10-13 | Stop reason: HOSPADM

## 2022-10-11 RX ORDER — ISOSORBIDE MONONITRATE 30 MG/1
30 TABLET, EXTENDED RELEASE ORAL DAILY
Status: ON HOLD | COMMUNITY
End: 2022-10-13 | Stop reason: HOSPADM

## 2022-10-11 RX ORDER — IPRATROPIUM BROMIDE AND ALBUTEROL SULFATE 2.5; .5 MG/3ML; MG/3ML
3 SOLUTION RESPIRATORY (INHALATION)
Status: COMPLETED | OUTPATIENT
Start: 2022-10-11 | End: 2022-10-11

## 2022-10-11 RX ORDER — ASPIRIN 81 MG/1
81 TABLET ORAL DAILY
Status: DISCONTINUED | OUTPATIENT
Start: 2022-10-12 | End: 2022-10-13 | Stop reason: HOSPADM

## 2022-10-11 RX ORDER — CARVEDILOL 6.25 MG/1
6.25 TABLET ORAL 2 TIMES DAILY
Status: DISCONTINUED | OUTPATIENT
Start: 2022-10-11 | End: 2022-10-13 | Stop reason: HOSPADM

## 2022-10-11 RX ORDER — ISOSORBIDE MONONITRATE 60 MG/1
60 TABLET, EXTENDED RELEASE ORAL DAILY
Status: DISCONTINUED | OUTPATIENT
Start: 2022-10-12 | End: 2022-10-12

## 2022-10-11 RX ORDER — ALBUTEROL SULFATE 0.83 MG/ML
2.5 SOLUTION RESPIRATORY (INHALATION) EVERY 6 HOURS PRN
COMMUNITY
Start: 2022-08-25 | End: 2022-10-20 | Stop reason: SDUPTHER

## 2022-10-11 RX ORDER — NALOXONE HCL 0.4 MG/ML
0.02 VIAL (ML) INJECTION
Status: DISCONTINUED | OUTPATIENT
Start: 2022-10-11 | End: 2022-10-13 | Stop reason: HOSPADM

## 2022-10-11 RX ORDER — ASPIRIN 325 MG
325 TABLET ORAL
Status: DISCONTINUED | OUTPATIENT
Start: 2022-10-11 | End: 2022-10-11

## 2022-10-11 RX ORDER — BENZONATATE 100 MG/1
100 CAPSULE ORAL 3 TIMES DAILY PRN
Status: DISCONTINUED | OUTPATIENT
Start: 2022-10-11 | End: 2022-10-13 | Stop reason: HOSPADM

## 2022-10-11 RX ORDER — SODIUM CHLORIDE 0.9 % (FLUSH) 0.9 %
10 SYRINGE (ML) INJECTION EVERY 8 HOURS
Status: DISCONTINUED | OUTPATIENT
Start: 2022-10-11 | End: 2022-10-13 | Stop reason: HOSPADM

## 2022-10-11 RX ORDER — ONDANSETRON 2 MG/ML
4 INJECTION INTRAMUSCULAR; INTRAVENOUS EVERY 8 HOURS PRN
Status: DISCONTINUED | OUTPATIENT
Start: 2022-10-11 | End: 2022-10-13 | Stop reason: HOSPADM

## 2022-10-11 RX ORDER — SIMETHICONE 80 MG
1 TABLET,CHEWABLE ORAL 4 TIMES DAILY PRN
Status: DISCONTINUED | OUTPATIENT
Start: 2022-10-11 | End: 2022-10-13 | Stop reason: HOSPADM

## 2022-10-11 RX ORDER — FUROSEMIDE 40 MG/1
40 TABLET ORAL DAILY
Status: DISCONTINUED | OUTPATIENT
Start: 2022-10-12 | End: 2022-10-13 | Stop reason: HOSPADM

## 2022-10-11 RX ORDER — HYDROXYZINE HYDROCHLORIDE 25 MG/1
50 TABLET, FILM COATED ORAL 3 TIMES DAILY PRN
Status: DISCONTINUED | OUTPATIENT
Start: 2022-10-11 | End: 2022-10-13 | Stop reason: HOSPADM

## 2022-10-11 RX ORDER — NITROGLYCERIN 0.4 MG/1
0.4 TABLET SUBLINGUAL EVERY 5 MIN PRN
Status: DISCONTINUED | OUTPATIENT
Start: 2022-10-11 | End: 2022-10-13 | Stop reason: HOSPADM

## 2022-10-11 RX ORDER — PRASUGREL 10 MG/1
10 TABLET, FILM COATED ORAL DAILY
Status: DISCONTINUED | OUTPATIENT
Start: 2022-10-12 | End: 2022-10-13 | Stop reason: HOSPADM

## 2022-10-11 RX ORDER — FENOFIBRATE 145 MG/1
145 TABLET, FILM COATED ORAL DAILY
Status: DISCONTINUED | OUTPATIENT
Start: 2022-10-12 | End: 2022-10-13 | Stop reason: HOSPADM

## 2022-10-11 RX ORDER — METHYLPREDNISOLONE SOD SUCC 125 MG
125 VIAL (EA) INJECTION
Status: COMPLETED | OUTPATIENT
Start: 2022-10-11 | End: 2022-10-11

## 2022-10-11 RX ORDER — TALC
6 POWDER (GRAM) TOPICAL NIGHTLY PRN
Status: DISCONTINUED | OUTPATIENT
Start: 2022-10-11 | End: 2022-10-13 | Stop reason: HOSPADM

## 2022-10-11 RX ORDER — PANTOPRAZOLE SODIUM 40 MG/1
40 TABLET, DELAYED RELEASE ORAL DAILY
Status: DISCONTINUED | OUTPATIENT
Start: 2022-10-12 | End: 2022-10-13 | Stop reason: HOSPADM

## 2022-10-11 RX ORDER — TALC
6 POWDER (GRAM) TOPICAL NIGHTLY PRN
Status: DISCONTINUED | OUTPATIENT
Start: 2022-10-11 | End: 2022-10-11

## 2022-10-11 RX ORDER — POLYETHYLENE GLYCOL 3350 17 G/17G
17 POWDER, FOR SOLUTION ORAL DAILY PRN
Status: DISCONTINUED | OUTPATIENT
Start: 2022-10-11 | End: 2022-10-13 | Stop reason: HOSPADM

## 2022-10-11 RX ORDER — RANOLAZINE 500 MG/1
500 TABLET, EXTENDED RELEASE ORAL 2 TIMES DAILY
Status: DISCONTINUED | OUTPATIENT
Start: 2022-10-11 | End: 2022-10-13 | Stop reason: HOSPADM

## 2022-10-11 RX ORDER — SODIUM CHLORIDE 0.9 % (FLUSH) 0.9 %
10 SYRINGE (ML) INJECTION
Status: DISCONTINUED | OUTPATIENT
Start: 2022-10-11 | End: 2022-10-13 | Stop reason: HOSPADM

## 2022-10-11 RX ORDER — FUROSEMIDE 20 MG/1
20 TABLET ORAL DAILY PRN
Status: ON HOLD | COMMUNITY
End: 2023-06-07 | Stop reason: SDUPTHER

## 2022-10-11 RX ORDER — PREDNISONE 50 MG/1
50 TABLET ORAL DAILY
Status: DISCONTINUED | OUTPATIENT
Start: 2022-10-11 | End: 2022-10-13 | Stop reason: HOSPADM

## 2022-10-11 RX ORDER — AZITHROMYCIN 250 MG/1
500 TABLET, FILM COATED ORAL DAILY
Status: DISCONTINUED | OUTPATIENT
Start: 2022-10-12 | End: 2022-10-11

## 2022-10-11 RX ORDER — ATORVASTATIN CALCIUM 20 MG/1
80 TABLET, FILM COATED ORAL DAILY
Status: DISCONTINUED | OUTPATIENT
Start: 2022-10-12 | End: 2022-10-13 | Stop reason: HOSPADM

## 2022-10-11 RX ADMIN — AZITHROMYCIN MONOHYDRATE 500 MG: 250 TABLET ORAL at 06:10

## 2022-10-11 RX ADMIN — IPRATROPIUM BROMIDE AND ALBUTEROL SULFATE 3 ML: 2.5; .5 SOLUTION RESPIRATORY (INHALATION) at 01:10

## 2022-10-11 RX ADMIN — CEFTRIAXONE 1 G: 1 INJECTION, SOLUTION INTRAVENOUS at 06:10

## 2022-10-11 RX ADMIN — RANOLAZINE 500 MG: 500 TABLET, EXTENDED RELEASE ORAL at 10:10

## 2022-10-11 RX ADMIN — IPRATROPIUM BROMIDE AND ALBUTEROL SULFATE 3 ML: 2.5; .5 SOLUTION RESPIRATORY (INHALATION) at 02:10

## 2022-10-11 RX ADMIN — METHYLPREDNISOLONE SODIUM SUCCINATE 125 MG: 125 INJECTION, POWDER, FOR SOLUTION INTRAMUSCULAR; INTRAVENOUS at 03:10

## 2022-10-11 RX ADMIN — PREDNISONE 50 MG: 50 TABLET ORAL at 08:10

## 2022-10-11 RX ADMIN — RIVAROXABAN 2.5 MG: 2.5 TABLET, FILM COATED ORAL at 08:10

## 2022-10-11 RX ADMIN — CARVEDILOL 6.25 MG: 6.25 TABLET, FILM COATED ORAL at 08:10

## 2022-10-11 RX ADMIN — GUAIFENESIN 600 MG: 600 TABLET, EXTENDED RELEASE ORAL at 11:10

## 2022-10-11 RX ADMIN — SACUBITRIL AND VALSARTAN 1 TABLET: 24; 26 TABLET, FILM COATED ORAL at 10:10

## 2022-10-11 NOTE — ASSESSMENT & PLAN NOTE
- Working to optimize BP control  - Continue home cardioprudent regimen  - Will continue to monitor and further titrate antihypertensives as clinically indicated

## 2022-10-11 NOTE — FIRST PROVIDER EVALUATION
"Medical screening examination initiated.  I have conducted a focused provider triage encounter, findings are as follows:    Brief history of present illness:  73 y/o male with history of CAD, previous cardiac stents 6/2022, presents with complaint of chest pain x 1-1/2 weeks.  He reports shortness of breath is worsening for 2 weeks.  EF 35% on most recent echo .  He has taken 5 nitroglycerin since 3 am this morning (last dose 10 am).  No current chest pain.      Vitals:    10/11/22 1156   BP: (!) 166/80   Pulse: 64   Resp: 16   Temp: 97.9 °F (36.6 °C)   TempSrc: Oral   SpO2: 99%   Weight: 98.4 kg (217 lb)   Height: 5' 9" (1.753 m)       Pertinent physical exam:  vital signs stable, no respiratory distress. Mild LE edema.      Brief workup plan:  will initiate cardiac workup and send to ED bed, patient required multiple doses of nitroglycerin for chest pain, history of cardiac stents and CHF with worsening SOB.     Preliminary workup initiated; this workup will be continued and followed by the physician or advanced practice provider that is assigned to the patient when roomed.  "

## 2022-10-11 NOTE — ED TRIAGE NOTES
"Lonnie Taylor, a 72 y.o. male presents to the ED w/ complaint of chest pain, SOB, and anxiety.     Pt arrived via EMS from home.     Pt has taken 5 nitro tablets since 0300 today. Pain was relieved with nitro.     Pt stated that he started with anxiety, which causes the chest pain. Pt stated that chest pain resolved once in the ambulance. Stated that it comes and goes.     Pt has been dealing with SOB x 2 weeks. + cough, productive, clear sputum.     Pt denies fever, chills. +nausea    Triage note:  Chief Complaint   Patient presents with    Chest Pain     Chest pain x2 weeks. Took 5 nitro this AM, 324 ASA. Rates pain 0 out of 10.      Review of patient's allergies indicates:   Allergen Reactions    Aggrastat concentrate [tirofiban] Shortness Of Breath and Other (See Comments)     Fever and chills    Aggrastat in sodium chloride [tirofiban-0.9% sodium chloride] Shortness Of Breath     fever    Brilinta [ticagrelor] Shortness Of Breath    Cymbalta [duloxetine] Nausea Only    Bupropion Other (See Comments)     "turns into zombie" withdrawn, not talking, outbursts  "turns into zombie", withdrawn, not talking, outbursts.     Past Medical History:   Diagnosis Date    CAD (coronary artery disease)     Dr Martinez    Carotid artery disease     Chronic kidney disease, stage III (moderate)     Depression     Dyslipidemia     Hepatic cyst     Hepatic cyst     High-density lipoprotein deficiency     HTN (hypertension)     Hx of colonic polyps     Insomnia     PVD (peripheral vascular disease)     stented    S/P AAA repair        "

## 2022-10-11 NOTE — ASSESSMENT & PLAN NOTE
- Remains grossly asymptomatic, euvolemic  - BNP mildly elevated from baseline, but not in acute exacerbation  - Continue home cardioprudent regimen  - Will continue to monitor on tele   Lm for pt to cb to clarify if the 2 step PPD form scanned would work. Dr. Marian Quick signed.

## 2022-10-11 NOTE — ASSESSMENT & PLAN NOTE
- Remains grossly asymptomatic  - Continue home cardioprudent regimen  - Will continue to monitor on tele

## 2022-10-11 NOTE — ASSESSMENT & PLAN NOTE
- Body mass index is 32.05 kg/m².  - Needs dietary and lifestyle modifications in relation to health  - Consider dietary/nutrition consult outpatient

## 2022-10-11 NOTE — PLAN OF CARE
SW consulted d/t pt requesting home O2. Requested walk test from treatment team. SW will continue to monitor.     Bernie Da Silva LV  ED   [facility]  Ext. [phone]

## 2022-10-11 NOTE — ASSESSMENT & PLAN NOTE
- Interval history and physical exam findings as described above  - CXR reviewed  - Currently satting well on RA  - Start azithro/rocephin and prednisone  - Continuous pulse ox  - Closely monitoring

## 2022-10-11 NOTE — SUBJECTIVE & OBJECTIVE
Past Medical History:   Diagnosis Date    CAD (coronary artery disease)     Dr Martinez    Carotid artery disease     Chronic kidney disease, stage III (moderate)     COPD (chronic obstructive pulmonary disease)     Depression     Dyslipidemia     Hepatic cyst     Hepatic cyst     High-density lipoprotein deficiency     HTN (hypertension)     Hx of colonic polyps     Insomnia     PVD (peripheral vascular disease)     stented    S/P AAA repair        Past Surgical History:   Procedure Laterality Date    ABDOMINAL AORTIC ANEURYSM REPAIR      ABDOMINAL AORTIC ANEURYSM REPAIR      ANGIOGRAM, CORONARY, WITH LEFT HEART CATHETERIZATION Left 2/14/2022    Procedure: Angiogram, Coronary, with Left Heart Cath;  Surgeon: Delfino Castellon MD;  Location: [facility] CATH LAB;  Service: Cardiology;  Laterality: Left;    ANGIOGRAPHY OF LOWER EXTREMITY Left 10/23/2018    Procedure: Angiogram Extremity Unilateral;  Surgeon: Gregor Cunha MD;  Location: [facility] CATH;  Service: Cardiology;  Laterality: Left;  LLE intervention-Will start with 4/5 slender sheath left radial and take diagnostic angio of LLE to determine whether brachial access or tibial access will be required    CATARACT EXTRACTION W/ INTRAOCULAR LENS  IMPLANT, BILATERAL      CATARACT SX Bilateral     CATHETERIZATION OF BOTH LEFT AND RIGHT HEART N/A 7/10/2020    Procedure: CATHETERIZATION, HEART, BOTH LEFT AND RIGHT;  Surgeon: Gregor Cunha MD;  Location: [facility] CATH;  Service: Cardiology;  Laterality: N/A;  LHC + RHC +/- PCI -access left radial artery and left brachial vein    CHOLECYSTECTOMY      COLONOSCOPY N/A 10/7/2015    Procedure: COLONOSCOPY;  Surgeon: Genaro You MD;  Location: [facility] ENDO ([address] [place] [address]);  Service: Endoscopy;  Laterality: N/A;    CORONARY ANGIOPLASTY WITH STENT PLACEMENT      CORONARY ARTERY BYPASS GRAFT      CORONARY BYPASS GRAFT ANGIOGRAPHY  2/14/2022    Procedure: Bypass graft study;  Surgeon: Delfino Castellon MD;  Location: [facility] CATH LAB;   "Service: Cardiology;;    HERNIA REPAIR      LAMINECTOMY      MAGNETIC RESONANCE IMAGING N/A 6/4/2021    Procedure: MRI (MAGNETIC RESONANCE IMAGING) LUMBAR SPINE;  Surgeon: Asa Diagnostic Provider;  Location: [facility];  Service: General;  Laterality: N/A;  In MRI @ 9:10 per Krys, To follow WC RM1    PATELLA SURGERY      PERCUTANEOUS TRANSLUMINAL ANGIOPLASTY (PTA) OF PERIPHERAL VESSEL N/A 9/18/2018    Procedure: PTA, PERIPHERAL BLOOD VESSEL;  Surgeon: Gregor Cunha MD;  Location: [facility] CATH;  Service: Cardiology;  Laterality: N/A;  Site change:  right popliteal artery access using US guidance.    PERCUTANEOUS TRANSLUMINAL ANGIOPLASTY (PTA) OF PERIPHERAL VESSEL Left 7/25/2019    Procedure: PTA, PERIPHERAL VESSEL;  Surgeon: Gregor Cunha MD;  Location: [facility] CATH;  Service: Cardiology;  Laterality: Left;    VASECTOMY         Review of patient's allergies indicates:   Allergen Reactions    Aggrastat concentrate [tirofiban] Shortness Of Breath and Other (See Comments)     Fever and chills    Aggrastat in sodium chloride [tirofiban-0.9% sodium chloride] Shortness Of Breath     fever    Brilinta [ticagrelor] Shortness Of Breath    Cymbalta [duloxetine] Nausea Only    Bupropion Other (See Comments)     "turns into zombie" withdrawn, not talking, outbursts  "turns into zombie", withdrawn, not talking, outbursts.       No current facility-administered medications on file prior to encounter.     Current Outpatient Medications on File Prior to Encounter   Medication Sig    albuterol (VENTOLIN HFA) 90 mcg/actuation inhaler Inhale 2 puffs into the lungs every 6 (six) hours as needed for Wheezing. Rescue    aspirin (ECOTRIN) 81 MG EC tablet Take 1 tablet (81 mg total) by mouth once daily.    atorvastatin (LIPITOR) 80 MG tablet Take 1 tablet (80 mg total) by mouth once daily.    carvediloL (COREG) 6.25 MG tablet Take 1 tablet (6.25 mg total) by mouth 2 (two) times daily.    fenofibrate (TRICOR) 145 MG tablet Take 1 tablet " (145 mg total) by mouth once daily.    isosorbide mononitrate (IMDUR) 60 MG 24 hr tablet Take 1 tablet (60 mg total) by mouth once daily.    nitroGLYCERIN (NITROSTAT) 0.4 MG SL tablet Place 1 tablet (0.4 mg total) under the tongue every 5 (five) minutes as needed for Chest pain.    pantoprazole (PROTONIX) 40 MG tablet Take 40 mg by mouth once daily.    prasugreL (EFFIENT) 10 mg Tab Take 1 tablet (10 mg total) by mouth once daily.    rivaroxaban (XARELTO) 2.5 mg Tab Take 1 tablet (2.5 mg total) by mouth daily with dinner or evening meal.    sacubitriL-valsartan (ENTRESTO) 24-26 mg per tablet Take 1 tablet by mouth 2 (two) times daily.    spironolactone (ALDACTONE) 25 MG tablet Take 1 tablet (25 mg total) by mouth once daily.    furosemide (LASIX) 40 MG tablet Take 1 tablet (40 mg total) by mouth once daily.    gabapentin (NEURONTIN) 300 MG capsule 3 (three) times daily.    nicotine (NICODERM CQ) 7 mg/24 hr Place 1 patch onto the skin once daily.    ranolazine (RANEXA) 500 MG Tb12 Take 500 mg by mouth 2 (two) times daily. 11/15/20    [DISCONTINUED] amLODIPine (NORVASC) 5 MG tablet Take 1 tablet (5 mg total) by mouth once daily.    [DISCONTINUED] lisinopriL (PRINIVIL,ZESTRIL) 20 MG tablet Take 1 tablet (20 mg total) by mouth once daily.    [DISCONTINUED] rosuvastatin (CRESTOR) 20 MG tablet Take 2 tablets (40 mg total) by mouth once daily.     Family History       Problem Relation (Age of Onset)    Heart disease Mother, Father    Lung cancer Brother          Tobacco Use    Smoking status: Every Day     Packs/day: 1.00     Years: 50.00     Pack years: 50.00     Types: Cigarettes    Smokeless tobacco: Never    Tobacco comments:     Currently smoke 1 ppd   Substance and Sexual Activity    Alcohol use: No    Drug use: Yes     Types: Marijuana     Comment: OCCASSIONALLY    Sexual activity: Not on file       ROS  General ROS: negative for weight loss, weight gain, fevers, chills, night sweats  ENT ROS: negative for epistaxis,  headaches or sinus pain  Ophthalmic ROS: negative for blurry vision, decreased vision, double vision or itchy eyes  Cardiovascular ROS: See HPI  Respiratory ROS: See HPI  Gastrointestinal ROS: negative for abdominal pain, change in bowel habits, black or bloody stools, nausea, emesis, or bloating  Genito-Urinary ROS: negative for dysuria, trouble voiding, or hematuria  Neurological ROS: negative for TIA or stroke symptoms  Endocrine ROS: negative for hair pattern changes or unexpected weight changes  Musculoskeletal ROS: negative for muscle pain, weakness, joint pain or joint swelling  Skin: negative for rash, wounds, skin breakdown, or issues otherwise  Hematological and Lymphatic ROS: negative for bleeding, bruising, swollen lymph nodes  Psychological ROS: negative for anxiety or depression      Objective:     Vital Signs (Most Recent):  Temp: 97.9 °F (36.6 °C) (10/11/22 1156)  Pulse: (!) 50 (10/11/22 1440)  Resp: 18 (10/11/22 1450)  BP: (!) 146/87 (10/11/22 1415)  SpO2: 95 % (10/11/22 1450)   Vital Signs (24h Range):  Temp:  [97.9 °F (36.6 °C)] 97.9 °F (36.6 °C)  Pulse:  [50-64] 50  Resp:  [16-22] 18  SpO2:  [95 %-100 %] 95 %  BP: (146-166)/(80-87) 146/87     Weight: 98.4 kg (217 lb)  Body mass index is 32.05 kg/m².      Physical Exam  Gen: in NAD, appears stated age; pt is obese  Neuro: AAOx4, CN2-12 grossly intact BL; motor, sensory, and strength grossly intact BL  HEENT: NTNC, EOMI, PERRLA, MMM; no thyromegaly or lymphadenopathy; no JVD appreciated  CVS: RRR, no m/r/g; S1/S2 auscultated with no S3 or S4; capillary refill < 2 sec  Resp: coarse breath sounds and rhonchi in all lung fields BL; no belabored breathing or accessory muscle use appreciated   Abd: BS+ in all 4 quadrants; NTND, soft to palpation; no organomegaly appreciated   Extrem: pulses full, equal, and regular over all 4 extremities; no UE or LE edema BL       Significant Labs: All pertinent labs within the past 24 hours have been  reviewed.    Significant Imaging: I have reviewed all pertinent imaging results/findings within the past 24 hours.

## 2022-10-11 NOTE — ASSESSMENT & PLAN NOTE
- Pt with 1 ppd smoking history for most of adult life  - Currently without plans to quit  - Counseled on the importance of smoking cessation in relation to health

## 2022-10-11 NOTE — PHARMACY MED REC
"Admission Medication History     The home medication history was taken by Michelle Marie.    You may go to "Admission" then "Reconcile Home Medications" tabs to review and/or act upon these items.     The home medication list has been updated by the Pharmacy department.   Please read ALL comments highlighted in yellow.   Please address this information as you see fit.    Feel free to contact us if you have any questions or require assistance.      The medications listed below were removed from the home medication list. Please reorder if appropriate:  Patient reports no longer taking the following medication(s):  LISINOPRIL 20 MG  NICOTINE 7 MG/ 24 HR  SPIRONOLACTONE 25 MG    Medications listed below were obtained from: Patient/family    Current Outpatient Medications on File Prior to Encounter   Medication Sig    albuterol (PROVENTIL) 2.5 mg /3 mL (0.083 %) nebulizer solution   Take 2.5 mg by nebulization every 6 (six) hours as needed for Wheezing or Shortness of Breath.    albuterol (VENTOLIN HFA) 90 mcg/actuation inhaler   Inhale 2 puffs into the lungs every 6 (six) hours as needed for Wheezing. Rescue    aspirin (ECOTRIN) 81 MG EC tablet   Take 1 tablet (81 mg total) by mouth once daily.    atorvastatin (LIPITOR) 80 MG tablet   Take 1 tablet (80 mg total) by mouth once daily.    carvediloL (COREG) 6.25 MG tablet   Take 1 tablet (6.25 mg total) by mouth 2 (two) times daily.    fenofibrate (TRICOR) 145 MG tablet   Take 1 tablet (145 mg total) by mouth once daily.    furosemide (LASIX) 20 MG tablet   Take 20 mg by mouth daily as needed (edema).    gabapentin (NEURONTIN) 300 MG capsule   Take 300 mg by mouth 3 (three) times daily as needed (nerve pain).    isosorbide mononitrate (IMDUR) 30 MG 24 hr tablet   Take 30 mg by mouth once daily.    nitroGLYCERIN (NITROSTAT) 0.4 MG SL tablet   Place 1 tablet (0.4 mg total) under the tongue every 5 (five) minutes as needed for Chest pain.    pantoprazole (PROTONIX) 40 MG " tablet   Take 40 mg by mouth once daily.    prasugreL (EFFIENT) 10 mg Tab   Take 1 tablet (10 mg total) by mouth once daily.    ranolazine (RANEXA) 500 MG Tb12 Take 500 mg by mouth 2 (two) times daily.    rivaroxaban (XARELTO) 2.5 mg Tab     Take 1 tablet (2.5 mg total) by mouth daily with dinner or evening meal. (Patient not taking: Reported on 10/11/2022)    sacubitriL-valsartan (ENTRESTO) 24-26 mg per tablet   Take 1 tablet by mouth 2 (two) times daily. (Patient not taking: Reported on 10/11/2022)             Potential issues to be addressed PRIOR TO DISCHARGE  Drug cost interfering with therapy: (RIVAROXABAN 2.5 MG AND SACUBITRIL-VALSARTAN 24-26 MG)    Michelle Marie  EXT [id number]                  .

## 2022-10-11 NOTE — H&P
[facility] - Emergency Dept  [facility] Medicine  History & Physical    Patient Name: Lonnie Taylor  MRN: [id number]  Patient Class: OP- Observation  Admission Date: 10/11/2022  Attending Physician: Tremayne Stahl MD   Primary Care Provider: Isiah You MD         Patient information was obtained from patient and ER records.     Subjective:     Principal Problem:COPD exacerbation    Chief Complaint:   Chief Complaint   Patient presents with    Chest Pain     Chest pain x2 weeks. Took 5 nitro this AM, 324 ASA. Rates pain 0 out of 10.         HPI: Lonnie Taylor is a 71yo WM with a PMH of HFrEF (35% with G1DD), CAD (s/p PCI), PAD, HTN, HLD, COPD, CKD3a, GERD, tobacco abuse, and obesity who presented to the [facility] ED on 10/11/22 with complaints of SOB and CP. Pt reports that 1.5 weeks ago he began to feel more winded with minor exertion, and also developed a cough productive of tan-colored sputum. Symptoms worsened and he was SOB at rest as well.  He reports that this causes him much anxiety, at which point he has occasional chest pains (states that this is a different CP from his past MIs). Tried 4-5 nebulizer treatments with minimal relief. No known sick contacts. Denies F/C/N/V/D/C, HA, palpitations, abdominal pain, dysuria, extremity swelling, or symptoms otherwise. Given lack of improvement, he came to the ER.    In the ED, vitals significant for Hr 50s and /80; satting 95% on RA. Labs showed , though were otherwise grossly unremarkable. Troponin negative ,and EKG with nonspecific ST findings though unchanged from previous. CXR showed accentuation interstitial markings. ED gave duonebs and IV steroids, and hospital medicine was consulted for admission and further management.      Past Medical History:   Diagnosis Date    CAD (coronary artery disease)     Dr Martinez    Carotid artery disease     Chronic kidney disease, stage III (moderate)     COPD (chronic obstructive pulmonary disease)      Depression     Dyslipidemia     Hepatic cyst     Hepatic cyst     High-density lipoprotein deficiency     HTN (hypertension)     Hx of colonic polyps     Insomnia     PVD (peripheral vascular disease)     stented    S/P AAA repair        Past Surgical History:   Procedure Laterality Date    ABDOMINAL AORTIC ANEURYSM REPAIR      ABDOMINAL AORTIC ANEURYSM REPAIR      ANGIOGRAM, CORONARY, WITH LEFT HEART CATHETERIZATION Left 2/14/2022    Procedure: Angiogram, Coronary, with Left Heart Cath;  Surgeon: Delfino Castellon MD;  Location: [facility] CATH LAB;  Service: Cardiology;  Laterality: Left;    ANGIOGRAPHY OF LOWER EXTREMITY Left 10/23/2018    Procedure: Angiogram Extremity Unilateral;  Surgeon: Gregor Cunha MD;  Location: [facility] CATH;  Service: Cardiology;  Laterality: Left;  LLE intervention-Will start with 4/5 slender sheath left radial and take diagnostic angio of LLE to determine whether brachial access or tibial access will be required    CATARACT EXTRACTION W/ INTRAOCULAR LENS  IMPLANT, BILATERAL      CATARACT SX Bilateral     CATHETERIZATION OF BOTH LEFT AND RIGHT HEART N/A 7/10/2020    Procedure: CATHETERIZATION, HEART, BOTH LEFT AND RIGHT;  Surgeon: Gregor Cunha MD;  Location: [facility] CATH;  Service: Cardiology;  Laterality: N/A;  LHC + RHC +/- PCI -access left radial artery and left brachial vein    CHOLECYSTECTOMY      COLONOSCOPY N/A 10/7/2015    Procedure: COLONOSCOPY;  Surgeon: Genaro You MD;  [facility]  Service: Endoscopy;  Laterality: N/A;    CORONARY ANGIOPLASTY WITH STENT PLACEMENT      CORONARY ARTERY BYPASS GRAFT      CORONARY BYPASS GRAFT ANGIOGRAPHY  2/14/2022    Procedure: Bypass graft study;  Surgeon: Delfino Castellon MD;  Location: [facility] CATH LAB;  Service: Cardiology;;    HERNIA REPAIR      LAMINECTOMY      MAGNETIC RESONANCE IMAGING N/A 6/4/2021    Procedure: MRI (MAGNETIC RESONANCE IMAGING) LUMBAR SPINE;  [facility]  "Provider;  Location: [facility];  Service: General;  Laterality: N/A;  In MRI @ 9:10 per Krys, To follow WC RM1    PATELLA SURGERY      PERCUTANEOUS TRANSLUMINAL ANGIOPLASTY (PTA) OF PERIPHERAL VESSEL N/A 9/18/2018    Procedure: PTA, PERIPHERAL BLOOD VESSEL;  Surgeon: Gregor Cunha MD;  [facility];  Service: Cardiology;  Laterality: N/A;  Site change:  right popliteal artery access using US guidance.    PERCUTANEOUS TRANSLUMINAL ANGIOPLASTY (PTA) OF PERIPHERAL VESSEL Left 7/25/2019    Procedure: PTA, PERIPHERAL VESSEL;  Surgeon: Gregor Cunha MD;  [facility];  Service: Cardiology;  Laterality: Left;    VASECTOMY         Review of patient's allergies indicates:   Allergen Reactions    Aggrastat concentrate [tirofiban] Shortness Of Breath and Other (See Comments)     Fever and chills    Aggrastat in sodium chloride [tirofiban-0.9% sodium chloride] Shortness Of Breath     fever    Brilinta [ticagrelor] Shortness Of Breath    Cymbalta [duloxetine] Nausea Only    Bupropion Other (See Comments)     "turns into zombie" withdrawn, not talking, outbursts  "turns into zombie", withdrawn, not talking, outbursts.       No current facility-administered medications on file prior to encounter.     Current Outpatient Medications on File Prior to Encounter   Medication Sig    albuterol (VENTOLIN HFA) 90 mcg/actuation inhaler Inhale 2 puffs into the lungs every 6 (six) hours as needed for Wheezing. Rescue    aspirin (ECOTRIN) 81 MG EC tablet Take 1 tablet (81 mg total) by mouth once daily.    atorvastatin (LIPITOR) 80 MG tablet Take 1 tablet (80 mg total) by mouth once daily.    carvediloL (COREG) 6.25 MG tablet Take 1 tablet (6.25 mg total) by mouth 2 (two) times daily.    fenofibrate (TRICOR) 145 MG tablet Take 1 tablet (145 mg total) by mouth once daily.    isosorbide mononitrate (IMDUR) 60 MG 24 hr tablet Take 1 tablet (60 mg total) by mouth once daily.    nitroGLYCERIN (NITROSTAT) 0.4 MG " SL tablet Place 1 tablet (0.4 mg total) under the tongue every 5 (five) minutes as needed for Chest pain.    pantoprazole (PROTONIX) 40 MG tablet Take 40 mg by mouth once daily.    prasugreL (EFFIENT) 10 mg Tab Take 1 tablet (10 mg total) by mouth once daily.    rivaroxaban (XARELTO) 2.5 mg Tab Take 1 tablet (2.5 mg total) by mouth daily with dinner or evening meal.    sacubitriL-valsartan (ENTRESTO) 24-26 mg per tablet Take 1 tablet by mouth 2 (two) times daily.    spironolactone (ALDACTONE) 25 MG tablet Take 1 tablet (25 mg total) by mouth once daily.    furosemide (LASIX) 40 MG tablet Take 1 tablet (40 mg total) by mouth once daily.    gabapentin (NEURONTIN) 300 MG capsule 3 (three) times daily.    nicotine (NICODERM CQ) 7 mg/24 hr Place 1 patch onto the skin once daily.    ranolazine (RANEXA) 500 MG Tb12 Take 500 mg by mouth 2 (two) times daily. 11/15/20    [DISCONTINUED] amLODIPine (NORVASC) 5 MG tablet Take 1 tablet (5 mg total) by mouth once daily.    [DISCONTINUED] lisinopriL (PRINIVIL,ZESTRIL) 20 MG tablet Take 1 tablet (20 mg total) by mouth once daily.    [DISCONTINUED] rosuvastatin (CRESTOR) 20 MG tablet Take 2 tablets (40 mg total) by mouth once daily.     Family History       Problem Relation (Age of Onset)    Heart disease Mother, Father    Lung cancer Brother          Tobacco Use    Smoking status: Every Day     Packs/day: 1.00     Years: 50.00     Pack years: 50.00     Types: Cigarettes    Smokeless tobacco: Never    Tobacco comments:     Currently smoke 1 ppd   Substance and Sexual Activity    Alcohol use: No    Drug use: Yes     Types: Marijuana     Comment: OCCASSIONALLY    Sexual activity: Not on file       ROS  General ROS: negative for weight loss, weight gain, fevers, chills, night sweats  ENT ROS: negative for epistaxis, headaches or sinus pain  Ophthalmic ROS: negative for blurry vision, decreased vision, double vision or itchy eyes  Cardiovascular ROS: See  HPI  Respiratory ROS: See HPI  Gastrointestinal ROS: negative for abdominal pain, change in bowel habits, black or bloody stools, nausea, emesis, or bloating  Genito-Urinary ROS: negative for dysuria, trouble voiding, or hematuria  Neurological ROS: negative for TIA or stroke symptoms  Endocrine ROS: negative for hair pattern changes or unexpected weight changes  Musculoskeletal ROS: negative for muscle pain, weakness, joint pain or joint swelling  Skin: negative for rash, wounds, skin breakdown, or issues otherwise  Hematological and Lymphatic ROS: negative for bleeding, bruising, swollen lymph nodes  Psychological ROS: negative for anxiety or depression      Objective:     Vital Signs (Most Recent):  Temp: 97.9 °F (36.6 °C) (10/11/22 1156)  Pulse: (!) 50 (10/11/22 1440)  Resp: 18 (10/11/22 1450)  BP: (!) 146/87 (10/11/22 1415)  SpO2: 95 % (10/11/22 1450)   Vital Signs (24h Range):  Temp:  [97.9 °F (36.6 °C)] 97.9 °F (36.6 °C)  Pulse:  [50-64] 50  Resp:  [16-22] 18  SpO2:  [95 %-100 %] 95 %  BP: (146-166)/(80-87) 146/87     Weight: 98.4 kg (217 lb)  Body mass index is 32.05 kg/m².      Physical Exam  Gen: in NAD, appears stated age; pt is obese  Neuro: AAOx4, CN2-12 grossly intact BL; motor, sensory, and strength grossly intact BL  HEENT: NTNC, EOMI, PERRLA, MMM; no thyromegaly or lymphadenopathy; no JVD appreciated  CVS: RRR, no m/r/g; S1/S2 auscultated with no S3 or S4; capillary refill < 2 sec  Resp: coarse breath sounds and rhonchi in all lung fields BL; no belabored breathing or accessory muscle use appreciated   Abd: BS+ in all 4 quadrants; NTND, soft to palpation; no organomegaly appreciated   Extrem: pulses full, equal, and regular over all 4 extremities; no UE or LE edema BL       Significant Labs: All pertinent labs within the past 24 hours have been reviewed.    Significant Imaging: I have reviewed all pertinent imaging results/findings within the past 24 hours.    Assessment/Plan:     * COPD  exacerbation  - Interval history and physical exam findings as described above  - CXR reviewed  - Currently satting well on RA  - Start azithro/rocephin and prednisone  - Continuous pulse ox  - Closely monitoring    Chronic combined systolic and diastolic heart failure  - Remains grossly asymptomatic, euvolemic  - BNP mildly elevated from baseline, but not in acute exacerbation  - Continue home cardioprudent regimen  - Will continue to monitor on tele    Coronary artery disease involving native coronary artery of native heart with angina pectoris  - Remains grossly asymptomatic  - Continue home cardioprudent regimen  - Will continue to monitor on tele    PAD (peripheral artery disease)  - Continue home ASA, statin, and xarelto    Essential hypertension  - Working to optimize BP control  - Continue home cardioprudent regimen  - Will continue to monitor and further titrate antihypertensives as clinically indicated     Mixed hyperlipidemia  - Continue home statin regimen    Stage 3a chronic kidney disease  - Cr baseline at admission  - Renally dosing all medications  - Avoid nephrotoxins  - Will continue to monitor on daily labs    Gastroesophageal reflux disease without esophagitis  - Currently asymptomatic  - Continue home PPI regimen    Tobacco abuse  - Pt with 1 ppd smoking history for most of adult life  - Currently without plans to quit  - Counseled on the importance of smoking cessation in relation to health     Class 1 obesity due to excess calories with serious comorbidity and body mass index (BMI) of 32.0 to 32.9 in adult  - Body mass index is 32.05 kg/m².  - Needs dietary and lifestyle modifications in relation to health  - Consider dietary/nutrition consult outpatient      VTE Risk Mitigation (From admission, onward)         Ordered     IP VTE HIGH RISK PATIENT  Once         10/11/22 1634     Place sequential compression device  Until discontinued         10/11/22 1634     Reason for No Pharmacological VTE  Prophylaxis  Once        Question:  Reasons:  Answer:  Already adequately anticoagulated on oral Anticoagulants    10/11/22 1634     Place sequential compression device  Until discontinued         10/11/22 1634                 Tremayne Stahl MD  Attending Physician  Department of Hospital Medicine  [facility] secure chat preferred, or ext. [phone]  10/11/2022

## 2022-10-11 NOTE — HPI
Lonnie Taylor is a 71yo WM with a PMH of HFrEF (35% with G1DD), CAD (s/p PCI), PAD, HTN, HLD, COPD, CKD3a, GERD, tobacco abuse, and obesity who [place] [facility] [place] 10[place]11/22 with complaints of SOB and CP. Pt reports that 1.5 weeks ago he began to feel more winded with minor exertion, and also developed a cough productive of tan-colored sputum. Symptoms worsened and he was SOB at rest as well.  He reports that this causes him much anxiety, at which point he has occasional chest pains (states that this is a different CP from his past MIs). Tried 4-5 nebulizer treatments with minimal relief. No known sick contacts. Denies F/C/N/V/D/C, HA, palpitations, abdominal pain, dysuria, extremity swelling, or symptoms otherwise. Given lack of improvement, he came to the ER.    In the ED, vitals significant for Hr 50s and /80; satting 95% on RA. Labs showed , though were otherwise grossly unremarkable. Troponin negative ,and EKG with nonspecific ST findings though unchanged from previous. CXR showed accentuation interstitial markings. ED gave duonebs and IV steroids, and hospital medicine was consulted for admission and further management.

## 2022-10-12 LAB
ALBUMIN SERPL BCP-MCNC: 3.6 G/DL (ref 3.5–5.2)
ALP SERPL-CCNC: 36 U/L (ref 55–135)
ALT SERPL W/O P-5'-P-CCNC: 11 U/L (ref 10–44)
ANION GAP SERPL CALC-SCNC: 12 MMOL/L (ref 8–16)
AST SERPL-CCNC: 16 U/L (ref 10–40)
BASOPHILS # BLD AUTO: 0.01 K/UL (ref 0–0.2)
BASOPHILS NFR BLD: 0.2 % (ref 0–1.9)
BILIRUB SERPL-MCNC: 0.9 MG/DL (ref 0.1–1)
BUN SERPL-MCNC: 22 MG/DL (ref 8–23)
CALCIUM SERPL-MCNC: 10.1 MG/DL (ref 8.7–10.5)
CHLORIDE SERPL-SCNC: 107 MMOL/L (ref 95–110)
CO2 SERPL-SCNC: 18 MMOL/L (ref 23–29)
CREAT SERPL-MCNC: 1.4 MG/DL (ref 0.5–1.4)
DIFFERENTIAL METHOD: ABNORMAL
EOSINOPHIL # BLD AUTO: 0 K/UL (ref 0–0.5)
EOSINOPHIL NFR BLD: 0 % (ref 0–8)
ERYTHROCYTE [DISTWIDTH] IN BLOOD BY AUTOMATED COUNT: 16.9 % (ref 11.5–14.5)
EST. GFR  (NO RACE VARIABLE): 52.7 ML/MIN/1.73 M^2
GLUCOSE SERPL-MCNC: 136 MG/DL (ref 70–110)
HCT VFR BLD AUTO: 45.9 % (ref 40–54)
HGB BLD-MCNC: 13.9 G/DL (ref 14–18)
IMM GRANULOCYTES # BLD AUTO: 0.02 K/UL (ref 0–0.04)
IMM GRANULOCYTES NFR BLD AUTO: 0.4 % (ref 0–0.5)
LYMPHOCYTES # BLD AUTO: 0.7 K/UL (ref 1–4.8)
LYMPHOCYTES NFR BLD: 12.2 % (ref 18–48)
MAGNESIUM SERPL-MCNC: 1.8 MG/DL (ref 1.6–2.6)
MCH RBC QN AUTO: 27.8 PG (ref 27–31)
MCHC RBC AUTO-ENTMCNC: 30.3 G/DL (ref 32–36)
MCV RBC AUTO: 92 FL (ref 82–98)
MONOCYTES # BLD AUTO: 0.1 K/UL (ref 0.3–1)
MONOCYTES NFR BLD: 1.1 % (ref 4–15)
NEUTROPHILS # BLD AUTO: 4.7 K/UL (ref 1.8–7.7)
NEUTROPHILS NFR BLD: 86.1 % (ref 38–73)
NRBC BLD-RTO: 0 /100 WBC
PHOSPHATE SERPL-MCNC: 2.4 MG/DL (ref 2.7–4.5)
PLATELET # BLD AUTO: 176 K/UL (ref 150–450)
PMV BLD AUTO: 12.9 FL (ref 9.2–12.9)
POTASSIUM SERPL-SCNC: 4.1 MMOL/L (ref 3.5–5.1)
PROT SERPL-MCNC: 6.9 G/DL (ref 6–8.4)
RBC # BLD AUTO: 5 M/UL (ref 4.6–6.2)
SODIUM SERPL-SCNC: 137 MMOL/L (ref 136–145)
TROPONIN I SERPL DL<=0.01 NG/ML-MCNC: 0.02 NG/ML (ref 0–0.03)
WBC # BLD AUTO: 5.41 K/UL (ref 3.9–12.7)

## 2022-10-12 PROCEDURE — 93005 ELECTROCARDIOGRAM TRACING: CPT

## 2022-10-12 PROCEDURE — A4216 STERILE WATER/SALINE, 10 ML: HCPCS | Performed by: STUDENT IN AN ORGANIZED HEALTH CARE EDUCATION/TRAINING PROGRAM

## 2022-10-12 PROCEDURE — 25000003 PHARM REV CODE 250: Performed by: STUDENT IN AN ORGANIZED HEALTH CARE EDUCATION/TRAINING PROGRAM

## 2022-10-12 PROCEDURE — 63600175 PHARM REV CODE 636 W HCPCS: Performed by: STUDENT IN AN ORGANIZED HEALTH CARE EDUCATION/TRAINING PROGRAM

## 2022-10-12 PROCEDURE — 80053 COMPREHEN METABOLIC PANEL: CPT | Performed by: STUDENT IN AN ORGANIZED HEALTH CARE EDUCATION/TRAINING PROGRAM

## 2022-10-12 PROCEDURE — 93010 ELECTROCARDIOGRAM REPORT: CPT | Mod: ,,, | Performed by: INTERNAL MEDICINE

## 2022-10-12 PROCEDURE — 63600175 PHARM REV CODE 636 W HCPCS

## 2022-10-12 PROCEDURE — 99226 PR SUBSEQUENT OBSERVATION CARE,LEVEL III: ICD-10-PCS | Mod: ,,, | Performed by: STUDENT IN AN ORGANIZED HEALTH CARE EDUCATION/TRAINING PROGRAM

## 2022-10-12 PROCEDURE — 93010 EKG 12-LEAD: ICD-10-PCS | Mod: 76,,, | Performed by: INTERNAL MEDICINE

## 2022-10-12 PROCEDURE — 85025 COMPLETE CBC W/AUTO DIFF WBC: CPT | Performed by: STUDENT IN AN ORGANIZED HEALTH CARE EDUCATION/TRAINING PROGRAM

## 2022-10-12 PROCEDURE — 84484 ASSAY OF TROPONIN QUANT: CPT

## 2022-10-12 PROCEDURE — 96375 TX/PRO/DX INJ NEW DRUG ADDON: CPT

## 2022-10-12 PROCEDURE — 36415 COLL VENOUS BLD VENIPUNCTURE: CPT | Performed by: STUDENT IN AN ORGANIZED HEALTH CARE EDUCATION/TRAINING PROGRAM

## 2022-10-12 PROCEDURE — 93010 ELECTROCARDIOGRAM REPORT: CPT | Mod: 76,,, | Performed by: INTERNAL MEDICINE

## 2022-10-12 PROCEDURE — 94761 N-INVAS EAR/PLS OXIMETRY MLT: CPT

## 2022-10-12 PROCEDURE — 63700000 PHARM REV CODE 250 ALT 637 W/O HCPCS: Performed by: STUDENT IN AN ORGANIZED HEALTH CARE EDUCATION/TRAINING PROGRAM

## 2022-10-12 PROCEDURE — 25000003 PHARM REV CODE 250

## 2022-10-12 PROCEDURE — 96366 THER/PROPH/DIAG IV INF ADDON: CPT

## 2022-10-12 PROCEDURE — 99226 PR SUBSEQUENT OBSERVATION CARE,LEVEL III: CPT | Mod: ,,, | Performed by: STUDENT IN AN ORGANIZED HEALTH CARE EDUCATION/TRAINING PROGRAM

## 2022-10-12 PROCEDURE — G0378 HOSPITAL OBSERVATION PER HR: HCPCS

## 2022-10-12 PROCEDURE — 25000003 PHARM REV CODE 250: Performed by: PHYSICIAN ASSISTANT

## 2022-10-12 PROCEDURE — 83735 ASSAY OF MAGNESIUM: CPT | Performed by: STUDENT IN AN ORGANIZED HEALTH CARE EDUCATION/TRAINING PROGRAM

## 2022-10-12 PROCEDURE — 25000242 PHARM REV CODE 250 ALT 637 W/ HCPCS: Performed by: STUDENT IN AN ORGANIZED HEALTH CARE EDUCATION/TRAINING PROGRAM

## 2022-10-12 PROCEDURE — 84100 ASSAY OF PHOSPHORUS: CPT | Performed by: STUDENT IN AN ORGANIZED HEALTH CARE EDUCATION/TRAINING PROGRAM

## 2022-10-12 RX ORDER — HYDRALAZINE HYDROCHLORIDE 25 MG/1
25 TABLET, FILM COATED ORAL EVERY 8 HOURS
Status: DISCONTINUED | OUTPATIENT
Start: 2022-10-12 | End: 2022-10-13 | Stop reason: HOSPADM

## 2022-10-12 RX ORDER — HYDRALAZINE HYDROCHLORIDE 20 MG/ML
10 INJECTION INTRAMUSCULAR; INTRAVENOUS EVERY 6 HOURS PRN
Status: DISCONTINUED | OUTPATIENT
Start: 2022-10-12 | End: 2022-10-13 | Stop reason: HOSPADM

## 2022-10-12 RX ORDER — SUCRALFATE 1 G/1
2 TABLET ORAL ONCE
Status: COMPLETED | OUTPATIENT
Start: 2022-10-12 | End: 2022-10-12

## 2022-10-12 RX ORDER — SODIUM,POTASSIUM PHOSPHATES 280-250MG
1 POWDER IN PACKET (EA) ORAL ONCE
Status: COMPLETED | OUTPATIENT
Start: 2022-10-12 | End: 2022-10-12

## 2022-10-12 RX ORDER — HYDRALAZINE HYDROCHLORIDE 25 MG/1
25 TABLET, FILM COATED ORAL EVERY 8 HOURS PRN
Status: DISCONTINUED | OUTPATIENT
Start: 2022-10-12 | End: 2022-10-12

## 2022-10-12 RX ORDER — MORPHINE SULFATE 4 MG/ML
4 INJECTION, SOLUTION INTRAMUSCULAR; INTRAVENOUS ONCE
Status: COMPLETED | OUTPATIENT
Start: 2022-10-12 | End: 2022-10-12

## 2022-10-12 RX ADMIN — HYDROXYZINE HYDROCHLORIDE 50 MG: 25 TABLET, FILM COATED ORAL at 08:10

## 2022-10-12 RX ADMIN — HYDROXYZINE HYDROCHLORIDE 50 MG: 25 TABLET, FILM COATED ORAL at 12:10

## 2022-10-12 RX ADMIN — HYDROXYZINE HYDROCHLORIDE 50 MG: 25 TABLET, FILM COATED ORAL at 11:10

## 2022-10-12 RX ADMIN — NITROGLYCERIN 0.4 MG: 0.4 TABLET, ORALLY DISINTEGRATING SUBLINGUAL at 12:10

## 2022-10-12 RX ADMIN — HYDRALAZINE HYDROCHLORIDE 25 MG: 25 TABLET, FILM COATED ORAL at 04:10

## 2022-10-12 RX ADMIN — PREDNISONE 50 MG: 50 TABLET ORAL at 09:10

## 2022-10-12 RX ADMIN — RIVAROXABAN 2.5 MG: 2.5 TABLET, FILM COATED ORAL at 05:10

## 2022-10-12 RX ADMIN — ALUMINUM HYDROXIDE, MAGNESIUM HYDROXIDE, AND DIMETHICONE 30 ML: 200; 20; 200 SUSPENSION ORAL at 02:10

## 2022-10-12 RX ADMIN — FENOFIBRATE 145 MG: 145 TABLET, FILM COATED ORAL at 09:10

## 2022-10-12 RX ADMIN — CARVEDILOL 6.25 MG: 6.25 TABLET, FILM COATED ORAL at 09:10

## 2022-10-12 RX ADMIN — Medication 10 ML: at 09:10

## 2022-10-12 RX ADMIN — ASPIRIN 81 MG: 81 TABLET, COATED ORAL at 09:10

## 2022-10-12 RX ADMIN — SPIRONOLACTONE 25 MG: 25 TABLET, FILM COATED ORAL at 09:10

## 2022-10-12 RX ADMIN — PANTOPRAZOLE SODIUM 40 MG: 40 TABLET, DELAYED RELEASE ORAL at 09:10

## 2022-10-12 RX ADMIN — CARVEDILOL 6.25 MG: 6.25 TABLET, FILM COATED ORAL at 08:10

## 2022-10-12 RX ADMIN — SUCRALFATE 2 G: 1 TABLET ORAL at 04:10

## 2022-10-12 RX ADMIN — HYDRALAZINE HYDROCHLORIDE 25 MG: 25 TABLET, FILM COATED ORAL at 09:10

## 2022-10-12 RX ADMIN — Medication 10 ML: at 02:10

## 2022-10-12 RX ADMIN — HYDRALAZINE HYDROCHLORIDE 25 MG: 25 TABLET, FILM COATED ORAL at 02:10

## 2022-10-12 RX ADMIN — GUAIFENESIN 600 MG: 600 TABLET, EXTENDED RELEASE ORAL at 08:10

## 2022-10-12 RX ADMIN — FUROSEMIDE 40 MG: 40 TABLET ORAL at 09:10

## 2022-10-12 RX ADMIN — ATORVASTATIN CALCIUM 80 MG: 20 TABLET, FILM COATED ORAL at 09:10

## 2022-10-12 RX ADMIN — AZITHROMYCIN MONOHYDRATE 500 MG: 250 TABLET ORAL at 09:10

## 2022-10-12 RX ADMIN — POTASSIUM & SODIUM PHOSPHATES POWDER PACK 280-160-250 MG 1 PACKET: 280-160-250 PACK at 09:10

## 2022-10-12 RX ADMIN — PRASUGREL 10 MG: 10 TABLET, FILM COATED ORAL at 10:10

## 2022-10-12 RX ADMIN — SACUBITRIL AND VALSARTAN 1 TABLET: 24; 26 TABLET, FILM COATED ORAL at 09:10

## 2022-10-12 RX ADMIN — CEFTRIAXONE 1 G: 1 INJECTION, SOLUTION INTRAVENOUS at 05:10

## 2022-10-12 RX ADMIN — RANOLAZINE 500 MG: 500 TABLET, EXTENDED RELEASE ORAL at 08:10

## 2022-10-12 RX ADMIN — MORPHINE SULFATE 4 MG: 4 INJECTION INTRAVENOUS at 06:10

## 2022-10-12 RX ADMIN — ISOSORBIDE MONONITRATE 90 MG: 60 TABLET, EXTENDED RELEASE ORAL at 09:10

## 2022-10-12 RX ADMIN — RANOLAZINE 500 MG: 500 TABLET, EXTENDED RELEASE ORAL at 09:10

## 2022-10-12 RX ADMIN — SACUBITRIL AND VALSARTAN 1 TABLET: 24; 26 TABLET, FILM COATED ORAL at 08:10

## 2022-10-12 RX ADMIN — GUAIFENESIN 600 MG: 600 TABLET, EXTENDED RELEASE ORAL at 09:10

## 2022-10-12 NOTE — PLAN OF CARE
Problem: Adult Inpatient Plan of Care  Goal: Optimal Comfort and Wellbeing  Outcome: Ongoing, Progressing     Problem: Adult Inpatient Plan of Care  Goal: Plan of Care Review  Outcome: Ongoing, Progressing

## 2022-10-12 NOTE — ASSESSMENT & PLAN NOTE
- Interval history and physical exam findings as described above  - CXR reviewed  - Currently satting well on RA  - Continue azithro/rocephin and prednisone  - Overall improving  - Continuous pulse ox  - Closely monitoring

## 2022-10-12 NOTE — HOSPITAL COURSE
Pt admitted to [facility] and initiated on azithro/rocephin and prednisone with improvement in respiratory status. O2 stats stable on RA. Troponin trend negative and no acute events on tele into 10/12. Overall improving. Was found to have hypoxia on walk test, discharged with home O2. Given additional doses of azithro and pred to complete 5d course. Referral for pulm and pulmonary rehab given. Strict return precautions provided.

## 2022-10-12 NOTE — PLAN OF CARE
[facility] - Telemetry Stepdown  Initial Discharge Assessment       Primary Care Provider: Isiah You MD    Admission Diagnosis: COPD exacerbation [J44.1]  Chest pain [R07.9]    Admission Date: 10/11/2022  Expected Discharge Date: 10/13/2022    Discharge Barriers Identified: None    Payor: [employer] MANAGED MEDICARE / Plan: [employer] MEDICARE HMO / Product Type: Capitation /     Extended Emergency Contact Information  Primary Emergency Contact: Ginny Taylor  Address: [address] [place] [address] [facility]  Home Phone: [phone]  Work Phone: [phone]  Mobile Phone: [phone]  Relation: Spouse    Discharge Plan A: Home, Home with family  Discharge Plan B: Home      [id number] - [place] - [address] AT [facility] [address]  [address]  [place] [address]  Phone: [phone] Fax: [phone]    [facility] - [facility] [address]  [address] [address]  Phone: [phone] Fax: [phone]    [facility] [address]  [facility] [address]  Phone: [phone] Fax: [phone]      Initial Assessment (most recent)       Adult Discharge Assessment - 10/12/22 0938          Discharge Assessment    Assessment Type Discharge Planning Assessment     Confirmed/corrected address, phone number and insurance Yes     Confirmed Demographics Correct on Facesheet     Source of Information patient     When was your last doctors appointment? --   Pt reported his last appointment with PCP was approximately 1 year ago.    Communicated RICKY with patient/caregiver Yes     Reason For Admission COPD Exacerbation J44.1     Lives With spouse     Do you expect to return to your current living situation? Yes     Do you have help at home or someone to help you manage your care at home? Yes     Who are your caregiver(s) and their phone number(s)? Ginny  [phone]     Prior to hospitilization cognitive status: Alert/Oriented     Current cognitive status: Alert/Oriented     Walking or Climbing Stairs Difficulty none     Dressing/Bathing Difficulty none     Equipment Currently Used at Home nebulizer     Readmission within 30 days? No     Patient currently being followed by outpatient case management? No     Do you currently have service(s) that help you manage your care at home? No     Do you take prescription medications? Yes     Do you have prescription coverage? Yes     Coverage [employer] Medicare     Do you have any problems affording any of your prescribed medications? No     Is the patient taking medications as prescribed? yes     Who is going to help you get home at discharge? Pt's wife will provide transportation home.     How do you get to doctors appointments? car, drives self;family or friend will provide     Are you on dialysis? No     Do you take coumadin? No     Discharge Plan A Home;Home with family     Discharge Plan B Home     DME Needed Upon Discharge  other (see comments)   TBD-May need O2.    Discharge Plan discussed with: Patient     Discharge Barriers Identified None        Physical Activity    On average, how many days per week do you engage in moderate to strenuous exercise (like a brisk walk)? 0 days     On average, how many minutes do you engage in exercise at this level? 0 min        Financial Resource Strain    How hard is it for you to pay for the very basics like food, housing, medical care, and heating? Not hard at all        Housing Stability    In the last 12 months, was there a time when you were not able to pay the mortgage or rent on time? No     In the last 12 months, how many places have you lived? 1     In the last 12 months, was there a time when you did not have a steady place to sleep or slept in a shelter (including now)? No        Transportation Needs    In the past 12 months, has lack of transportation  kept you from medical appointments or from getting medications? No     In the past 12 months, has lack of transportation kept you from meetings, work, or from getting things needed for daily living? No        Food Insecurity    Within the past 12 months, you worried that your food would run out before you got the money to buy more. Never true     Within the past 12 months, the food you bought just didn't last and you didn't have money to get more. Never true        Stress    Do you feel stress - tense, restless, nervous, or anxious, or unable to sleep at night because your mind is troubled all the time - these days? Not at all        Social Connections    In a typical week, how many times do you talk on the phone with family, friends, or neighbors? More than three times a week     How often do you get together with friends or relatives? More than three times a week     How often do you attend Cheondoism or Bahai services? Never     Are you , , , , never , or living with a partner?         Alcohol Use    Q1: How often do you have a drink containing alcohol? Never     Q2: How many drinks containing alcohol do you have on a typical day when you are drinking? Patient does not drink     Q3: How often do you have six or more drinks on one occasion? Never        Relationship/Environment    Name(s) of Who Lives With Patient Ginny Taylor-[phone]                      SW met with pt and completed initial discharge assessment.  Pt in Obs status.  Pt does not have hospital readmissions.  Pt's wife will provide transportation home.  Pt stated he has not been see by his PCP in over one year. Will get hospital follow up scheduled.  Pt stated his wife has POA.    Pt lives with his wife in a single story home.  No steps for entry.  Pt reported he is independent with his ADLS and mobility.  Pt reported his wife is available to provide help and support at home.  Pt has the  following DME at home:  Nebulizer.      Pt does not receive coumadin or dialysis.  Pt reported he does not receive HH or Behavioral Health Services.  Pt does not have any issues paying for meds and reported he is compliant with taking his meds.      Tentative Disp:  Home w/ Family.  Waiting for recommendations from medical team.      Yulia Lara LMSW  PRN-  [facility]  Ext. [phone]

## 2022-10-12 NOTE — SUBJECTIVE & OBJECTIVE
Interval History: Pt seen and examined this morning on jackson CASSIDY. Respiratory status improved this morning, though pt had a difficult time sleeping last night 2/2 anxiety. Reassured that he is clinically improving, and that PRN atarax is available. Optimistic for hopeful discharge tomorrow. Care plan reviewed with pt and wife. Otherwise, doing well and with no further complaints at this time.      Objective:     Vital Signs (Most Recent):  Temp: 96.3 °F (35.7 °C) (10/12/22 1118)  Pulse: 67 (10/12/22 1118)  Resp: 18 (10/12/22 1118)  BP: 134/63 (10/12/22 1118)  SpO2: 95 % (10/12/22 1118) Vital Signs (24h Range):  Temp:  [96.3 °F (35.7 °C)-98.7 °F (37.1 °C)] 96.3 °F (35.7 °C)  Pulse:  [50-83] 67  Resp:  [16-24] 18  SpO2:  [94 %-100 %] 95 %  BP: (134-199)/() 134/63     Weight: 98.4 kg (217 lb)  Body mass index is 32.05 kg/m².    Intake/Output Summary (Last 24 hours) at 10/12/2022 1202  Last data filed at 10/11/2022 1951  Gross per 24 hour   Intake 49 ml   Output --   Net 49 ml        Physical Exam  Gen: in NAD, appears stated age; pt is obese  Neuro: AAOx4, CN2-12 grossly intact BL; motor, sensory, and strength grossly intact BL  HEENT: NTNC, EOMI, PERRLA, MMM; no thyromegaly or lymphadenopathy; no JVD appreciated  CVS: RRR, no m/r/g; S1/S2 auscultated with no S3 or S4; capillary refill < 2 sec  Resp: coarse breath sounds in all lung fields BL (improved); no belabored breathing or accessory muscle use appreciated   Abd: BS+ in all 4 quadrants; NTND, soft to palpation; no organomegaly appreciated   Extrem: pulses full, equal, and regular over all 4 extremities; no UE or LE edema BL    Significant Labs: All pertinent labs within the past 24 hours have been reviewed.    Significant Imaging: I have reviewed all pertinent imaging results/findings within the past 24 hours.

## 2022-10-12 NOTE — PROGRESS NOTES
Kamron Dunne - Telemetry [facility] Medicine  Progress Note    Patient Name: Lonnie Taylor  MRN: [id number]  Patient Class: OP- Observation   Admission Date: 10/11/2022  Length of Stay: 0 days  Attending Physician: Tremayne Stahl MD  Primary Care Provider: Isiah You MD        Subjective:     Principal Problem:COPD exacerbation        HPI:  Lonnie Taylor is a 71yo WM with a PMH of HFrEF (35% with G1DD), CAD (s/p PCI), PAD, HTN, HLD, COPD, CKD3a, GERD, tobacco abuse, and obesity who presented to the [facility] ED on 10/11/22 with complaints of SOB and CP. Pt reports that 1.5 weeks ago he began to feel more winded with minor exertion, and also developed a cough productive of tan-colored sputum. Symptoms worsened and he was SOB at rest as well.  He reports that this causes him much anxiety, at which point he has occasional chest pains (states that this is a different CP from his past MIs). Tried 4-5 nebulizer treatments with minimal relief. No known sick contacts. Denies F/C/N/V/D/C, HA, palpitations, abdominal pain, dysuria, extremity swelling, or symptoms otherwise. Given lack of improvement, he came to the ER.    In the ED, vitals significant for Hr 50s and /80; satting 95% on RA. Labs showed , though were otherwise grossly unremarkable. Troponin negative ,and EKG with nonspecific ST findings though unchanged from previous. CXR showed accentuation interstitial markings. ED gave duonebs and IV steroids, and hospital medicine was consulted for admission and further management.      Overview/Hospital Course:  Pt admitted to [facility] and initiated on azithro/rocephin and prednisone with improvement in respiratory status. O2 stats stable on RA. Troponin trend negative and no acute events on tele into 10/12. Overall improving.      Interval History: Pt seen and examined this morning on jackson CASSIDY. Respiratory status improved this morning, though pt had a difficult time sleeping last night 2/2 anxiety.  Reassured that he is clinically improving, and that PRN atarax is available. Optimistic for hopeful discharge tomorrow. Care plan reviewed with pt and wife. Otherwise, doing well and with no further complaints at this time.      Objective:     Vital Signs (Most Recent):  Temp: 96.3 °F (35.7 °C) (10/12/22 1118)  Pulse: 67 (10/12/22 1118)  Resp: 18 (10/12/22 1118)  BP: 134/63 (10/12/22 1118)  SpO2: 95 % (10/12/22 1118) Vital Signs (24h Range):  Temp:  [96.3 °F (35.7 °C)-98.7 °F (37.1 °C)] 96.3 °F (35.7 °C)  Pulse:  [50-83] 67  Resp:  [16-24] 18  SpO2:  [94 %-100 %] 95 %  BP: (134-199)/() 134/63     Weight: 98.4 kg (217 lb)  Body mass index is 32.05 kg/m².    Intake/Output Summary (Last 24 hours) at 10/12/2022 1202  Last data filed at 10/11/2022 1951  Gross per 24 hour   Intake 49 ml   Output --   Net 49 ml        Physical Exam  Gen: in NAD, appears stated age; pt is obese  Neuro: AAOx4, CN2-12 grossly intact BL; motor, sensory, and strength grossly intact BL  HEENT: NTNC, EOMI, PERRLA, MMM; no thyromegaly or lymphadenopathy; no JVD appreciated  CVS: RRR, no m/r/g; S1/S2 auscultated with no S3 or S4; capillary refill < 2 sec  Resp: coarse breath sounds in all lung fields BL (improved); no belabored breathing or accessory muscle use appreciated   Abd: BS+ in all 4 quadrants; NTND, soft to palpation; no organomegaly appreciated   Extrem: pulses full, equal, and regular over all 4 extremities; no UE or LE edema BL    Significant Labs: All pertinent labs within the past 24 hours have been reviewed.    Significant Imaging: I have reviewed all pertinent imaging results/findings within the past 24 hours.      Assessment/Plan:      * COPD exacerbation  - Interval history and physical exam findings as described above  - CXR reviewed  - Currently satting well on RA  - Continue azithro/rocephin and prednisone  - Overall improving  - Continuous pulse ox  - Closely monitoring    Chronic combined systolic and diastolic heart  failure  - Remains grossly asymptomatic, euvolemic  - BNP mildly elevated from baseline, but not in acute exacerbation  - Continue home cardioprudent regimen  - Will continue to monitor on tele    Coronary artery disease involving native coronary artery of native heart with angina pectoris  - Remains grossly asymptomatic  - Continue home cardioprudent regimen  - Will continue to monitor on tele    PAD (peripheral artery disease)  - Continue home ASA, statin, and xarelto    Essential hypertension  - Working to optimize BP control  - Continue home cardioprudent regimen  - Will continue to monitor and further titrate antihypertensives as clinically indicated     Mixed hyperlipidemia  - Continue home statin regimen    Stage 3a chronic kidney disease  - Cr baseline at admission  - Renally dosing all medications  - Avoid nephrotoxins  - Will continue to monitor on daily labs    Gastroesophageal reflux disease without esophagitis  - Currently asymptomatic  - Continue home PPI regimen    Tobacco abuse  - Pt with 1 ppd smoking history for most of adult life  - Currently without plans to quit  - Counseled on the importance of smoking cessation in relation to health     Class 1 obesity due to excess calories with serious comorbidity and body mass index (BMI) of 32.0 to 32.9 in adult  - Body mass index is 32.05 kg/m².  - Needs dietary and lifestyle modifications in relation to health  - Consider dietary/nutrition consult outpatient      VTE Risk Mitigation (From admission, onward)         Ordered     IP VTE HIGH RISK PATIENT  Once         10/11/22 1634     Place sequential compression device  Until discontinued         10/11/22 1634     Reason for No Pharmacological VTE Prophylaxis  Once        Question:  Reasons:  Answer:  Already adequately anticoagulated on oral Anticoagulants    10/11/22 1634     Place sequential compression device  Until discontinued         10/11/22 1634                Discharge Planning   RICKY: 10/13/2022      Code Status: Full Code   Is the patient medically ready for discharge?: No    Reason for patient still in hospital (select all that apply): Laboratory test  Discharge Plan A: Home, Home with family            Tremayne Stahl MD  Attending Physician  Department of Hospital Medicine  Epic secure chat preferred, or ext. [phone]  10/12/2022

## 2022-10-12 NOTE — ED PROVIDER NOTES
"Encounter Date: 10/11/2022       History     Chief Complaint   Patient presents with    Chest Pain     Chest pain x2 weeks. Took 5 nitro this AM, 324 ASA. Rates pain 0 out of 10.      72-year-old male with multiple comorbidities include CAD, COPD, AAA s/p repair, hypertension presents for chest pain and shortness of breath.  He reports feeling short of breath over the past 2 weeks and had significant chest pain prior to arrival.  He took nitro x5 which did relieve his chest pain but he is still feeling short of breath, fatigue with sweat and wheezing.  He ran out of his inhaler and has been using multiple nebulizer treatments.  He denies fever, cough, leg pain or swelling, vomiting prior similar complaints.  States this chest pain does not feel similar to prior MI.    Review of patient's allergies indicates:   Allergen Reactions    Aggrastat concentrate [tirofiban] Shortness Of Breath and Other (See Comments)     Fever and chills    Aggrastat in sodium chloride [tirofiban-0.9% sodium chloride] Shortness Of Breath     fever    Brilinta [ticagrelor] Shortness Of Breath    Cymbalta [duloxetine] Nausea Only    Bupropion Other (See Comments)     "turns into zombie" withdrawn, not talking, outbursts  "turns into zombie", withdrawn, not talking, outbursts.     Past Medical History:   Diagnosis Date    CAD (coronary artery disease)     Dr Martinez    Carotid artery disease     Chronic kidney disease, stage III (moderate)     COPD (chronic obstructive pulmonary disease)     Depression     Dyslipidemia     Hepatic cyst     Hepatic cyst     High-density lipoprotein deficiency     HTN (hypertension)     Hx of colonic polyps     Insomnia     PVD (peripheral vascular disease)     stented    S/P AAA repair      Past Surgical History:   Procedure Laterality Date    ABDOMINAL AORTIC ANEURYSM REPAIR      ABDOMINAL AORTIC ANEURYSM REPAIR      ANGIOGRAM, CORONARY, WITH LEFT HEART CATHETERIZATION Left 2/14/2022    Procedure: Angiogram, " Coronary, with Left Heart Cath;  Surgeon: Delfino Castellon MD;  Location: [facility] CATH LAB;  Service: Cardiology;  Laterality: Left;    ANGIOGRAPHY OF LOWER EXTREMITY Left 10/23/2018    Procedure: Angiogram Extremity Unilateral;  Surgeon: Gregor Cunha MD;  Location: [facility] CATH;  Service: Cardiology;  Laterality: Left;  LLE intervention-Will start with 4/5 slender sheath left radial and take diagnostic angio of LLE to determine whether brachial access or tibial access will be required    CATARACT EXTRACTION W/ INTRAOCULAR LENS  IMPLANT, BILATERAL      CATARACT SX Bilateral     CATHETERIZATION OF BOTH LEFT AND RIGHT HEART N/A 7/10/2020    Procedure: CATHETERIZATION, HEART, BOTH LEFT AND RIGHT;  Surgeon: Gregor Cunha MD;  Location: [facility] CATH;  Service: Cardiology;  Laterality: N/A;  LHC + RHC +/- PCI -access left radial artery and left brachial vein    CHOLECYSTECTOMY      COLONOSCOPY N/A 10/7/2015    Procedure: COLONOSCOPY;  Surgeon: Genaro You MD;  Location: [facility] ENDO ([address] [place] [address]);  Service: Endoscopy;  Laterality: N/A;    CORONARY ANGIOPLASTY WITH STENT PLACEMENT      CORONARY ARTERY BYPASS GRAFT      CORONARY BYPASS GRAFT ANGIOGRAPHY  2/14/2022    Procedure: Bypass graft study;  Surgeon: Delfino Castellon MD;  Location: [facility] CATH LAB;  Service: Cardiology;;    HERNIA REPAIR      LAMINECTOMY      MAGNETIC RESONANCE IMAGING N/A 6/4/2021    Procedure: MRI (MAGNETIC RESONANCE IMAGING) LUMBAR SPINE;  Surgeon: Asa Diagnostic Provider;  Location: [facility];  Service: General;  Laterality: N/A;  In MRI @ 9:10 per Krys, To follow WC RM1    PATELLA SURGERY      PERCUTANEOUS TRANSLUMINAL ANGIOPLASTY (PTA) OF PERIPHERAL VESSEL N/A 9/18/2018    Procedure: PTA, PERIPHERAL BLOOD VESSEL;  Surgeon: Gregor Cunha MD;  Location: [facility] CATH;  Service: Cardiology;  Laterality: N/A;  Site change:  right popliteal artery access using US guidance.    PERCUTANEOUS TRANSLUMINAL ANGIOPLASTY (PTA) OF PERIPHERAL VESSEL Left  7/25/2019    Procedure: PTA, PERIPHERAL VESSEL;  Surgeon: Gregor Cunha MD;  Location: [facility] CATH;  Service: Cardiology;  Laterality: Left;    VASECTOMY       Family History   Problem Relation Age of Onset    Heart disease Mother     Heart disease Father     Lung cancer Brother      Social History     Tobacco Use    Smoking status: Every Day     Packs/day: 1.00     Years: 50.00     Pack years: 50.00     Types: Cigarettes    Smokeless tobacco: Never    Tobacco comments:     Currently smoke 1 ppd   Substance Use Topics    Alcohol use: No    Drug use: Yes     Types: Marijuana     Comment: OCCASSIONALLY     Review of Systems   Constitutional:  Positive for diaphoresis and fatigue. Negative for fever.   HENT:  Negative for sore throat.    Respiratory:  Positive for chest tightness, shortness of breath and wheezing.    Cardiovascular:  Positive for chest pain. Negative for palpitations and leg swelling.   Gastrointestinal:  Negative for abdominal pain and nausea.   Genitourinary:  Negative for dysuria.   Musculoskeletal:  Negative for back pain.   Skin:  Negative for rash.   Neurological:  Negative for weakness.   Hematological:  Does not bruise/bleed easily.     Physical Exam     Initial Vitals [10/11/22 1156]   BP Pulse Resp Temp SpO2   (!) 166/80 64 16 97.9 °F (36.6 °C) 99 %      MAP       --         Physical Exam    Nursing note and vitals reviewed.  Constitutional: He appears well-developed and well-nourished. He is not diaphoretic. No distress.   HENT:   Head: Normocephalic and atraumatic.   Eyes: EOM are normal. Pupils are equal, round, and reactive to light.   Neck: Neck supple. No JVD present.   Normal range of motion.  Cardiovascular:  Normal rate, regular rhythm, normal heart sounds and intact distal pulses.     Exam reveals no gallop and no friction rub.       No murmur heard.  No lower extremity edema, erythema, tenderness or warmth   Pulmonary/Chest: No respiratory distress. He has no decreased breath  sounds. He has wheezes. He has no rhonchi. He has no rales. He exhibits no tenderness.   Musculoskeletal:         General: Normal range of motion.      Cervical back: Normal range of motion and neck supple.     Neurological: He is alert and oriented to person, place, and time.   Skin: Skin is warm and dry.   Psychiatric: He has a normal mood and affect.       ED Course   Procedures  Labs Reviewed   CBC W/ AUTO DIFFERENTIAL - Abnormal; Notable for the following components:       Result Value    RBC 4.54 (*)     Hemoglobin 13.1 (*)     RDW 17.1 (*)     All other components within normal limits   COMPREHENSIVE METABOLIC PANEL - Abnormal; Notable for the following components:    Chloride 111 (*)     CO2 21 (*)     Total Bilirubin 1.1 (*)     Alkaline Phosphatase 33 (*)     Anion Gap 7 (*)     eGFR 57.6 (*)     All other components within normal limits   B-TYPE NATRIURETIC PEPTIDE - Abnormal; Notable for the following components:     (*)     All other components within normal limits   HIV 1 / 2 ANTIBODY    Narrative:     Release to patient->Immediate   HEPATITIS C ANTIBODY    Narrative:     Release to patient->Immediate   TROPONIN I   TROPONIN I   PROCALCITONIN     EKG Readings: (Independently Interpreted)   Initial Reading: No STEMI. Previous EKG: Compared with most recent EKG Previous EKG Date: 3/29/2022. Rhythm: Sinus Bradycardia. Heart Rate: 53. Ectopy: No Ectopy. ST Segments: Non-Specific ST Segment Depression. Clinical Impression: Normal Sinus Rhythm   ECG Results              EKG 12-lead (Final result)  Result time 10/11/22 16:23:51      Final result by Interface, Lab In [facility] (10/11/22 16:23:51)                   Narrative:    Test Reason : R07.9,    Vent. Rate : 053 BPM     Atrial Rate : 053 BPM     P-R Int : 160 ms          QRS Dur : 110 ms      QT Int : 490 ms       P-R-T Axes : 062 -39 098 degrees     QTc Int : 459 ms    Sinus bradycardia  Possible Left atrial enlargement  Left axis  deviation  Intraventricular conduction delay    Nonspecific ST and T wave abnormality  Abnormal ECG  When compared with ECG of 29-MAR-2022 00:43,    Borderline criteria for Anterior infarct are no longer Present  T wave amplitude has decreased in Inferior leads  Nonspecific T wave abnormality, worse in Lateral leads  Confirmed by ANNEMARIE ART MD ([id number]) on 10/11/2022 4:23:35 PM    Referred By: ADRIANA   SELF           Confirmed By:ANNEMARIE ART MD                                  Imaging Results              X-Ray Chest AP Portable (Final result)  Result time 10/11/22 14:00:21      Final result by Deepak Hassan MD (10/11/22 14:00:21)                   Impression:      See above      Electronically signed by: Deepak Hassan MD  Date:    10/11/2022  Time:    14:00               Narrative:    EXAMINATION:  XR CHEST AP PORTABLE    CLINICAL HISTORY:  Chest Pain;    TECHNIQUE:  Single frontal view of the chest was performed.    COMPARISON:  N 03/31/2022 one    FINDINGS:  Postoperative changes as before.  Mild cardiomegaly.  Slight degree of diffuse accentuation interstitial markings.  No significant airspace consolidation pleural effusion identified                                       Medications   influenza (QUADRIVALENT ADJUVANTED PF) vaccine 0.5 mL (has no administration in time range)   sodium chloride 0.9% flush 10 mL (has no administration in time range)   melatonin tablet 6 mg (has no administration in time range)   predniSONE tablet 50 mg (50 mg Oral Given 10/11/22 2006)   hydrOXYzine HCL tablet 50 mg (has no administration in time range)   aspirin EC tablet 81 mg (has no administration in time range)   atorvastatin tablet 80 mg (has no administration in time range)   carvediloL tablet 6.25 mg (6.25 mg Oral Given 10/11/22 2006)   fenofibrate tablet 145 mg (has no administration in time range)   furosemide tablet 40 mg (has no administration in time range)   isosorbide mononitrate 24 hr tablet 60 mg (has no  administration in time range)   nitroGLYCERIN SL tablet 0.4 mg (has no administration in time range)   pantoprazole EC tablet 40 mg (has no administration in time range)   prasugreL tablet 10 mg (has no administration in time range)   ranolazine 12 hr tablet 500 mg (has no administration in time range)   rivaroxaban tablet 2.5 mg (2.5 mg Oral Given 10/11/22 2011)   sacubitriL-valsartan 24-26 mg per tablet 1 tablet (has no administration in time range)   spironolactone tablet 25 mg (has no administration in time range)   sodium chloride 0.9% flush 10 mL (has no administration in time range)   albuterol-ipratropium 2.5 mg-0.5 mg/3 mL nebulizer solution 3 mL (has no administration in time range)   ondansetron injection 4 mg (has no administration in time range)   prochlorperazine injection Soln 5 mg (has no administration in time range)   polyethylene glycol packet 17 g (has no administration in time range)   simethicone chewable tablet 80 mg (has no administration in time range)   aluminum-magnesium hydroxide-simethicone 200-200-20 mg/5 mL suspension 30 mL (has no administration in time range)   acetaminophen tablet 650 mg (has no administration in time range)   naloxone 0.4 mg/mL injection 0.02 mg (has no administration in time range)   cefTRIAXone (ROCEPHIN) 1 g/50 mL D5W IVPB (0 g Intravenous Stopped 10/11/22 1951)   azithromycin tablet 500 mg (500 mg Oral Given 10/11/22 1812)   albuterol-ipratropium 2.5 mg-0.5 mg/3 mL nebulizer solution 3 mL (3 mLs Nebulization Given 10/11/22 1318)   methylPREDNISolone sodium succinate injection 125 mg (125 mg Intravenous Given 10/11/22 1512)   albuterol-ipratropium 2.5 mg-0.5 mg/3 mL nebulizer solution 3 mL (3 mLs Nebulization Given 10/11/22 1450)     Medical Decision Making:   History:   Old Medical Records: I decided to obtain old medical records.  Old Records Summarized: records from clinic visits, records from previous admission(s) and records from another hospital.       <>  Summary of Records: Seen in the ED at [facility] in June for chest pain  Initial Assessment:   72-year-old male presenting for chest tightness and chest pain shortness of breath.  Hypertensive 166/80 otherwise normal vitals.  Nontoxic appearing.  Differential Diagnosis:   ACS   COPD exacerbation   CHF exacerbation  Lower suspicion for infectious cause  Independently Interpreted Test(s):   I have ordered and independently interpreted X-rays - see summary below.       <> Summary of X-Ray Reading(s): No consolidation  I have ordered and independently interpreted EKG Reading(s) - see prior notes  Clinical Tests:   Lab Tests: Ordered and Reviewed  Radiological Study: Ordered and Reviewed  Medical Tests: Ordered and Reviewed  ED Management:  Will check labs, give DuoNebs and reassess.    Reading improved somewhat with DuoNebs but patient still feeling short of breath.  Labs are reassuring.  He will be admitted to Hospital Medicine for further management.  Patient comfortable with admission.           ED Course as of 10/11/22 2201   Tue Oct 11, 2022   1345 BNP(!): 628 [CC]   1430 Troponin I: 0.012 [CC]   1432 Patient remains chest pain-free but breathing for labor than prior.  Lung sounds tight with wheezes.  Will give steroids, additional nebulizer treatments, plan for admission [CC]      ED Course User Index  [CC] Kelly Padilla PA-C                 Clinical Impression:   Final diagnoses:  [R07.9] Chest pain  [J44.1] COPD exacerbation (Primary)        ED Disposition Condition    Observation Stable                Kelly Padilla PA-C  10/11/22 2202

## 2022-10-13 ENCOUNTER — TELEPHONE (OUTPATIENT)
Dept: PULMONOLOGY | Facility: CLINIC | Age: 72
End: 2022-10-13
Payer: MEDICARE

## 2022-10-13 VITALS
DIASTOLIC BLOOD PRESSURE: 70 MMHG | RESPIRATION RATE: 18 BRPM | BODY MASS INDEX: 32.16 KG/M2 | WEIGHT: 217.13 LBS | HEIGHT: 69 IN | TEMPERATURE: 98 F | OXYGEN SATURATION: 97 % | HEART RATE: 64 BPM | SYSTOLIC BLOOD PRESSURE: 154 MMHG

## 2022-10-13 LAB
ALBUMIN SERPL BCP-MCNC: 3.3 G/DL (ref 3.5–5.2)
ALP SERPL-CCNC: 33 U/L (ref 55–135)
ALT SERPL W/O P-5'-P-CCNC: 11 U/L (ref 10–44)
ANION GAP SERPL CALC-SCNC: 10 MMOL/L (ref 8–16)
AST SERPL-CCNC: 14 U/L (ref 10–40)
BASOPHILS # BLD AUTO: 0.01 K/UL (ref 0–0.2)
BASOPHILS NFR BLD: 0.1 % (ref 0–1.9)
BILIRUB SERPL-MCNC: 0.8 MG/DL (ref 0.1–1)
BUN SERPL-MCNC: 26 MG/DL (ref 8–23)
CALCIUM SERPL-MCNC: 9.3 MG/DL (ref 8.7–10.5)
CHLORIDE SERPL-SCNC: 108 MMOL/L (ref 95–110)
CO2 SERPL-SCNC: 20 MMOL/L (ref 23–29)
CREAT SERPL-MCNC: 1.4 MG/DL (ref 0.5–1.4)
DIFFERENTIAL METHOD: ABNORMAL
EOSINOPHIL # BLD AUTO: 0 K/UL (ref 0–0.5)
EOSINOPHIL NFR BLD: 0 % (ref 0–8)
ERYTHROCYTE [DISTWIDTH] IN BLOOD BY AUTOMATED COUNT: 17.2 % (ref 11.5–14.5)
EST. GFR  (NO RACE VARIABLE): 52.7 ML/MIN/1.73 M^2
GLUCOSE SERPL-MCNC: 112 MG/DL (ref 70–110)
HCT VFR BLD AUTO: 42.3 % (ref 40–54)
HGB BLD-MCNC: 13.1 G/DL (ref 14–18)
IMM GRANULOCYTES # BLD AUTO: 0.04 K/UL (ref 0–0.04)
IMM GRANULOCYTES NFR BLD AUTO: 0.3 % (ref 0–0.5)
LYMPHOCYTES # BLD AUTO: 1.3 K/UL (ref 1–4.8)
LYMPHOCYTES NFR BLD: 10.6 % (ref 18–48)
MAGNESIUM SERPL-MCNC: 1.8 MG/DL (ref 1.6–2.6)
MCH RBC QN AUTO: 27.9 PG (ref 27–31)
MCHC RBC AUTO-ENTMCNC: 31 G/DL (ref 32–36)
MCV RBC AUTO: 90 FL (ref 82–98)
MONOCYTES # BLD AUTO: 0.7 K/UL (ref 0.3–1)
MONOCYTES NFR BLD: 6.2 % (ref 4–15)
NEUTROPHILS # BLD AUTO: 9.9 K/UL (ref 1.8–7.7)
NEUTROPHILS NFR BLD: 82.8 % (ref 38–73)
NRBC BLD-RTO: 0 /100 WBC
PHOSPHATE SERPL-MCNC: 2.9 MG/DL (ref 2.7–4.5)
PLATELET # BLD AUTO: 183 K/UL (ref 150–450)
PMV BLD AUTO: 12.8 FL (ref 9.2–12.9)
POTASSIUM SERPL-SCNC: 4.1 MMOL/L (ref 3.5–5.1)
PROT SERPL-MCNC: 6.1 G/DL (ref 6–8.4)
RBC # BLD AUTO: 4.69 M/UL (ref 4.6–6.2)
SODIUM SERPL-SCNC: 138 MMOL/L (ref 136–145)
WBC # BLD AUTO: 12 K/UL (ref 3.9–12.7)

## 2022-10-13 PROCEDURE — 99217 PR OBSERVATION CARE DISCHARGE: ICD-10-PCS | Mod: ,,, | Performed by: STUDENT IN AN ORGANIZED HEALTH CARE EDUCATION/TRAINING PROGRAM

## 2022-10-13 PROCEDURE — 94640 AIRWAY INHALATION TREATMENT: CPT

## 2022-10-13 PROCEDURE — 93010 ELECTROCARDIOGRAM REPORT: CPT | Mod: ,,, | Performed by: INTERNAL MEDICINE

## 2022-10-13 PROCEDURE — 25000003 PHARM REV CODE 250: Performed by: PHYSICIAN ASSISTANT

## 2022-10-13 PROCEDURE — 93010 EKG 12-LEAD: ICD-10-PCS | Mod: ,,, | Performed by: INTERNAL MEDICINE

## 2022-10-13 PROCEDURE — 63600175 PHARM REV CODE 636 W HCPCS: Performed by: STUDENT IN AN ORGANIZED HEALTH CARE EDUCATION/TRAINING PROGRAM

## 2022-10-13 PROCEDURE — 25000242 PHARM REV CODE 250 ALT 637 W/ HCPCS: Performed by: STUDENT IN AN ORGANIZED HEALTH CARE EDUCATION/TRAINING PROGRAM

## 2022-10-13 PROCEDURE — 25000003 PHARM REV CODE 250: Performed by: STUDENT IN AN ORGANIZED HEALTH CARE EDUCATION/TRAINING PROGRAM

## 2022-10-13 PROCEDURE — 63700000 PHARM REV CODE 250 ALT 637 W/O HCPCS: Performed by: STUDENT IN AN ORGANIZED HEALTH CARE EDUCATION/TRAINING PROGRAM

## 2022-10-13 PROCEDURE — G0378 HOSPITAL OBSERVATION PER HR: HCPCS

## 2022-10-13 PROCEDURE — 80053 COMPREHEN METABOLIC PANEL: CPT | Performed by: STUDENT IN AN ORGANIZED HEALTH CARE EDUCATION/TRAINING PROGRAM

## 2022-10-13 PROCEDURE — 84100 ASSAY OF PHOSPHORUS: CPT | Performed by: STUDENT IN AN ORGANIZED HEALTH CARE EDUCATION/TRAINING PROGRAM

## 2022-10-13 PROCEDURE — 36415 COLL VENOUS BLD VENIPUNCTURE: CPT | Performed by: STUDENT IN AN ORGANIZED HEALTH CARE EDUCATION/TRAINING PROGRAM

## 2022-10-13 PROCEDURE — 85025 COMPLETE CBC W/AUTO DIFF WBC: CPT | Performed by: STUDENT IN AN ORGANIZED HEALTH CARE EDUCATION/TRAINING PROGRAM

## 2022-10-13 PROCEDURE — A4216 STERILE WATER/SALINE, 10 ML: HCPCS | Performed by: STUDENT IN AN ORGANIZED HEALTH CARE EDUCATION/TRAINING PROGRAM

## 2022-10-13 PROCEDURE — 93005 ELECTROCARDIOGRAM TRACING: CPT

## 2022-10-13 PROCEDURE — 99217 PR OBSERVATION CARE DISCHARGE: CPT | Mod: ,,, | Performed by: STUDENT IN AN ORGANIZED HEALTH CARE EDUCATION/TRAINING PROGRAM

## 2022-10-13 PROCEDURE — 94761 N-INVAS EAR/PLS OXIMETRY MLT: CPT

## 2022-10-13 PROCEDURE — 83735 ASSAY OF MAGNESIUM: CPT | Performed by: STUDENT IN AN ORGANIZED HEALTH CARE EDUCATION/TRAINING PROGRAM

## 2022-10-13 RX ORDER — AZITHROMYCIN 500 MG/1
500 TABLET, FILM COATED ORAL DAILY
Qty: 3 TABLET | Refills: 0 | Status: SHIPPED | OUTPATIENT
Start: 2022-10-14 | End: 2022-10-17

## 2022-10-13 RX ORDER — HYDROXYZINE HYDROCHLORIDE 50 MG/1
50 TABLET, FILM COATED ORAL 3 TIMES DAILY PRN
Qty: 45 TABLET | Refills: 0 | Status: SHIPPED | OUTPATIENT
Start: 2022-10-13 | End: 2023-10-23

## 2022-10-13 RX ORDER — PREDNISONE 50 MG/1
50 TABLET ORAL DAILY
Qty: 3 TABLET | Refills: 0 | Status: SHIPPED | OUTPATIENT
Start: 2022-10-14 | End: 2022-10-17

## 2022-10-13 RX ORDER — IPRATROPIUM BROMIDE AND ALBUTEROL SULFATE 2.5; .5 MG/3ML; MG/3ML
3 SOLUTION RESPIRATORY (INHALATION)
Status: DISCONTINUED | OUTPATIENT
Start: 2022-10-13 | End: 2022-10-13 | Stop reason: HOSPADM

## 2022-10-13 RX ORDER — ISOSORBIDE MONONITRATE 30 MG/1
90 TABLET, EXTENDED RELEASE ORAL DAILY
Qty: 90 TABLET | Refills: 11 | Status: SHIPPED | OUTPATIENT
Start: 2022-10-14 | End: 2022-10-20 | Stop reason: SDUPTHER

## 2022-10-13 RX ADMIN — IPRATROPIUM BROMIDE AND ALBUTEROL SULFATE 3 ML: 2.5; .5 SOLUTION RESPIRATORY (INHALATION) at 09:10

## 2022-10-13 RX ADMIN — IPRATROPIUM BROMIDE AND ALBUTEROL SULFATE 3 ML: 2.5; .5 SOLUTION RESPIRATORY (INHALATION) at 03:10

## 2022-10-13 RX ADMIN — FUROSEMIDE 40 MG: 40 TABLET ORAL at 08:10

## 2022-10-13 RX ADMIN — HYDRALAZINE HYDROCHLORIDE 25 MG: 25 TABLET, FILM COATED ORAL at 02:10

## 2022-10-13 RX ADMIN — RANOLAZINE 500 MG: 500 TABLET, EXTENDED RELEASE ORAL at 08:10

## 2022-10-13 RX ADMIN — PREDNISONE 50 MG: 50 TABLET ORAL at 08:10

## 2022-10-13 RX ADMIN — PRASUGREL 10 MG: 10 TABLET, FILM COATED ORAL at 08:10

## 2022-10-13 RX ADMIN — ATORVASTATIN CALCIUM 80 MG: 20 TABLET, FILM COATED ORAL at 08:10

## 2022-10-13 RX ADMIN — SPIRONOLACTONE 25 MG: 25 TABLET, FILM COATED ORAL at 08:10

## 2022-10-13 RX ADMIN — Medication 10 ML: at 05:10

## 2022-10-13 RX ADMIN — PANTOPRAZOLE SODIUM 40 MG: 40 TABLET, DELAYED RELEASE ORAL at 08:10

## 2022-10-13 RX ADMIN — FENOFIBRATE 145 MG: 145 TABLET, FILM COATED ORAL at 08:10

## 2022-10-13 RX ADMIN — HYDROXYZINE HYDROCHLORIDE 50 MG: 25 TABLET, FILM COATED ORAL at 08:10

## 2022-10-13 RX ADMIN — Medication 10 ML: at 02:10

## 2022-10-13 RX ADMIN — GUAIFENESIN 600 MG: 600 TABLET, EXTENDED RELEASE ORAL at 08:10

## 2022-10-13 RX ADMIN — HYDRALAZINE HYDROCHLORIDE 25 MG: 25 TABLET, FILM COATED ORAL at 05:10

## 2022-10-13 RX ADMIN — ASPIRIN 81 MG: 81 TABLET, COATED ORAL at 08:10

## 2022-10-13 RX ADMIN — AZITHROMYCIN MONOHYDRATE 500 MG: 250 TABLET ORAL at 08:10

## 2022-10-13 RX ADMIN — ISOSORBIDE MONONITRATE 90 MG: 60 TABLET, EXTENDED RELEASE ORAL at 08:10

## 2022-10-13 RX ADMIN — CARVEDILOL 6.25 MG: 6.25 TABLET, FILM COATED ORAL at 08:10

## 2022-10-13 RX ADMIN — SACUBITRIL AND VALSARTAN 1 TABLET: 24; 26 TABLET, FILM COATED ORAL at 08:10

## 2022-10-13 NOTE — DISCHARGE SUMMARY
[facility] - Telemetry [facility] Medicine  Discharge Summary      Patient Name: Lonnie Taylor  MRN: [id number]  Patient Class: OP- Observation  Admission Date: 10/11/2022  Hospital Length of Stay: 0 days  Discharge Date and Time:  10/13/2022 4:15 PM  Attending Physician: Belem Fuller MD   Discharging Provider: Belem Fuller MD  Primary Care Provider: Isiah You MD  Hospital Medicine Team: [facility]  Belem Fuller MD    HPI:   Lonnie Taylor is a 73yo WM with a PMH of HFrEF (35% with G1DD), CAD (s/p PCI), PAD, HTN, HLD, COPD, CKD3a, GERD, tobacco abuse, and obesity who presented to the [facility] on 10/11/22 with complaints of SOB and CP. Pt reports that 1.5 weeks ago he began to feel more winded with minor exertion, and also developed a cough productive of tan-colored sputum. Symptoms worsened and he was SOB at rest as well.  He reports that this causes him much anxiety, at which point he has occasional chest pains (states that this is a different CP from his past MIs). Tried 4-5 nebulizer treatments with minimal relief. No known sick contacts. Denies F/C/N/V/D/C, HA, palpitations, abdominal pain, dysuria, extremity swelling, or symptoms otherwise. Given lack of improvement, he came to the ER.    In the ED, vitals significant for Hr 50s and /80; satting 95% on RA. Labs showed , though were otherwise grossly unremarkable. Troponin negative ,and EKG with nonspecific ST findings though unchanged from previous. CXR showed accentuation interstitial markings. ED gave duonebs and IV steroids, and hospital medicine was consulted for admission and further management.      * No surgery found *      Hospital Course:   Pt admitted to [facility] and initiated on azithro/rocephin and prednisone with improvement in respiratory status. O2 stats stable on RA. Troponin trend negative and no acute events on tele into 10/12. Overall improving. Was found to have hypoxia on walk test, discharged with  home O2. Given additional doses of azithro and pred to complete 5d course. Referral for pulm and pulmonary rehab given. Strict return precautions provided.        Goals of Care Treatment Preferences:  Code Status: Full Code      Consults:   Consults (From admission, onward)        Status Ordering Provider     Inpatient consult to Social Work  Once        Provider:  (Not yet assigned)    Completed ANCA SANTAMARIA          No new Assessment & Plan notes have been filed under this hospital service since the last note was generated.  Service: Hospital Medicine    Final Active Diagnoses:    Diagnosis Date Noted POA    PRINCIPAL PROBLEM:  COPD exacerbation [J44.1] 10/11/2022 Yes    Chronic combined systolic and diastolic heart failure [I50.42] 10/11/2022 Yes    Mixed hyperlipidemia [E78.2] 10/11/2022 Yes    Gastroesophageal reflux disease without esophagitis [K21.9] 10/11/2022 Yes    PAD (peripheral artery disease) [I73.9] 07/25/2019 Yes    Tobacco abuse [Z72.0] 09/18/2018 Yes    Class 1 obesity due to excess calories with serious comorbidity and body mass index (BMI) of 32.0 to 32.9 in adult [E66.09, Z68.32] 01/16/2018 Not Applicable    Stage 3a chronic kidney disease [N18.31] 09/16/2015 Yes    Coronary artery disease involving native coronary artery of native heart with angina pectoris [I25.119] 11/30/2012 Yes    Essential hypertension [I10] 11/30/2012 Yes      Problems Resolved During this Admission:       Discharged Condition: good    Disposition: Home or Self Care    Follow Up:    Patient Instructions:      OXYGEN FOR HOME USE     Order Specific Question Answer Comments   Liter Flow 2    Duration Continuous    Qualifying Test Performed at: Activity    Oxygen saturation at rest 92    Oxygen saturation with activity 87    Oxygen saturation with activity on oxygen 96    Portable mode: continuous    Route nasal cannula    Device: home concentrator with portable tanks    Length of need (in months): 99 mos   "  Patient condition with qualifying saturation COPD    Height: 5' 9" (1.753 m)    Weight: 98.5 kg (217 lb 2.5 oz)    Alternative treatment measures have been tried or considered and deemed clinically ineffective. Yes      OXYGEN FOR HOME USE     Order Specific Question Answer Comments   Liter Flow 2    Duration Continuous    Qualifying Test Performed at: Rest    Oxygen saturation: 88    Portable mode: continuous    Route nasal cannula    Device: home concentrator with portable tanks    Length of need (in months): 99 mos    Patient condition with qualifying saturation COPD    Height: 5' 9" (1.753 m)    Weight: 98.5 kg (217 lb 2.5 oz)    Alternative treatment measures have been tried or considered and deemed clinically ineffective. Yes      Ambulatory referral/consult to Pulmonary Rehab   Standing Status: Future   Referral Priority: Routine Referral Type: Consultation   Referral Reason: Specialty Services Required   Requested Specialty: Pulmonary Disease   Number of Visits Requested: 1     Ambulatory referral/consult to Pulmonology   Standing Status: Future   Referral Priority: Routine Referral Type: Consultation   Referral Reason: Specialty Services Required   Requested Specialty: Pulmonary Disease   Number of Visits Requested: 1     Diet Cardiac     Notify your health care provider if you experience any of the following:  temperature >100.4     Notify your health care provider if you experience any of the following:  persistent nausea and vomiting or diarrhea     Notify your health care provider if you experience any of the following:  severe uncontrolled pain     Notify your health care provider if you experience any of the following:  difficulty breathing or increased cough     Notify your health care provider if you experience any of the following:  severe persistent headache     Notify your health care provider if you experience any of the following:  worsening rash     Notify your health care provider if you " experience any of the following:  persistent dizziness, light-headedness, or visual disturbances     Notify your health care provider if you experience any of the following:  increased confusion or weakness     Activity as tolerated       Significant Diagnostic Studies: Labs: All labs within the past 24 hours have been reviewed    Pending Diagnostic Studies:     None         Medications:  Reconciled Home Medications:      Medication List      START taking these medications    azithromycin 500 MG tablet  Commonly known as: ZITHROMAX  Take 1 tablet (500 mg total) by mouth once daily. for 3 days  Start taking on: October 14, 2022     ENTRESTO 24-26 mg per tablet  Generic drug: sacubitriL-valsartan  Take 1 tablet by mouth 2 (two) times daily.     hydrOXYzine 50 MG tablet  Commonly known as: ATARAX  Take 1 tablet (50 mg total) by mouth 3 (three) times daily as needed for Anxiety.     predniSONE 50 MG Tab  Commonly known as: DELTASONE  Take 1 tablet (50 mg total) by mouth once daily. for 3 days  Start taking on: October 14, 2022     XARELTO 2.5 mg Tab  Generic drug: rivaroxaban  Take 1 tablet (2.5 mg total) by mouth daily with dinner or evening meal.        CHANGE how you take these medications    isosorbide mononitrate 30 MG 24 hr tablet  Commonly known as: IMDUR  Take 3 tablets (90 mg total) by mouth once daily.  Start taking on: October 14, 2022  What changed:   · medication strength  · how much to take  · Another medication with the same name was removed. Continue taking this medication, and follow the directions you see here.        CONTINUE taking these medications    * albuterol 90 mcg/actuation inhaler  Commonly known as: VENTOLIN HFA  Inhale 2 puffs into the lungs every 6 (six) hours as needed for Wheezing. Rescue     * albuterol 2.5 mg /3 mL (0.083 %) nebulizer solution  Commonly known as: PROVENTIL  Take 2.5 mg by nebulization every 6 (six) hours as needed for Wheezing or Shortness of Breath.     aspirin 81 MG  EC tablet  Commonly known as: ECOTRIN  Take 1 tablet (81 mg total) by mouth once daily.     atorvastatin 80 MG tablet  Commonly known as: LIPITOR  Take 1 tablet (80 mg total) by mouth once daily.     carvediloL 6.25 MG tablet  Commonly known as: COREG  Take 1 tablet (6.25 mg total) by mouth 2 (two) times daily.     fenofibrate 145 MG tablet  Commonly known as: TRICOR  Take 1 tablet (145 mg total) by mouth once daily.     furosemide 20 MG tablet  Commonly known as: LASIX  Take 20 mg by mouth daily as needed (edema).     gabapentin 300 MG capsule  Commonly known as: NEURONTIN  Take 300 mg by mouth 3 (three) times daily as needed (nerve pain).     nitroGLYCERIN 0.4 MG SL tablet  Commonly known as: NITROSTAT  Place 1 tablet (0.4 mg total) under the tongue every 5 (five) minutes as needed for Chest pain.     pantoprazole 40 MG tablet  Commonly known as: PROTONIX  Take 40 mg by mouth once daily.     prasugreL 10 mg Tab  Commonly known as: EFFIENT  Take 1 tablet (10 mg total) by mouth once daily.     ranolazine 500 MG Tb12  Commonly known as: RANEXA  Take 500 mg by mouth 2 (two) times daily.         * This list has 2 medication(s) that are the same as other medications prescribed for you. Read the directions carefully, and ask your doctor or other care provider to review them with you.                Indwelling Lines/Drains at time of discharge:   Lines/Drains/Airways     None                 Time spent on the discharge of patient: 35 minutes         May PATEL Fuller MD  Department of Hospital Medicine  [facility] - Telemetry Stepdown

## 2022-10-13 NOTE — PROGRESS NOTES
Wife at bedside.  PIV removed.  Tolerated well.  Patient d/c'd by wheelchair to ER entrance with RN to meet wife at her care. O2 accompanied pt to car with personal belongings and bedside RX.

## 2022-10-13 NOTE — PLAN OF CARE
"[facility] - Telemetry Stepdown      HOME HEALTH ORDERS  FACE TO FACE ENCOUNTER    Patient Name: Lonnie Taylor  YOB: 1950    PCP: Isiah You MD   PCP Address: [address] / [place]  PCP Phone Number: [phone]  PCP Fax: [phone]    Encounter Date: 10/11/22    Admit to Home Health    Diagnoses:  Active Hospital Problems    Diagnosis  POA    *COPD exacerbation [J44.1]  Yes    Chronic combined systolic and diastolic heart failure [I50.42]  Yes    Mixed hyperlipidemia [E78.2]  Yes    Gastroesophageal reflux disease without esophagitis [K21.9]  Yes    PAD (peripheral artery disease) [I73.9]  Yes    Tobacco abuse [Z72.0]  Yes    Class 1 obesity due to excess calories with serious comorbidity and body mass index (BMI) of 32.0 to 32.9 in adult [E66.09, Z68.32]  Not Applicable    Stage 3a chronic kidney disease [N18.31]  Yes    Coronary artery disease involving native coronary artery of native heart with angina pectoris [I25.119]  Yes    Essential hypertension [I10]  Yes      Resolved Hospital Problems   No resolved problems to display.       Follow Up Appointments:  No future appointments.    Allergies:  Review of patient's allergies indicates:   Allergen Reactions    Aggrastat concentrate [tirofiban] Shortness Of Breath and Other (See Comments)     Fever and chills    Aggrastat in sodium chloride [tirofiban-0.9% sodium chloride] Shortness Of Breath     fever    Brilinta [ticagrelor] Shortness Of Breath    Cymbalta [duloxetine] Nausea Only    Bupropion Other (See Comments)     "turns into zombie" withdrawn, not talking, outbursts  "turns into zombie", withdrawn, not talking, outbursts.       Medications: Review discharge medications with patient and family and provide education.    Current Facility-Administered Medications   Medication Dose Route Frequency Provider Last Rate Last Admin    acetaminophen tablet 650 mg  650 mg Oral Q4H PRN Tremayne Stahl MD        " albuterol-ipratropium 2.5 mg-0.5 mg/3 mL nebulizer solution 3 mL  3 mL Nebulization Q4H WAKE May MNano Fuller MD   3 mL at 10/13/22 0910    aluminum-magnesium hydroxide-simethicone 200-200-20 mg/5 mL suspension 30 mL  30 mL Oral QID PRN Tremayne Stahl MD   30 mL at 10/12/22 0213    aspirin EC tablet 81 mg  81 mg Oral Daily Tremayne Stahl MD   81 mg at 10/13/22 0835    atorvastatin tablet 80 mg  80 mg Oral Daily Tremayne Stahl MD   80 mg at 10/13/22 0834    azithromycin tablet 500 mg  500 mg Oral Daily Tremayne Stahl MD   500 mg at 10/13/22 0834    benzonatate capsule 100 mg  100 mg Oral TID PRN Roxie Sanchez PA-C        carvediloL tablet 6.25 mg  6.25 mg Oral BID Tremayne Stahl MD   6.25 mg at 10/13/22 0834    cefTRIAXone (ROCEPHIN) 1 g/50 mL D5W IVPB  1 g Intravenous Q24H Tremayne Stahl MD   Stopped at 10/12/22 1742    fenofibrate tablet 145 mg  145 mg Oral Daily Tremayne Stahl MD   145 mg at 10/13/22 0834    furosemide tablet 40 mg  40 mg Oral Daily Tremayne Stahl MD   40 mg at 10/13/22 0835    guaiFENesin 12 hr tablet 600 mg  600 mg Oral BID Roxie Sanchez PA-C   600 mg at 10/13/22 0834    hydrALAZINE injection 10 mg  10 mg Intravenous Q6H PRN Tremayne Stahl MD        hydrALAZINE tablet 25 mg  25 mg Oral Q8H Tremayne Stahl MD   25 mg at 10/13/22 0521    hydrOXYzine HCL tablet 50 mg  50 mg Oral TID PRN Tremayne Stahl MD   50 mg at 10/13/22 0842    influenza (QUADRIVALENT ADJUVANTED PF) vaccine 0.5 mL  0.5 mL Intramuscular vaccine x 1 dose Ana Jones MD        isosorbide mononitrate 24 hr tablet 90 mg  90 mg Oral Daily Tremayne Stahl MD   90 mg at 10/13/22 0833    melatonin tablet 6 mg  6 mg Oral Nightly PRN Kelly Padilla PA-C        naloxone 0.4 mg/mL injection 0.02 mg  0.02 mg Intravenous PRN Tremayne Stahl MD        nitroGLYCERIN SL tablet 0.4 mg  0.4 mg Sublingual Q5 Min PRN Tremayne Stahl MD   0.4 mg at 10/12/22 0009    ondansetron injection 4 mg  4 mg  Intravenous Q8H PRN Tremayne Stahl MD        pantoprazole EC tablet 40 mg  40 mg Oral Daily Tremayne Stahl MD   40 mg at 10/13/22 0835    polyethylene glycol packet 17 g  17 g Oral Daily PRN Tremayne Stahl MD        prasugreL tablet 10 mg  10 mg Oral Daily Tremayne Stahl MD   10 mg at 10/13/22 0835    predniSONE tablet 50 mg  50 mg Oral Daily Tremayne Stahl MD   50 mg at 10/13/22 0834    prochlorperazine injection Soln 5 mg  5 mg Intravenous Q6H PRN Tremayne Stahl MD        ranolazine 12 hr tablet 500 mg  500 mg Oral BID Tremayne Stahl MD   500 mg at 10/13/22 0834    rivaroxaban tablet 2.5 mg  2.5 mg Oral Daily with dinner Tremayne Stahl MD   2.5 mg at 10/12/22 1708    sacubitriL-valsartan 24-26 mg per tablet 1 tablet  1 tablet Oral BID Tremayne Stahl MD   1 tablet at 10/13/22 0834    simethicone chewable tablet 80 mg  1 tablet Oral QID PRN Tremayne Stahl MD        sodium chloride 0.9% flush 10 mL  10 mL Intravenous PRN Kelly Padilla PA-C        sodium chloride 0.9% flush 10 mL  10 mL Intravenous Q8H Tremayne Stahl MD   10 mL at 10/13/22 0521    spironolactone tablet 25 mg  25 mg Oral Daily Tremayne Stahl MD   25 mg at 10/13/22 0834     Current Discharge Medication List        START taking these medications    Details   azithromycin (ZITHROMAX) 500 MG tablet Take 1 tablet (500 mg total) by mouth once daily. for 3 days  Qty: 3 tablet, Refills: 0      hydrOXYzine (ATARAX) 50 MG tablet Take 1 tablet (50 mg total) by mouth 3 (three) times daily as needed for Anxiety.  Qty: 45 tablet, Refills: 0      predniSONE (DELTASONE) 50 MG Tab Take 1 tablet (50 mg total) by mouth once daily. for 3 days  Qty: 3 tablet, Refills: 0           CONTINUE these medications which have CHANGED    Details   isosorbide mononitrate (IMDUR) 30 MG 24 hr tablet Take 3 tablets (90 mg total) by mouth once daily.  Qty: 90 tablet, Refills: 11           CONTINUE these medications which have NOT CHANGED    Details    albuterol (PROVENTIL) 2.5 mg /3 mL (0.083 %) nebulizer solution Take 2.5 mg by nebulization every 6 (six) hours as needed for Wheezing or Shortness of Breath.      albuterol (VENTOLIN HFA) 90 mcg/actuation inhaler Inhale 2 puffs into the lungs every 6 (six) hours as needed for Wheezing. Rescue  Qty: 1 Inhaler, Refills: 12      aspirin (ECOTRIN) 81 MG EC tablet Take 1 tablet (81 mg total) by mouth once daily.  Qty: 30 tablet, Refills: 11    Comments: Bedside delivery all meds      atorvastatin (LIPITOR) 80 MG tablet Take 1 tablet (80 mg total) by mouth once daily.  Qty: 30 tablet, Refills: 11    Comments: Bedside delivery all meds      carvediloL (COREG) 6.25 MG tablet Take 1 tablet (6.25 mg total) by mouth 2 (two) times daily.  Qty: 180 tablet, Refills: 11    Comments: .      fenofibrate (TRICOR) 145 MG tablet Take 1 tablet (145 mg total) by mouth once daily.      furosemide (LASIX) 20 MG tablet Take 20 mg by mouth daily as needed (edema).      gabapentin (NEURONTIN) 300 MG capsule Take 300 mg by mouth 3 (three) times daily as needed (nerve pain).      nitroGLYCERIN (NITROSTAT) 0.4 MG SL tablet Place 1 tablet (0.4 mg total) under the tongue every 5 (five) minutes as needed for Chest pain.  Qty: 25 tablet, Refills: 6      pantoprazole (PROTONIX) 40 MG tablet Take 40 mg by mouth once daily.      prasugreL (EFFIENT) 10 mg Tab Take 1 tablet (10 mg total) by mouth once daily.  Qty: 30 tablet, Refills: 11    Comments: Price check please- pls feed free to Terr[facility] Bernard      ranolazine (RANEXA) 500 MG Tb12 Take 500 mg by mouth 2 (two) times daily.      rivaroxaban (XARELTO) 2.5 mg Tab Take 1 tablet (2.5 mg total) by mouth daily with dinner or evening meal.  Qty: 90 tablet, Refills: 3      sacubitriL-valsartan (ENTRESTO) 24-26 mg per tablet Take 1 tablet by mouth 2 (two) times daily.  Qty: 60 tablet, Refills: 11           STOP taking these medications       spironolactone (ALDACTONE) 25 MG tablet Comments:    Reason for Stopping:                 I have seen and examined this patient within the last 30 days. My clinical findings that support the need for the home health skilled services and home bound status are the following:no   Weakness/numbness causing balance and gait disturbance due to COPD Exacerbation making it taxing to leave home.     Diet:   cardiac diet    Labs:  None    Referrals/ Consults  Physical Therapy to evaluate and treat. Evaluate for home safety and equipment needs; Establish/upgrade home exercise program. Perform / instruct on therapeutic exercises, gait training, transfer training, and Range of Motion.  Occupational Therapy to evaluate and treat. Evaluate home environment for safety and equipment needs. Perform/Instruct on transfers, ADL training, ROM, and therapeutic exercises.    Activities:   activity as tolerated    Nursing:   Agency to admit patient within 24 hours of hospital discharge unless specified on physician order or at patient request    SN to complete comprehensive assessment including routine vital signs. Instruct on disease process and s/s of complications to report to MD. Review/verify medication list sent home with the patient at time of discharge  and instruct patient/caregiver as needed. Frequency may be adjusted depending on start of care date.     Skilled nurse to perform up to 3 visits PRN for symptoms related to diagnosis    Notify MD if SBP > 160 or < 90; DBP > 90 or < 50; HR > 120 or < 50; Temp > 101; O2 < 88%; Other:   NA    Ok to schedule additional visits based on staff availability and patient request on consecutive days within the home health episode.    When multiple disciplines ordered:    Start of Care occurs on Sunday - Wednesday schedule remaining discipline evaluations as ordered on separate consecutive days following the start of care.    Thursday SOC -schedule subsequent evaluations Friday and Monday the following week.     Friday - Saturday SOC - schedule  subsequent discipline evaluations on consecutive days starting Monday of the following week.    For all post-discharge communication and subsequent orders please contact patient's primary care physician. If unable to reach primary care physician or do not receive response within 30 minutes, please contact Primary Care Physician for clinical staff order clarification    Miscellaneous   Home Oxygen:  Oxygen at 2 L/min nasal canula to be used:  Continuously and As needed for SOB.    Home Health Aide:  Physical Therapy Three times weekly, Occupational Therapy Three times weekly, and Respiratory Therapy Three times weekly    Wound Care Orders  no    I certify that this patient is confined to his home and needs physical therapy and occupational therapy.

## 2022-10-13 NOTE — PLAN OF CARE
[facility] scheduled pt's hospital f/u visit on 10/20/22 @ 10:00am.   Pulmonology will make contact with pt to schedule his hospital f/u due to appointments being far out past November.

## 2022-10-13 NOTE — PLAN OF CARE
Problem: Adult Inpatient Plan of Care  Goal: Absence of Hospital-Acquired Illness or Injury  Outcome: Ongoing, Progressing     Problem: Adult Inpatient Plan of Care  Goal: Plan of Care Review  Outcome: Ongoing, Progressing     Problem: Adult Inpatient Plan of Care  Goal: Optimal Comfort and Wellbeing  Outcome: Ongoing, Progressing   AAOx4.  Plan of care reviewed, patient verbalized understand.  VSS.  Patient resting in bed with eyes closed.  Safety measures in place and maintained.  NADN.  WCTM.  Patient refuses to perform 6 minute walk test at this time.  States he doesn't think he is up to it right now.  Will reassess at another time.

## 2022-10-13 NOTE — NURSING
Home Oxygen Evaluation    Date Performed: 10/13/2022    1) Patient's Home O2 Sat on room air, while at rest: 88%        If O2 sats on room air at rest are 88% or below, patient qualifies. No additional testing needed. Document N/A in steps 2 and 3. If 89% or above, complete steps 2.      2) Patient's O2 Sat on room air while exercising:         If O2 sats on room air while exercising remain 89% or above patient does not qualify, no further testing needed Document N/A in step 3. If O2 sats on room air while exercising are 88% or below, continue to step 3.      3) Patient's LPM         (Must show improvement from #2 for patients to qualify)    If O2 sats improve on oxygen, patient qualifies for portable oxygen. If not, the patient does not qualify.

## 2022-10-13 NOTE — TELEPHONE ENCOUNTER
Tried contacting patient and case management to schedule an appointment for patients COPD. I left a voicemail for the patient to call the office back. Office number provided.

## 2022-10-13 NOTE — NURSING
Attempted to perform 6 min walk test...  he got very short winded while ambulating in hallway without 02.  his sats dropped between 87-88%.  Took him a few minutes to catch up when we got back to room.  he could only ambulate around the hallway once.

## 2022-10-13 NOTE — PLAN OF CARE
SW met with pt to discuss discharge plan for HH.  Pt agreeable to [facility].  Referral sent.  Nicole VILLAFANA working on DME order for O2.      3:39 PM  Pt accepted by [facility].       10/13/22 1527   Post-Acute Status   Post-Acute Authorization Home Health;HME   HME Status (!) Pending Delivery   Home Health Status Referrals Sent   Discharge Delays None known at this time   Discharge Plan   Discharge Plan A Home Health;Home;Home with family   Discharge Plan B Home;Home with family     Yulia Lara LMSW  PRN-  [facility]  Ext. [phone]

## 2022-10-14 PROCEDURE — G0180 MD CERTIFICATION HHA PATIENT: HCPCS | Mod: ,,, | Performed by: STUDENT IN AN ORGANIZED HEALTH CARE EDUCATION/TRAINING PROGRAM

## 2022-10-14 PROCEDURE — G0180 PR HOME HEALTH MD CERTIFICATION: ICD-10-PCS | Mod: ,,, | Performed by: STUDENT IN AN ORGANIZED HEALTH CARE EDUCATION/TRAINING PROGRAM

## 2022-10-14 NOTE — PLAN OF CARE
[facility] - Telemetry Stepdown  Discharge Final Note    Primary Care Provider: Isiah You MD    Expected Discharge Date: 10/13/2022    Pt accepted by [facility].  Pt received home O2 at bedside.  Pt's family provided transportation home.      Final Discharge Note (most recent)       Final Note - 10/14/22 0723          Final Note    Assessment Type Final Discharge Note        Post-Acute Status    Post-Acute Authorization Home Health;HME     HME Status Set-up Complete/Auth obtained   Received Home O2.    Home Health Status Set-up Complete/Auth obtained   [facility]    Coverage [employer]     Discharge Delays None known at this time                     Important Message from Medicare           Future Appointments   Date Time Provider Department Center   10/20/2022 10:00 AM Isiah You MD [facility] GRANT Lara LMSW  PRN-  [facility]  Ext. [phone]

## 2022-10-20 ENCOUNTER — TELEPHONE (OUTPATIENT)
Dept: FAMILY MEDICINE | Facility: CLINIC | Age: 72
End: 2022-10-20

## 2022-10-20 ENCOUNTER — OFFICE VISIT (OUTPATIENT)
Dept: FAMILY MEDICINE | Facility: CLINIC | Age: 72
End: 2022-10-20
Payer: MEDICARE

## 2022-10-20 VITALS
TEMPERATURE: 98 F | WEIGHT: 206.81 LBS | SYSTOLIC BLOOD PRESSURE: 124 MMHG | DIASTOLIC BLOOD PRESSURE: 58 MMHG | BODY MASS INDEX: 30.63 KG/M2 | OXYGEN SATURATION: 97 % | HEIGHT: 69 IN | HEART RATE: 55 BPM

## 2022-10-20 DIAGNOSIS — Z12.5 SCREENING FOR PROSTATE CANCER: ICD-10-CM

## 2022-10-20 DIAGNOSIS — R79.89 ABNORMAL THYROID BLOOD TEST: ICD-10-CM

## 2022-10-20 DIAGNOSIS — Z23 NEEDS FLU SHOT: ICD-10-CM

## 2022-10-20 DIAGNOSIS — N18.31 STAGE 3A CHRONIC KIDNEY DISEASE: ICD-10-CM

## 2022-10-20 DIAGNOSIS — N40.0 BENIGN PROSTATIC HYPERPLASIA, UNSPECIFIED WHETHER LOWER URINARY TRACT SYMPTOMS PRESENT: ICD-10-CM

## 2022-10-20 DIAGNOSIS — Z86.010 HISTORY OF COLONIC POLYPS: ICD-10-CM

## 2022-10-20 DIAGNOSIS — J43.9 PULMONARY EMPHYSEMA, UNSPECIFIED EMPHYSEMA TYPE: Chronic | ICD-10-CM

## 2022-10-20 DIAGNOSIS — Z00.00 ROUTINE MEDICAL EXAM: Primary | ICD-10-CM

## 2022-10-20 DIAGNOSIS — J44.1 COPD EXACERBATION: ICD-10-CM

## 2022-10-20 DIAGNOSIS — I73.9 PVD (PERIPHERAL VASCULAR DISEASE): Chronic | ICD-10-CM

## 2022-10-20 DIAGNOSIS — I70.213 ATHEROSCLEROSIS OF NATIVE ARTERY OF BOTH LOWER EXTREMITIES WITH INTERMITTENT CLAUDICATION: ICD-10-CM

## 2022-10-20 DIAGNOSIS — I72.3 ILIAC ARTERY ANEURYSM, BILATERAL: Chronic | ICD-10-CM

## 2022-10-20 DIAGNOSIS — I50.42 CHRONIC COMBINED SYSTOLIC AND DIASTOLIC HEART FAILURE: ICD-10-CM

## 2022-10-20 DIAGNOSIS — I10 ESSENTIAL HYPERTENSION: ICD-10-CM

## 2022-10-20 DIAGNOSIS — I73.9 PAD (PERIPHERAL ARTERY DISEASE): ICD-10-CM

## 2022-10-20 DIAGNOSIS — E78.6 HIGH-DENSITY LIPOPROTEIN DEFICIENCY: ICD-10-CM

## 2022-10-20 DIAGNOSIS — K21.9 GASTROESOPHAGEAL REFLUX DISEASE WITHOUT ESOPHAGITIS: ICD-10-CM

## 2022-10-20 DIAGNOSIS — Z72.0 TOBACCO ABUSE: ICD-10-CM

## 2022-10-20 DIAGNOSIS — I25.119 CORONARY ARTERY DISEASE INVOLVING NATIVE CORONARY ARTERY OF NATIVE HEART WITH ANGINA PECTORIS: ICD-10-CM

## 2022-10-20 DIAGNOSIS — D69.6 THROMBOCYTOPENIA, UNSPECIFIED: ICD-10-CM

## 2022-10-20 DIAGNOSIS — I20.9 ANGINA, CLASS III: ICD-10-CM

## 2022-10-20 DIAGNOSIS — I77.819 ECTASIS AORTA: ICD-10-CM

## 2022-10-20 DIAGNOSIS — E78.2 MIXED HYPERLIPIDEMIA: ICD-10-CM

## 2022-10-20 DIAGNOSIS — E55.9 VITAMIN D DEFICIENCY DISEASE: ICD-10-CM

## 2022-10-20 DIAGNOSIS — I25.2 HISTORY OF NON-ST ELEVATION MYOCARDIAL INFARCTION (NSTEMI): Chronic | ICD-10-CM

## 2022-10-20 PROCEDURE — 90694 VACC AIIV4 NO PRSRV 0.5ML IM: CPT | Mod: S$GLB,,, | Performed by: INTERNAL MEDICINE

## 2022-10-20 PROCEDURE — 4010F PR ACE/ARB THEARPY RXD/TAKEN: ICD-10-PCS | Mod: CPTII,S$GLB,, | Performed by: INTERNAL MEDICINE

## 2022-10-20 PROCEDURE — 1159F MED LIST DOCD IN RCRD: CPT | Mod: CPTII,S$GLB,, | Performed by: INTERNAL MEDICINE

## 2022-10-20 PROCEDURE — 1159F PR MEDICATION LIST DOCUMENTED IN MEDICAL RECORD: ICD-10-PCS | Mod: CPTII,S$GLB,, | Performed by: INTERNAL MEDICINE

## 2022-10-20 PROCEDURE — 1101F PR PT FALLS ASSESS DOC 0-1 FALLS W/OUT INJ PAST YR: ICD-10-PCS | Mod: CPTII,S$GLB,, | Performed by: INTERNAL MEDICINE

## 2022-10-20 PROCEDURE — 3078F DIAST BP <80 MM HG: CPT | Mod: CPTII,S$GLB,, | Performed by: INTERNAL MEDICINE

## 2022-10-20 PROCEDURE — 3078F PR MOST RECENT DIASTOLIC BLOOD PRESSURE < 80 MM HG: ICD-10-PCS | Mod: CPTII,S$GLB,, | Performed by: INTERNAL MEDICINE

## 2022-10-20 PROCEDURE — 3288F FALL RISK ASSESSMENT DOCD: CPT | Mod: CPTII,S$GLB,, | Performed by: INTERNAL MEDICINE

## 2022-10-20 PROCEDURE — 3044F PR MOST RECENT HEMOGLOBIN A1C LEVEL <7.0%: ICD-10-PCS | Mod: CPTII,S$GLB,, | Performed by: INTERNAL MEDICINE

## 2022-10-20 PROCEDURE — 99397 PR PREVENTIVE VISIT,EST,65 & OVER: ICD-10-PCS | Mod: 25,S$GLB,, | Performed by: INTERNAL MEDICINE

## 2022-10-20 PROCEDURE — 1101F PT FALLS ASSESS-DOCD LE1/YR: CPT | Mod: CPTII,S$GLB,, | Performed by: INTERNAL MEDICINE

## 2022-10-20 PROCEDURE — 1126F AMNT PAIN NOTED NONE PRSNT: CPT | Mod: CPTII,S$GLB,, | Performed by: INTERNAL MEDICINE

## 2022-10-20 PROCEDURE — 3074F PR MOST RECENT SYSTOLIC BLOOD PRESSURE < 130 MM HG: ICD-10-PCS | Mod: CPTII,S$GLB,, | Performed by: INTERNAL MEDICINE

## 2022-10-20 PROCEDURE — 3288F PR FALLS RISK ASSESSMENT DOCUMENTED: ICD-10-PCS | Mod: CPTII,S$GLB,, | Performed by: INTERNAL MEDICINE

## 2022-10-20 PROCEDURE — 90694 FLU VACCINE - QUADRIVALENT - ADJUVANTED: ICD-10-PCS | Mod: S$GLB,,, | Performed by: INTERNAL MEDICINE

## 2022-10-20 PROCEDURE — 99499 UNLISTED E&M SERVICE: CPT | Mod: S$GLB,,, | Performed by: INTERNAL MEDICINE

## 2022-10-20 PROCEDURE — 99397 PER PM REEVAL EST PAT 65+ YR: CPT | Mod: 25,S$GLB,, | Performed by: INTERNAL MEDICINE

## 2022-10-20 PROCEDURE — 99215 OFFICE O/P EST HI 40 MIN: CPT | Mod: 25,S$GLB,, | Performed by: INTERNAL MEDICINE

## 2022-10-20 PROCEDURE — 99215 PR OFFICE/OUTPT VISIT, EST, LEVL V, 40-54 MIN: ICD-10-PCS | Mod: 25,S$GLB,, | Performed by: INTERNAL MEDICINE

## 2022-10-20 PROCEDURE — 99999 PR PBB SHADOW E&M-EST. PATIENT-LVL IV: ICD-10-PCS | Mod: PBBFAC,,, | Performed by: INTERNAL MEDICINE

## 2022-10-20 PROCEDURE — 4010F ACE/ARB THERAPY RXD/TAKEN: CPT | Mod: CPTII,S$GLB,, | Performed by: INTERNAL MEDICINE

## 2022-10-20 PROCEDURE — 1126F PR PAIN SEVERITY QUANTIFIED, NO PAIN PRESENT: ICD-10-PCS | Mod: CPTII,S$GLB,, | Performed by: INTERNAL MEDICINE

## 2022-10-20 PROCEDURE — G0008 FLU VACCINE - QUADRIVALENT - ADJUVANTED: ICD-10-PCS | Mod: S$GLB,,, | Performed by: INTERNAL MEDICINE

## 2022-10-20 PROCEDURE — 3074F SYST BP LT 130 MM HG: CPT | Mod: CPTII,S$GLB,, | Performed by: INTERNAL MEDICINE

## 2022-10-20 PROCEDURE — 99999 PR PBB SHADOW E&M-EST. PATIENT-LVL IV: CPT | Mod: PBBFAC,,, | Performed by: INTERNAL MEDICINE

## 2022-10-20 PROCEDURE — G0008 ADMIN INFLUENZA VIRUS VAC: HCPCS | Mod: S$GLB,,, | Performed by: INTERNAL MEDICINE

## 2022-10-20 PROCEDURE — 3044F HG A1C LEVEL LT 7.0%: CPT | Mod: CPTII,S$GLB,, | Performed by: INTERNAL MEDICINE

## 2022-10-20 RX ORDER — ALBUTEROL SULFATE 90 UG/1
2 AEROSOL, METERED RESPIRATORY (INHALATION) EVERY 6 HOURS PRN
Qty: 18 G | Refills: 12 | Status: SHIPPED | OUTPATIENT
Start: 2022-10-20 | End: 2023-10-23 | Stop reason: SDUPTHER

## 2022-10-20 RX ORDER — ALPRAZOLAM 0.5 MG/1
0.5 TABLET ORAL NIGHTLY PRN
COMMUNITY
Start: 2022-06-01 | End: 2022-10-20 | Stop reason: SDUPTHER

## 2022-10-20 RX ORDER — PREDNISONE 20 MG/1
TABLET ORAL
Qty: 15 TABLET | Refills: 0 | Status: SHIPPED | OUTPATIENT
Start: 2022-10-20 | End: 2023-10-23

## 2022-10-20 RX ORDER — ALBUTEROL SULFATE 0.83 MG/ML
2.5 SOLUTION RESPIRATORY (INHALATION) EVERY 6 HOURS PRN
Qty: 300 EACH | Refills: 12 | Status: SHIPPED | OUTPATIENT
Start: 2022-10-20 | End: 2023-11-07

## 2022-10-20 RX ORDER — ISOSORBIDE MONONITRATE 30 MG/1
90 TABLET, EXTENDED RELEASE ORAL DAILY
Qty: 90 TABLET | Refills: 11 | Status: SHIPPED | OUTPATIENT
Start: 2022-10-20 | End: 2023-10-23 | Stop reason: SDUPTHER

## 2022-10-20 RX ORDER — ALPRAZOLAM 0.5 MG/1
0.5 TABLET ORAL NIGHTLY PRN
Qty: 30 TABLET | Refills: 5 | Status: SHIPPED | OUTPATIENT
Start: 2022-10-20 | End: 2022-12-27 | Stop reason: SDUPTHER

## 2022-10-20 RX ORDER — FLUTICASONE FUROATE, UMECLIDINIUM BROMIDE AND VILANTEROL TRIFENATATE 200; 62.5; 25 UG/1; UG/1; UG/1
1 POWDER RESPIRATORY (INHALATION) DAILY
Qty: 60 EACH | Refills: 12 | Status: SHIPPED | OUTPATIENT
Start: 2022-10-20 | End: 2023-03-23
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
## 2022-11-11 ENCOUNTER — PATIENT MESSAGE (OUTPATIENT)
Dept: FAMILY MEDICINE | Facility: CLINIC | Age: 72
End: 2022-11-11
Payer: MEDICARE

## 2022-11-17 ENCOUNTER — EXTERNAL HOME HEALTH (OUTPATIENT)
Dept: HOME HEALTH SERVICES | Facility: HOSPITAL | Age: 72
End: 2022-11-17
Payer: MEDICARE

## 2022-11-22 ENCOUNTER — PES CALL (OUTPATIENT)
Dept: ADMINISTRATIVE | Facility: CLINIC | Age: 72
End: 2022-11-22
Payer: MEDICARE

## 2022-12-27 RX ORDER — ALPRAZOLAM 0.5 MG/1
0.5 TABLET ORAL NIGHTLY PRN
Qty: 30 TABLET | Refills: 0 | Status: SHIPPED | OUTPATIENT
Start: 2022-12-27 | End: 2023-03-10 | Stop reason: SDUPTHER

## 2022-12-27 NOTE — TELEPHONE ENCOUNTER
No new care gaps identified.  [facility] Embedded Care Gaps. Reference number: [id number]. 12/27/2022   1:02:43 PM CST

## 2022-12-27 NOTE — TELEPHONE ENCOUNTER
Patient is asking for his med xanax to be sent to [facility] pharmacy. The patient pharmacy has been updated to the preferred pharmacy. Please advise!

## 2022-12-27 NOTE — TELEPHONE ENCOUNTER
----- Message from Shalini Koehler sent at 12/27/2022 12:32 PM CST -----  Regarding: refill request  Type:  RX Refill Request    Who Called:  [place]     Refill or New Rx: refill    RX Name and Strength: ALPRAZolam (XANAX) 0.5 MG tablet    How is the patient currently taking it? (ex. 1XDay) 1xday     Is this a 30 day or 90 day RX: 30 days     Preferred Pharmacy with phone number: [facility] [address], [address] [phone]       Local or Mail Order: mail     Ordering Provider: Kenyatta     [facility] Call Back Number: [phone]    Additional Information:

## 2023-02-07 DIAGNOSIS — Z00.00 ENCOUNTER FOR MEDICARE ANNUAL WELLNESS EXAM: ICD-10-CM

## 2023-02-09 DIAGNOSIS — Z00.00 ENCOUNTER FOR MEDICARE ANNUAL WELLNESS EXAM: ICD-10-CM

## 2023-02-23 DIAGNOSIS — Z98.61 POSTSURGICAL PERCUTANEOUS TRANSLUMINAL CORONARY ANGIOPLASTY STATUS: Primary | ICD-10-CM

## 2023-02-23 DIAGNOSIS — I25.10 CORONARY ARTERY DISEASE, UNSPECIFIED VESSEL OR LESION TYPE, UNSPECIFIED WHETHER ANGINA PRESENT, UNSPECIFIED WHETHER NATIVE OR TRANSPLANTED HEART: ICD-10-CM

## 2023-03-08 ENCOUNTER — TELEPHONE (OUTPATIENT)
Dept: CARDIAC REHAB | Facility: CLINIC | Age: 73
End: 2023-03-08
Payer: MEDICARE

## 2023-03-08 NOTE — TELEPHONE ENCOUNTER
----- Message from Luisa Nieto sent at 3/8/2023  1:00 PM CST -----  Regarding: Referral  Pt [phone] calling in ref to the referral he has to this department.    Thanks

## 2023-03-09 DIAGNOSIS — Z98.61 POSTSURGICAL PERCUTANEOUS TRANSLUMINAL CORONARY ANGIOPLASTY STATUS: Primary | ICD-10-CM

## 2023-03-09 DIAGNOSIS — I25.10 CORONARY ARTERY DISEASE, UNSPECIFIED VESSEL OR LESION TYPE, UNSPECIFIED WHETHER ANGINA PRESENT, UNSPECIFIED WHETHER NATIVE OR TRANSPLANTED HEART: ICD-10-CM

## 2023-03-10 RX ORDER — ALPRAZOLAM 0.5 MG/1
0.5 TABLET ORAL NIGHTLY PRN
Qty: 30 TABLET | Refills: 5 | Status: SHIPPED | OUTPATIENT
Start: 2023-03-10 | End: 2023-09-26 | Stop reason: SDUPTHER

## 2023-03-10 NOTE — TELEPHONE ENCOUNTER
No new care gaps identified.  [facility] Embedded Care Gaps. Reference number: [id number]. 3/10/2023   9:18:30 AM CST

## 2023-03-13 ENCOUNTER — TELEPHONE (OUTPATIENT)
Dept: FAMILY MEDICINE | Facility: CLINIC | Age: 73
End: 2023-03-13
Payer: MEDICARE

## 2023-03-22 ENCOUNTER — TELEPHONE (OUTPATIENT)
Dept: CARDIAC REHAB | Facility: CLINIC | Age: 73
End: 2023-03-22
Payer: MEDICARE

## 2023-03-22 NOTE — TELEPHONE ENCOUNTER
----- Message from Rosalina Moreno MA sent at 3/21/2023 12:31 PM CDT -----  The patient would like to cancel his appointment that schedule 3-.  Thank you

## 2023-03-23 ENCOUNTER — OFFICE VISIT (OUTPATIENT)
Dept: PULMONOLOGY | Facility: CLINIC | Age: 73
End: 2023-03-23
Payer: MEDICARE

## 2023-03-23 VITALS
HEART RATE: 58 BPM | DIASTOLIC BLOOD PRESSURE: 64 MMHG | WEIGHT: 200 LBS | BODY MASS INDEX: 30.31 KG/M2 | HEIGHT: 68 IN | OXYGEN SATURATION: 99 % | RESPIRATION RATE: 19 BRPM | SYSTOLIC BLOOD PRESSURE: 134 MMHG

## 2023-03-23 DIAGNOSIS — F17.211 NICOTINE DEPENDENCE, CIGARETTES, IN REMISSION: ICD-10-CM

## 2023-03-23 DIAGNOSIS — J44.1 COPD EXACERBATION: ICD-10-CM

## 2023-03-23 PROCEDURE — 3078F PR MOST RECENT DIASTOLIC BLOOD PRESSURE < 80 MM HG: ICD-10-PCS | Mod: HCNC,CPTII,S$GLB, | Performed by: INTERNAL MEDICINE

## 2023-03-23 PROCEDURE — 1160F RVW MEDS BY RX/DR IN RCRD: CPT | Mod: HCNC,CPTII,S$GLB, | Performed by: INTERNAL MEDICINE

## 2023-03-23 PROCEDURE — 1160F PR REVIEW ALL MEDS BY PRESCRIBER/CLIN PHARMACIST DOCUMENTED: ICD-10-PCS | Mod: HCNC,CPTII,S$GLB, | Performed by: INTERNAL MEDICINE

## 2023-03-23 PROCEDURE — 1159F PR MEDICATION LIST DOCUMENTED IN MEDICAL RECORD: ICD-10-PCS | Mod: HCNC,CPTII,S$GLB, | Performed by: INTERNAL MEDICINE

## 2023-03-23 PROCEDURE — 4010F PR ACE/ARB THEARPY RXD/TAKEN: ICD-10-PCS | Mod: HCNC,CPTII,S$GLB, | Performed by: INTERNAL MEDICINE

## 2023-03-23 PROCEDURE — 99999 PR PBB SHADOW E&M-EST. PATIENT-LVL III: ICD-10-PCS | Mod: PBBFAC,HCNC,, | Performed by: INTERNAL MEDICINE

## 2023-03-23 PROCEDURE — 99204 PR OFFICE/OUTPT VISIT, NEW, LEVL IV, 45-59 MIN: ICD-10-PCS | Mod: HCNC,S$GLB,, | Performed by: INTERNAL MEDICINE

## 2023-03-23 PROCEDURE — 3075F PR MOST RECENT SYSTOLIC BLOOD PRESS GE 130-139MM HG: ICD-10-PCS | Mod: HCNC,CPTII,S$GLB, | Performed by: INTERNAL MEDICINE

## 2023-03-23 PROCEDURE — 99999 PR PBB SHADOW E&M-EST. PATIENT-LVL III: CPT | Mod: PBBFAC,HCNC,, | Performed by: INTERNAL MEDICINE

## 2023-03-23 PROCEDURE — 3078F DIAST BP <80 MM HG: CPT | Mod: HCNC,CPTII,S$GLB, | Performed by: INTERNAL MEDICINE

## 2023-03-23 PROCEDURE — 3075F SYST BP GE 130 - 139MM HG: CPT | Mod: HCNC,CPTII,S$GLB, | Performed by: INTERNAL MEDICINE

## 2023-03-23 PROCEDURE — 99499 UNLISTED E&M SERVICE: CPT | Mod: S$GLB,,, | Performed by: INTERNAL MEDICINE

## 2023-03-23 PROCEDURE — 1159F MED LIST DOCD IN RCRD: CPT | Mod: HCNC,CPTII,S$GLB, | Performed by: INTERNAL MEDICINE

## 2023-03-23 PROCEDURE — 4010F ACE/ARB THERAPY RXD/TAKEN: CPT | Mod: HCNC,CPTII,S$GLB, | Performed by: INTERNAL MEDICINE

## 2023-03-23 PROCEDURE — 99499 RISK ADDL DX/OHS AUDIT: ICD-10-PCS | Mod: S$GLB,,, | Performed by: INTERNAL MEDICINE

## 2023-03-23 PROCEDURE — 1126F PR PAIN SEVERITY QUANTIFIED, NO PAIN PRESENT: ICD-10-PCS | Mod: HCNC,CPTII,S$GLB, | Performed by: INTERNAL MEDICINE

## 2023-03-23 PROCEDURE — 1126F AMNT PAIN NOTED NONE PRSNT: CPT | Mod: HCNC,CPTII,S$GLB, | Performed by: INTERNAL MEDICINE

## 2023-03-23 PROCEDURE — 99204 OFFICE O/P NEW MOD 45 MIN: CPT | Mod: HCNC,S$GLB,, | Performed by: INTERNAL MEDICINE

## 2023-03-23 NOTE — ASSESSMENT & PLAN NOTE
Current every day smoker >60 years, 1/2 ppd. Discussed at length risks and long term effects of smoking, discussed and encouraged smoking cessation. Patient not interested in smoking cessation at this time. Ordered annual LDCT to screen to lung cancer.

## 2023-03-23 NOTE — ASSESSMENT & PLAN NOTE
COPD stable. PFTs from 2020 show evidence of mild obstruction, mild reduction in lung volumes, and moderate reduction in DLCO. Wearing oxygen at night. Using rescue inhaler and nebulizer daily, currently not on a daily controller, ordered LAMA inhaler, instructed to take daily.

## 2023-03-23 NOTE — PROGRESS NOTES
Subjective:       Patient ID: Lonnie Taylor is a 73 y.o. male.    Chief Complaint: No chief complaint on file.    72 yo M presents in office with wife to establish care and follow up on his COPD. Hospitalized in October 2022 for COPD exacerbation, discharged with home oxygen. Reports using oxygen every night and has noticed some improvement in symptoms. States shortness of breath is stable, with some associated fatigue, wheezing, productive cough, tan mucous, however not coughing as much. Occasional chest pain, follows up with cardiology for heart failure.     Using albuterol rescue inhaler several times a day, using nebulizer several times a week, with good control.     Positive for GERD, takes daily protonix    Denies any f/c/ns, or weight loss    No recent ER or urgent care visits, or antibiotics. Received steroid therapy from PCP in January 2023.    Smoking hx- Current every day smoker, for past 60 years, 1/2 ppd, not interested in smoking cessation.  Occupation- Retired,  and a   Inhalational Agents- Current every marijuana smoker  Mold/Asbestosis exposure- Exposed to various chemicals  Pets- 1 dog  Radiation/Chemo Medications- N/A       Family History: no Lung Cancer, no COPD, Mother emphysema, no Asthma  Childhood history of Lung Disease: Denies     Seen with NP and agree with findings.     Review of Systems   Constitutional:  Positive for fatigue. Negative for weight loss and appetite change.   Respiratory:  Positive for cough, shortness of breath, wheezing and use of rescue inhaler.    Cardiovascular:  Positive for chest pain and leg swelling.   Neurological:  Negative for weakness and light-headedness.   Psychiatric/Behavioral:  The patient is not nervous/anxious.      Objective:      Physical Exam   Constitutional: He is oriented to person, place, and time. He appears well-developed and well-nourished.   HENT:   Head: Normocephalic.   Mouth/Throat: Mallampati Score: III.    Cardiovascular: Normal rate, regular rhythm and normal heart sounds.   No murmur heard.  Pulmonary/Chest: Normal expansion, effort normal and breath sounds normal. No respiratory distress. He has no decreased breath sounds. He has no wheezes. He has no rhonchi. He has no rales.   Musculoskeletal:         General: Normal range of motion.      Cervical back: Normal range of motion.   Neurological: He is alert and oriented to person, place, and time. Gait normal.   Skin: Skin is warm and dry.   Psychiatric: He has a normal mood and affect. His behavior is normal. Judgment and thought content normal.     Personal Diagnostic Review  Chest x-ray: 10/11/22 Mild cardiomegaly. Slight degree of diffuse accentuation interstitial markings. No significant airspace consolidation pleural effusion identified     CT Chest Lung Screening Low Dose 10/14/20 Clinically or potentially clinically significant non lung cancer finding: Mild emphysema     No flowsheet data found.      Assessment:       1. COPD exacerbation    2. Nicotine dependence, cigarettes, in remission          Outpatient Encounter Medications as of 3/23/2023   Medication Sig Dispense Refill    albuterol (PROVENTIL) 2.5 mg /3 mL (0.083 %) nebulizer solution Take 3 mLs (2.5 mg total) by nebulization every 6 (six) hours as needed for Wheezing or Shortness of Breath. 300 each 12    albuterol (VENTOLIN HFA) 90 mcg/actuation inhaler Inhale 2 puffs into the lungs every 6 (six) hours as needed for Wheezing. Rescue 18 g 12    ALPRAZolam (XANAX) 0.5 MG tablet Take 1 tablet (0.5 mg total) by mouth nightly as needed for Anxiety. 30 tablet 5    aspirin (ECOTRIN) 81 MG EC tablet Take 1 tablet (81 mg total) by mouth once daily. 30 tablet 11    atorvastatin (LIPITOR) 80 MG tablet Take 1 tablet (80 mg total) by mouth once daily. (Patient not taking: Reported on 10/20/2022) 30 tablet 11    carvediloL (COREG) 6.25 MG tablet Take 1 tablet (6.25 mg total) by mouth 2 (two) times  daily. 180 tablet 11    fenofibrate (TRICOR) 145 MG tablet Take 1 tablet (145 mg total) by mouth once daily.      furosemide (LASIX) 20 MG tablet Take 20 mg by mouth daily as needed (edema).      gabapentin (NEURONTIN) 300 MG capsule Take 300 mg by mouth 3 (three) times daily as needed (nerve pain).      hydrOXYzine (ATARAX) 50 MG tablet Take 1 tablet (50 mg total) by mouth 3 (three) times daily as needed for Anxiety. 45 tablet 0    isosorbide mononitrate (IMDUR) 30 MG 24 hr tablet Take 3 tablets (90 mg total) by mouth once daily. 90 tablet 11    nitroGLYCERIN (NITROSTAT) 0.4 MG SL tablet Place 1 tablet (0.4 mg total) under the tongue every 5 (five) minutes as needed for Chest pain. 25 tablet 6    pantoprazole (PROTONIX) 40 MG tablet Take 40 mg by mouth once daily.      prasugreL (EFFIENT) 10 mg Tab Take 1 tablet (10 mg total) by mouth once daily. 30 tablet 11    predniSONE (DELTASONE) 20 MG tablet 2 a day x 5 days then 1 a day x 5 days 15 tablet 0    ranolazine (RANEXA) 500 MG Tb12 Take 500 mg by mouth 2 (two) times daily.      rivaroxaban (XARELTO) 2.5 mg Tab Take 1 tablet (2.5 mg total) by mouth daily with dinner or evening meal. (Patient not taking: Reported on 10/20/2022) 90 tablet 3    sacubitriL-valsartan (ENTRESTO) 24-26 mg per tablet Take 1 tablet by mouth 2 (two) times daily. (Patient not taking: Reported on 10/20/2022) 60 tablet 11    tiotropium bromide (SPIRIVA RESPIMAT) 2.5 mcg/actuation inhaler Inhale 2 puffs into the lungs Daily. Controller 4 g 6    [DISCONTINUED] ALPRAZolam (XANAX) 0.5 MG tablet Take 1 tablet (0.5 mg total) by mouth nightly as needed for Anxiety. 30 tablet 0    [DISCONTINUED] amLODIPine (NORVASC) 5 MG tablet Take 1 tablet (5 mg total) by mouth once daily. 30 tablet 11    [DISCONTINUED] fluticasone-umeclidin-vilanter (TRELEGY ELLIPTA) 200-62.5-25 mcg inhaler Inhale 1 puff into the lungs once daily. 60 each 12    [DISCONTINUED] lisinopriL (PRINIVIL,ZESTRIL) 20 MG  tablet Take 1 tablet (20 mg total) by mouth once daily. 90 tablet 3    [DISCONTINUED] rosuvastatin (CRESTOR) 20 MG tablet Take 2 tablets (40 mg total) by mouth once daily. 180 tablet 3     No facility-administered encounter medications on file as of 3/23/2023.     Orders Placed This Encounter   Procedures    CT Chest Lung Screening Low Dose     Standing Status:   Future     Standing Expiration Date:   3/23/2024     Order Specific Question:   Is there documentation of shared decision making for this lung screening exam?     Answer:   Yes     Order Specific Question:   Is the patient a current smoker?     Answer:   Yes     Order Specific Question:   Does the patient have a 20-pack/year or greater smoke history?     Answer:   Yes     Order Specific Question:   Is the patient between the age 50-80 years old?     Answer:   Yes     Order Specific Question:   Does the patient show any signs or symptoms of lung cancer?     Answer:   No     Order Specific Question:   Is this the first (baseline) CT or an annual exam?     Answer:   Annual [2]     Order Specific Question:   May the Radiologist modify the order per protocol to meet the clinical needs of the patient?     Answer:   Yes     Order Specific Question:   Is this a low dose screening chest CT?     Answer:   Yes    Complete PFT with bronchodilator     Standing Status:   Future     Standing Expiration Date:   3/23/2024     Order Specific Question:   Release to patient     Answer:   Immediate       Plan:       Nicotine dependence, cigarettes, in remission  Current every day smoker >60 years, 1/2 ppd. Discussed at length risks and long term effects of smoking, discussed and encouraged smoking cessation. Patient not interested in smoking cessation at this time. Ordered annual LDCT to screen to lung cancer.     COPD exacerbation  COPD stable. PFTs from 2020 show evidence of mild obstruction, mild reduction in lung volumes, and moderate reduction in DLCO. Wearing oxygen at  night. Using rescue inhaler and nebulizer daily, currently not on a daily controller, ordered LAMA inhaler, instructed to take daily.

## 2023-04-12 ENCOUNTER — PATIENT MESSAGE (OUTPATIENT)
Dept: PULMONOLOGY | Facility: CLINIC | Age: 73
End: 2023-04-12
Payer: MEDICARE

## 2023-06-04 ENCOUNTER — HOSPITAL ENCOUNTER (INPATIENT)
Facility: HOSPITAL | Age: 73
LOS: 1 days | Discharge: HOME | DRG: 291 | End: 2023-06-07
Attending: EMERGENCY MEDICINE | Admitting: HOSPITALIST
Payer: MEDICARE

## 2023-06-04 DIAGNOSIS — J96.20 ACUTE ON CHRONIC RESPIRATORY FAILURE: ICD-10-CM

## 2023-06-04 DIAGNOSIS — J44.1 COPD WITH ACUTE EXACERBATION: ICD-10-CM

## 2023-06-04 DIAGNOSIS — Z72.0 TOBACCO ABUSE: ICD-10-CM

## 2023-06-04 DIAGNOSIS — I50.9 CHF (CONGESTIVE HEART FAILURE): ICD-10-CM

## 2023-06-04 DIAGNOSIS — N17.9 AKI (ACUTE KIDNEY INJURY): Primary | ICD-10-CM

## 2023-06-04 DIAGNOSIS — R06.02 SHORTNESS OF BREATH: ICD-10-CM

## 2023-06-04 DIAGNOSIS — I50.23 ACUTE ON CHRONIC SYSTOLIC CONGESTIVE HEART FAILURE: ICD-10-CM

## 2023-06-04 PROBLEM — J96.21 ACUTE ON CHRONIC RESPIRATORY FAILURE WITH HYPOXIA: Status: ACTIVE | Noted: 2023-06-04

## 2023-06-04 LAB
ALBUMIN SERPL BCP-MCNC: 3.5 G/DL (ref 3.5–5.2)
ALP SERPL-CCNC: 39 U/L (ref 55–135)
ALT SERPL W/O P-5'-P-CCNC: 10 U/L (ref 10–44)
ANION GAP SERPL CALC-SCNC: 11 MMOL/L (ref 8–16)
AST SERPL-CCNC: 13 U/L (ref 10–40)
BASOPHILS # BLD AUTO: 0.04 K/UL (ref 0–0.2)
BASOPHILS NFR BLD: 0.7 % (ref 0–1.9)
BILIRUB SERPL-MCNC: 1.1 MG/DL (ref 0.1–1)
BNP SERPL-MCNC: 886 PG/ML (ref 0–99)
BUN SERPL-MCNC: 18 MG/DL (ref 8–23)
CALCIUM SERPL-MCNC: 9.4 MG/DL (ref 8.7–10.5)
CHLORIDE SERPL-SCNC: 107 MMOL/L (ref 95–110)
CO2 SERPL-SCNC: 23 MMOL/L (ref 23–29)
CREAT SERPL-MCNC: 1.7 MG/DL (ref 0.5–1.4)
DIFFERENTIAL METHOD: ABNORMAL
EOSINOPHIL # BLD AUTO: 0.1 K/UL (ref 0–0.5)
EOSINOPHIL NFR BLD: 1.5 % (ref 0–8)
ERYTHROCYTE [DISTWIDTH] IN BLOOD BY AUTOMATED COUNT: 16.9 % (ref 11.5–14.5)
EST. GFR  (NO RACE VARIABLE): 41.5 ML/MIN/1.73 M^2
GLUCOSE SERPL-MCNC: 113 MG/DL (ref 70–110)
HCT VFR BLD AUTO: 42.4 % (ref 40–54)
HGB BLD-MCNC: 13.2 G/DL (ref 14–18)
IMM GRANULOCYTES # BLD AUTO: 0.01 K/UL (ref 0–0.04)
IMM GRANULOCYTES NFR BLD AUTO: 0.2 % (ref 0–0.5)
LYMPHOCYTES # BLD AUTO: 1 K/UL (ref 1–4.8)
LYMPHOCYTES NFR BLD: 16.3 % (ref 18–48)
MCH RBC QN AUTO: 28.8 PG (ref 27–31)
MCHC RBC AUTO-ENTMCNC: 31.1 G/DL (ref 32–36)
MCV RBC AUTO: 92 FL (ref 82–98)
MONOCYTES # BLD AUTO: 0.4 K/UL (ref 0.3–1)
MONOCYTES NFR BLD: 6.7 % (ref 4–15)
NEUTROPHILS # BLD AUTO: 4.6 K/UL (ref 1.8–7.7)
NEUTROPHILS NFR BLD: 74.6 % (ref 38–73)
NRBC BLD-RTO: 0 /100 WBC
PLATELET # BLD AUTO: 161 K/UL (ref 150–450)
PMV BLD AUTO: 11.8 FL (ref 9.2–12.9)
POTASSIUM SERPL-SCNC: 3.9 MMOL/L (ref 3.5–5.1)
PROCALCITONIN SERPL IA-MCNC: 0.02 NG/ML
PROT SERPL-MCNC: 6.6 G/DL (ref 6–8.4)
RBC # BLD AUTO: 4.59 M/UL (ref 4.6–6.2)
SODIUM SERPL-SCNC: 141 MMOL/L (ref 136–145)
TROPONIN I SERPL DL<=0.01 NG/ML-MCNC: 0.03 NG/ML (ref 0–0.03)
WBC # BLD AUTO: 6.09 K/UL (ref 3.9–12.7)

## 2023-06-04 PROCEDURE — 31720 CLEARANCE OF AIRWAYS: CPT

## 2023-06-04 PROCEDURE — 25000003 PHARM REV CODE 250: Performed by: FAMILY MEDICINE

## 2023-06-04 PROCEDURE — 25000242 PHARM REV CODE 250 ALT 637 W/ HCPCS: Performed by: FAMILY MEDICINE

## 2023-06-04 PROCEDURE — 93010 ELECTROCARDIOGRAM REPORT: CPT | Mod: ,,, | Performed by: INTERNAL MEDICINE

## 2023-06-04 PROCEDURE — 36415 COLL VENOUS BLD VENIPUNCTURE: CPT | Performed by: FAMILY MEDICINE

## 2023-06-04 PROCEDURE — 83880 ASSAY OF NATRIURETIC PEPTIDE: CPT | Performed by: EMERGENCY MEDICINE

## 2023-06-04 PROCEDURE — G0378 HOSPITAL OBSERVATION PER HR: HCPCS

## 2023-06-04 PROCEDURE — 63600175 PHARM REV CODE 636 W HCPCS: Performed by: EMERGENCY MEDICINE

## 2023-06-04 PROCEDURE — 25000003 PHARM REV CODE 250: Performed by: EMERGENCY MEDICINE

## 2023-06-04 PROCEDURE — 94761 N-INVAS EAR/PLS OXIMETRY MLT: CPT

## 2023-06-04 PROCEDURE — 99285 EMERGENCY DEPT VISIT HI MDM: CPT | Mod: 25

## 2023-06-04 PROCEDURE — 96365 THER/PROPH/DIAG IV INF INIT: CPT

## 2023-06-04 PROCEDURE — 84145 PROCALCITONIN (PCT): CPT | Performed by: FAMILY MEDICINE

## 2023-06-04 PROCEDURE — 94640 AIRWAY INHALATION TREATMENT: CPT

## 2023-06-04 PROCEDURE — 80053 COMPREHEN METABOLIC PANEL: CPT | Performed by: EMERGENCY MEDICINE

## 2023-06-04 PROCEDURE — 99223 1ST HOSP IP/OBS HIGH 75: CPT | Mod: ,,, | Performed by: FAMILY MEDICINE

## 2023-06-04 PROCEDURE — 85025 COMPLETE CBC W/AUTO DIFF WBC: CPT | Performed by: EMERGENCY MEDICINE

## 2023-06-04 PROCEDURE — 99900035 HC TECH TIME PER 15 MIN (STAT)

## 2023-06-04 PROCEDURE — 84484 ASSAY OF TROPONIN QUANT: CPT | Performed by: EMERGENCY MEDICINE

## 2023-06-04 PROCEDURE — 99285 PR EMERGENCY DEPT VISIT,LEVEL V: ICD-10-PCS | Mod: ,,, | Performed by: EMERGENCY MEDICINE

## 2023-06-04 PROCEDURE — 93010 EKG 12-LEAD: ICD-10-PCS | Mod: ,,, | Performed by: INTERNAL MEDICINE

## 2023-06-04 PROCEDURE — 93005 ELECTROCARDIOGRAM TRACING: CPT

## 2023-06-04 PROCEDURE — 99223 PR INITIAL HOSPITAL CARE,LEVL III: ICD-10-PCS | Mod: ,,, | Performed by: FAMILY MEDICINE

## 2023-06-04 PROCEDURE — 96375 TX/PRO/DX INJ NEW DRUG ADDON: CPT

## 2023-06-04 PROCEDURE — 99285 EMERGENCY DEPT VISIT HI MDM: CPT | Mod: ,,, | Performed by: EMERGENCY MEDICINE

## 2023-06-04 RX ORDER — FUROSEMIDE 20 MG/1
20 TABLET ORAL DAILY
Status: DISCONTINUED | OUTPATIENT
Start: 2023-06-05 | End: 2023-06-05

## 2023-06-04 RX ORDER — GABAPENTIN 300 MG/1
300 CAPSULE ORAL 3 TIMES DAILY PRN
Status: DISCONTINUED | OUTPATIENT
Start: 2023-06-04 | End: 2023-06-07 | Stop reason: HOSPADM

## 2023-06-04 RX ORDER — FUROSEMIDE 10 MG/ML
40 INJECTION INTRAMUSCULAR; INTRAVENOUS
Status: COMPLETED | OUTPATIENT
Start: 2023-06-04 | End: 2023-06-04

## 2023-06-04 RX ORDER — CARVEDILOL 6.25 MG/1
6.25 TABLET ORAL 2 TIMES DAILY
Status: DISCONTINUED | OUTPATIENT
Start: 2023-06-04 | End: 2023-06-07 | Stop reason: HOSPADM

## 2023-06-04 RX ORDER — PANTOPRAZOLE SODIUM 40 MG/1
40 TABLET, DELAYED RELEASE ORAL DAILY
Status: DISCONTINUED | OUTPATIENT
Start: 2023-06-05 | End: 2023-06-07 | Stop reason: HOSPADM

## 2023-06-04 RX ORDER — AZITHROMYCIN 250 MG/1
500 TABLET, FILM COATED ORAL EVERY 24 HOURS
Status: DISCONTINUED | OUTPATIENT
Start: 2023-06-05 | End: 2023-06-05

## 2023-06-04 RX ORDER — OXYCODONE HYDROCHLORIDE 5 MG/1
5 TABLET ORAL EVERY 6 HOURS PRN
Status: DISCONTINUED | OUTPATIENT
Start: 2023-06-04 | End: 2023-06-07 | Stop reason: HOSPADM

## 2023-06-04 RX ORDER — METHYLPREDNISOLONE SOD SUCC 125 MG
125 VIAL (EA) INJECTION
Status: COMPLETED | OUTPATIENT
Start: 2023-06-04 | End: 2023-06-04

## 2023-06-04 RX ORDER — SODIUM CHLORIDE 0.9 % (FLUSH) 0.9 %
3 SYRINGE (ML) INJECTION
Status: DISCONTINUED | OUTPATIENT
Start: 2023-06-04 | End: 2023-06-07 | Stop reason: HOSPADM

## 2023-06-04 RX ORDER — RANOLAZINE 500 MG/1
500 TABLET, EXTENDED RELEASE ORAL 2 TIMES DAILY
Status: DISCONTINUED | OUTPATIENT
Start: 2023-06-04 | End: 2023-06-07 | Stop reason: HOSPADM

## 2023-06-04 RX ORDER — IPRATROPIUM BROMIDE AND ALBUTEROL SULFATE 2.5; .5 MG/3ML; MG/3ML
3 SOLUTION RESPIRATORY (INHALATION)
Status: DISCONTINUED | OUTPATIENT
Start: 2023-06-04 | End: 2023-06-05

## 2023-06-04 RX ORDER — CLOPIDOGREL BISULFATE 75 MG/1
75 TABLET ORAL DAILY
Status: DISCONTINUED | OUTPATIENT
Start: 2023-06-05 | End: 2023-06-07 | Stop reason: HOSPADM

## 2023-06-04 RX ORDER — TALC
6 POWDER (GRAM) TOPICAL NIGHTLY PRN
Status: DISCONTINUED | OUTPATIENT
Start: 2023-06-04 | End: 2023-06-07 | Stop reason: HOSPADM

## 2023-06-04 RX ORDER — ASPIRIN 81 MG/1
81 TABLET ORAL DAILY
Status: DISCONTINUED | OUTPATIENT
Start: 2023-06-05 | End: 2023-06-07 | Stop reason: HOSPADM

## 2023-06-04 RX ORDER — ONDANSETRON 2 MG/ML
4 INJECTION INTRAMUSCULAR; INTRAVENOUS EVERY 12 HOURS PRN
Status: DISCONTINUED | OUTPATIENT
Start: 2023-06-04 | End: 2023-06-07 | Stop reason: HOSPADM

## 2023-06-04 RX ORDER — PREDNISONE 20 MG/1
40 TABLET ORAL DAILY
Status: DISCONTINUED | OUTPATIENT
Start: 2023-06-05 | End: 2023-06-05

## 2023-06-04 RX ORDER — PRASUGREL 10 MG/1
10 TABLET, FILM COATED ORAL DAILY
Status: DISCONTINUED | OUTPATIENT
Start: 2023-06-05 | End: 2023-06-04

## 2023-06-04 RX ORDER — ACETAMINOPHEN 500 MG
1000 TABLET ORAL EVERY 8 HOURS PRN
Status: DISCONTINUED | OUTPATIENT
Start: 2023-06-04 | End: 2023-06-07 | Stop reason: HOSPADM

## 2023-06-04 RX ORDER — ATORVASTATIN CALCIUM 40 MG/1
80 TABLET, FILM COATED ORAL DAILY
Status: DISCONTINUED | OUTPATIENT
Start: 2023-06-05 | End: 2023-06-07 | Stop reason: HOSPADM

## 2023-06-04 RX ORDER — ALPRAZOLAM 0.5 MG/1
0.5 TABLET ORAL NIGHTLY PRN
Status: DISCONTINUED | OUTPATIENT
Start: 2023-06-04 | End: 2023-06-07 | Stop reason: HOSPADM

## 2023-06-04 RX ADMIN — AZITHROMYCIN MONOHYDRATE 500 MG: 500 INJECTION, POWDER, LYOPHILIZED, FOR SOLUTION INTRAVENOUS at 06:06

## 2023-06-04 RX ADMIN — CEFTRIAXONE 2 G: 2 INJECTION, POWDER, FOR SOLUTION INTRAMUSCULAR; INTRAVENOUS at 06:06

## 2023-06-04 RX ADMIN — SACUBITRIL AND VALSARTAN 1 TABLET: 24; 26 TABLET, FILM COATED ORAL at 09:06

## 2023-06-04 RX ADMIN — ALPRAZOLAM 0.5 MG: 0.5 TABLET ORAL at 09:06

## 2023-06-04 RX ADMIN — IPRATROPIUM BROMIDE AND ALBUTEROL SULFATE 3 ML: .5; 3 SOLUTION RESPIRATORY (INHALATION) at 07:06

## 2023-06-04 RX ADMIN — FUROSEMIDE 40 MG: 10 INJECTION, SOLUTION INTRAMUSCULAR; INTRAVENOUS at 06:06

## 2023-06-04 RX ADMIN — CARVEDILOL 6.25 MG: 6.25 TABLET, FILM COATED ORAL at 09:06

## 2023-06-04 RX ADMIN — RANOLAZINE 500 MG: 500 TABLET, EXTENDED RELEASE ORAL at 09:06

## 2023-06-04 RX ADMIN — METHYLPREDNISOLONE SODIUM SUCCINATE 125 MG: 125 INJECTION, POWDER, FOR SOLUTION INTRAMUSCULAR; INTRAVENOUS at 04:06

## 2023-06-04 NOTE — SUBJECTIVE & OBJECTIVE
Past Medical History:   Diagnosis Date    CAD (coronary artery disease)     Dr Martinez    Carotid artery disease     Chronic kidney disease, stage III (moderate)     COPD (chronic obstructive pulmonary disease)     Depression     Dyslipidemia     Hepatic cyst     Hepatic cyst     High-density lipoprotein deficiency     HTN (hypertension)     Hx of colonic polyps     Insomnia     PVD (peripheral vascular disease)     stented    S/P AAA repair        Past Surgical History:   Procedure Laterality Date    ABDOMINAL AORTIC ANEURYSM REPAIR      ABDOMINAL AORTIC ANEURYSM REPAIR      ANGIOGRAM, CORONARY, WITH LEFT HEART CATHETERIZATION Left 2/14/2022    Procedure: Angiogram, Coronary, with Left Heart Cath;  Surgeon: Delfino Castellon MD;  Location: [facility] CATH LAB;  Service: Cardiology;  Laterality: Left;    ANGIOGRAPHY OF LOWER EXTREMITY Left 10/23/2018    Procedure: Angiogram Extremity Unilateral;  Surgeon: Gregor Cunha MD;  Location: [facility] CATH;  Service: Cardiology;  Laterality: Left;  LLE intervention-Will start with 4/5 slender sheath left radial and take diagnostic angio of LLE to determine whether brachial access or tibial access will be required    CATARACT EXTRACTION W/ INTRAOCULAR LENS  IMPLANT, BILATERAL      CATARACT SX Bilateral     CATHETERIZATION OF BOTH LEFT AND RIGHT HEART N/A 7/10/2020    Procedure: CATHETERIZATION, HEART, BOTH LEFT AND RIGHT;  Surgeon: Gregor Cunha MD;  Location: [facility] CATH;  Service: Cardiology;  Laterality: N/A;  LHC + RHC +/- PCI -access left radial artery and left brachial vein    CHOLECYSTECTOMY      COLONOSCOPY N/A 10/7/2015    Procedure: COLONOSCOPY;  Surgeon: Genaro You MD;  Location: [facility] ENDO ([address] [place] [address]);  Service: Endoscopy;  Laterality: N/A;    CORONARY ANGIOPLASTY WITH STENT PLACEMENT      CORONARY ARTERY BYPASS GRAFT      CORONARY BYPASS GRAFT ANGIOGRAPHY  2/14/2022    Procedure: Bypass graft study;  Surgeon: Delfino Castellon MD;  Location: [facility] CATH LAB;   "Service: Cardiology;;    HERNIA REPAIR      LAMINECTOMY      MAGNETIC RESONANCE IMAGING N/A 6/4/2021    Procedure: MRI (MAGNETIC RESONANCE IMAGING) LUMBAR SPINE;  Surgeon: Asa Diagnostic Provider;  Location: [facility];  Service: General;  Laterality: N/A;  In MRI @ 9:10 per Krys, To follow WC RM1    PATELLA SURGERY      PERCUTANEOUS TRANSLUMINAL ANGIOPLASTY (PTA) OF PERIPHERAL VESSEL N/A 9/18/2018    Procedure: PTA, PERIPHERAL BLOOD VESSEL;  Surgeon: Gregor Cunha MD;  Location: [facility] CATH;  Service: Cardiology;  Laterality: N/A;  Site change:  right popliteal artery access using US guidance.    PERCUTANEOUS TRANSLUMINAL ANGIOPLASTY (PTA) OF PERIPHERAL VESSEL Left 7/25/2019    Procedure: PTA, PERIPHERAL VESSEL;  Surgeon: Gregor Cunha MD;  Location: [facility] CATH;  Service: Cardiology;  Laterality: Left;    VASECTOMY         Review of patient's allergies indicates:   Allergen Reactions    Aggrastat concentrate [tirofiban] Shortness Of Breath and Other (See Comments)     Fever and chills    Aggrastat in sodium chloride [tirofiban-0.9% sodium chloride] Shortness Of Breath     fever    Brilinta [ticagrelor] Shortness Of Breath    Cymbalta [duloxetine] Nausea Only    Bupropion Other (See Comments)     "turns into zombie" withdrawn, not talking, outbursts  "turns into zombie", withdrawn, not talking, outbursts.       No current facility-administered medications on file prior to encounter.     Current Outpatient Medications on File Prior to Encounter   Medication Sig    albuterol (PROVENTIL) 2.5 mg /3 mL (0.083 %) nebulizer solution Take 3 mLs (2.5 mg total) by nebulization every 6 (six) hours as needed for Wheezing or Shortness of Breath.    albuterol (VENTOLIN HFA) 90 mcg/actuation inhaler Inhale 2 puffs into the lungs every 6 (six) hours as needed for Wheezing. Rescue    ALPRAZolam (XANAX) 0.5 MG tablet Take 1 tablet (0.5 mg total) by mouth nightly as needed for Anxiety.    aspirin (ECOTRIN) 81 MG EC tablet Take 1 " tablet (81 mg total) by mouth once daily.    fenofibrate (TRICOR) 145 MG tablet Take 1 tablet (145 mg total) by mouth once daily.    furosemide (LASIX) 20 MG tablet Take 20 mg by mouth daily as needed (edema).    gabapentin (NEURONTIN) 300 MG capsule Take 300 mg by mouth 3 (three) times daily as needed (nerve pain).    hydrOXYzine (ATARAX) 50 MG tablet Take 1 tablet (50 mg total) by mouth 3 (three) times daily as needed for Anxiety.    isosorbide mononitrate (IMDUR) 30 MG 24 hr tablet Take 3 tablets (90 mg total) by mouth once daily.    pantoprazole (PROTONIX) 40 MG tablet Take 40 mg by mouth once daily.    predniSONE (DELTASONE) 20 MG tablet 2 a day x 5 days then 1 a day x 5 days    ranolazine (RANEXA) 500 MG Tb12 Take 500 mg by mouth 2 (two) times daily.    rivaroxaban (XARELTO) 2.5 mg Tab Take 1 tablet (2.5 mg total) by mouth daily with dinner or evening meal.    sacubitriL-valsartan (ENTRESTO) 24-26 mg per tablet Take 1 tablet by mouth 2 (two) times daily.    tiotropium bromide (SPIRIVA RESPIMAT) 2.5 mcg/actuation inhaler Inhale 2 puffs into the lungs Daily. Controller    atorvastatin (LIPITOR) 80 MG tablet Take 1 tablet (80 mg total) by mouth once daily. (Patient not taking: Reported on 10/20/2022)    carvediloL (COREG) 6.25 MG tablet Take 1 tablet (6.25 mg total) by mouth 2 (two) times daily.    nitroGLYCERIN (NITROSTAT) 0.4 MG SL tablet Place 1 tablet (0.4 mg total) under the tongue every 5 (five) minutes as needed for Chest pain.    prasugreL (EFFIENT) 10 mg Tab Take 1 tablet (10 mg total) by mouth once daily.    [DISCONTINUED] amLODIPine (NORVASC) 5 MG tablet Take 1 tablet (5 mg total) by mouth once daily.    [DISCONTINUED] lisinopriL (PRINIVIL,ZESTRIL) 20 MG tablet Take 1 tablet (20 mg total) by mouth once daily.    [DISCONTINUED] rosuvastatin (CRESTOR) 20 MG tablet Take 2 tablets (40 mg total) by mouth once daily.     Family History       Problem Relation (Age of Onset)    Heart disease Mother, Father     Lung cancer Brother    No Known Problems Daughter, Daughter, Daughter, Son          Tobacco Use    Smoking status: Every Day     Packs/day: 1.00     Years: 50.00     Pack years: 50.00     Types: Cigarettes    Smokeless tobacco: Never    Tobacco comments:     Currently smoke 1 ppd   Substance and Sexual Activity    Alcohol use: No    Drug use: Yes     Types: Marijuana     Comment: OCCASSIONALLY    Sexual activity: Yes     Partners: Female     Review of Systems   Constitutional:  Positive for fatigue. Negative for chills, fever and unexpected weight change.   HENT:  Negative for sore throat and trouble swallowing.    Respiratory:  Positive for cough, shortness of breath and wheezing.    Cardiovascular:  Negative for chest pain, palpitations and leg swelling.   Gastrointestinal:  Negative for abdominal distention, abdominal pain, diarrhea, nausea and vomiting.   Genitourinary:  Negative for dysuria and hematuria.   Musculoskeletal:  Negative for neck pain and neck stiffness.   Skin:  Negative for rash and wound.   Neurological:  Negative for seizures, syncope, weakness, light-headedness and headaches.   Psychiatric/Behavioral:  Negative for confusion and decreased concentration.    Objective:     Vital Signs (Most Recent):  Temp: 98.4 °F (36.9 °C) (06/04/23 1616)  Pulse: 62 (06/04/23 1732)  Resp: 19 (06/04/23 1735)  BP: 138/65 (06/04/23 1732)  SpO2: 98 % (06/04/23 1732) Vital Signs (24h Range):  Temp:  [98.4 °F (36.9 °C)] 98.4 °F (36.9 °C)  Pulse:  [62-68] 62  Resp:  [16-24] 19  SpO2:  [98 %-100 %] 98 %  BP: (137-154)/(65-70) 138/65     Weight: 117.9 kg (260 lb)  Body mass index is 39.53 kg/m².     Physical Exam  Constitutional:       General: He is not in acute distress.     Appearance: He is obese. He is not toxic-appearing or diaphoretic.   HENT:      Head: Normocephalic and atraumatic.      Nose: Nose normal.   Eyes:      General: No scleral icterus.     Extraocular Movements: Extraocular movements intact.       Pupils: Pupils are equal, round, and reactive to light.   Cardiovascular:      Rate and Rhythm: Normal rate and regular rhythm.   Pulmonary:      Effort: Pulmonary effort is normal. No respiratory distress.      Breath sounds: Wheezing present. No rales.   Abdominal:      General: Abdomen is flat. There is no distension.      Palpations: Abdomen is soft.      Tenderness: There is no abdominal tenderness. There is no guarding.   Musculoskeletal:         General: Normal range of motion.      Cervical back: Normal range of motion and neck supple. No rigidity.      Right lower leg: No edema.      Left lower leg: No edema.   Skin:     General: Skin is warm and dry.      Coloration: Skin is not jaundiced.   Neurological:      General: No focal deficit present.      Mental Status: He is alert and oriented to person, place, and time.      Cranial Nerves: No cranial nerve deficit.   Psychiatric:         Mood and Affect: Mood normal.         Behavior: Behavior normal.            CRANIAL NERVES     CN III, IV, VI   Pupils are equal, round, and reactive to light.     Significant Labs: All pertinent labs within the past 24 hours have been reviewed.  CBC:   Recent Labs   Lab 06/04/23  1647   WBC 6.09   HGB 13.2*   HCT 42.4        CMP:   Recent Labs   Lab 06/04/23  1647      K 3.9      CO2 23   *   BUN 18   CREATININE 1.7*   CALCIUM 9.4   PROT 6.6   ALBUMIN 3.5   BILITOT 1.1*   ALKPHOS 39*   AST 13   ALT 10   ANIONGAP 11     Cardiac Markers:   Recent Labs   Lab 06/04/23  1647   *       Significant Imaging: I have reviewed all pertinent imaging results/findings within the past 24 hours.

## 2023-06-04 NOTE — ED PROVIDER NOTES
"Encounter Date: 6/4/2023       History     Chief Complaint   Patient presents with    Shortness of Breath     Patient states he has hx of CHF and respiratory disease. Has been feeling SOB with no relief from home inhalers. EMS states on arrival he was 94% RA with wheezing noted to left side, right clear. EMS gave 1 duo neb 8 lpm with patient stating he has some relief. His Spo2 is 100% on 8 lpm. No other complains. Denies pain.      73-year-old man with comorbidities of CAD, CKD, COPD on 2 L of oxygen at home as well as dyslipidemia and previous AAA repair presents to the emergency department for evaluation of ongoing shortness of breath with an associated intermittently productive cough with increased oxygen demands at home (using up to 5 LPM due to shortness of breath) despite engaging in 60 minute albuterol nebulizer treatments without associated fever, body aches, or chest pain.  He denies significant lower extremity edema, but does endorse fatigue as well as ongoing anxiety due to his breathlessness.    Review of patient's allergies indicates:   Allergen Reactions    Aggrastat concentrate [tirofiban] Shortness Of Breath and Other (See Comments)     Fever and chills    Aggrastat in sodium chloride [tirofiban-0.9% sodium chloride] Shortness Of Breath     fever    Brilinta [ticagrelor] Shortness Of Breath    Cymbalta [duloxetine] Nausea Only    Bupropion Other (See Comments)     "turns into zombie" withdrawn, not talking, outbursts  "turns into zombie", withdrawn, not talking, outbursts.     Past Medical History:   Diagnosis Date    CAD (coronary artery disease)     Dr Martinez    Carotid artery disease     Chronic kidney disease, stage III (moderate)     COPD (chronic obstructive pulmonary disease)     Depression     Dyslipidemia     Hepatic cyst     Hepatic cyst     High-density lipoprotein deficiency     HTN (hypertension)     Hx of colonic polyps     Insomnia     PVD (peripheral vascular disease)     stented    " S/P AAA repair      Past Surgical History:   Procedure Laterality Date    ABDOMINAL AORTIC ANEURYSM REPAIR      ABDOMINAL AORTIC ANEURYSM REPAIR      ANGIOGRAM, CORONARY, WITH LEFT HEART CATHETERIZATION Left 2/14/2022    Procedure: Angiogram, Coronary, with Left Heart Cath;  Surgeon: Delfino Castellon MD;  Location: [facility] CATH LAB;  Service: Cardiology;  Laterality: Left;    ANGIOGRAPHY OF LOWER EXTREMITY Left 10/23/2018    Procedure: Angiogram Extremity Unilateral;  Surgeon: Gregor Cunha MD;  Location: [facility] CATH;  Service: Cardiology;  Laterality: Left;  LLE intervention-Will start with 4/5 slender sheath left radial and take diagnostic angio of LLE to determine whether brachial access or tibial access will be required    CATARACT EXTRACTION W/ INTRAOCULAR LENS  IMPLANT, BILATERAL      CATARACT SX Bilateral     CATHETERIZATION OF BOTH LEFT AND RIGHT HEART N/A 7/10/2020    Procedure: CATHETERIZATION, HEART, BOTH LEFT AND RIGHT;  Surgeon: Gregor Cunha MD;  Location: [facility] CATH;  Service: Cardiology;  Laterality: N/A;  LHC + RHC +/- PCI -access left radial artery and left brachial vein    CHOLECYSTECTOMY      COLONOSCOPY N/A 10/7/2015    Procedure: COLONOSCOPY;  Surgeon: Genaro You MD;  Location: [facility] ENDO ([address] [place] [address]);  Service: Endoscopy;  Laterality: N/A;    CORONARY ANGIOPLASTY WITH STENT PLACEMENT      CORONARY ARTERY BYPASS GRAFT      CORONARY BYPASS GRAFT ANGIOGRAPHY  2/14/2022    Procedure: Bypass graft study;  Surgeon: Delfino Castellon MD;  Location: [facility] CATH LAB;  Service: Cardiology;;    HERNIA REPAIR      LAMINECTOMY      MAGNETIC RESONANCE IMAGING N/A 6/4/2021    Procedure: MRI (MAGNETIC RESONANCE IMAGING) LUMBAR SPINE;  Surgeon: Asa Diagnostic Provider;  [facility];  Service: General;  Laterality: N/A;  In MRI @ 9:10 per Krys, To follow WC RM1    PATELLA SURGERY      PERCUTANEOUS TRANSLUMINAL ANGIOPLASTY (PTA) OF PERIPHERAL VESSEL N/A 9/18/2018    Procedure: PTA, PERIPHERAL  BLOOD VESSEL;  Surgeon: Gregor Cunha MD;  Location: [facility] CATH;  Service: Cardiology;  Laterality: N/A;  Site change:  right popliteal artery access using US guidance.    PERCUTANEOUS TRANSLUMINAL ANGIOPLASTY (PTA) OF PERIPHERAL VESSEL Left 7/25/2019    Procedure: PTA, PERIPHERAL VESSEL;  Surgeon: Gregor Cunha MD;  Location: [facility] CATH;  Service: Cardiology;  Laterality: Left;    VASECTOMY       Family History   Problem Relation Age of Onset    Heart disease Mother     Heart disease Father     Lung cancer Brother     No Known Problems Daughter     No Known Problems Daughter     No Known Problems Daughter     No Known Problems Son      Social History     Tobacco Use    Smoking status: Every Day     Packs/day: 1.00     Years: 50.00     Pack years: 50.00     Types: Cigarettes    Smokeless tobacco: Never    Tobacco comments:     Currently smoke 1 ppd   Substance Use Topics    Alcohol use: No    Drug use: Yes     Types: Marijuana     Comment: OCCASSIONALLY     Review of Systems    Physical Exam     Initial Vitals [06/04/23 1616]   BP Pulse Resp Temp SpO2   (!) 154/70 68 (!) 24 98.4 °F (36.9 °C) 100 %      MAP       --         Physical Exam    Vitals reviewed.  Constitutional:   73-year-old  man, mild shortness of breath noted with mild accessory muscle use   HENT:   Head: Normocephalic and atraumatic.   Edentulous without evidence of acute intraoral injury   Eyes: EOM are normal. Pupils are equal, round, and reactive to light.   Neck: No tracheal deviation present.   Cardiovascular:  Normal rate and intact distal pulses.           Pulmonary/Chest:   Mild end expiratory wheeze noted at bilateral bases with mild accessory muscle use/ tachypnea; patient is speaking in full sentences   Abdominal: Abdomen is soft. He exhibits no distension. There is no abdominal tenderness.   Musculoskeletal:         General: No edema. Normal range of motion.     Neurological: He is alert and oriented to person, place, and  time. GCS score is 15. GCS eye subscore is 4. GCS verbal subscore is 5. GCS motor subscore is 6.   Skin: Skin is warm and dry.   Psychiatric: His behavior is normal. Thought content normal.       ED Course   Procedures  Labs Reviewed   CBC W/ AUTO DIFFERENTIAL - Abnormal; Notable for the following components:       Result Value    RBC 4.59 (*)     Hemoglobin 13.2 (*)     MCHC 31.1 (*)     RDW 16.9 (*)     Gran % 74.6 (*)     Lymph % 16.3 (*)     All other components within normal limits   COMPREHENSIVE METABOLIC PANEL - Abnormal; Notable for the following components:    Glucose 113 (*)     Creatinine 1.7 (*)     Total Bilirubin 1.1 (*)     Alkaline Phosphatase 39 (*)     eGFR 41.5 (*)     All other components within normal limits   TROPONIN I - Abnormal; Notable for the following components:    Troponin I 0.029 (*)     All other components within normal limits   B-TYPE NATRIURETIC PEPTIDE - Abnormal; Notable for the following components:     (*)     All other components within normal limits   PROCALCITONIN     EKG Readings: (Independently Interpreted)   Initial Reading: No STEMI. Rhythm: Normal Sinus Rhythm. Heart Rate: 72. Ectopy: Frequent PVCs. Axis: Left Axis Deviation.   Mild ST segment flattening noted to lead I     Imaging Results              X-Ray Chest AP Portable (Final result)  Result time 06/04/23 17:50:55      Final result by Jakob Solorzano DO (06/04/23 17:50:55)                   Impression:      Cardiomegaly and findings compatible with pulmonary edema/CHF      Electronically signed by: Jakob Solorzano  Date:    06/04/2023  Time:    17:50               Narrative:    EXAMINATION:  XR CHEST AP PORTABLE    CLINICAL HISTORY:  shortness of breath;    TECHNIQUE:  Single frontal view of the chest was performed.    COMPARISON:  10/11/2022.    FINDINGS:  The lungs are well expanded.  There are bilateral interstitial opacities, slightly progressed from prior.  The pleural spaces are clear.  The cardiac  silhouette is enlarged.  There are calcifications of the aortic arch.  The visualized osseous structures demonstrate degenerative changes.  There are median sternotomy wires.                                       Medications   cefTRIAXone (ROCEPHIN) 2 g in dextrose 5 % in water (D5W) 5 % 50 mL IVPB (MB+) (2 g Intravenous New Bag 6/4/23 1821)   azithromycin (ZITHROMAX) 500 mg in dextrose 5 % (D5W) 250 mL IVPB (Vial-Mate) (has no administration in time range)   ALPRAZolam tablet 0.5 mg (has no administration in time range)   aspirin EC tablet 81 mg (has no administration in time range)   atorvastatin tablet 80 mg (has no administration in time range)   carvediloL tablet 6.25 mg (has no administration in time range)   gabapentin capsule 300 mg (has no administration in time range)   isosorbide mononitrate 24 hr tablet 90 mg (has no administration in time range)   pantoprazole EC tablet 40 mg (has no administration in time range)   prasugreL tablet 10 mg (has no administration in time range)   clopidogreL tablet 75 mg (has no administration in time range)   ranolazine 12 hr tablet 500 mg (has no administration in time range)   rivaroxaban tablet 2.5 mg (has no administration in time range)   sacubitriL-valsartan 24-26 mg per tablet 1 tablet (has no administration in time range)   sodium chloride 0.9% flush 3 mL (has no administration in time range)   predniSONE tablet 40 mg (has no administration in time range)   albuterol-ipratropium 2.5 mg-0.5 mg/3 mL nebulizer solution 3 mL (has no administration in time range)   cefTRIAXone (ROCEPHIN) 1 g in dextrose 5 % in water (D5W) 5 % 50 mL IVPB (MB+) (has no administration in time range)     And   azithromycin tablet 500 mg (has no administration in time range)   melatonin tablet 6 mg (has no administration in time range)   ondansetron injection 4 mg (has no administration in time range)   acetaminophen tablet 1,000 mg (has no administration in time range)   oxyCODONE  immediate release tablet 5 mg (has no administration in time range)   furosemide tablet 20 mg (has no administration in time range)   methylPREDNISolone sodium succinate injection 125 mg (125 mg Intravenous Given 6/4/23 1653)   furosemide injection 40 mg (40 mg Intravenous Given 6/4/23 1821)     Medical Decision Making:   History:   Old Medical Records: I decided to obtain old medical records.  Old Records Summarized: records from clinic visits.  Differential Diagnosis:   COPD exacerbation, CHF exacerbation, pneumonia, electrolyte derangement, a KI, pneumonia  Clinical Tests:   Lab Tests: Ordered and Reviewed  Radiological Study: Reviewed and Ordered  Medical Tests: Ordered and Reviewed  Other:   I have discussed this case with another health care provider.          Attending Attestation:             Attending ED Notes:   Laboratory evaluation does not reveal evidence of significant leukocytosis but does identify a mild granulocytosis of approximate 75%.  Metabolic profile reveals a mild acute kidney injury with a creatinine of 1.7 up from a baseline proximally 1.3 without additional evidence of significant solid organ dysfunction.  The serum troponin is notably minimally elevated at 0.029 with an EKG that does not reveal evidence of jose luis injury pattern or acute arrhythmia.  The serum BNP is notably elevated at 886 with evidence of pulmonary edema noted on chest x-ray.  Minimal wheezing noted on exam with improved work of breathing with supplemental oxygen.  40 mg of furosemide has been administered intravenously, and following blood cultures IV Rocephin and azithromycin have been administered for potential occult lower respiratory infection.  Findings and concerns have been discussed with Hospital Medicine, and the patient will be placed in observation under their care in fair condition for further therapy and management.                 Clinical Impression:   Final diagnoses:  [R06.02] Shortness of  breath  [N17.9] CHAUNCEY (acute kidney injury) (Primary)  [J44.1] COPD with acute exacerbation  [I50.23] Acute on chronic systolic congestive heart failure  [J96.20] Acute on chronic respiratory failure        ED Disposition Condition    Observation                 Ricardo Lawson MD  06/04/23 2679       Ricardo Lawson MD  06/04/23 7275

## 2023-06-04 NOTE — Clinical Note
Diagnosis: Acute on chronic respiratory failure [[id number]]   Future Attending Provider: KECIA COLLADO [[id number]]   Is the patient being sent to ED Observation?: No   Requested Bed Type: Standard [1]   Admitting Provider:: KECIA COLLADO [[id number]]   Special Needs:: No Special Needs [1]

## 2023-06-04 NOTE — HPI
This is a 72 yo male with a past medical history of COPD on 2-3L home O2, HFrEF (35% with G3DD), CAD (s/p PCI), PAD on xarelto, HTN, HLD, CKD3a, GERD, tobacco abuse, and obesity who presents to the ED with chief complaint of shortness of breath and wheezing. Patient reports that for the last 1-2 weeks he has been having significant shortness of breath and wheezing. He has been using home nebs regularly without relief and has had to up titrate home O2 to 5L. He does still smoke but states has not really smoked since worsening SOB onset. He takes lasix 20mg prn at home and has taken it once or twice during this time. Does report productive cough. Denies fevers, chills, chest pain, lower extremity edema, abdominal pain, confusion, weakness.    In the ED patient afebrile and hemodynamically with hypoxia on home O2 with improved saturations on 8L high flow NC. Bilateral wheezing.  and CXR with come evidence of CHF exacerbation though clinically patient appears euvolemic and suspect primary etiology of symptoms due to COPD exacerbation. Patient started on iv solumedrol and duonebs and given one dose of iv lasix. Admitted to the care of medicine for further evaluation and management.

## 2023-06-04 NOTE — ED TRIAGE NOTES
Patient comes into the emergency department by EMS with complaints of SOB. Patient states that hx of COPD/CHF, on 2-3L O2 at home but has been using 6L NC. Pt states he's running out of his supply for neb treatments. States he is unable to catch his breath and it scares him. Pt denies any other symptoms.

## 2023-06-04 NOTE — ASSESSMENT & PLAN NOTE
- IP COPD Pathway initiated  - on 2L home O2  ;  Currently saturating well on 8L high flow  - solumedrol 125mg iv once in ED  - start prednisone 40mg po daily  - Duonebs q6h while awake  - start ceftriaxone and azithromycin  - counseled on smoking cessation  - monitor pulseOx and supplemental O2 as needed  - further management pending clinical course and future study review

## 2023-06-05 PROBLEM — I50.43 ACUTE ON CHRONIC COMBINED SYSTOLIC AND DIASTOLIC HEART FAILURE: Status: ACTIVE | Noted: 2022-10-11

## 2023-06-05 LAB
ANION GAP SERPL CALC-SCNC: 11 MMOL/L (ref 8–16)
ASCENDING AORTA: 4.48 CM
AV INDEX (PROSTH): 0.72
AV MEAN GRADIENT: 8 MMHG
AV PEAK GRADIENT: 17 MMHG
AV VALVE AREA: 3.28 CM2
AV VELOCITY RATIO: 0.66
BASOPHILS # BLD AUTO: 0.01 K/UL (ref 0–0.2)
BASOPHILS NFR BLD: 0.3 % (ref 0–1.9)
BSA FOR ECHO PROCEDURE: 2.38 M2
BUN SERPL-MCNC: 17 MG/DL (ref 8–23)
CALCIUM SERPL-MCNC: 9.8 MG/DL (ref 8.7–10.5)
CHLORIDE SERPL-SCNC: 105 MMOL/L (ref 95–110)
CO2 SERPL-SCNC: 27 MMOL/L (ref 23–29)
CREAT SERPL-MCNC: 1.6 MG/DL (ref 0.5–1.4)
CV ECHO LV RWT: 0.29 CM
DIFFERENTIAL METHOD: ABNORMAL
DOP CALC AO PEAK VEL: 2.07 M/S
DOP CALC AO VTI: 43.6 CM
DOP CALC LVOT AREA: 4.6 CM2
DOP CALC LVOT DIAMETER: 2.41 CM
DOP CALC LVOT PEAK VEL: 1.36 M/S
DOP CALC LVOT STROKE VOLUME: 142.84 CM3
DOP CALCLVOT PEAK VEL VTI: 31.33 CM
E WAVE DECELERATION TIME: 239.79 MSEC
E/A RATIO: 2.1
E/E' RATIO: 15.54 M/S
ECHO LV POSTERIOR WALL: 1.03 CM (ref 0.6–1.1)
EJECTION FRACTION: 40 %
EOSINOPHIL # BLD AUTO: 0 K/UL (ref 0–0.5)
EOSINOPHIL NFR BLD: 0.3 % (ref 0–8)
ERYTHROCYTE [DISTWIDTH] IN BLOOD BY AUTOMATED COUNT: 16.8 % (ref 11.5–14.5)
EST. GFR  (NO RACE VARIABLE): 44.7 ML/MIN/1.73 M^2
FRACTIONAL SHORTENING: 11 % (ref 28–44)
GLUCOSE SERPL-MCNC: 150 MG/DL (ref 70–110)
HCT VFR BLD AUTO: 42.7 % (ref 40–54)
HGB BLD-MCNC: 13.1 G/DL (ref 14–18)
IMM GRANULOCYTES # BLD AUTO: 0.01 K/UL (ref 0–0.04)
IMM GRANULOCYTES NFR BLD AUTO: 0.3 % (ref 0–0.5)
INTERVENTRICULAR SEPTUM: 1.03 CM (ref 0.6–1.1)
LA MAJOR: 6.2 CM
LA MINOR: 6 CM
LA WIDTH: 5.13 CM
LEFT ATRIUM SIZE: 5.78 CM
LEFT ATRIUM VOLUME INDEX MOD: 49.5 ML/M2
LEFT ATRIUM VOLUME INDEX: 67.1 ML/M2
LEFT ATRIUM VOLUME MOD: 113.34 CM3
LEFT ATRIUM VOLUME: 153.7 CM3
LEFT INTERNAL DIMENSION IN SYSTOLE: 6.3 CM (ref 2.1–4)
LEFT VENTRICLE DIASTOLIC VOLUME INDEX: 106.77 ML/M2
LEFT VENTRICLE DIASTOLIC VOLUME: 244.5 ML
LEFT VENTRICLE MASS INDEX: 149 G/M2
LEFT VENTRICLE SYSTOLIC VOLUME INDEX: 79.2 ML/M2
LEFT VENTRICLE SYSTOLIC VOLUME: 181.46 ML
LEFT VENTRICULAR INTERNAL DIMENSION IN DIASTOLE: 7.1 CM (ref 3.5–6)
LEFT VENTRICULAR MASS: 342.27 G
LV LATERAL E/E' RATIO: 12.63 M/S
LV SEPTAL E/E' RATIO: 20.2 M/S
LYMPHOCYTES # BLD AUTO: 0.4 K/UL (ref 1–4.8)
LYMPHOCYTES NFR BLD: 11.2 % (ref 18–48)
MAGNESIUM SERPL-MCNC: 1.7 MG/DL (ref 1.6–2.6)
MCH RBC QN AUTO: 28.3 PG (ref 27–31)
MCHC RBC AUTO-ENTMCNC: 30.7 G/DL (ref 32–36)
MCV RBC AUTO: 92 FL (ref 82–98)
MONOCYTES # BLD AUTO: 0.1 K/UL (ref 0.3–1)
MONOCYTES NFR BLD: 1.3 % (ref 4–15)
MV A" WAVE DURATION": 8.37 MSEC
MV PEAK A VEL: 0.48 M/S
MV PEAK E VEL: 1.01 M/S
NEUTROPHILS # BLD AUTO: 3.3 K/UL (ref 1.8–7.7)
NEUTROPHILS NFR BLD: 86.6 % (ref 38–73)
NRBC BLD-RTO: 0 /100 WBC
PHOSPHATE SERPL-MCNC: 1.9 MG/DL (ref 2.7–4.5)
PISA MRMAX VEL: 0.05 M/S
PISA TR MAX VEL: 2.76 M/S
PLATELET # BLD AUTO: 169 K/UL (ref 150–450)
PMV BLD AUTO: 12.4 FL (ref 9.2–12.9)
POTASSIUM SERPL-SCNC: 3.8 MMOL/L (ref 3.5–5.1)
PULM VEIN S/D RATIO: 0.52
PV PEAK D VEL: 0.6 M/S
PV PEAK S VEL: 0.31 M/S
RA MAJOR: 5.52 CM
RA PRESSURE: 8 MMHG
RA WIDTH: 4.3 CM
RBC # BLD AUTO: 4.63 M/UL (ref 4.6–6.2)
RIGHT VENTRICULAR END-DIASTOLIC DIMENSION: 4.34 CM
RV TISSUE DOPPLER FREE WALL SYSTOLIC VELOCITY 1 (APICAL 4 CHAMBER VIEW): 13 CM/S
SINUS: 3.05 CM
SODIUM SERPL-SCNC: 143 MMOL/L (ref 136–145)
STJ: 3.81 CM
TDI LATERAL: 0.08 M/S
TDI SEPTAL: 0.05 M/S
TDI: 0.07 M/S
TR MAX PG: 30 MMHG
TRICUSPID ANNULAR PLANE SYSTOLIC EXCURSION: 2 CM
TROPONIN I SERPL DL<=0.01 NG/ML-MCNC: 0.03 NG/ML (ref 0–0.03)
TV REST PULMONARY ARTERY PRESSURE: 38 MMHG
WBC # BLD AUTO: 3.85 K/UL (ref 3.9–12.7)

## 2023-06-05 PROCEDURE — 36415 COLL VENOUS BLD VENIPUNCTURE: CPT

## 2023-06-05 PROCEDURE — 84484 ASSAY OF TROPONIN QUANT: CPT

## 2023-06-05 PROCEDURE — 94640 AIRWAY INHALATION TREATMENT: CPT

## 2023-06-05 PROCEDURE — 36415 COLL VENOUS BLD VENIPUNCTURE: CPT | Performed by: FAMILY MEDICINE

## 2023-06-05 PROCEDURE — 63700000 PHARM REV CODE 250 ALT 637 W/O HCPCS: Performed by: FAMILY MEDICINE

## 2023-06-05 PROCEDURE — 25000242 PHARM REV CODE 250 ALT 637 W/ HCPCS: Performed by: FAMILY MEDICINE

## 2023-06-05 PROCEDURE — 99233 PR SUBSEQUENT HOSPITAL CARE,LEVL III: ICD-10-PCS | Mod: ,,,

## 2023-06-05 PROCEDURE — 99233 SBSQ HOSP IP/OBS HIGH 50: CPT | Mod: ,,,

## 2023-06-05 PROCEDURE — 25000003 PHARM REV CODE 250

## 2023-06-05 PROCEDURE — 25000003 PHARM REV CODE 250: Performed by: FAMILY MEDICINE

## 2023-06-05 PROCEDURE — 63600175 PHARM REV CODE 636 W HCPCS: Performed by: FAMILY MEDICINE

## 2023-06-05 PROCEDURE — 27000221 HC OXYGEN, UP TO 24 HOURS

## 2023-06-05 PROCEDURE — 85025 COMPLETE CBC W/AUTO DIFF WBC: CPT | Performed by: FAMILY MEDICINE

## 2023-06-05 PROCEDURE — 63600175 PHARM REV CODE 636 W HCPCS

## 2023-06-05 PROCEDURE — 94761 N-INVAS EAR/PLS OXIMETRY MLT: CPT

## 2023-06-05 PROCEDURE — 84100 ASSAY OF PHOSPHORUS: CPT | Performed by: FAMILY MEDICINE

## 2023-06-05 PROCEDURE — 99900031 HC PATIENT EDUCATION (STAT)

## 2023-06-05 PROCEDURE — 99900035 HC TECH TIME PER 15 MIN (STAT)

## 2023-06-05 PROCEDURE — G0378 HOSPITAL OBSERVATION PER HR: HCPCS

## 2023-06-05 PROCEDURE — 80048 BASIC METABOLIC PNL TOTAL CA: CPT | Performed by: FAMILY MEDICINE

## 2023-06-05 PROCEDURE — 96375 TX/PRO/DX INJ NEW DRUG ADDON: CPT

## 2023-06-05 PROCEDURE — 83735 ASSAY OF MAGNESIUM: CPT | Performed by: FAMILY MEDICINE

## 2023-06-05 PROCEDURE — 25500020 PHARM REV CODE 255: Performed by: HOSPITALIST

## 2023-06-05 RX ORDER — FUROSEMIDE 10 MG/ML
40 INJECTION INTRAMUSCULAR; INTRAVENOUS
Status: DISCONTINUED | OUTPATIENT
Start: 2023-06-05 | End: 2023-06-06

## 2023-06-05 RX ORDER — SODIUM,POTASSIUM PHOSPHATES 280-250MG
1 POWDER IN PACKET (EA) ORAL
Status: COMPLETED | OUTPATIENT
Start: 2023-06-05 | End: 2023-06-05

## 2023-06-05 RX ADMIN — RANOLAZINE 500 MG: 500 TABLET, EXTENDED RELEASE ORAL at 09:06

## 2023-06-05 RX ADMIN — POTASSIUM & SODIUM PHOSPHATES POWDER PACK 280-160-250 MG 1 PACKET: 280-160-250 PACK at 06:06

## 2023-06-05 RX ADMIN — ISOSORBIDE MONONITRATE 90 MG: 60 TABLET, EXTENDED RELEASE ORAL at 08:06

## 2023-06-05 RX ADMIN — ATORVASTATIN CALCIUM 80 MG: 40 TABLET, FILM COATED ORAL at 08:06

## 2023-06-05 RX ADMIN — FUROSEMIDE 20 MG: 20 TABLET ORAL at 08:06

## 2023-06-05 RX ADMIN — RANOLAZINE 500 MG: 500 TABLET, EXTENDED RELEASE ORAL at 08:06

## 2023-06-05 RX ADMIN — HUMAN ALBUMIN MICROSPHERES AND PERFLUTREN 0.11 MG: 10; .22 INJECTION, SOLUTION INTRAVENOUS at 02:06

## 2023-06-05 RX ADMIN — POTASSIUM & SODIUM PHOSPHATES POWDER PACK 280-160-250 MG 1 PACKET: 280-160-250 PACK at 12:06

## 2023-06-05 RX ADMIN — PREDNISONE 40 MG: 20 TABLET ORAL at 08:06

## 2023-06-05 RX ADMIN — IPRATROPIUM BROMIDE AND ALBUTEROL SULFATE 3 ML: .5; 3 SOLUTION RESPIRATORY (INHALATION) at 07:06

## 2023-06-05 RX ADMIN — SACUBITRIL AND VALSARTAN 1 TABLET: 24; 26 TABLET, FILM COATED ORAL at 09:06

## 2023-06-05 RX ADMIN — SACUBITRIL AND VALSARTAN 1 TABLET: 24; 26 TABLET, FILM COATED ORAL at 08:06

## 2023-06-05 RX ADMIN — CARVEDILOL 6.25 MG: 6.25 TABLET, FILM COATED ORAL at 09:06

## 2023-06-05 RX ADMIN — POTASSIUM & SODIUM PHOSPHATES POWDER PACK 280-160-250 MG 1 PACKET: 280-160-250 PACK at 09:06

## 2023-06-05 RX ADMIN — RIVAROXABAN 2.5 MG: 2.5 TABLET, FILM COATED ORAL at 06:06

## 2023-06-05 RX ADMIN — AZITHROMYCIN MONOHYDRATE 500 MG: 250 TABLET ORAL at 08:06

## 2023-06-05 RX ADMIN — POTASSIUM & SODIUM PHOSPHATES POWDER PACK 280-160-250 MG 1 PACKET: 280-160-250 PACK at 08:06

## 2023-06-05 RX ADMIN — PANTOPRAZOLE SODIUM 40 MG: 40 TABLET, DELAYED RELEASE ORAL at 08:06

## 2023-06-05 RX ADMIN — CLOPIDOGREL BISULFATE 75 MG: 75 TABLET ORAL at 08:06

## 2023-06-05 RX ADMIN — ASPIRIN 81 MG: 81 TABLET, COATED ORAL at 08:06

## 2023-06-05 RX ADMIN — FUROSEMIDE 40 MG: 10 INJECTION, SOLUTION INTRAMUSCULAR; INTRAVENOUS at 12:06

## 2023-06-05 NOTE — ASSESSMENT & PLAN NOTE
- ECHO 03/2022  EF 35% with Grade III LVDD  -  and CXR with some concern for CHF exacerbation  - lasix 60mg iv once tonight  - plan to resume home lasix 20mg po daily in am  - low sodium fluid restricted diet  - strict I/Os  - daily weights  - tele monitor  - continue home DAPT, statin, BB, entresto, and imdur  - ECHO pending

## 2023-06-05 NOTE — RESPIRATORY THERAPY
RAPID RESPONSE RESPIRATORY THERAPY PROACTIVE ROUNDING NOTE             Time of visit: 820     Code Status: Full Code   : 1950  Bed: 8092/8092 A:   MRN: [id number]  Time spent at the bedside: < 15 min    SITUATION    Evaluated patient for: HFNC Compliance     BACKGROUND    Patient has a past medical history of CAD (coronary artery disease), Carotid artery disease, Chronic kidney disease, stage III (moderate), COPD (chronic obstructive pulmonary disease), Depression, Dyslipidemia, Hepatic cyst, Hepatic cyst, High-density lipoprotein deficiency, HTN (hypertension), colonic polyps, Insomnia, PVD (peripheral vascular disease), and S/P AAA repair.    24 Hours Vitals Range:  Temp:  [97.6 °F (36.4 °C)-98.4 °F (36.9 °C)]   Pulse:  [53-74]   Resp:  [16-24]   BP: (113-169)/(51-70)   SpO2:  [96 %-100 %]     Labs:    Recent Labs     23  1647 23  0308    143   K 3.9 3.8    105   CO2 23 27   CREATININE 1.7* 1.6*   * 150*   PHOS  --  1.9*   MG  --  1.7        No results for input(s): PH, PCO2, PO2, HCO3, POCSATURATED, BE in the last 72 hours.    ASSESSMENT/INTERVENTIONS    Patient resting comfortably. No respiratory concerns at this time.    Last VS   Temp: 97.6 °F (36.4 °C) (809)  Pulse: 54 (829)  Resp: 16 (829)  BP: 127/61 (809)  SpO2: 99 % (829)    Level of Consciousness: Level of Consciousness (AVPU): alert  Respiratory Effort: Respiratory Effort: Normal, Unlabored Expansion/Accessory Muscle Usage: Expansion/Accessory Muscles/Retractions: no use of accessory muscles, no retractions, expansion symmetric  All Lung Field Breath Sounds: All Lung Fields Breath Sounds: Anterior:, Lateral:, diminished  LLL Breath Sounds: diminished  RUL Breath Sounds: diminished  RML Breath Sounds: diminished  RLL Breath Sounds: diminished  O2 Device/Concentration: 3LNC  Was the O2 device able to be weaned? Yes  Ambu at bedside:      Active Orders   Respiratory Care    Inhalation  Treatment Q6H WAKE     Frequency: Q6H WAKE     Number of Occurrences: Until Specified    Oxygen Continuous     Frequency: Continuous     Number of Occurrences: Until Specified     Order Questions:      Device type: High flow      Device: High Flow Nasal Cannula (6 -15 Liters)      LPM: 6-10      Titrate O2 per Oxygen Titration Protocol: Yes      To maintain SpO2 goal of: >= 88%      Notify MD of: Inability to achieve desired SpO2; Sudden change in patient status and requires 20% increase in FiO2; Patient requires >60% FiO2    Pulse Oximetry Q4H     Frequency: Q4H     Number of Occurrences: Until Specified     Order Comments: Q4h with Vitals. Notify MD if < or = 88%      SMOKING CESSATION EDUCATION PER RESPIRATORY Once     Frequency: Once     Number of Occurrences: 1 Occurrences       RECOMMENDATIONS    We recommend: RRT Recs: Continue POC per primary team.      FOLLOW-UP    Please call back the Rapid Response RT, Maru Roa RRT at x [phone] for any questions or concerns.

## 2023-06-05 NOTE — ASSESSMENT & PLAN NOTE
- hx of pulmonary emphysema noted  - IP COPD Pathway initiated- d/c  - on 2L home O2  ;  Currently saturating well on 8L high flow>> 96% on 3L  - solumedrol 125mg iv once in ED  - start prednisone 40mg po daily- d/c  - Duonebs q6h PRN  - start ceftriaxone and azithromycin- d/c  - counseled on smoking cessation  - monitor pulseOx and supplemental O2 as needed

## 2023-06-05 NOTE — ASSESSMENT & PLAN NOTE
- Baseline creatinine appears to be around 1.4  - avoid nephrotoxic agents as appropriate  - continue to monitor

## 2023-06-05 NOTE — ASSESSMENT & PLAN NOTE
Improving  Patient with Hypoxic Respiratory failure which is Acute on chronic.  he is on home oxygen at 2 LPM. Supplemental oxygen was provided and noted-      Signs/symptoms of respiratory failure include- tachypnea, increased work of breathing and wheezing. Contributing diagnoses includes - CHF and COPD Labs and images were reviewed. Patient Has not had a recent ABG. Will treat underlying causes and adjust management of respiratory failure as follows:    - suspect predominantly due to CHF with some mild pulmonary edema/effusions in setting of HFrEF  - steroids, duonebs, abx as above- d/c  - lasix iv x1, will continue further diuresis   - further management as above

## 2023-06-05 NOTE — NURSING
..Nurses Note -- 4 Eyes      6/5/2023   1:14 AM      Skin assessed during: Admit      [x] No Altered Skin Integrity Present    []Prevention Measures Documented      [] Yes- Altered Skin Integrity Present or Discovered   [] LDA Added if Not in Epic (Describe Wound)   [] New Altered Skin Integrity was Present on Admit and Documented in LDA   [] Wound Image Taken    Wound Care Consulted? No    Attending Nurse:  Melina Sherman RN     Second RN/Staff Member:  Renny Fernando RN

## 2023-06-05 NOTE — PLAN OF CARE
Problem: Adjustment to Illness COPD (Chronic Obstructive Pulmonary Disease)  Goal: Optimal Chronic Illness Coping  Outcome: Ongoing, Progressing     Problem: Infection COPD (Chronic Obstructive Pulmonary Disease)  Goal: Absence of Infection Signs and Symptoms  Outcome: Ongoing, Progressing     Problem: Oral Intake Inadequate COPD (Chronic Obstructive Pulmonary Disease)  Goal: Improved Nutrition Intake  Outcome: Ongoing, Progressing

## 2023-06-05 NOTE — NURSING
End of shift note    AAOx4  3L NC  IV lasix  Uneventful shift  VSS  NADN- safety checks performed  Call light in reach

## 2023-06-05 NOTE — ASSESSMENT & PLAN NOTE
Dangers of cigarette smoking were reviewed with patient in detail for 10 minutes and patient was encouraged to quit. Nicotine replacement options were discussed.  - pt declines smoking cessation at this time

## 2023-06-05 NOTE — SUBJECTIVE & OBJECTIVE
Interval History: NAEON. AF, VSS. Patient seen and evaluated by me. States he is feeling much better and feels his breathing has improved. Denies increased work of breathing, worsening SOB, and chest pain. He is comfortable on 2-3L of O2 but states he normally will use 5-6 L at time at home. Only uses his home diuretic as needed. Counseled on smoking cessation but patient states that he does not wish to stop at this time.     Review of Systems   Constitutional:  Negative for chills and fever.   Respiratory:  Positive for cough, shortness of breath (improving) and wheezing. Negative for chest tightness.    Cardiovascular:  Negative for chest pain and leg swelling.   Gastrointestinal:  Negative for abdominal pain and nausea.   Neurological:  Negative for dizziness and weakness.   Objective:     Vital Signs (Most Recent):  Temp: 97.6 °F (36.4 °C) (06/05/23 1124)  Pulse: 60 (06/05/23 1142)  Resp: 18 (06/05/23 1124)  BP: (!) 142/67 (06/05/23 1124)  SpO2: 96 % (06/05/23 1124) Vital Signs (24h Range):  Temp:  [97.6 °F (36.4 °C)-98.4 °F (36.9 °C)] 97.6 °F (36.4 °C)  Pulse:  [53-74] 60  Resp:  [16-24] 18  SpO2:  [96 %-100 %] 96 %  BP: (113-169)/(51-70) 142/67     Weight: 117.9 kg (260 lb)  Body mass index is 39.53 kg/m².    Intake/Output Summary (Last 24 hours) at 6/5/2023 1248  Last data filed at 6/4/2023 2052  Gross per 24 hour   Intake 47.51 ml   Output 1000 ml   Net -952.49 ml         Physical Exam  Vitals and nursing note reviewed.   Constitutional:       Appearance: He is well-developed.   Eyes:      Pupils: Pupils are equal, round, and reactive to light.   Cardiovascular:      Rate and Rhythm: Normal rate and regular rhythm.   Pulmonary:      Effort: Pulmonary effort is normal.      Breath sounds: Wheezing and rales present.      Comments: End expiratory wheezing  Abdominal:      Palpations: Abdomen is soft.      Tenderness: There is no abdominal tenderness.   Musculoskeletal:         General: No tenderness.   Skin:      General: Skin is warm and dry.   Neurological:      Mental Status: He is alert and oriented to person, place, and time.   Psychiatric:         Behavior: Behavior normal.           Significant Labs: All pertinent labs within the past 24 hours have been reviewed.    Significant Imaging: I have reviewed all pertinent imaging results/findings within the past 24 hours.

## 2023-06-05 NOTE — ASSESSMENT & PLAN NOTE
- ECHO 03/2022  EF 35% with Grade III LVDD  -  and CXR with some concern for CHF exacerbation  - lasix 60mg iv once tonight  - continue lasix 40 mg IV BID  - low sodium fluid restricted diet  - strict I/Os  - daily weights  - tele monitor  - continue home DAPT, statin, BB, entresto, and imdur  - repeat ECHO pending

## 2023-06-05 NOTE — ASSESSMENT & PLAN NOTE
Patient with Hypoxic Respiratory failure which is Acute on chronic.  he is on home oxygen at 2 LPM. Supplemental oxygen was provided and noted-      .   Signs/symptoms of respiratory failure include- tachypnea, increased work of breathing and wheezing. Contributing diagnoses includes - CHF and COPD Labs and images were reviewed. Patient Has not had a recent ABG. Will treat underlying causes and adjust management of respiratory failure as follows:    - suspect predominantly due to COPD with some mild pulmonary edema/effusions in setting of HFrEF  - steroids, duonebs, abx as above  - lasix iv x1 and then resume daily po  - further management as above

## 2023-06-05 NOTE — H&P
[facility] - Emergency Dept  [facility] Medicine  History & Physical    Patient Name: Lonnie Taylor  MRN: [id number]  Patient Class: OP- Observation  Admission Date: 6/4/2023  Attending Physician: Donal Holley MD   Primary Care Provider: Isiah You MD         Patient information was obtained from patient, past medical records and ER records.     Subjective:     Principal Problem:COPD exacerbation    Chief Complaint:   Chief Complaint   Patient presents with    Shortness of Breath     Patient states he has hx of CHF and respiratory disease. Has been feeling SOB with no relief from home inhalers. EMS states on arrival he was 94% RA with wheezing noted to left side, right clear. EMS gave 1 duo neb 8 lpm with patient stating he has some relief. His Spo2 is 100% on 8 lpm. No other complains. Denies pain.         HPI: This is a 72 yo male with a past medical history of COPD on 2-3L home O2, HFrEF (35% with G3DD), CAD (s/p PCI), PAD on xarelto, HTN, HLD, CKD3a, GERD, tobacco abuse, and obesity who presents to the ED with chief complaint of shortness of breath and wheezing. Patient reports that for the last 1-2 weeks he has been having significant shortness of breath and wheezing. He has been using home nebs regularly without relief and has had to up titrate home O2 to 5L. He does still smoke but states has not really smoked since worsening SOB onset. He takes lasix 20mg prn at home and has taken it once or twice during this time. Does report productive cough. Denies fevers, chills, chest pain, lower extremity edema, abdominal pain, confusion, weakness.    In the ED patient afebrile and hemodynamically with hypoxia on home O2 with improved saturations on 8L high flow NC. Bilateral wheezing.  and CXR with come evidence of CHF exacerbation though clinically patient appears euvolemic and suspect primary etiology of symptoms due to COPD exacerbation. Patient started on iv solumedrol and duonebs and given one dose  of iv lasix. Admitted to the [facility] of medicine for further evaluation and management.       Past Medical History:   Diagnosis Date    CAD (coronary artery disease)     Dr Martinez    Carotid artery disease     Chronic kidney disease, stage III (moderate)     COPD (chronic obstructive pulmonary disease)     Depression     Dyslipidemia     Hepatic cyst     Hepatic cyst     High-density lipoprotein deficiency     HTN (hypertension)     Hx of colonic polyps     Insomnia     PVD (peripheral vascular disease)     stented    S/P AAA repair        Past Surgical History:   Procedure Laterality Date    ABDOMINAL AORTIC ANEURYSM REPAIR      ABDOMINAL AORTIC ANEURYSM REPAIR      ANGIOGRAM, CORONARY, WITH LEFT HEART CATHETERIZATION Left 2/14/2022    Procedure: Angiogram, Coronary, with Left Heart Cath;  Surgeon: Delfino Castellon MD;  Location: [facility] CATH LAB;  Service: Cardiology;  Laterality: Left;    ANGIOGRAPHY OF LOWER EXTREMITY Left 10/23/2018    Procedure: Angiogram Extremity Unilateral;  Surgeon: Gregor Cunha MD;  Location: [facility] CATH;  Service: Cardiology;  Laterality: Left;  LLE intervention-Will start with 4/5 slender sheath left radial and take diagnostic angio of LLE to determine whether brachial access or tibial access will be required    CATARACT EXTRACTION W/ INTRAOCULAR LENS  IMPLANT, BILATERAL      CATARACT SX Bilateral     CATHETERIZATION OF BOTH LEFT AND RIGHT HEART N/A 7/10/2020    Procedure: CATHETERIZATION, HEART, BOTH LEFT AND RIGHT;  Surgeon: Gregor Cunha MD;  Location: [facility] CATH;  Service: Cardiology;  Laterality: N/A;  LHC + RHC +/- PCI -access left radial artery and left brachial vein    CHOLECYSTECTOMY      COLONOSCOPY N/A 10/7/2015    Procedure: COLONOSCOPY;  Surgeon: Genaro You MD;  [facility]  Service: Endoscopy;  Laterality: N/A;    CORONARY ANGIOPLASTY WITH STENT PLACEMENT      CORONARY ARTERY BYPASS GRAFT      CORONARY BYPASS GRAFT  "ANGIOGRAPHY  2/14/2022    Procedure: Bypass graft study;  Surgeon: Delfino Castellon MD;  Location: [facility] CATH LAB;  Service: Cardiology;;    HERNIA REPAIR      LAMINECTOMY      MAGNETIC RESONANCE IMAGING N/A 6/4/2021    Procedure: MRI (MAGNETIC RESONANCE IMAGING) LUMBAR SPINE;  Surgeon: Asa Diagnostic Provider;  Location: [facility] MARLA;  Service: General;  Laterality: N/A;  In MRI @ 9:10 per Krys, To follow WC RM1    PATELLA SURGERY      PERCUTANEOUS TRANSLUMINAL ANGIOPLASTY (PTA) OF PERIPHERAL VESSEL N/A 9/18/2018    Procedure: PTA, PERIPHERAL BLOOD VESSEL;  Surgeon: Gregor Cunha MD;  Location: [facility] CATH;  Service: Cardiology;  Laterality: N/A;  Site change:  right popliteal artery access using US guidance.    PERCUTANEOUS TRANSLUMINAL ANGIOPLASTY (PTA) OF PERIPHERAL VESSEL Left 7/25/2019    Procedure: PTA, PERIPHERAL VESSEL;  Surgeon: Gregor Cunha MD;  Location: [facility] CATH;  Service: Cardiology;  Laterality: Left;    VASECTOMY         Review of patient's allergies indicates:   Allergen Reactions    Aggrastat concentrate [tirofiban] Shortness Of Breath and Other (See Comments)     Fever and chills    Aggrastat in sodium chloride [tirofiban-0.9% sodium chloride] Shortness Of Breath     fever    Brilinta [ticagrelor] Shortness Of Breath    Cymbalta [duloxetine] Nausea Only    Bupropion Other (See Comments)     "turns into zombie" withdrawn, not talking, outbursts  "turns into zombie", withdrawn, not talking, outbursts.       No current facility-administered medications on file prior to encounter.     Current Outpatient Medications on File Prior to Encounter   Medication Sig    albuterol (PROVENTIL) 2.5 mg /3 mL (0.083 %) nebulizer solution Take 3 mLs (2.5 mg total) by nebulization every 6 (six) hours as needed for Wheezing or Shortness of Breath.    albuterol (VENTOLIN HFA) 90 mcg/actuation inhaler Inhale 2 puffs into the lungs every 6 (six) hours as needed for Wheezing. Rescue    ALPRAZolam " (XANAX) 0.5 MG tablet Take 1 tablet (0.5 mg total) by mouth nightly as needed for Anxiety.    aspirin (ECOTRIN) 81 MG EC tablet Take 1 tablet (81 mg total) by mouth once daily.    fenofibrate (TRICOR) 145 MG tablet Take 1 tablet (145 mg total) by mouth once daily.    furosemide (LASIX) 20 MG tablet Take 20 mg by mouth daily as needed (edema).    gabapentin (NEURONTIN) 300 MG capsule Take 300 mg by mouth 3 (three) times daily as needed (nerve pain).    hydrOXYzine (ATARAX) 50 MG tablet Take 1 tablet (50 mg total) by mouth 3 (three) times daily as needed for Anxiety.    isosorbide mononitrate (IMDUR) 30 MG 24 hr tablet Take 3 tablets (90 mg total) by mouth once daily.    pantoprazole (PROTONIX) 40 MG tablet Take 40 mg by mouth once daily.    predniSONE (DELTASONE) 20 MG tablet 2 a day x 5 days then 1 a day x 5 days    ranolazine (RANEXA) 500 MG Tb12 Take 500 mg by mouth 2 (two) times daily.    rivaroxaban (XARELTO) 2.5 mg Tab Take 1 tablet (2.5 mg total) by mouth daily with dinner or evening meal.    sacubitriL-valsartan (ENTRESTO) 24-26 mg per tablet Take 1 tablet by mouth 2 (two) times daily.    tiotropium bromide (SPIRIVA RESPIMAT) 2.5 mcg/actuation inhaler Inhale 2 puffs into the lungs Daily. Controller    atorvastatin (LIPITOR) 80 MG tablet Take 1 tablet (80 mg total) by mouth once daily. (Patient not taking: Reported on 10/20/2022)    carvediloL (COREG) 6.25 MG tablet Take 1 tablet (6.25 mg total) by mouth 2 (two) times daily.    nitroGLYCERIN (NITROSTAT) 0.4 MG SL tablet Place 1 tablet (0.4 mg total) under the tongue every 5 (five) minutes as needed for Chest pain.    prasugreL (EFFIENT) 10 mg Tab Take 1 tablet (10 mg total) by mouth once daily.    [DISCONTINUED] amLODIPine (NORVASC) 5 MG tablet Take 1 tablet (5 mg total) by mouth once daily.    [DISCONTINUED] lisinopriL (PRINIVIL,ZESTRIL) 20 MG tablet Take 1 tablet (20 mg total) by mouth once daily.    [DISCONTINUED] rosuvastatin (CRESTOR)  20 MG tablet Take 2 tablets (40 mg total) by mouth once daily.     Family History       Problem Relation (Age of Onset)    Heart disease Mother, Father    Lung cancer Brother    No Known Problems Daughter, Daughter, Daughter, Son          Tobacco Use    Smoking status: Every Day     Packs/day: 1.00     Years: 50.00     Pack years: 50.00     Types: Cigarettes    Smokeless tobacco: Never    Tobacco comments:     Currently smoke 1 ppd   Substance and Sexual Activity    Alcohol use: No    Drug use: Yes     Types: Marijuana     Comment: OCCASSIONALLY    Sexual activity: Yes     Partners: Female     Review of Systems   Constitutional:  Positive for fatigue. Negative for chills, fever and unexpected weight change.   HENT:  Negative for sore throat and trouble swallowing.    Respiratory:  Positive for cough, shortness of breath and wheezing.    Cardiovascular:  Negative for chest pain, palpitations and leg swelling.   Gastrointestinal:  Negative for abdominal distention, abdominal pain, diarrhea, nausea and vomiting.   Genitourinary:  Negative for dysuria and hematuria.   Musculoskeletal:  Negative for neck pain and neck stiffness.   Skin:  Negative for rash and wound.   Neurological:  Negative for seizures, syncope, weakness, light-headedness and headaches.   Psychiatric/Behavioral:  Negative for confusion and decreased concentration.    Objective:     Vital Signs (Most Recent):  Temp: 98.4 °F (36.9 °C) (06/04/23 1616)  Pulse: 62 (06/04/23 1732)  Resp: 19 (06/04/23 1735)  BP: 138/65 (06/04/23 1732)  SpO2: 98 % (06/04/23 1732) Vital Signs (24h Range):  Temp:  [98.4 °F (36.9 °C)] 98.4 °F (36.9 °C)  Pulse:  [62-68] 62  Resp:  [16-24] 19  SpO2:  [98 %-100 %] 98 %  BP: (137-154)/(65-70) 138/65     Weight: 117.9 kg (260 lb)  Body mass index is 39.53 kg/m².     Physical Exam  Constitutional:       General: He is not in acute distress.     Appearance: He is obese. He is not toxic-appearing or diaphoretic.   HENT:      Head:  Normocephalic and atraumatic.      Nose: Nose normal.   Eyes:      General: No scleral icterus.     Extraocular Movements: Extraocular movements intact.      Pupils: Pupils are equal, round, and reactive to light.   Cardiovascular:      Rate and Rhythm: Normal rate and regular rhythm.   Pulmonary:      Effort: Pulmonary effort is normal. No respiratory distress.      Breath sounds: Wheezing present. No rales.   Abdominal:      General: Abdomen is flat. There is no distension.      Palpations: Abdomen is soft.      Tenderness: There is no abdominal tenderness. There is no guarding.   Musculoskeletal:         General: Normal range of motion.      Cervical back: Normal range of motion and neck supple. No rigidity.      Right lower leg: No edema.      Left lower leg: No edema.   Skin:     General: Skin is warm and dry.      Coloration: Skin is not jaundiced.   Neurological:      General: No focal deficit present.      Mental Status: He is alert and oriented to person, place, and time.      Cranial Nerves: No cranial nerve deficit.   Psychiatric:         Mood and Affect: Mood normal.         Behavior: Behavior normal.            CRANIAL NERVES     CN III, IV, VI   Pupils are equal, round, and reactive to light.     Significant Labs: All pertinent labs within the past 24 hours have been reviewed.  CBC:   Recent Labs   Lab 06/04/23  1647   WBC 6.09   HGB 13.2*   HCT 42.4        CMP:   Recent Labs   Lab 06/04/23  1647      K 3.9      CO2 23   *   BUN 18   CREATININE 1.7*   CALCIUM 9.4   PROT 6.6   ALBUMIN 3.5   BILITOT 1.1*   ALKPHOS 39*   AST 13   ALT 10   ANIONGAP 11     Cardiac Markers:   Recent Labs   Lab 06/04/23  1647   *       Significant Imaging: I have reviewed all pertinent imaging results/findings within the past 24 hours.    Assessment/Plan:     * COPD exacerbation  - IP COPD Pathway initiated  - on 2L home O2  ;  Currently saturating well on 8L high flow  - solumedrol 125mg iv  once in ED  - start prednisone 40mg po daily  - Duonebs q6h while awake  - start ceftriaxone and azithromycin  - counseled on smoking cessation  - monitor pulseOx and supplemental O2 as needed  - further management pending clinical course and future study review      Acute on chronic respiratory failure with hypoxia  Patient with Hypoxic Respiratory failure which is Acute on chronic.  he is on home oxygen at 2 LPM. Supplemental oxygen was provided and noted-      .   Signs/symptoms of respiratory failure include- tachypnea, increased work of breathing and wheezing. Contributing diagnoses includes - CHF and COPD Labs and images were reviewed. Patient Has not had a recent ABG. Will treat underlying causes and adjust management of respiratory failure as follows:    - suspect predominantly due to COPD with some mild pulmonary edema/effusions in setting of HFrEF  - steroids, duonebs, abx as above  - lasix iv x1 and then resume daily po  - further management as above     Chronic combined systolic and diastolic heart failure  - ECHO 03/2022  EF 35% with Grade III LVDD  -  and CXR with some concern for CHF exacerbation  - lasix 60mg iv once tonight  - plan to resume home lasix 20mg po daily in am  - low sodium fluid restricted diet  - strict I/Os  - daily weights  - tele monitor  - continue home DAPT, statin, BB, entresto, and imdur  - ECHO pending    PAD (peripheral artery disease)  - continue home DAPT, statin, and xarelto      Stage 3a chronic kidney disease  - Baseline creatinine appears to be around 1.4  - avoid nephrotoxic agents as appropriate  - continue to monitor      Essential hypertension  - continue home meds      Coronary artery disease involving native coronary artery of native heart with angina pectoris  - continue home DAPT, statin, BB  - continue home ranexa        VTE Risk Mitigation (From admission, onward)    None             On 06/04/2023, patient should be placed in hospital observation  services under my care.        Gaston Sumner MD  Department of Hospital Medicine  [facility] - Emergency Dept

## 2023-06-05 NOTE — HOSPITAL COURSE
Lonnie Taylor is a 73 y.o. male admitted to observation for a CHF exacerbation. Continued to diurese with IV lasix. O2 sats remained stable on home O2.Good UOP with improvement in SOB. Stable on home O2. Patient is medically ready for discharge. Will continue lasix 40 mg daily. Smoking cessation given. All questions answered at bedside with verbal understanding. Return precautions given.

## 2023-06-05 NOTE — ASSESSMENT & PLAN NOTE
- Baseline creatinine appears to be around 1.4>> 1.6  - avoid nephrotoxic agents as appropriate  - continue to monitor

## 2023-06-05 NOTE — PROGRESS NOTES
[facility] - Tele[facility] Medicine  Progress Note    Patient Name: Lonnie Taylor  MRN: [id number]  Patient Class: OP- Observation   Admission Date: 6/4/2023  Length of Stay: 0 days  Attending Physician: Donal Holley MD  Primary Care Provider: Isiah You MD        Subjective:     Principal Problem:Acute on chronic combined systolic and diastolic heart failure        HPI:  This is a 72 yo male with a past medical history of COPD on 2-3L home O2, HFrEF (35% with G3DD), CAD (s/p PCI), PAD on xarelto, HTN, HLD, CKD3a, GERD, tobacco abuse, and obesity who presents to the ED with chief complaint of shortness of breath and wheezing. Patient reports that for the last 1-2 weeks he has been having significant shortness of breath and wheezing. He has been using home nebs regularly without relief and has had to up titrate home O2 to 5L. He does still smoke but states has not really smoked since worsening SOB onset. He takes lasix 20mg prn at home and has taken it once or twice during this time. Does report productive cough. Denies fevers, chills, chest pain, lower extremity edema, abdominal pain, confusion, weakness.    In the ED patient afebrile and hemodynamically with hypoxia on home O2 with improved saturations on 8L high flow NC. Bilateral wheezing.  and CXR with come evidence of CHF exacerbation though clinically patient appears euvolemic and suspect primary etiology of symptoms due to COPD exacerbation. Patient started on iv solumedrol and duonebs and given one dose of iv lasix. Admitted to the care of medicine for further evaluation and management.       Overview/Hospital Course:  Lonnie Taylor is a 73 y.o. male admitted to observation for a CHF exacerbation. Continued to diurese with IV lasix. O2 sats remained stable on home O2.        Interval History: NAEON. AF, VSS. Patient seen and evaluated by me. States he is feeling much better and feels his breathing has improved. Denies increased  work of breathing, worsening SOB, and chest pain. He is comfortable on 2-3L of O2 but states he normally will use 5-6 L at time at home. Only uses his home diuretic as needed. Counseled on smoking cessation but patient states that he does not wish to stop at this time.     Review of Systems   Constitutional:  Negative for chills and fever.   Respiratory:  Positive for cough, shortness of breath (improving) and wheezing. Negative for chest tightness.    Cardiovascular:  Negative for chest pain and leg swelling.   Gastrointestinal:  Negative for abdominal pain and nausea.   Neurological:  Negative for dizziness and weakness.   Objective:     Vital Signs (Most Recent):  Temp: 97.6 °F (36.4 °C) (06/05/23 1124)  Pulse: 60 (06/05/23 1142)  Resp: 18 (06/05/23 1124)  BP: (!) 142/67 (06/05/23 1124)  SpO2: 96 % (06/05/23 1124) Vital Signs (24h Range):  Temp:  [97.6 °F (36.4 °C)-98.4 °F (36.9 °C)] 97.6 °F (36.4 °C)  Pulse:  [53-74] 60  Resp:  [16-24] 18  SpO2:  [96 %-100 %] 96 %  BP: (113-169)/(51-70) 142/67     Weight: 117.9 kg (260 lb)  Body mass index is 39.53 kg/m².    Intake/Output Summary (Last 24 hours) at 6/5/2023 1248  Last data filed at 6/4/2023 2052  Gross per 24 hour   Intake 47.51 ml   Output 1000 ml   Net -952.49 ml         Physical Exam  Vitals and nursing note reviewed.   Constitutional:       Appearance: He is well-developed.   Eyes:      Pupils: Pupils are equal, round, and reactive to light.   Cardiovascular:      Rate and Rhythm: Normal rate and regular rhythm.   Pulmonary:      Effort: Pulmonary effort is normal.      Breath sounds: Wheezing and rales present.      Comments: End expiratory wheezing  Abdominal:      Palpations: Abdomen is soft.      Tenderness: There is no abdominal tenderness.   Musculoskeletal:         General: No tenderness.   Skin:     General: Skin is warm and dry.   Neurological:      Mental Status: He is alert and oriented to person, place, and time.   Psychiatric:         Behavior:  Behavior normal.           Significant Labs: All pertinent labs within the past 24 hours have been reviewed.    Significant Imaging: I have reviewed all pertinent imaging results/findings within the past 24 hours.      Assessment/Plan:      * Acute on chronic combined systolic and diastolic heart failure  - ECHO 03/2022  EF 35% with Grade III LVDD  -  and CXR with some concern for CHF exacerbation  - lasix 60mg iv once tonight  - continue lasix 40 mg IV BID  - low sodium fluid restricted diet  - strict I/Os  - daily weights  - tele monitor  - continue home DAPT, statin, BB, entresto, and imdur  - repeat ECHO pending    Acute on chronic respiratory failure with hypoxia  Improving  Patient with Hypoxic Respiratory failure which is Acute on chronic.  he is on home oxygen at 2 LPM. Supplemental oxygen was provided and noted-      Signs/symptoms of respiratory failure include- tachypnea, increased work of breathing and wheezing. Contributing diagnoses includes - CHF and COPD Labs and images were reviewed. Patient Has not had a recent ABG. Will treat underlying causes and adjust management of respiratory failure as follows:    - suspect predominantly due to CHF with some mild pulmonary edema/effusions in setting of HFrEF  - steroids, duonebs, abx as above- d/c  - lasix iv x1, will continue further diuresis   - further management as above     COPD exacerbation  - hx of pulmonary emphysema noted  - IP COPD Pathway initiated- d/c  - on 2L home O2  ;  Currently saturating well on 8L high flow>> 96% on 3L  - solumedrol 125mg iv once in ED  - start prednisone 40mg po daily- d/c  - Duonebs q6h PRN  - start ceftriaxone and azithromycin- d/c  - counseled on smoking cessation  - monitor pulseOx and supplemental O2 as needed      Encounter for smoking cessation counseling  Dangers of cigarette smoking were reviewed with patient in detail for 10 minutes and patient was encouraged to quit. Nicotine replacement options were  discussed.  - pt declines smoking cessation at this time      PAD (peripheral artery disease)  - continue home DAPT, statin, and xarelto      Stage 3a chronic kidney disease  - Baseline creatinine appears to be around 1.4>> 1.6  - avoid nephrotoxic agents as appropriate  - continue to monitor      Essential hypertension  - continue home meds      Coronary artery disease involving native coronary artery of native heart with angina pectoris  - continue home DAPT, statin, BB  - continue home ranexa        VTE Risk Mitigation (From admission, onward)    None          Discharge Planning   RICKY: 6/6/2023     Code Status: Full Code   Is the patient medically ready for discharge?: No    Reason for patient still in hospital (select all that apply): Patient trending condition and Treatment             Michelle Awad PA-C  Department of Hospital Medicine   [facility] - Telemetry Stepdown     No indicators present

## 2023-06-06 LAB
ANION GAP SERPL CALC-SCNC: 10 MMOL/L (ref 8–16)
BASOPHILS # BLD AUTO: 0.02 K/UL (ref 0–0.2)
BASOPHILS NFR BLD: 0.2 % (ref 0–1.9)
BUN SERPL-MCNC: 25 MG/DL (ref 8–23)
CALCIUM SERPL-MCNC: 9.5 MG/DL (ref 8.7–10.5)
CHLORIDE SERPL-SCNC: 101 MMOL/L (ref 95–110)
CO2 SERPL-SCNC: 30 MMOL/L (ref 23–29)
CREAT SERPL-MCNC: 1.6 MG/DL (ref 0.5–1.4)
DIFFERENTIAL METHOD: ABNORMAL
EOSINOPHIL # BLD AUTO: 0 K/UL (ref 0–0.5)
EOSINOPHIL NFR BLD: 0 % (ref 0–8)
ERYTHROCYTE [DISTWIDTH] IN BLOOD BY AUTOMATED COUNT: 17.2 % (ref 11.5–14.5)
EST. GFR  (NO RACE VARIABLE): 44.7 ML/MIN/1.73 M^2
GLUCOSE SERPL-MCNC: 117 MG/DL (ref 70–110)
HCT VFR BLD AUTO: 42.9 % (ref 40–54)
HGB BLD-MCNC: 13.3 G/DL (ref 14–18)
IMM GRANULOCYTES # BLD AUTO: 0.06 K/UL (ref 0–0.04)
IMM GRANULOCYTES NFR BLD AUTO: 0.5 % (ref 0–0.5)
LYMPHOCYTES # BLD AUTO: 1.1 K/UL (ref 1–4.8)
LYMPHOCYTES NFR BLD: 8.3 % (ref 18–48)
MAGNESIUM SERPL-MCNC: 1.7 MG/DL (ref 1.6–2.6)
MCH RBC QN AUTO: 28.8 PG (ref 27–31)
MCHC RBC AUTO-ENTMCNC: 31 G/DL (ref 32–36)
MCV RBC AUTO: 93 FL (ref 82–98)
MONOCYTES # BLD AUTO: 0.7 K/UL (ref 0.3–1)
MONOCYTES NFR BLD: 5.5 % (ref 4–15)
NEUTROPHILS # BLD AUTO: 11.4 K/UL (ref 1.8–7.7)
NEUTROPHILS NFR BLD: 85.5 % (ref 38–73)
NRBC BLD-RTO: 0 /100 WBC
PHOSPHATE SERPL-MCNC: 4.3 MG/DL (ref 2.7–4.5)
PLATELET # BLD AUTO: 200 K/UL (ref 150–450)
PMV BLD AUTO: 12.3 FL (ref 9.2–12.9)
POTASSIUM SERPL-SCNC: 4 MMOL/L (ref 3.5–5.1)
RBC # BLD AUTO: 4.62 M/UL (ref 4.6–6.2)
SODIUM SERPL-SCNC: 141 MMOL/L (ref 136–145)
WBC # BLD AUTO: 13.26 K/UL (ref 3.9–12.7)

## 2023-06-06 PROCEDURE — 25000003 PHARM REV CODE 250

## 2023-06-06 PROCEDURE — 83735 ASSAY OF MAGNESIUM: CPT | Performed by: FAMILY MEDICINE

## 2023-06-06 PROCEDURE — 99900031 HC PATIENT EDUCATION (STAT)

## 2023-06-06 PROCEDURE — 85025 COMPLETE CBC W/AUTO DIFF WBC: CPT | Performed by: FAMILY MEDICINE

## 2023-06-06 PROCEDURE — 84100 ASSAY OF PHOSPHORUS: CPT | Performed by: FAMILY MEDICINE

## 2023-06-06 PROCEDURE — 94761 N-INVAS EAR/PLS OXIMETRY MLT: CPT

## 2023-06-06 PROCEDURE — 99900035 HC TECH TIME PER 15 MIN (STAT)

## 2023-06-06 PROCEDURE — 36415 COLL VENOUS BLD VENIPUNCTURE: CPT | Performed by: FAMILY MEDICINE

## 2023-06-06 PROCEDURE — 80048 BASIC METABOLIC PNL TOTAL CA: CPT | Performed by: FAMILY MEDICINE

## 2023-06-06 PROCEDURE — 20600001 HC STEP DOWN PRIVATE ROOM

## 2023-06-06 PROCEDURE — 99233 PR SUBSEQUENT HOSPITAL CARE,LEVL III: ICD-10-PCS | Mod: ,,,

## 2023-06-06 PROCEDURE — 96376 TX/PRO/DX INJ SAME DRUG ADON: CPT

## 2023-06-06 PROCEDURE — 99233 SBSQ HOSP IP/OBS HIGH 50: CPT | Mod: ,,,

## 2023-06-06 PROCEDURE — 63600175 PHARM REV CODE 636 W HCPCS

## 2023-06-06 PROCEDURE — 25000003 PHARM REV CODE 250: Performed by: FAMILY MEDICINE

## 2023-06-06 PROCEDURE — 27000221 HC OXYGEN, UP TO 24 HOURS

## 2023-06-06 RX ORDER — FUROSEMIDE 10 MG/ML
40 INJECTION INTRAMUSCULAR; INTRAVENOUS
Status: DISCONTINUED | OUTPATIENT
Start: 2023-06-06 | End: 2023-06-07

## 2023-06-06 RX ORDER — POLYETHYLENE GLYCOL 3350 17 G/17G
17 POWDER, FOR SOLUTION ORAL DAILY PRN
Status: DISCONTINUED | OUTPATIENT
Start: 2023-06-06 | End: 2023-06-07 | Stop reason: HOSPADM

## 2023-06-06 RX ADMIN — RANOLAZINE 500 MG: 500 TABLET, EXTENDED RELEASE ORAL at 09:06

## 2023-06-06 RX ADMIN — Medication 6 MG: at 12:06

## 2023-06-06 RX ADMIN — POLYETHYLENE GLYCOL 3350 17 G: 17 POWDER, FOR SOLUTION ORAL at 05:06

## 2023-06-06 RX ADMIN — FUROSEMIDE 40 MG: 10 INJECTION, SOLUTION INTRAMUSCULAR; INTRAVENOUS at 12:06

## 2023-06-06 RX ADMIN — ISOSORBIDE MONONITRATE 90 MG: 60 TABLET, EXTENDED RELEASE ORAL at 08:06

## 2023-06-06 RX ADMIN — ASPIRIN 81 MG: 81 TABLET, COATED ORAL at 08:06

## 2023-06-06 RX ADMIN — CLOPIDOGREL BISULFATE 75 MG: 75 TABLET ORAL at 08:06

## 2023-06-06 RX ADMIN — PANTOPRAZOLE SODIUM 40 MG: 40 TABLET, DELAYED RELEASE ORAL at 08:06

## 2023-06-06 RX ADMIN — CARVEDILOL 6.25 MG: 6.25 TABLET, FILM COATED ORAL at 09:06

## 2023-06-06 RX ADMIN — FUROSEMIDE 40 MG: 10 INJECTION, SOLUTION INTRAMUSCULAR; INTRAVENOUS at 09:06

## 2023-06-06 RX ADMIN — RANOLAZINE 500 MG: 500 TABLET, EXTENDED RELEASE ORAL at 08:06

## 2023-06-06 RX ADMIN — ATORVASTATIN CALCIUM 80 MG: 40 TABLET, FILM COATED ORAL at 08:06

## 2023-06-06 RX ADMIN — RIVAROXABAN 2.5 MG: 2.5 TABLET, FILM COATED ORAL at 05:06

## 2023-06-06 RX ADMIN — SACUBITRIL AND VALSARTAN 1 TABLET: 24; 26 TABLET, FILM COATED ORAL at 09:06

## 2023-06-06 RX ADMIN — SACUBITRIL AND VALSARTAN 1 TABLET: 24; 26 TABLET, FILM COATED ORAL at 08:06

## 2023-06-06 NOTE — SUBJECTIVE & OBJECTIVE
Interval History: NAEON. AF, VSS. Patient seen and evaluated by me. Endorses improvement in SOB but still requiring more O2 than home dose. Had only 1.5 L UOP. Will need further IV diuresis to due to rales on exam and O2 requirement. Counseled patient on taking his lasix daily in order to decrease CHF exacerbations in the future. Voiced understanding at this time.     Review of Systems   Constitutional:  Negative for chills and fever.   Respiratory:  Positive for shortness of breath. Negative for chest tightness.    Cardiovascular:  Negative for chest pain and leg swelling.   Gastrointestinal:  Negative for abdominal pain and nausea.   Neurological:  Negative for dizziness and weakness.   Objective:     Vital Signs (Most Recent):  Temp: 98 °F (36.7 °C) (06/06/23 1158)  Pulse: (!) 52 (06/06/23 1158)  Resp: 18 (06/06/23 1158)  BP: 138/61 (06/06/23 1158)  SpO2: 98 % (06/06/23 1158) Vital Signs (24h Range):  Temp:  [97.5 °F (36.4 °C)-98.3 °F (36.8 °C)] 98 °F (36.7 °C)  Pulse:  [52-62] 52  Resp:  [16-20] 18  SpO2:  [97 %-100 %] 98 %  BP: (107-150)/(52-67) 138/61     Weight: 88.7 kg (195 lb 8.8 oz)  Body mass index is 29.73 kg/m².    Intake/Output Summary (Last 24 hours) at 6/6/2023 1202  Last data filed at 6/6/2023 0554  Gross per 24 hour   Intake --   Output 1525 ml   Net -1525 ml         Physical Exam  Vitals and nursing note reviewed.   Constitutional:       Appearance: He is well-developed.   Eyes:      Pupils: Pupils are equal, round, and reactive to light.   Cardiovascular:      Rate and Rhythm: Normal rate and regular rhythm.   Pulmonary:      Effort: Pulmonary effort is normal.      Breath sounds: Rales present.   Abdominal:      Palpations: Abdomen is soft.      Tenderness: There is no abdominal tenderness.   Musculoskeletal:         General: No tenderness.   Skin:     General: Skin is warm and dry.   Neurological:      Mental Status: He is alert and oriented to person, place, and time.   Psychiatric:          Behavior: Behavior normal.           Significant Labs: All pertinent labs within the past 24 hours have been reviewed.    Significant Imaging: I have reviewed all pertinent imaging results/findings within the past 24 hours.

## 2023-06-06 NOTE — ASSESSMENT & PLAN NOTE
- ECHO 03/2022  EF 35% with Grade III LVDD  -  and CXR with some concern for CHF exacerbation  - lasix 60mg iv once tonight  - continue lasix 40 mg IV BID  - low sodium fluid restricted diet  - strict I/Os  - daily weights  - tele monitor  - continue home DAPT, statin, BB, entresto, and imdur  Results for orders placed during the hospital encounter of 06/04/23    Echo    Interpretation Summary  · The left ventricle is moderately enlarged with eccentric hypertrophy and mildly decreased systolic function. The estimated ejection fraction is 40%.  · Mild right ventricular enlargement with normal right ventricular systolic function.  · Grade III left ventricular diastolic dysfunction.  · Biatrial enlargement.  · Mild aortic regurgitation.  · Mild-to-moderate mitral regurgitation.  · The estimated PA systolic pressure is 38 mmHg.  · Intermediate central venous pressure (8 mmHg).

## 2023-06-06 NOTE — NURSING
End of shift note    AAOx4  2L NC  Miralax x1  Uneventful shift   VSS  NADN- safety checks performed  Call light in reach

## 2023-06-06 NOTE — ASSESSMENT & PLAN NOTE
- Baseline creatinine appears to be around 1.4>> 1.6- stable  - avoid nephrotoxic agents as appropriate  - continue to monitor with further diuresis

## 2023-06-06 NOTE — PLAN OF CARE
[facility] - Telemetry Stepdown  Initial Discharge Assessment       Primary Care Provider: Isiah You MD    Admission Diagnosis: Shortness of breath [R06.02]  CHF (congestive heart failure) [I50.9]  Acute on chronic systolic congestive heart failure [I50.23]  CHAUNCEY (acute kidney injury) [N17.9]  COPD with acute exacerbation [J44.1]  Acute on chronic respiratory failure [J96.20]    Admission Date: 6/4/2023  Expected Discharge Date: 6/7/2023    Transition of Care Barriers: None    Payor: [employer] MEDICARE / Plan: [employer] MEDICARE HMO / Product Type: Capitation /     Extended Emergency Contact Information  Primary Emergency Contact: Ginny Taylor  Address: [address] [place] [address] [facility]  Home Phone: [phone]  Work Phone: [phone]  Mobile Phone: [phone]  Relation: Spouse    Discharge Plan A: Home with family  Discharge Plan B: [facility]      [id number] - [place] - [address] AT [facility] & [address]  [place] [address]  Phone: [phone] Fax: [phone]    [facility] - [facility] [address] [facility]  [address] [facility] [address]  Phone: [phone] Fax: [phone]    [facility] [place] [address] [address]  [facility] [address]  Phone: [phone] Fax: [phone]      Initial Assessment (most recent)       Adult Discharge Assessment - 06/06/23 1408          Discharge Assessment    Assessment Type Discharge Planning Assessment     Confirmed/corrected address, phone number and insurance Yes     Confirmed Demographics Correct on Facesheet     Source of Information patient     Communicated RICKY with patient/caregiver Yes     Reason For Admission Resp failure     People in Home spouse     Do you expect to return to your current living situation? Yes     Do you have help at home or someone to help you manage your care at  home? Yes     Who are your caregiver(s) and their phone number(s)? Ginny Taylor (spouse) [phone]     Prior to hospitilization cognitive status: Alert/Oriented     Current cognitive status: Alert/Oriented     Walking or Climbing Stairs ambulation difficulty, requires equipment     Equipment Currently Used at Home walker, rolling;oxygen;nebulizer;cane, straight     Readmission within 30 days? No     Patient currently being followed by outpatient case management? No     Do you currently have service(s) that help you manage your care at home? No     Do you take prescription medications? Yes     Do you have prescription coverage? Yes     Do you have any problems affording any of your prescribed medications? No     Is the patient taking medications as prescribed? yes     Who is going to help you get home at discharge? Patient's spouse will provide transportation home.     How do you get to doctors appointments? family or friend will provide     Are you on dialysis? No     Do you take coumadin? No     Discharge Plan A Home with family     Discharge Plan B Home Health     DME Needed Upon Discharge  none     Discharge Plan discussed with: Patient     Transition of Care Barriers None                   Nancie Hebert RN  Ext [phone]

## 2023-06-06 NOTE — PLAN OF CARE
Problem: Adult Inpatient Plan of Care  Goal: Plan of Care Review  Outcome: Ongoing, Progressing  Goal: Patient-Specific Goal (Individualized)  Outcome: Ongoing, Progressing  Goal: Absence of Hospital-Acquired Illness or Injury  Outcome: Ongoing, Progressing  Goal: Optimal Comfort and Wellbeing  Outcome: Ongoing, Progressing  Goal: Readiness for Transition of Care  Outcome: Ongoing, Progressing     Problem: Adjustment to Illness COPD (Chronic Obstructive Pulmonary Disease)  Goal: Optimal Chronic Illness Coping  Outcome: Ongoing, Progressing     Problem: Functional Ability Impaired COPD (Chronic Obstructive Pulmonary Disease)  Goal: Optimal Level of Functional [place]  Outcome: Ongoing, Progressing     Problem: Infection COPD (Chronic Obstructive Pulmonary Disease)  Goal: Absence of Infection Signs and Symptoms  Outcome: Ongoing, Progressing     Problem: Oral Intake Inadequate COPD (Chronic Obstructive Pulmonary Disease)  Goal: Improved Nutrition Intake  Outcome: Ongoing, Progressing     Problem: Respiratory Compromise COPD (Chronic Obstructive Pulmonary Disease)  Goal: Effective Oxygenation and Ventilation  Outcome: Ongoing, Progressing     Aox4. On 2LNC. No complaints or concerns voiced by pt. Safety measures remain intact. Bed lowest position. Call light in reach. Care is on going.

## 2023-06-06 NOTE — PROGRESS NOTES
[facility] - Telemetry [facility] Medicine  Progress Note    Patient Name: Lonnie Taylor  MRN: [id number]  Patient Class: OP- Observation   Admission Date: 6/4/2023  Length of Stay: 0 days  Attending Physician: Donal Holley MD  Primary Care Provider: Isiah You MD        Subjective:     Principal Problem:Acute on chronic combined systolic and diastolic heart failure        HPI:  This is a 72 yo male with a past medical history of COPD on 2-3L home O2, HFrEF (35% with G3DD), CAD (s/p PCI), PAD on xarelto, HTN, HLD, CKD3a, GERD, tobacco abuse, and obesity who presents to the ED with chief complaint of shortness of breath and wheezing. Patient reports that for the last 1-2 weeks he has been having significant shortness of breath and wheezing. He has been using home nebs regularly without relief and has had to up titrate home O2 to 5L. He does still smoke but states has not really smoked since worsening SOB onset. He takes lasix 20mg prn at home and has taken it once or twice during this time. Does report productive cough. Denies fevers, chills, chest pain, lower extremity edema, abdominal pain, confusion, weakness.    In the ED patient afebrile and hemodynamically with hypoxia on home O2 with improved saturations on 8L high flow NC. Bilateral wheezing.  and CXR with come evidence of CHF exacerbation though clinically patient appears euvolemic and suspect primary etiology of symptoms due to COPD exacerbation. Patient started on iv solumedrol and duonebs and given one dose of iv lasix. Admitted to the care of medicine for further evaluation and management.       Overview/Hospital Course:  Lonnie Taylor is a 73 y.o. male admitted to observation for a CHF exacerbation. Continued to diurese with IV lasix. O2 sats remained stable on home O2.        Interval History: NAEON. AF, VSS. Patient seen and evaluated by me. Endorses improvement in SOB but still requiring more O2 than home dose. Had only 1.5 L  UOP. Will need further IV diuresis to due to rales on exam and O2 requirement. Counseled patient on taking his lasix daily in order to decrease CHF exacerbations in the future. Voiced understanding at this time.     Review of Systems   Constitutional:  Negative for chills and fever.   Respiratory:  Positive for shortness of breath. Negative for chest tightness.    Cardiovascular:  Negative for chest pain and leg swelling.   Gastrointestinal:  Negative for abdominal pain and nausea.   Neurological:  Negative for dizziness and weakness.   Objective:     Vital Signs (Most Recent):  Temp: 98 °F (36.7 °C) (06/06/23 1158)  Pulse: (!) 52 (06/06/23 1158)  Resp: 18 (06/06/23 1158)  BP: 138/61 (06/06/23 1158)  SpO2: 98 % (06/06/23 1158) Vital Signs (24h Range):  Temp:  [97.5 °F (36.4 °C)-98.3 °F (36.8 °C)] 98 °F (36.7 °C)  Pulse:  [52-62] 52  Resp:  [16-20] 18  SpO2:  [97 %-100 %] 98 %  BP: (107-150)/(52-67) 138/61     Weight: 88.7 kg (195 lb 8.8 oz)  Body mass index is 29.73 kg/m².    Intake/Output Summary (Last 24 hours) at 6/6/2023 1202  Last data filed at 6/6/2023 0554  Gross per 24 hour   Intake --   Output 1525 ml   Net -1525 ml         Physical Exam  Vitals and nursing note reviewed.   Constitutional:       Appearance: He is well-developed.   Eyes:      Pupils: Pupils are equal, round, and reactive to light.   Cardiovascular:      Rate and Rhythm: Normal rate and regular rhythm.   Pulmonary:      Effort: Pulmonary effort is normal.      Breath sounds: Rales present.   Abdominal:      Palpations: Abdomen is soft.      Tenderness: There is no abdominal tenderness.   Musculoskeletal:         General: No tenderness.   Skin:     General: Skin is warm and dry.   Neurological:      Mental Status: He is alert and oriented to person, place, and time.   Psychiatric:         Behavior: Behavior normal.           Significant Labs: All pertinent labs within the past 24 hours have been reviewed.    Significant Imaging: I have  reviewed all pertinent imaging results/findings within the past 24 hours.      Assessment/Plan:      * Acute on chronic combined systolic and diastolic heart failure  - ECHO 03/2022  EF 35% with Grade III LVDD  -  and CXR with some concern for CHF exacerbation  - lasix 60mg iv once tonight  - continue lasix 40 mg IV BID  - low sodium fluid restricted diet  - strict I/Os  - daily weights  - tele monitor  - continue home DAPT, statin, BB, entresto, and imdur  Results for orders placed during the hospital encounter of 06/04/23    Echo    Interpretation Summary  · The left ventricle is moderately enlarged with eccentric hypertrophy and mildly decreased systolic function. The estimated ejection fraction is 40%.  · Mild right ventricular enlargement with normal right ventricular systolic function.  · Grade III left ventricular diastolic dysfunction.  · Biatrial enlargement.  · Mild aortic regurgitation.  · Mild-to-moderate mitral regurgitation.  · The estimated PA systolic pressure is 38 mmHg.  · Intermediate central venous pressure (8 mmHg).    Acute on chronic respiratory failure with hypoxia  Improving  Patient with Hypoxic Respiratory failure which is Acute on chronic.  he is on home oxygen at 2 LPM. Supplemental oxygen was provided and noted-      Signs/symptoms of respiratory failure include- tachypnea, increased work of breathing and wheezing. Contributing diagnoses includes - CHF and COPD Labs and images were reviewed. Patient Has not had a recent ABG. Will treat underlying causes and adjust management of respiratory failure as follows:    - suspect predominantly due to CHF with some mild pulmonary edema/effusions in setting of HFrEF  - steroids, duonebs, abx as above- d/c  - lasix iv x1, will continue further diuresis   - further management as above     COPD exacerbation  - hx of pulmonary emphysema noted  -  COPD Pathway initiated- d/c  - on 2L home O2  ;  Currently saturating well on 8L high flow>> 96%  on 3L  - solumedrol 125mg iv once in ED  - start prednisone 40mg po daily- d/c  - Duonebs q6h PRN  - start ceftriaxone and azithromycin- d/c  - counseled on smoking cessation  - monitor pulseOx and supplemental O2 as needed      Encounter for smoking cessation counseling  Dangers of cigarette smoking were reviewed with patient in detail for 10 minutes and patient was encouraged to quit. Nicotine replacement options were discussed.  - pt declines smoking cessation at this time      PAD (peripheral artery disease)  - continue home DAPT, statin, and xarelto    Stage 3a chronic kidney disease  - Baseline creatinine appears to be around 1.4>> 1.6- stable  - avoid nephrotoxic agents as appropriate  - continue to monitor with further diuresis     Essential hypertension  - continue home meds      Coronary artery disease involving native coronary artery of native heart with angina pectoris  - continue home DAPT, statin, BB  - continue home ranexa        VTE Risk Mitigation (From admission, onward)    None          Discharge Planning   RICKY: 6/7/2023     Code Status: Full Code   Is the patient medically ready for discharge?: No    Reason for patient still in hospital (select all that apply): Patient trending condition and Treatment               Michelle Awad PA-C  Department of Hospital Medicine   [facility] - Telemetry Stepdown

## 2023-06-07 VITALS
TEMPERATURE: 98 F | WEIGHT: 195.31 LBS | HEIGHT: 68 IN | HEART RATE: 54 BPM | DIASTOLIC BLOOD PRESSURE: 60 MMHG | RESPIRATION RATE: 20 BRPM | OXYGEN SATURATION: 100 % | BODY MASS INDEX: 29.6 KG/M2 | SYSTOLIC BLOOD PRESSURE: 131 MMHG

## 2023-06-07 LAB
ANION GAP SERPL CALC-SCNC: 9 MMOL/L (ref 8–16)
BASOPHILS # BLD AUTO: 0.05 K/UL (ref 0–0.2)
BASOPHILS NFR BLD: 0.5 % (ref 0–1.9)
BUN SERPL-MCNC: 32 MG/DL (ref 8–23)
CALCIUM SERPL-MCNC: 9.5 MG/DL (ref 8.7–10.5)
CHLORIDE SERPL-SCNC: 101 MMOL/L (ref 95–110)
CO2 SERPL-SCNC: 29 MMOL/L (ref 23–29)
CREAT SERPL-MCNC: 1.7 MG/DL (ref 0.5–1.4)
DIFFERENTIAL METHOD: ABNORMAL
EOSINOPHIL # BLD AUTO: 0.1 K/UL (ref 0–0.5)
EOSINOPHIL NFR BLD: 1.3 % (ref 0–8)
ERYTHROCYTE [DISTWIDTH] IN BLOOD BY AUTOMATED COUNT: 17.2 % (ref 11.5–14.5)
EST. GFR  (NO RACE VARIABLE): 41.5 ML/MIN/1.73 M^2
GLUCOSE SERPL-MCNC: 85 MG/DL (ref 70–110)
HCT VFR BLD AUTO: 42.8 % (ref 40–54)
HGB BLD-MCNC: 13.8 G/DL (ref 14–18)
IMM GRANULOCYTES # BLD AUTO: 0.03 K/UL (ref 0–0.04)
IMM GRANULOCYTES NFR BLD AUTO: 0.3 % (ref 0–0.5)
LYMPHOCYTES # BLD AUTO: 2.9 K/UL (ref 1–4.8)
LYMPHOCYTES NFR BLD: 31.3 % (ref 18–48)
MAGNESIUM SERPL-MCNC: 1.6 MG/DL (ref 1.6–2.6)
MCH RBC QN AUTO: 29.3 PG (ref 27–31)
MCHC RBC AUTO-ENTMCNC: 32.2 G/DL (ref 32–36)
MCV RBC AUTO: 91 FL (ref 82–98)
MONOCYTES # BLD AUTO: 0.7 K/UL (ref 0.3–1)
MONOCYTES NFR BLD: 7.5 % (ref 4–15)
NEUTROPHILS # BLD AUTO: 5.5 K/UL (ref 1.8–7.7)
NEUTROPHILS NFR BLD: 59.1 % (ref 38–73)
NRBC BLD-RTO: 0 /100 WBC
PHOSPHATE SERPL-MCNC: 4 MG/DL (ref 2.7–4.5)
PLATELET # BLD AUTO: 190 K/UL (ref 150–450)
PMV BLD AUTO: 11.9 FL (ref 9.2–12.9)
POTASSIUM SERPL-SCNC: 3.7 MMOL/L (ref 3.5–5.1)
RBC # BLD AUTO: 4.71 M/UL (ref 4.6–6.2)
SODIUM SERPL-SCNC: 139 MMOL/L (ref 136–145)
WBC # BLD AUTO: 9.32 K/UL (ref 3.9–12.7)

## 2023-06-07 PROCEDURE — 99239 HOSP IP/OBS DSCHRG MGMT >30: CPT | Mod: ,,,

## 2023-06-07 PROCEDURE — 99239 PR HOSPITAL DISCHARGE DAY,>30 MIN: ICD-10-PCS | Mod: ,,,

## 2023-06-07 PROCEDURE — 83735 ASSAY OF MAGNESIUM: CPT | Performed by: FAMILY MEDICINE

## 2023-06-07 PROCEDURE — 36415 COLL VENOUS BLD VENIPUNCTURE: CPT | Performed by: FAMILY MEDICINE

## 2023-06-07 PROCEDURE — 1111F PR DISCHARGE MEDS RECONCILED W/ CURRENT OUTPATIENT MED LIST: ICD-10-PCS | Mod: CPTII,,,

## 2023-06-07 PROCEDURE — 27000221 HC OXYGEN, UP TO 24 HOURS

## 2023-06-07 PROCEDURE — 25000003 PHARM REV CODE 250: Performed by: FAMILY MEDICINE

## 2023-06-07 PROCEDURE — 85025 COMPLETE CBC W/AUTO DIFF WBC: CPT | Performed by: FAMILY MEDICINE

## 2023-06-07 PROCEDURE — 1111F DSCHRG MED/CURRENT MED MERGE: CPT | Mod: CPTII,,,

## 2023-06-07 PROCEDURE — 94761 N-INVAS EAR/PLS OXIMETRY MLT: CPT

## 2023-06-07 PROCEDURE — 84100 ASSAY OF PHOSPHORUS: CPT | Performed by: FAMILY MEDICINE

## 2023-06-07 PROCEDURE — 25000003 PHARM REV CODE 250

## 2023-06-07 PROCEDURE — 80048 BASIC METABOLIC PNL TOTAL CA: CPT | Performed by: FAMILY MEDICINE

## 2023-06-07 RX ORDER — CLOPIDOGREL BISULFATE 75 MG/1
75 TABLET ORAL DAILY
Qty: 30 TABLET | Refills: 11 | Status: SHIPPED | OUTPATIENT
Start: 2023-06-07 | End: 2024-06-06

## 2023-06-07 RX ORDER — FUROSEMIDE 40 MG/1
40 TABLET ORAL DAILY
Status: DISCONTINUED | OUTPATIENT
Start: 2023-06-07 | End: 2023-06-07 | Stop reason: HOSPADM

## 2023-06-07 RX ORDER — FUROSEMIDE 20 MG/1
40 TABLET ORAL DAILY
Qty: 60 TABLET | Refills: 11 | Status: SHIPPED | OUTPATIENT
Start: 2023-06-07 | End: 2024-03-20

## 2023-06-07 RX ORDER — NICOTINE 7MG/24HR
1 PATCH, TRANSDERMAL 24 HOURS TRANSDERMAL DAILY
Qty: 7 PATCH | Refills: 0 | Status: SHIPPED | OUTPATIENT
Start: 2023-06-07 | End: 2023-06-14

## 2023-06-07 RX ADMIN — FUROSEMIDE 40 MG: 40 TABLET ORAL at 09:06

## 2023-06-07 RX ADMIN — SACUBITRIL AND VALSARTAN 1 TABLET: 24; 26 TABLET, FILM COATED ORAL at 09:06

## 2023-06-07 RX ADMIN — ATORVASTATIN CALCIUM 80 MG: 40 TABLET, FILM COATED ORAL at 09:06

## 2023-06-07 RX ADMIN — PANTOPRAZOLE SODIUM 40 MG: 40 TABLET, DELAYED RELEASE ORAL at 09:06

## 2023-06-07 RX ADMIN — ASPIRIN 81 MG: 81 TABLET, COATED ORAL at 09:06

## 2023-06-07 RX ADMIN — CARVEDILOL 6.25 MG: 6.25 TABLET, FILM COATED ORAL at 09:06

## 2023-06-07 RX ADMIN — RANOLAZINE 500 MG: 500 TABLET, EXTENDED RELEASE ORAL at 09:06

## 2023-06-07 RX ADMIN — ISOSORBIDE MONONITRATE 90 MG: 60 TABLET, EXTENDED RELEASE ORAL at 09:06

## 2023-06-07 RX ADMIN — CLOPIDOGREL BISULFATE 75 MG: 75 TABLET ORAL at 09:06

## 2023-06-07 NOTE — DISCHARGE SUMMARY
[facility] - Tele[facility] Medicine  Discharge Summary      Patient Name: Lonnie Taylor  MRN: [id number]  NICOLE: [id number]  Patient Class: IP- Inpatient  Admission Date: 6/4/2023  Hospital Length of Stay: 1 days  Discharge Date and Time: 6/7/2023  2:30 PM  Attending Physician: Neena att. providers found   Discharging Provider: Michelle Awad PA-C  Primary Care Provider: Isiah You MD  [facility] Medicine Team: [facility] Michelle Awad PA-C  Primary Care Team: [facility]    HPI:   This is a 72 yo male with a past medical history of COPD on 2-3L home O2, HFrEF (35% with G3DD), CAD (s/p PCI), PAD on xarelto, HTN, HLD, CKD3a, GERD, tobacco abuse, and obesity who presents to the ED with chief complaint of shortness of breath and wheezing. Patient reports that for the last 1-2 weeks he has been having significant shortness of breath and wheezing. He has been using home nebs regularly without relief and has had to up titrate home O2 to 5L. He does still smoke but states has not really smoked since worsening SOB onset. He takes lasix 20mg prn at home and has taken it once or twice during this time. Does report productive cough. Denies fevers, chills, chest pain, lower extremity edema, abdominal pain, confusion, weakness.    In the ED patient afebrile and hemodynamically with hypoxia on home O2 with improved saturations on 8L high flow NC. Bilateral wheezing.  and CXR with come evidence of CHF exacerbation though clinically patient appears euvolemic and suspect primary etiology of symptoms due to COPD exacerbation. Patient started on iv solumedrol and duonebs and given one dose of iv lasix. Admitted to the care of medicine for further evaluation and management.       * No surgery found *      Hospital Course:   Lonnie Taylor is a 73 y.o. male admitted to observation for a CHF exacerbation. Continued to diurese with IV lasix. O2 sats remained stable on home O2.Good UOP with improvement in  SOB. Stable on home O2. Patient is medically ready for discharge. Will continue lasix 40 mg daily. Smoking cessation given. All questions answered at bedside with verbal understanding. Return precautions given.          Goals of Care Treatment Preferences:  Code Status: Full Code      Consults:     No new Assessment & Plan notes have been filed under this hospital service since the last note was generated.  Service: Hospital Medicine    Final Active Diagnoses:    Diagnosis Date Noted POA    PRINCIPAL PROBLEM:  Acute on chronic combined systolic and diastolic heart failure [I50.43] 10/11/2022 Yes    Acute on chronic respiratory failure with hypoxia [J96.21] 06/04/2023 Yes    COPD exacerbation [J44.1] 10/11/2022 Yes    Encounter for smoking cessation counseling [Z71.6]  Not Applicable    PAD (peripheral artery disease) [I73.9] 07/25/2019 Yes    Stage 3a chronic kidney disease [N18.31] 09/16/2015 Yes    Coronary artery disease involving native coronary artery of native heart with angina pectoris [I25.119] 11/30/2012 Yes    Essential hypertension [I10] 11/30/2012 Yes      Problems Resolved During this Admission:       Discharged Condition: stable    Disposition: Home or Self Care    Follow Up:    Patient Instructions:      Ambulatory referral/consult to Smoking Cessation Program   Standing Status: Future   Referral Priority: Routine Referral Type: Consultation   Referral Reason: Specialty Services Required   Requested Specialty: CTTS   Number of Visits Requested: 1         Pending Diagnostic Studies:     None         Medications:  Reconciled Home Medications:      Medication List      START taking these medications    clopidogreL 75 mg tablet  Commonly known as: PLAVIX  Take 1 tablet (75 mg total) by mouth once daily.     nicotine 7 mg/24 hr  Commonly known as: NICODERM CQ  Place 1 patch onto the skin once daily. for 7 days        CHANGE how you take these medications    furosemide 20 MG tablet  Commonly known  as: LASIX  Take 2 tablets (40 mg total) by mouth once daily.  What changed:   · how much to take  · when to take this  · reasons to take this        CONTINUE taking these medications    * albuterol 2.5 mg /3 mL (0.083 %) nebulizer solution  Commonly known as: PROVENTIL  Take 3 mLs (2.5 mg total) by nebulization every 6 (six) hours as needed for Wheezing or Shortness of Breath.     * albuterol 90 mcg/actuation inhaler  Commonly known as: VENTOLIN HFA  Inhale 2 puffs into the lungs every 6 (six) hours as needed for Wheezing. Rescue     ALPRAZolam 0.5 MG tablet  Commonly known as: XANAX  Take 1 tablet (0.5 mg total) by mouth nightly as needed for Anxiety.     aspirin 81 MG EC tablet  Commonly known as: ECOTRIN  Take 1 tablet (81 mg total) by mouth once daily.     atorvastatin 80 MG tablet  Commonly known as: LIPITOR  Take 1 tablet (80 mg total) by mouth once daily.     carvediloL 6.25 MG tablet  Commonly known as: COREG  Take 1 tablet (6.25 mg total) by mouth 2 (two) times daily.     ENTRESTO 24-26 mg per tablet  Generic drug: sacubitriL-valsartan  Take 1 tablet by mouth 2 (two) times daily.     fenofibrate 145 MG tablet  Commonly known as: TRICOR  Take 1 tablet (145 mg total) by mouth once daily.     gabapentin 300 MG capsule  Commonly known as: NEURONTIN  Take 300 mg by mouth 3 (three) times daily as needed (nerve pain).     hydrOXYzine 50 MG tablet  Commonly known as: ATARAX  Take 1 tablet (50 mg total) by mouth 3 (three) times daily as needed for Anxiety.     isosorbide mononitrate 30 MG 24 hr tablet  Commonly known as: IMDUR  Take 3 tablets (90 mg total) by mouth once daily.     nitroGLYCERIN 0.4 MG SL tablet  Commonly known as: NITROSTAT  Place 1 tablet (0.4 mg total) under the tongue every 5 (five) minutes as needed for Chest pain.     pantoprazole 40 MG tablet  Commonly known as: PROTONIX  Take 40 mg by mouth once daily.     prasugreL 10 mg Tab  Commonly known as: EFFIENT  Take 1 tablet (10 mg total) by mouth  once daily.     predniSONE 20 MG tablet  Commonly known as: DELTASONE  2 a day x 5 days then 1 a day x 5 days     ranolazine 500 MG Tb12  Commonly known as: RANEXA  Take 500 mg by mouth 2 (two) times daily.     tiotropium bromide 2.5 mcg/actuation inhaler  Commonly known as: SPIRIVA RESPIMAT  Inhale 2 puffs into the lungs Daily. Controller     XARELTO 2.5 mg Tab  Generic drug: rivaroxaban  Take 1 tablet (2.5 mg total) by mouth daily with dinner or evening meal.         * This list has 2 medication(s) that are the same as other medications prescribed for you. Read the directions carefully, and ask your doctor or other care provider to review them with you.                Indwelling Lines/Drains at time of discharge:   Lines/Drains/Airways     None                 Time spent on the discharge of patient: 36 minutes         Michelle Awad PA-C  Department of Hospital Medicine  [facility] - Tele[facility]

## 2023-06-07 NOTE — PLAN OF CARE
Problem: Adult Inpatient Plan of Care  Goal: Plan of Care Review  Outcome: Ongoing, Progressing  Goal: Patient-Specific Goal (Individualized)  Outcome: Ongoing, Progressing  Goal: Absence of Hospital-Acquired Illness or Injury  Outcome: Ongoing, Progressing  Goal: Optimal Comfort and Wellbeing  Outcome: Ongoing, Progressing  Goal: Readiness for Transition of Care  Outcome: Ongoing, Progressing   Patient was received awake and alert skin warm to touch. Even and non labored respiration. Pt denies any pain at this time. Plan of care reviewed and all safety measures in place.

## 2023-06-07 NOTE — PLAN OF CARE
[facility] - Tele[facility] Stepdown  Discharge Final Note    Primary Care Provider: Isiah You MD    Expected Discharge Date: 6/7/2023      Future Appointments   Date Time Provider Department Center   6/15/2023  1:20 PM Roberto Vaca MD [facility]          Final Discharge Note (most recent)       Final Note - 06/07/23 1137          Final Note    Assessment Type Final Discharge Note     Anticipated Discharge Disposition Home or Self Care     What phone number can be called within the next 1-3 days to see how you are doing after discharge? [phone]     Hospital Resources/Appts/Education Provided Appointments scheduled and added to AVS        Post-Acute Status    Post-Acute Authorization Other     Other Status No Post-Acute Service Needs     Discharge Delays None known at this time                     Important Message from Medicare  Important Message from Medicare regarding Discharge Appeal Rights: Explained to patient/caregiver, Signed/date by patient/caregiver     Date IMM was signed: 06/07/23  Time IMM was signed: 4344      Nancie Hebert RN  Ext [phone]

## 2023-06-07 NOTE — CARE UPDATE
Jun15 Hospital Follow Up 1:20 PM   Roberto Vaca MD  [facility]   [address] [facility]   [place] [address]   [phone]

## 2023-06-07 NOTE — CARE UPDATE
CHW met with patient/family at bedside. Patient experience rounding completed and reviewed the following.     Do you know your discharge plan? Yes, home with Wife.     If you are discharging home, do you have help at home? Yes family     Do you think you will need help at home at discharge? No, He says he can take care of him self.      Have you discussed your needs and preferences with your SW/CM? Not yet.     Assigned SW/CM notified of any patient/family needs or concerns.     Happy with his stay at [facility]!!

## 2023-06-07 NOTE — NURSING
Pt received discharge instructions/education, no concerns verbalized. IV discontinued, cath intact, pt tolerated well. Currently awaiting wife to arrive to provide transportation home.

## 2023-06-08 ENCOUNTER — PATIENT OUTREACH (OUTPATIENT)
Dept: ADMINISTRATIVE | Facility: CLINIC | Age: 73
End: 2023-06-08
Payer: MEDICARE

## 2023-06-08 NOTE — PROGRESS NOTES
C3 nurse attempted to contact Lonnie Taylor for a TCC post hospital discharge follow up call. No answer. Left voicemail with callback information. The patient has a scheduled HOSFU appointment with Roberto Vaca MD on 06/15/23 @ 7822.         fall

## 2023-06-09 NOTE — PROGRESS NOTES
2nd attempt to make TCC Call. Left voicemail. Please call [phone] leave your first and last name and date of birth for Rosalina. I will return your call.

## 2023-06-19 NOTE — PHYSICIAN QUERY
PT Name: Lonnie Taylor  MR #: [id number]     DOCUMENTATION CLARIFICATION     CDS/: Afia Palomo RN              Contact information:[email or web address]  This form is a permanent document in the medical record.     Query Date: June 19, 2023    By submitting this query, we are merely seeking further clarification of documentation.  Please utilize your independent clinical judgment when addressing the question(s) below.  The Medical Record contains the following   Indicators   Supporting Clinical Findings Location in Medical Record   x Documentation of Respiratory Failure, ARDS Acute on chronic respiratory failure with hypoxia H&P   x Subjective Respiratory Signs/Symptoms:   SOB, GARCIA, Cough, etc. SOB H&P    Objective Respiratory Signs/Symptoms: Respiratory distress, Accessory muscle use, tachypnea, wheezing, etc.     x RR     ABGs     O2 sat RR= 18 to 24  O2 sat=91 to 100 Vs record 6-4 to 6-7    Hypoxia/Hypercapnia      BiPAP/Intubation/Mechanical Ventilation     x Supplemental O2 3 to 8lnc Vs record 6-4 to 6-7   x Home O2, Oxygen Dependence 2 to 3lnc home oxygen H&P    Radiology findings     x Acute/Chronic Illness COPD,CHF H&P    Treatment     x Other EMS states on arrival he was 94% RA with wheezing noted to left side, right clear. EMS gave 1 duo neb 8 lpm with patient stating he has some relief. His Spo2 is 100% on 8 lpm.      Denies increased work of breathing, worsening SOB, and chest pain. He is comfortable on 2-3L of O2 but states he normally will use 5-6 L at time at home. H&P                HM note 6-5       The noted clinical guidelines following a diagnosis are only system guidelines and do not replace the providers clinical judgment.    Due to the conflicting clinical picture, please clinically validate the diagnosis of Acute on chronic respiratory failure with hypoxia.    If validated, please provide additional clinical support for the diagnosis.     [    ] Above stated diagnosis  is not confirmed and/or it has been ruled out     [    ] Above stated diagnosis is not confirmed and/or it has been ruled out, other diagnosis ruled in Chronic respiratory failure with hypoxia     [ x   ] Acute Respiratory Failure with Hypoxia (ABG pO2 < 60 mmHg or O2 sat of <91% on room air and respiratory symptoms documented) diagnosis is confirmed and additional clinical support/decision-making indicators for the diagnosis include (please specify):from HPI: Patient reports that for the last 1-2 weeks he has been having significant shortness of breath and wheezing. He has been using home nebs regularly without relief and has had to up titrate home O2 to 5L.On adm O2S 100% on 5L RR 22 and patient noted to be in respiratory distress with prolonged inspiratory and expiratory wheezes.     [    ] Other clarification (please specify): ___________________     Please document in your progress notes daily for the duration of treatment until resolved and include in your discharge summary.     Reference:    PATEL Freire MD. (2020, March 13). Acute respiratory distress syndrome: Clinical features, diagnosis, and complications in adults ([id number] BENTON Chinchilla MD & [id number] TRENTON Lacy MD, Eds.). Retrieved November 13, 2020, from [email or web address]-[email or web address]-features-[email or web address]-[email or web address]-[email or web address]?search=ards&source=search_result&selectedTitle=1~150&usage_type=default&display_rank=1  Form No. 65042

## 2023-07-13 RX ORDER — ALPRAZOLAM 0.5 MG/1
0.5 TABLET ORAL NIGHTLY PRN
Qty: 30 TABLET | Refills: 5 | Status: CANCELLED | OUTPATIENT
Start: 2023-07-13

## 2023-07-13 RX ORDER — ALBUTEROL SULFATE 0.83 MG/ML
2.5 SOLUTION RESPIRATORY (INHALATION) EVERY 6 HOURS PRN
Qty: 300 EACH | Refills: 12 | Status: CANCELLED | OUTPATIENT
Start: 2023-07-13

## 2023-07-13 NOTE — TELEPHONE ENCOUNTER
No care due was identified.  [facility] Embedded Care Due Messages. Reference number: [id number].   7/13/2023 2:30:04 PM CDT

## 2023-07-17 NOTE — TELEPHONE ENCOUNTER
Left voicemail informing patient that we received Rx refill requests. I informed the patient that the Rxs have refills already and that if he needs any Rxs to contact the office via the chart or telephone.

## 2023-09-09 ENCOUNTER — NURSE TRIAGE (OUTPATIENT)
Dept: ADMINISTRATIVE | Facility: CLINIC | Age: 73
End: 2023-09-09
Payer: MEDICARE

## 2023-09-09 NOTE — TELEPHONE ENCOUNTER
Pts wife calling with pt, states that pt tested + for COVID today. States yesterday he began with a lot of coughing, has needed to go from 3L to now 5L of oxygen, needing his nebulizer, is weak and needing assistance with walking, and having chills with 99.4F temp. Per protocol advised CALL 911 NOW. verbalized understanding, but states she will wait. Reinforced importance of dispo, but still states they will wait. Denies any further questions or concerns at this time, advised to call back if they have any that come up. Advised pt to call back with any other concerns or worsening symptoms. Verbalized understanding and will route message to provider.     Reason for Disposition   Shock suspected (e.g., cold/pale/clammy skin, too weak to stand, low BP, rapid pulse)    Additional Information   Negative: SEVERE difficulty breathing (e.g., struggling for each breath, speaks in single words)   Negative: Difficult to awaken or acting confused (e.g., disoriented, slurred speech)   Negative: Bluish (or gray) lips or face now    Protocols used: Coronavirus (COVID-19) Diagnosed or [id number]

## 2023-09-11 ENCOUNTER — HOSPITAL ENCOUNTER (INPATIENT)
Facility: HOSPITAL | Age: 73
LOS: 2 days | Discharge: HOME | DRG: 177 | End: 2023-09-14
Attending: STUDENT IN AN ORGANIZED HEALTH CARE EDUCATION/TRAINING PROGRAM | Admitting: HOSPITALIST
Payer: MEDICARE

## 2023-09-11 DIAGNOSIS — Z87.898 HISTORY OF CONFUSION: ICD-10-CM

## 2023-09-11 DIAGNOSIS — R06.00 DYSPNEA: ICD-10-CM

## 2023-09-11 DIAGNOSIS — R79.89 ELEVATED TROPONIN: ICD-10-CM

## 2023-09-11 DIAGNOSIS — I50.43 ACUTE ON CHRONIC COMBINED SYSTOLIC AND DIASTOLIC HEART FAILURE: ICD-10-CM

## 2023-09-11 DIAGNOSIS — U07.1 COVID-19: Primary | ICD-10-CM

## 2023-09-11 DIAGNOSIS — R06.02 SOB (SHORTNESS OF BREATH): ICD-10-CM

## 2023-09-11 DIAGNOSIS — I21.4 NSTEMI (NON-ST ELEVATED MYOCARDIAL INFARCTION): ICD-10-CM

## 2023-09-11 DIAGNOSIS — I50.9 ACUTE ON CHRONIC CONGESTIVE HEART FAILURE, UNSPECIFIED HEART FAILURE TYPE: ICD-10-CM

## 2023-09-11 DIAGNOSIS — R07.9 CHEST PAIN: ICD-10-CM

## 2023-09-11 DIAGNOSIS — I25.119 CORONARY ARTERY DISEASE INVOLVING NATIVE CORONARY ARTERY OF NATIVE HEART WITH ANGINA PECTORIS: ICD-10-CM

## 2023-09-11 PROBLEM — J96.21 ACUTE ON CHRONIC RESPIRATORY FAILURE WITH HYPOXIA: Status: RESOLVED | Noted: 2023-06-04 | Resolved: 2023-09-11

## 2023-09-11 PROCEDURE — 94640 AIRWAY INHALATION TREATMENT: CPT | Mod: HCNC

## 2023-09-11 PROCEDURE — U0002 COVID-19 LAB TEST NON-CDC: HCPCS | Mod: HCNC | Performed by: STUDENT IN AN ORGANIZED HEALTH CARE EDUCATION/TRAINING PROGRAM

## 2023-09-11 PROCEDURE — 93005 ELECTROCARDIOGRAM TRACING: CPT | Mod: HCNC

## 2023-09-11 PROCEDURE — 93010 EKG 12-LEAD: ICD-10-PCS | Mod: HCNC,,, | Performed by: INTERNAL MEDICINE

## 2023-09-11 PROCEDURE — 93010 ELECTROCARDIOGRAM REPORT: CPT | Mod: HCNC,,, | Performed by: INTERNAL MEDICINE

## 2023-09-11 PROCEDURE — 94761 N-INVAS EAR/PLS OXIMETRY MLT: CPT | Mod: HCNC

## 2023-09-11 RX ORDER — IPRATROPIUM BROMIDE AND ALBUTEROL SULFATE 2.5; .5 MG/3ML; MG/3ML
3 SOLUTION RESPIRATORY (INHALATION)
Status: COMPLETED | OUTPATIENT
Start: 2023-09-12 | End: 2023-09-12

## 2023-09-11 RX ORDER — METHYLPREDNISOLONE SOD SUCC 125 MG
125 VIAL (EA) INJECTION
Status: COMPLETED | OUTPATIENT
Start: 2023-09-12 | End: 2023-09-12

## 2023-09-11 RX ADMIN — IPRATROPIUM BROMIDE AND ALBUTEROL SULFATE 3 ML: .5; 3 SOLUTION RESPIRATORY (INHALATION) at 11:09

## 2023-09-11 NOTE — Clinical Note
Diagnosis: SOB (shortness of breath) [564328]   Admitting Provider:: KECIA COLLADO [[id number]]   Future Attending Provider: KECIA COLLADO [[id number]]   Reason for IP Medical Treatment  (Clinical interventions that can only be accomplished in the IP setting? ) :: CHF, COVID, NSTEMI   I certify that Inpatient services for greater than or equal to 2 midnights are medically necessary:: Yes   Plans for Post-Acute care--if anticipated (pick the single best option):: A. No post acute care anticipated at this time

## 2023-09-12 PROBLEM — I20.0 UNSTABLE ANGINA: Chronic | Status: ACTIVE | Noted: 2023-09-12

## 2023-09-12 PROBLEM — U07.1 COVID: Status: ACTIVE | Noted: 2023-09-12

## 2023-09-12 LAB
ALBUMIN SERPL BCP-MCNC: 2.9 G/DL (ref 3.5–5.2)
ALP SERPL-CCNC: 27 U/L (ref 55–135)
ALT SERPL W/O P-5'-P-CCNC: 15 U/L (ref 10–44)
ANION GAP SERPL CALC-SCNC: 14 MMOL/L (ref 8–16)
ANISOCYTOSIS BLD QL SMEAR: SLIGHT
AST SERPL-CCNC: 34 U/L (ref 10–40)
BASOPHILS # BLD AUTO: 0.01 K/UL (ref 0–0.2)
BASOPHILS # BLD AUTO: 0.01 K/UL (ref 0–0.2)
BASOPHILS NFR BLD: 0.3 % (ref 0–1.9)
BASOPHILS NFR BLD: 0.3 % (ref 0–1.9)
BILIRUB SERPL-MCNC: 0.7 MG/DL (ref 0.1–1)
BNP SERPL-MCNC: 1006 PG/ML (ref 0–99)
BUN SERPL-MCNC: 19 MG/DL (ref 8–23)
BURR CELLS BLD QL SMEAR: ABNORMAL
CALCIUM SERPL-MCNC: 8.1 MG/DL (ref 8.7–10.5)
CHLORIDE SERPL-SCNC: 108 MMOL/L (ref 95–110)
CHOLEST SERPL-MCNC: 147 MG/DL (ref 120–199)
CHOLEST/HDLC SERPL: 7 {RATIO} (ref 2–5)
CO2 SERPL-SCNC: 19 MMOL/L (ref 23–29)
CREAT SERPL-MCNC: 1.3 MG/DL (ref 0.5–1.4)
CRP SERPL-MCNC: 79.8 MG/L (ref 0–8.2)
D DIMER PPP IA.FEU-MCNC: 2.07 MG/L FEU
DIFFERENTIAL METHOD: ABNORMAL
DIFFERENTIAL METHOD: ABNORMAL
EOSINOPHIL # BLD AUTO: 0 K/UL (ref 0–0.5)
EOSINOPHIL # BLD AUTO: 0 K/UL (ref 0–0.5)
EOSINOPHIL NFR BLD: 0 % (ref 0–8)
EOSINOPHIL NFR BLD: 0.3 % (ref 0–8)
ERYTHROCYTE [DISTWIDTH] IN BLOOD BY AUTOMATED COUNT: 17.1 % (ref 11.5–14.5)
ERYTHROCYTE [DISTWIDTH] IN BLOOD BY AUTOMATED COUNT: 17.1 % (ref 11.5–14.5)
EST. GFR  (NO RACE VARIABLE): 57.3 ML/MIN/1.73 M^2
GIANT PLATELETS BLD QL SMEAR: PRESENT
GLUCOSE SERPL-MCNC: 87 MG/DL (ref 70–110)
HCT VFR BLD AUTO: 36.5 % (ref 40–54)
HCT VFR BLD AUTO: 42.2 % (ref 40–54)
HDLC SERPL-MCNC: 21 MG/DL (ref 40–75)
HDLC SERPL: 14.3 % (ref 20–50)
HGB BLD-MCNC: 11.9 G/DL (ref 14–18)
HGB BLD-MCNC: 13.9 G/DL (ref 14–18)
IMM GRANULOCYTES # BLD AUTO: 0.01 K/UL (ref 0–0.04)
IMM GRANULOCYTES # BLD AUTO: 0.01 K/UL (ref 0–0.04)
IMM GRANULOCYTES NFR BLD AUTO: 0.3 % (ref 0–0.5)
IMM GRANULOCYTES NFR BLD AUTO: 0.3 % (ref 0–0.5)
LDH SERPL L TO P-CCNC: 301 U/L (ref 110–260)
LDLC SERPL CALC-MCNC: 97.6 MG/DL (ref 63–159)
LYMPHOCYTES # BLD AUTO: 0.3 K/UL (ref 1–4.8)
LYMPHOCYTES # BLD AUTO: 0.6 K/UL (ref 1–4.8)
LYMPHOCYTES NFR BLD: 15.9 % (ref 18–48)
LYMPHOCYTES NFR BLD: 8.2 % (ref 18–48)
MAGNESIUM SERPL-MCNC: 1.4 MG/DL (ref 1.6–2.6)
MAGNESIUM SERPL-MCNC: 2.1 MG/DL (ref 1.6–2.6)
MCH RBC QN AUTO: 29.6 PG (ref 27–31)
MCH RBC QN AUTO: 30.1 PG (ref 27–31)
MCHC RBC AUTO-ENTMCNC: 32.6 G/DL (ref 32–36)
MCHC RBC AUTO-ENTMCNC: 32.9 G/DL (ref 32–36)
MCV RBC AUTO: 90 FL (ref 82–98)
MCV RBC AUTO: 92 FL (ref 82–98)
MONOCYTES # BLD AUTO: 0.1 K/UL (ref 0.3–1)
MONOCYTES # BLD AUTO: 0.4 K/UL (ref 0.3–1)
MONOCYTES NFR BLD: 11.9 % (ref 4–15)
MONOCYTES NFR BLD: 3.8 % (ref 4–15)
NEUTROPHILS # BLD AUTO: 2.5 K/UL (ref 1.8–7.7)
NEUTROPHILS # BLD AUTO: 3 K/UL (ref 1.8–7.7)
NEUTROPHILS NFR BLD: 71.3 % (ref 38–73)
NEUTROPHILS NFR BLD: 87.4 % (ref 38–73)
NONHDLC SERPL-MCNC: 126 MG/DL
NRBC BLD-RTO: 0 /100 WBC
NRBC BLD-RTO: 0 /100 WBC
OVALOCYTES BLD QL SMEAR: ABNORMAL
PHOSPHATE SERPL-MCNC: 2.1 MG/DL (ref 2.7–4.5)
PLATELET # BLD AUTO: 80 K/UL (ref 150–450)
PLATELET # BLD AUTO: 91 K/UL (ref 150–450)
PLATELET BLD QL SMEAR: ABNORMAL
PMV BLD AUTO: 12.8 FL (ref 9.2–12.9)
PMV BLD AUTO: 13.1 FL (ref 9.2–12.9)
POIKILOCYTOSIS BLD QL SMEAR: ABNORMAL
POLYCHROMASIA BLD QL SMEAR: ABNORMAL
POTASSIUM SERPL-SCNC: 3 MMOL/L (ref 3.5–5.1)
PROT SERPL-MCNC: 5.4 G/DL (ref 6–8.4)
RBC # BLD AUTO: 3.96 M/UL (ref 4.6–6.2)
RBC # BLD AUTO: 4.69 M/UL (ref 4.6–6.2)
SARS-COV-2 RDRP RESP QL NAA+PROBE: POSITIVE
SCHISTOCYTES BLD QL SMEAR: ABNORMAL
SODIUM SERPL-SCNC: 141 MMOL/L (ref 136–145)
TRIGL SERPL-MCNC: 142 MG/DL (ref 30–150)
TROPONIN I SERPL DL<=0.01 NG/ML-MCNC: 1.07 NG/ML (ref 0–0.03)
TROPONIN I SERPL DL<=0.01 NG/ML-MCNC: 1.19 NG/ML (ref 0–0.03)
WBC # BLD AUTO: 3.41 K/UL (ref 3.9–12.7)
WBC # BLD AUTO: 3.53 K/UL (ref 3.9–12.7)

## 2023-09-12 PROCEDURE — 99900035 HC TECH TIME PER 15 MIN (STAT): Mod: HCNC

## 2023-09-12 PROCEDURE — 99223 PR INITIAL HOSPITAL CARE,LEVL III: ICD-10-PCS | Mod: AI,HCNC,GC, | Performed by: HOSPITALIST

## 2023-09-12 PROCEDURE — 94664 DEMO&/EVAL PT USE INHALER: CPT | Mod: HCNC

## 2023-09-12 PROCEDURE — 20600001 HC STEP DOWN PRIVATE ROOM: Mod: HCNC

## 2023-09-12 PROCEDURE — 25000242 PHARM REV CODE 250 ALT 637 W/ HCPCS: Mod: HCNC

## 2023-09-12 PROCEDURE — 94640 AIRWAY INHALATION TREATMENT: CPT | Mod: HCNC

## 2023-09-12 PROCEDURE — 85025 COMPLETE CBC W/AUTO DIFF WBC: CPT | Mod: 91,HCNC

## 2023-09-12 PROCEDURE — 94640 AIRWAY INHALATION TREATMENT: CPT | Mod: HCNC,XB

## 2023-09-12 PROCEDURE — 84484 ASSAY OF TROPONIN QUANT: CPT | Mod: 91,HCNC

## 2023-09-12 PROCEDURE — 25000003 PHARM REV CODE 250: Mod: HCNC | Performed by: HOSPITALIST

## 2023-09-12 PROCEDURE — 84484 ASSAY OF TROPONIN QUANT: CPT | Mod: HCNC | Performed by: STUDENT IN AN ORGANIZED HEALTH CARE EDUCATION/TRAINING PROGRAM

## 2023-09-12 PROCEDURE — 25000003 PHARM REV CODE 250: Mod: HCNC | Performed by: STUDENT IN AN ORGANIZED HEALTH CARE EDUCATION/TRAINING PROGRAM

## 2023-09-12 PROCEDURE — 36415 COLL VENOUS BLD VENIPUNCTURE: CPT | Mod: HCNC

## 2023-09-12 PROCEDURE — 80061 LIPID PANEL: CPT | Mod: HCNC

## 2023-09-12 PROCEDURE — 99285 EMERGENCY DEPT VISIT HI MDM: CPT | Mod: HCNC

## 2023-09-12 PROCEDURE — 94761 N-INVAS EAR/PLS OXIMETRY MLT: CPT | Mod: HCNC

## 2023-09-12 PROCEDURE — 25000003 PHARM REV CODE 250: Mod: HCNC

## 2023-09-12 PROCEDURE — 85025 COMPLETE CBC W/AUTO DIFF WBC: CPT | Mod: HCNC | Performed by: STUDENT IN AN ORGANIZED HEALTH CARE EDUCATION/TRAINING PROGRAM

## 2023-09-12 PROCEDURE — 80053 COMPREHEN METABOLIC PANEL: CPT | Mod: HCNC | Performed by: STUDENT IN AN ORGANIZED HEALTH CARE EDUCATION/TRAINING PROGRAM

## 2023-09-12 PROCEDURE — 85379 FIBRIN DEGRADATION QUANT: CPT | Mod: HCNC

## 2023-09-12 PROCEDURE — 87040 BLOOD CULTURE FOR BACTERIA: CPT | Mod: 59,HCNC

## 2023-09-12 PROCEDURE — 27000207 HC ISOLATION: Mod: HCNC

## 2023-09-12 PROCEDURE — 86140 C-REACTIVE PROTEIN: CPT | Mod: HCNC

## 2023-09-12 PROCEDURE — 83735 ASSAY OF MAGNESIUM: CPT | Mod: HCNC | Performed by: STUDENT IN AN ORGANIZED HEALTH CARE EDUCATION/TRAINING PROGRAM

## 2023-09-12 PROCEDURE — 96374 THER/PROPH/DIAG INJ IV PUSH: CPT | Mod: HCNC

## 2023-09-12 PROCEDURE — 63600175 PHARM REV CODE 636 W HCPCS: Mod: JZ,TB,HCNC

## 2023-09-12 PROCEDURE — 83735 ASSAY OF MAGNESIUM: CPT | Mod: 91,HCNC

## 2023-09-12 PROCEDURE — 99223 1ST HOSP IP/OBS HIGH 75: CPT | Mod: AI,HCNC,GC, | Performed by: HOSPITALIST

## 2023-09-12 PROCEDURE — 83615 LACTATE (LD) (LDH) ENZYME: CPT | Mod: HCNC

## 2023-09-12 PROCEDURE — 83880 ASSAY OF NATRIURETIC PEPTIDE: CPT | Mod: HCNC | Performed by: STUDENT IN AN ORGANIZED HEALTH CARE EDUCATION/TRAINING PROGRAM

## 2023-09-12 PROCEDURE — 25000242 PHARM REV CODE 250 ALT 637 W/ HCPCS: Mod: HCNC | Performed by: STUDENT IN AN ORGANIZED HEALTH CARE EDUCATION/TRAINING PROGRAM

## 2023-09-12 PROCEDURE — 27000646 HC AEROBIKA DEVICE: Mod: HCNC

## 2023-09-12 PROCEDURE — S4991 NICOTINE PATCH NONLEGEND: HCPCS | Mod: HCNC

## 2023-09-12 PROCEDURE — 25000242 PHARM REV CODE 250 ALT 637 W/ HCPCS: Mod: HCNC | Performed by: HOSPITALIST

## 2023-09-12 PROCEDURE — 96375 TX/PRO/DX INJ NEW DRUG ADDON: CPT | Mod: HCNC

## 2023-09-12 PROCEDURE — 84100 ASSAY OF PHOSPHORUS: CPT | Mod: HCNC

## 2023-09-12 PROCEDURE — 63600175 PHARM REV CODE 636 W HCPCS: Mod: HCNC | Performed by: STUDENT IN AN ORGANIZED HEALTH CARE EDUCATION/TRAINING PROGRAM

## 2023-09-12 RX ORDER — IBUPROFEN 200 MG
1 TABLET ORAL DAILY
Status: DISCONTINUED | OUTPATIENT
Start: 2023-09-12 | End: 2023-09-14 | Stop reason: HOSPADM

## 2023-09-12 RX ORDER — IPRATROPIUM BROMIDE 0.5 MG/2.5ML
0.5 SOLUTION RESPIRATORY (INHALATION) EVERY 6 HOURS
Status: DISCONTINUED | OUTPATIENT
Start: 2023-09-12 | End: 2023-09-12

## 2023-09-12 RX ORDER — ASCORBIC ACID 500 MG
500 TABLET ORAL 2 TIMES DAILY
Status: DISCONTINUED | OUTPATIENT
Start: 2023-09-12 | End: 2023-09-14 | Stop reason: HOSPADM

## 2023-09-12 RX ORDER — FUROSEMIDE 40 MG/1
40 TABLET ORAL DAILY
Status: DISCONTINUED | OUTPATIENT
Start: 2023-09-12 | End: 2023-09-14 | Stop reason: HOSPADM

## 2023-09-12 RX ORDER — FENOFIBRATE 145 MG/1
145 TABLET, FILM COATED ORAL DAILY
Status: DISCONTINUED | OUTPATIENT
Start: 2023-09-12 | End: 2023-09-14 | Stop reason: HOSPADM

## 2023-09-12 RX ORDER — ATORVASTATIN CALCIUM 40 MG/1
80 TABLET, FILM COATED ORAL DAILY
Status: DISCONTINUED | OUTPATIENT
Start: 2023-09-12 | End: 2023-09-14 | Stop reason: HOSPADM

## 2023-09-12 RX ORDER — CARVEDILOL 6.25 MG/1
6.25 TABLET ORAL 2 TIMES DAILY
Status: DISCONTINUED | OUTPATIENT
Start: 2023-09-12 | End: 2023-09-14 | Stop reason: HOSPADM

## 2023-09-12 RX ORDER — ALBUTEROL SULFATE 2.5 MG/.5ML
2.5 SOLUTION RESPIRATORY (INHALATION) EVERY 4 HOURS PRN
Status: DISCONTINUED | OUTPATIENT
Start: 2023-09-13 | End: 2023-09-14 | Stop reason: HOSPADM

## 2023-09-12 RX ORDER — LEVALBUTEROL 1.25 MG/.5ML
1.25 SOLUTION, CONCENTRATE RESPIRATORY (INHALATION) EVERY 6 HOURS
Status: DISCONTINUED | OUTPATIENT
Start: 2023-09-12 | End: 2023-09-12

## 2023-09-12 RX ORDER — ACETAMINOPHEN 325 MG/1
650 TABLET ORAL EVERY 8 HOURS PRN
Status: DISCONTINUED | OUTPATIENT
Start: 2023-09-12 | End: 2023-09-14 | Stop reason: HOSPADM

## 2023-09-12 RX ORDER — BENZONATATE 100 MG/1
100 CAPSULE ORAL 3 TIMES DAILY PRN
Status: DISCONTINUED | OUTPATIENT
Start: 2023-09-12 | End: 2023-09-14 | Stop reason: HOSPADM

## 2023-09-12 RX ORDER — ASPIRIN 81 MG/1
81 TABLET ORAL DAILY
Status: DISCONTINUED | OUTPATIENT
Start: 2023-09-12 | End: 2023-09-14 | Stop reason: HOSPADM

## 2023-09-12 RX ORDER — MAGNESIUM SULFATE HEPTAHYDRATE 40 MG/ML
2 INJECTION, SOLUTION INTRAVENOUS ONCE
Status: COMPLETED | OUTPATIENT
Start: 2023-09-12 | End: 2023-09-12

## 2023-09-12 RX ORDER — ALPRAZOLAM 0.5 MG/1
0.5 TABLET ORAL NIGHTLY PRN
Status: DISCONTINUED | OUTPATIENT
Start: 2023-09-12 | End: 2023-09-14 | Stop reason: HOSPADM

## 2023-09-12 RX ORDER — SODIUM,POTASSIUM PHOSPHATES 280-250MG
2 POWDER IN PACKET (EA) ORAL ONCE
Status: COMPLETED | OUTPATIENT
Start: 2023-09-12 | End: 2023-09-12

## 2023-09-12 RX ORDER — POTASSIUM CHLORIDE 20 MEQ/1
40 TABLET, EXTENDED RELEASE ORAL ONCE
Status: COMPLETED | OUTPATIENT
Start: 2023-09-12 | End: 2023-09-12

## 2023-09-12 RX ORDER — PANTOPRAZOLE SODIUM 40 MG/1
40 TABLET, DELAYED RELEASE ORAL DAILY
Status: DISCONTINUED | OUTPATIENT
Start: 2023-09-12 | End: 2023-09-14 | Stop reason: HOSPADM

## 2023-09-12 RX ORDER — GLUCAGON 1 MG
1 KIT INJECTION
Status: DISCONTINUED | OUTPATIENT
Start: 2023-09-12 | End: 2023-09-14 | Stop reason: HOSPADM

## 2023-09-12 RX ORDER — FUROSEMIDE 10 MG/ML
80 INJECTION INTRAMUSCULAR; INTRAVENOUS
Status: COMPLETED | OUTPATIENT
Start: 2023-09-12 | End: 2023-09-12

## 2023-09-12 RX ORDER — MUPIROCIN 20 MG/G
OINTMENT TOPICAL 2 TIMES DAILY
Status: DISCONTINUED | OUTPATIENT
Start: 2023-09-12 | End: 2023-09-14 | Stop reason: HOSPADM

## 2023-09-12 RX ORDER — TALC
6 POWDER (GRAM) TOPICAL NIGHTLY PRN
Status: DISCONTINUED | OUTPATIENT
Start: 2023-09-12 | End: 2023-09-14 | Stop reason: HOSPADM

## 2023-09-12 RX ORDER — IBUPROFEN 200 MG
24 TABLET ORAL
Status: DISCONTINUED | OUTPATIENT
Start: 2023-09-12 | End: 2023-09-14 | Stop reason: HOSPADM

## 2023-09-12 RX ORDER — CLOPIDOGREL BISULFATE 75 MG/1
75 TABLET ORAL DAILY
Status: DISCONTINUED | OUTPATIENT
Start: 2023-09-12 | End: 2023-09-14 | Stop reason: HOSPADM

## 2023-09-12 RX ORDER — SODIUM CHLORIDE 0.9 % (FLUSH) 0.9 %
10 SYRINGE (ML) INJECTION EVERY 12 HOURS PRN
Status: DISCONTINUED | OUTPATIENT
Start: 2023-09-12 | End: 2023-09-14 | Stop reason: HOSPADM

## 2023-09-12 RX ORDER — NITROGLYCERIN 0.4 MG/1
0.4 TABLET SUBLINGUAL EVERY 5 MIN PRN
Status: DISCONTINUED | OUTPATIENT
Start: 2023-09-12 | End: 2023-09-14 | Stop reason: HOSPADM

## 2023-09-12 RX ORDER — IBUPROFEN 200 MG
16 TABLET ORAL
Status: DISCONTINUED | OUTPATIENT
Start: 2023-09-12 | End: 2023-09-14 | Stop reason: HOSPADM

## 2023-09-12 RX ORDER — LOPERAMIDE HYDROCHLORIDE 2 MG/1
2 CAPSULE ORAL 4 TIMES DAILY PRN
Status: DISCONTINUED | OUTPATIENT
Start: 2023-09-12 | End: 2023-09-14 | Stop reason: HOSPADM

## 2023-09-12 RX ORDER — ALBUTEROL SULFATE 90 UG/1
2 AEROSOL, METERED RESPIRATORY (INHALATION) 4 TIMES DAILY
Status: DISCONTINUED | OUTPATIENT
Start: 2023-09-12 | End: 2023-09-14 | Stop reason: HOSPADM

## 2023-09-12 RX ORDER — NALOXONE HCL 0.4 MG/ML
0.02 VIAL (ML) INJECTION
Status: DISCONTINUED | OUTPATIENT
Start: 2023-09-12 | End: 2023-09-14 | Stop reason: HOSPADM

## 2023-09-12 RX ADMIN — OXYCODONE HYDROCHLORIDE AND ACETAMINOPHEN 500 MG: 500 TABLET ORAL at 08:09

## 2023-09-12 RX ADMIN — OXYCODONE HYDROCHLORIDE AND ACETAMINOPHEN 500 MG: 500 TABLET ORAL at 09:09

## 2023-09-12 RX ADMIN — ALBUTEROL SULFATE 2 PUFF: 108 INHALANT RESPIRATORY (INHALATION) at 01:09

## 2023-09-12 RX ADMIN — Medication 1 PATCH: at 09:09

## 2023-09-12 RX ADMIN — NITROGLYCERIN 0.4 MG: 0.4 TABLET, ORALLY DISINTEGRATING SUBLINGUAL at 10:09

## 2023-09-12 RX ADMIN — SACUBITRIL AND VALSARTAN 1 TABLET: 24; 26 TABLET, FILM COATED ORAL at 09:09

## 2023-09-12 RX ADMIN — REMDESIVIR 200 MG: 100 INJECTION, POWDER, LYOPHILIZED, FOR SOLUTION INTRAVENOUS at 04:09

## 2023-09-12 RX ADMIN — PANTOPRAZOLE SODIUM 40 MG: 40 TABLET, DELAYED RELEASE ORAL at 09:09

## 2023-09-12 RX ADMIN — IPRATROPIUM BROMIDE AND ALBUTEROL SULFATE 3 ML: .5; 3 SOLUTION RESPIRATORY (INHALATION) at 12:09

## 2023-09-12 RX ADMIN — MUPIROCIN: 20 OINTMENT TOPICAL at 09:09

## 2023-09-12 RX ADMIN — POTASSIUM & SODIUM PHOSPHATES POWDER PACK 280-160-250 MG 2 PACKET: 280-160-250 PACK at 09:09

## 2023-09-12 RX ADMIN — FENOFIBRATE 145 MG: 145 TABLET, FILM COATED ORAL at 09:09

## 2023-09-12 RX ADMIN — ALPRAZOLAM 0.5 MG: 0.5 TABLET ORAL at 08:09

## 2023-09-12 RX ADMIN — ISOSORBIDE MONONITRATE 90 MG: 60 TABLET, EXTENDED RELEASE ORAL at 09:09

## 2023-09-12 RX ADMIN — MAGNESIUM SULFATE HEPTAHYDRATE 2 G: 40 INJECTION, SOLUTION INTRAVENOUS at 02:09

## 2023-09-12 RX ADMIN — FUROSEMIDE 80 MG: 10 INJECTION, SOLUTION INTRAVENOUS at 02:09

## 2023-09-12 RX ADMIN — ALBUTEROL SULFATE 2 PUFF: 108 INHALANT RESPIRATORY (INHALATION) at 08:09

## 2023-09-12 RX ADMIN — BENZONATATE 100 MG: 100 CAPSULE ORAL at 09:09

## 2023-09-12 RX ADMIN — CARVEDILOL 6.25 MG: 6.25 TABLET, FILM COATED ORAL at 08:09

## 2023-09-12 RX ADMIN — CLOPIDOGREL BISULFATE 75 MG: 75 TABLET ORAL at 09:09

## 2023-09-12 RX ADMIN — POTASSIUM CHLORIDE 40 MEQ: 1500 TABLET, EXTENDED RELEASE ORAL at 02:09

## 2023-09-12 RX ADMIN — ALBUTEROL SULFATE 2.5 MG: 2.5 SOLUTION RESPIRATORY (INHALATION) at 11:09

## 2023-09-12 RX ADMIN — SACUBITRIL AND VALSARTAN 1 TABLET: 24; 26 TABLET, FILM COATED ORAL at 08:09

## 2023-09-12 RX ADMIN — BENZONATATE 100 MG: 100 CAPSULE ORAL at 11:09

## 2023-09-12 RX ADMIN — ASPIRIN 81 MG: 81 TABLET, COATED ORAL at 09:09

## 2023-09-12 RX ADMIN — FUROSEMIDE 40 MG: 40 TABLET ORAL at 09:09

## 2023-09-12 RX ADMIN — CARVEDILOL 6.25 MG: 6.25 TABLET, FILM COATED ORAL at 09:09

## 2023-09-12 RX ADMIN — METHYLPREDNISOLONE SODIUM SUCCINATE 125 MG: 125 INJECTION, POWDER, FOR SOLUTION INTRAMUSCULAR; INTRAVENOUS at 12:09

## 2023-09-12 RX ADMIN — RIVAROXABAN 2.5 MG: 2.5 TABLET, FILM COATED ORAL at 04:09

## 2023-09-12 RX ADMIN — ATORVASTATIN CALCIUM 80 MG: 40 TABLET, FILM COATED ORAL at 09:09

## 2023-09-12 RX ADMIN — ALBUTEROL SULFATE 2 PUFF: 108 INHALANT RESPIRATORY (INHALATION) at 04:09

## 2023-09-12 NOTE — PLAN OF CARE
[facility] - Cardiology Stepdown  Initial Discharge Assessment       Primary Care Provider: Isiah You MD    Admission Diagnosis: Dyspnea [R06.00]  SOB (shortness of breath) [R06.02]  Elevated troponin [R77.8]  NSTEMI (non-ST elevated myocardial infarction) [I21.4]  Acute on chronic congestive heart failure, unspecified heart failure type [I50.9]  COVID-19 [U07.1]    Admission Date: 9/11/2023  Expected Discharge Date: 9/14/2023    Transition of Care Barriers: None    Payor: HU[employer] MANAGED MEDICARE / Plan: HU[employer] MEDICARE HMO / Product Type: Capitation /     Extended Emergency Contact Information  Primary Emergency Contact: Ginny Taylor  Address: [address] [place] [address] [place] [address] [facility]  Home Phone: [phone]  Work Phone: [phone]  Mobile Phone: [phone]  Relation: Spouse    Discharge Plan A: Home with family  Discharge Plan B: Home with family      [id number] - [place] - [address] [facility] [facility] AT [facility] & [address]  [place] [address]  Phone: [phone] Fax: [phone]    [facility] - [facility] [address] [facility]  [address] [facility] [address]  Phone: [phone] Fax: [phone]    [facility] [place] [address] [address]  [facility] [address]  Phone: [phone] Fax: [phone]      Initial Assessment (most recent)       Adult Discharge Assessment - 09/12/23 1454          Discharge Assessment    Assessment Type Discharge Planning Assessment     Confirmed/corrected address, phone number and insurance Yes     Confirmed Demographics Correct on Facesheet     Source of Information patient     Communicated RICKY with patient/caregiver Date not available/Unable to determine     Reason For Admission Covid     People in Home spouse     Facility Arrived From: home     Do you expect to return to your current living situation? Yes     Do you have  help at home or someone to help you manage your care at home? Yes     Who are your caregiver(s) and their phone number(s)? Ginny Taylor (spouse) [phone]     Prior to hospitilization cognitive status: Alert/Oriented     Current cognitive status: Alert/Oriented     Walking or Climbing Stairs ambulation difficulty, requires equipment     Mobility Management Wheelchair with wheels, cane     Dressing/Bathing --   grab bars    Equipment Currently Used at Home oxygen;nebulizer;walker, rolling;grab bar;blood pressure machine     Readmission within 30 days? No     Patient currently being followed by outpatient case management? No     Do you currently have service(s) that help you manage your care at home? No     Do you take prescription medications? Yes     Do you have prescription coverage? Yes     Coverage [employer] managed     Do you have any problems affording any of your prescribed medications? No     Is the patient taking medications as prescribed? yes     Who is going to help you get home at discharge? Ginny Taylor (spouse) [phone]     How do you get to doctors appointments? car, drives self;family or friend will provide     Are you on dialysis? No     Do you take coumadin? No     DME Needed Upon Discharge  none     Discharge Plan discussed with: Patient     Transition of Care Barriers None     Discharge Plan A Home with family     Discharge Plan B Home with family        Physical Activity    On average, how many days per week do you engage in moderate to strenuous exercise (like a brisk walk)? 0 days     On average, how many minutes do you engage in exercise at this level? 0 min        Financial Resource Strain    How hard is it for you to pay for the very basics like food, housing, medical care, and heating? Not hard at all        Housing Stability    In the last 12 months, was there a time when you were not able to pay the mortgage or rent on time? No     In the last 12 months, how many places have you  lived? 1     In the last 12 months, was there a time when you did not have a steady place to sleep or slept in a shelter (including now)? No        Transportation Needs    In the past 12 months, has lack of transportation kept you from medical appointments or from getting medications? No     In the past 12 months, has lack of transportation kept you from meetings, work, or from getting things needed for daily living? No        Food Insecurity    Within the past 12 months, you worried that your food would run out before you got the money to buy more. Never true     Within the past 12 months, the food you bought just didn't last and you didn't have money to get more. Never true        Stress    Do you feel stress - tense, restless, nervous, or anxious, or unable to sleep at night because your mind is troubled all the time - these days? Not at all        Social Connections    In a typical week, how many times do you talk on the phone with family, friends, or neighbors? More than three times a week     How often do you get together with friends or relatives? More than three times a week     How often do you attend Scientologist or Yazidism services? Never     Do you belong to any clubs or organizations such as Scientologist groups, unions, fraternal or athletic groups, or school groups? Yes     How often do you attend meetings of the clubs or organizations you belong to? More than 4 times per year     Are you , , , , never , or living with a partner?         Alcohol Use    Q1: How often do you have a drink containing alcohol? Never     Q2: How many drinks containing alcohol do you have on a typical day when you are drinking? Patient does not drink     Q3: How often do you have six or more drinks on one occasion? Never        OTHER    Name(s) of People in Home Ginny Taylor (spouse) [phone]                      CM met with the patient at the bedside and discussed the discharge  process with him. Gave him the discharge booklet and our contact numbers. Patient verified his name , , insurance and PCP.Patient lives in a single story home with Ginny Taylor (spouse) [phone]. Souse to bring him home. Stated his  grand children come over occasionally .  Stated he owns a nebulizer and oxygen machine and wheelchair with wheels and grab bars and a  blood pressure machine. He is not on coumadin and not on dialysis. Patient wants medications called to his pharmacy. [facility] at [address] [facility]. Will continue to monitor for discharge needs.     Geovanny Ledesma RN    [phone]

## 2023-09-12 NOTE — HOSPITAL COURSE
Patient started on Remdesivir for COVID infection and high risk with noted COPD and HF, continuing to use tobacco although reduced amount recently per patient.  No supplemental O2 required, steroids deferred.  Patient with complaints of diarrhea, but no diarrhea since admission. Stool studies discontinued. Intermittent periods of confusion and disorientation, seems baseline. Echo showed LV EF 35-40%. Pt has remained stable. VSS and wnl. Has not required supplemental O2 to maintain sats. Able to discharge home.

## 2023-09-12 NOTE — SUBJECTIVE & OBJECTIVE
Past Medical History:   Diagnosis Date    CAD (coronary artery disease)     Dr Martinez    Carotid artery disease     Chronic kidney disease, stage III (moderate)     COPD (chronic obstructive pulmonary disease)     Depression     Dyslipidemia     Hepatic cyst     Hepatic cyst     High-density lipoprotein deficiency     HTN (hypertension)     Hx of colonic polyps     Insomnia     PVD (peripheral vascular disease)     stented    S/P AAA repair        Past Surgical History:   Procedure Laterality Date    ABDOMINAL AORTIC ANEURYSM REPAIR      ABDOMINAL AORTIC ANEURYSM REPAIR      ANGIOGRAM, CORONARY, WITH LEFT HEART CATHETERIZATION Left 2/14/2022    Procedure: Angiogram, Coronary, with Left Heart Cath;  Surgeon: Delfino Castellon MD;  Location: [facility] CATH LAB;  Service: Cardiology;  Laterality: Left;    ANGIOGRAPHY OF LOWER EXTREMITY Left 10/23/2018    Procedure: Angiogram Extremity Unilateral;  Surgeon: Gregor Cunha MD;  Location: [facility] CATH;  Service: Cardiology;  Laterality: Left;  LLE intervention-Will start with 4/5 slender sheath left radial and take diagnostic angio of LLE to determine whether brachial access or tibial access will be required    CATARACT EXTRACTION W/ INTRAOCULAR LENS  IMPLANT, BILATERAL      CATARACT SX Bilateral     CATHETERIZATION OF BOTH LEFT AND RIGHT HEART N/A 7/10/2020    Procedure: CATHETERIZATION, HEART, BOTH LEFT AND RIGHT;  Surgeon: Gregor Cunha MD;  Location: [facility] CATH;  Service: Cardiology;  Laterality: N/A;  LHC + RHC +/- PCI -access left radial artery and left brachial vein    CHOLECYSTECTOMY      COLONOSCOPY N/A 10/7/2015    Procedure: COLONOSCOPY;  Surgeon: Genaro You MD;  Location: [facility] ENDO ([address] [place] [address]);  Service: Endoscopy;  Laterality: N/A;    CORONARY ANGIOPLASTY WITH STENT PLACEMENT      CORONARY ARTERY BYPASS GRAFT      CORONARY BYPASS GRAFT ANGIOGRAPHY  2/14/2022    Procedure: Bypass graft study;  Surgeon: Delfino Castellon MD;  Location: [facility] CATH LAB;   "Service: Cardiology;;    HERNIA REPAIR      LAMINECTOMY      MAGNETIC RESONANCE IMAGING N/A 6/4/2021    Procedure: MRI (MAGNETIC RESONANCE IMAGING) LUMBAR SPINE;  Surgeon: Asa Diagnostic Provider;  Location: [facility];  Service: General;  Laterality: N/A;  In MRI @ 9:10 per Krys, To follow WC RM1    PATELLA SURGERY      PERCUTANEOUS TRANSLUMINAL ANGIOPLASTY (PTA) OF PERIPHERAL VESSEL N/A 9/18/2018    Procedure: PTA, PERIPHERAL BLOOD VESSEL;  Surgeon: Gregor Cunha MD;  Location: [facility] CATH;  Service: Cardiology;  Laterality: N/A;  Site change:  right popliteal artery access using US guidance.    PERCUTANEOUS TRANSLUMINAL ANGIOPLASTY (PTA) OF PERIPHERAL VESSEL Left 7/25/2019    Procedure: PTA, PERIPHERAL VESSEL;  Surgeon: Gregor Cunha MD;  Location: [facility] CATH;  Service: Cardiology;  Laterality: Left;    VASECTOMY         Review of patient's allergies indicates:   Allergen Reactions    Aggrastat concentrate [tirofiban] Shortness Of Breath and Other (See Comments)     Fever and chills    Aggrastat in sodium chloride [tirofiban-0.9% sodium chloride] Shortness Of Breath     fever    Brilinta [ticagrelor] Shortness Of Breath    Cymbalta [duloxetine] Nausea Only    Bupropion Other (See Comments)     "turns into zombie" withdrawn, not talking, outbursts  "turns into zombie", withdrawn, not talking, outbursts.       No current facility-administered medications on file prior to encounter.     Current Outpatient Medications on File Prior to Encounter   Medication Sig    albuterol (PROVENTIL) 2.5 mg /3 mL (0.083 %) nebulizer solution Take 3 mLs (2.5 mg total) by nebulization every 6 (six) hours as needed for Wheezing or Shortness of Breath.    albuterol (VENTOLIN HFA) 90 mcg/actuation inhaler Inhale 2 puffs into the lungs every 6 (six) hours as needed for Wheezing. Rescue    ALPRAZolam (XANAX) 0.5 MG tablet Take 1 tablet (0.5 mg total) by mouth nightly as needed for Anxiety.    aspirin (ECOTRIN) 81 MG EC tablet Take 1 " tablet (81 mg total) by mouth once daily.    atorvastatin (LIPITOR) 80 MG tablet Take 1 tablet (80 mg total) by mouth once daily. (Patient not taking: Reported on 10/20/2022)    carvediloL (COREG) 6.25 MG tablet Take 1 tablet (6.25 mg total) by mouth 2 (two) times daily.    clopidogreL (PLAVIX) 75 mg tablet Take 1 tablet (75 mg total) by mouth once daily.    fenofibrate (TRICOR) 145 MG tablet Take 1 tablet (145 mg total) by mouth once daily.    furosemide (LASIX) 20 MG tablet Take 2 tablets (40 mg total) by mouth once daily.    gabapentin (NEURONTIN) 300 MG capsule Take 300 mg by mouth 3 (three) times daily as needed (nerve pain).    hydrOXYzine (ATARAX) 50 MG tablet Take 1 tablet (50 mg total) by mouth 3 (three) times daily as needed for Anxiety.    isosorbide mononitrate (IMDUR) 30 MG 24 hr tablet Take 3 tablets (90 mg total) by mouth once daily.    nitroGLYCERIN (NITROSTAT) 0.4 MG SL tablet Place 1 tablet (0.4 mg total) under the tongue every 5 (five) minutes as needed for Chest pain.    pantoprazole (PROTONIX) 40 MG tablet Take 40 mg by mouth once daily.    prasugreL (EFFIENT) 10 mg Tab Take 1 tablet (10 mg total) by mouth once daily.    predniSONE (DELTASONE) 20 MG tablet 2 a day x 5 days then 1 a day x 5 days    ranolazine (RANEXA) 500 MG Tb12 Take 500 mg by mouth 2 (two) times daily.    rivaroxaban (XARELTO) 2.5 mg Tab Take 1 tablet (2.5 mg total) by mouth daily with dinner or evening meal.    sacubitriL-valsartan (ENTRESTO) 24-26 mg per tablet Take 1 tablet by mouth 2 (two) times daily.    tiotropium bromide (SPIRIVA RESPIMAT) 2.5 mcg/actuation inhaler Inhale 2 puffs into the lungs Daily. Controller    [DISCONTINUED] amLODIPine (NORVASC) 5 MG tablet Take 1 tablet (5 mg total) by mouth once daily.    [DISCONTINUED] lisinopriL (PRINIVIL,ZESTRIL) 20 MG tablet Take 1 tablet (20 mg total) by mouth once daily.    [DISCONTINUED] rosuvastatin (CRESTOR) 20 MG tablet Take 2 tablets (40 mg total) by mouth once daily.      Family History       Problem Relation (Age of Onset)    Heart disease Mother, Father    Lung cancer Brother    No Known Problems Daughter, Daughter, Daughter, Son          Tobacco Use    Smoking status: Every Day     Current packs/day: 1.00     Average packs/day: 1 pack/day for 50.0 years (50.0 ttl pk-yrs)     Types: Cigarettes    Smokeless tobacco: Never    Tobacco comments:     Currently smoke 1 ppd   Substance and Sexual Activity    Alcohol use: No    Drug use: Yes     Types: Marijuana     Comment: OCCASSIONALLY    Sexual activity: Yes     Partners: Female     Review of Systems   Constitutional:  Positive for activity change, appetite change, chills, diaphoresis, fatigue and fever. Unexpected weight change: 10-15lbs intentional weight loss.  HENT:  Positive for postnasal drip, rhinorrhea and sore throat. Negative for mouth sores and trouble swallowing.    Eyes:  Positive for visual disturbance.   Respiratory:  Positive for cough, shortness of breath and wheezing.    Cardiovascular:  Positive for chest pain (retrosternal, lasting 1 hour, alleviated by nitro,). Negative for leg swelling.   Gastrointestinal:  Positive for diarrhea. Negative for abdominal distention, abdominal pain, constipation and nausea.   Endocrine: Negative for cold intolerance and heat intolerance.   Genitourinary:  Positive for decreased urine volume. Negative for difficulty urinating, dysuria and testicular pain.   Musculoskeletal:  Positive for back pain (chronic) and myalgias. Negative for neck pain and neck stiffness.   Skin:  Positive for pallor.   Neurological:  Positive for weakness. Negative for tremors, light-headedness and headaches.   Psychiatric/Behavioral: Negative.  The patient is not nervous/anxious.      Objective:     Vital Signs (Most Recent):  Temp: 98.1 °F (36.7 °C) (09/12/23 0145)  Pulse: 73 (09/12/23 0145)  Resp: 17 (09/12/23 0145)  BP: (!) 175/80 (09/12/23 0145)  SpO2: 95 % (09/12/23 0145) Vital Signs (24h  Range):  Temp:  [98.1 °F (36.7 °C)-99.7 °F (37.6 °C)] 98.1 °F (36.7 °C)  Pulse:  [54-73] 73  Resp:  [17-20] 17  SpO2:  [95 %-98 %] 95 %  BP: (116-175)/(52-80) 175/80     Weight: 88.6 kg (195 lb 5.2 oz)  Body mass index is 29.7 kg/m².     Physical Exam  Constitutional:       General: He is not in acute distress.     Appearance: He is obese. He is ill-appearing. He is not toxic-appearing.   HENT:      Head: Normocephalic and atraumatic.      Right Ear: External ear normal.      Left Ear: External ear normal.      Nose: Congestion and rhinorrhea present.      Mouth/Throat:      Mouth: Mucous membranes are dry.   Eyes:      General: No scleral icterus.  Cardiovascular:      Rate and Rhythm: Normal rate and regular rhythm.      Pulses: Normal pulses.      Heart sounds: No murmur heard.     No friction rub.   Pulmonary:      Effort: Pulmonary effort is normal.      Breath sounds: Rhonchi present. No wheezing.      Comments: Saturating 99 on room air on examination, laying flat, no signs of orthopnea  Abdominal:      General: There is no distension.      Tenderness: There is no guarding.   Musculoskeletal:         General: No swelling or tenderness.      Cervical back: No rigidity.      Right lower leg: No edema.      Left lower leg: No edema.   Skin:     Capillary Refill: Capillary refill takes less than 2 seconds.      Coloration: Skin is pale.      Findings: No lesion.   Neurological:      General: No focal deficit present.      Mental Status: He is alert and oriented to person, place, and time.      Motor: Weakness present.   Psychiatric:         Mood and Affect: Mood normal.         Behavior: Behavior normal.         Thought Content: Thought content normal.                Significant Labs: All pertinent labs within the past 24 hours have been reviewed.  Recent Lab Results         09/12/23  0001   09/11/23  2355        Albumin 2.9         Alkaline Phosphatase 27         ALT 15         Anion Gap 14         Aniso  Slight         AST 34         Baso # 0.01         Basophil % 0.3         BILIRUBIN TOTAL 0.7  Comment: For infants and newborns, interpretation of results should be based  on gestational age, weight and in agreement with clinical  observations.    Premature Infant recommended reference ranges:  Up to 24 hours.............<8.0 mg/dL  Up to 48 hours............<12.0 mg/dL  3-5 days..................<15.0 mg/dL  6-29 days.................<15.0 mg/dL           BNP 1,006  Comment: Values of less than 100 pg/ml are consistent with non-CHF populations.         BUN 19         [place]/Echinocytes Moderate         Calcium 8.1         Chloride 108         CO2 19         Creatinine 1.3         Differential Method Automated         eGFR 57.3         Eos # 0.0         Eosinophil % 0.3         Fragmented Cells Occasional         Giant Platelets Present         Glucose 87         Gran # (ANC) 2.5         Gran % 71.3         Hematocrit 36.5         Hemoglobin 11.9         Immature Grans (Abs) 0.01  Comment: Mild elevation in immature granulocytes is non specific and   can be seen in a variety of conditions including stress response,   acute inflammation, trauma and pregnancy. Correlation with other   laboratory and clinical findings is essential.           Immature Granulocytes 0.3         Lymph # 0.6         Lymph % 15.9         Magnesium  1.4         MCH 30.1         MCHC 32.6         MCV 92         Mono # 0.4         Mono % 11.9         MPV 12.8         nRBC 0         Ovalocytes Occasional         Platelet Estimate Decreased         Platelets 80         Poikilocytosis Moderate         Poly Occasional         Potassium 3.0         PROTEIN TOTAL 5.4         RBC 3.96         RDW 17.1         SARS-CoV-2 RNA, Amplification, Qual   Positive  Comment: This test utilizes isothermal nucleic acid amplification technology   to   detect the SARS-CoV-2 RdRp nucleic acid segment. The analytical   sensitivity   (limit of detection) is 500  copies/swab.     A POSITIVE result is indicative of the presence of SARS-CoV-2 RNA;   clinical   correlation with patient history and other diagnostic information is   necessary to determine patient infection status.    A NEGATIVE result means that SARS-CoV-2 nucleic acids are not present   above   the limit of detection. A NEGATIVE result should be treated as   presumptive.   It does not rule out the possibility of COVID-19 and should not be   the sole   basis for treatment decisions. If COVID-19 is strongly suspected   based on   clinical and exposure history, re-testing using an alternate   molecular assay   should be considered.     This test is only for use under the Food and Drug Administration s   Emergency   Use Authorization (EUA).     Commercial kits are provided by [employer]. Performance   characteristics of the EUA have been independently verified by   [facility] Department of Pathology and Laboratory Medicine.   _________________________________________________________________   The authorized Fact Sheet for Healthcare Providers and the authorized   Fact   Sheet for Patients of the ID NOW COVID-19 are available on the FDA   website:   https://www.fda.gov/media/551051/download   https://www.fda.gov/media/682249/download         Sodium 141         Troponin I 1.193  Comment: The reference interval for Troponin I represents the 99th percentile   cutoff   for our facility and is consistent with 3rd generation assay   performance.           WBC 3.53                 Significant Imaging: I have reviewed all pertinent imaging results/findings within the past 24 hours.

## 2023-09-12 NOTE — H&P
Kamron Dunne - Cardiology [facility] Medicine  History & Physical    Patient Name: Lonnie Taylor  MRN: [id number]  Patient Class: IP- Inpatient  Admission Date: 9/11/2023  Attending Physician: Donal Holley MD   Primary Care Provider: Isiah You MD         Patient information was obtained from patient, past medical records, and ER records.     Subjective:     Principal Problem:COPD exacerbation    Chief Complaint:   Chief Complaint   Patient presents with    Shortness of Breath     Pt arrives c/o SOB with expiratory wheezing. Hx of COPD and recent Covid dx. Given albuterol en route.    Chest Pain        HPI: Mr. Taylor is a 73-year-old male with medical history of COPD on 4-5 L home O2 intermittently, HFrEF (40% with 6/2023), CAD (s/p PCI), PAD on xarelto, HTN, HLD, CKD3b, GERD, tobacco abuse, and obesity who presents to the ED with chief complaint of shortness of breath and wheezing. Reports that aprox one week ago he started feeling worsening SOB, states he did not had to increase O2  requirements. Other associated symptoms reported are increase in sputum production w/o hemoptysis, sputum white-yellow, diarrhea that started last Thursday described as semi-formed loose stool (3x episodes per day)  malaise and myalgias  located mainly on thighs.    Of note patient reports episode of chest pain yesterday in AM, states he has recurrent chest pains with exertion and occassionally at rest, chest pain was relived by nitro. Trops on admission 1.19 repeat downtrending. EKG w/o evidence of STEMI    Initial work up in the ED , Cbc notable for  leucopenia and thrombocytopenia, CMP remarkable for hypoK and hypoMg. CXR; Cardiomegaly with bilateral edema.                      Past Medical History:   Diagnosis Date    CAD (coronary artery disease)     Dr Martinez    Carotid artery disease     Chronic kidney disease, stage III (moderate)     COPD (chronic obstructive pulmonary disease)     Depression     Dyslipidemia      Hepatic cyst     Hepatic cyst     High-density lipoprotein deficiency     HTN (hypertension)     Hx of colonic polyps     Insomnia     PVD (peripheral vascular disease)     stented    S/P AAA repair        Past Surgical History:   Procedure Laterality Date    ABDOMINAL AORTIC ANEURYSM REPAIR      ABDOMINAL AORTIC ANEURYSM REPAIR      ANGIOGRAM, CORONARY, WITH LEFT HEART CATHETERIZATION Left 2/14/2022    Procedure: Angiogram, Coronary, with Left Heart Cath;  Surgeon: Delfino Castellon MD;  Location: [facility] CATH LAB;  Service: Cardiology;  Laterality: Left;    ANGIOGRAPHY OF LOWER EXTREMITY Left 10/23/2018    Procedure: Angiogram Extremity Unilateral;  Surgeon: Gregor Cunha MD;  Location: [facility] CATH;  Service: Cardiology;  Laterality: Left;  LLE intervention-Will start with 4/5 slender sheath left radial and take diagnostic angio of LLE to determine whether brachial access or tibial access will be required    CATARACT EXTRACTION W/ INTRAOCULAR LENS  IMPLANT, BILATERAL      CATARACT SX Bilateral     CATHETERIZATION OF BOTH LEFT AND RIGHT HEART N/A 7/10/2020    Procedure: CATHETERIZATION, HEART, BOTH LEFT AND RIGHT;  Surgeon: Gregor Cunha MD;  Location: [facility] CATH;  Service: Cardiology;  Laterality: N/A;  LHC + RHC +/- PCI -access left radial artery and left brachial vein    CHOLECYSTECTOMY      COLONOSCOPY N/A 10/7/2015    Procedure: COLONOSCOPY;  Surgeon: Genaro You MD;  Location: [facility] ENDO ([address] [place] [address]);  Service: Endoscopy;  Laterality: N/A;    CORONARY ANGIOPLASTY WITH STENT PLACEMENT      CORONARY ARTERY BYPASS GRAFT      CORONARY BYPASS GRAFT ANGIOGRAPHY  2/14/2022    Procedure: Bypass graft study;  Surgeon: Delfino Castellon MD;  Location: [facility] CATH LAB;  Service: Cardiology;;    HERNIA REPAIR      LAMINECTOMY      MAGNETIC RESONANCE IMAGING N/A 6/4/2021    Procedure: MRI (MAGNETIC RESONANCE IMAGING) LUMBAR SPINE;  Surgeon: [facility] Diagnostic Provider;  Location: [facility];  Service: General;   "Laterality: N/A;  In MRI @ 9:10 per Krys, To follow WC RM1    PATELLA SURGERY      PERCUTANEOUS TRANSLUMINAL ANGIOPLASTY (PTA) OF PERIPHERAL VESSEL N/A 9/18/2018    Procedure: PTA, PERIPHERAL BLOOD VESSEL;  Surgeon: Gregor Cunha MD;  Location: [facility] CATH;  Service: Cardiology;  Laterality: N/A;  Site change:  right popliteal artery access using US guidance.    PERCUTANEOUS TRANSLUMINAL ANGIOPLASTY (PTA) OF PERIPHERAL VESSEL Left 7/25/2019    Procedure: PTA, PERIPHERAL VESSEL;  Surgeon: Gregor Cunha MD;  Location: [facility] CATH;  Service: Cardiology;  Laterality: Left;    VASECTOMY         Review of patient's allergies indicates:   Allergen Reactions    Aggrastat concentrate [tirofiban] Shortness Of Breath and Other (See Comments)     Fever and chills    Aggrastat in sodium chloride [tirofiban-0.9% sodium chloride] Shortness Of Breath     fever    Brilinta [ticagrelor] Shortness Of Breath    Cymbalta [duloxetine] Nausea Only    Bupropion Other (See Comments)     "turns into zombie" withdrawn, not talking, outbursts  "turns into zombie", withdrawn, not talking, outbursts.       No current facility-administered medications on file prior to encounter.     Current Outpatient Medications on File Prior to Encounter   Medication Sig    albuterol (PROVENTIL) 2.5 mg /3 mL (0.083 %) nebulizer solution Take 3 mLs (2.5 mg total) by nebulization every 6 (six) hours as needed for Wheezing or Shortness of Breath.    albuterol (VENTOLIN HFA) 90 mcg/actuation inhaler Inhale 2 puffs into the lungs every 6 (six) hours as needed for Wheezing. Rescue    ALPRAZolam (XANAX) 0.5 MG tablet Take 1 tablet (0.5 mg total) by mouth nightly as needed for Anxiety.    aspirin (ECOTRIN) 81 MG EC tablet Take 1 tablet (81 mg total) by mouth once daily.    atorvastatin (LIPITOR) 80 MG tablet Take 1 tablet (80 mg total) by mouth once daily. (Patient not taking: Reported on 10/20/2022)    carvediloL (COREG) 6.25 MG tablet Take 1 tablet (6.25 mg total) " by mouth 2 (two) times daily.    clopidogreL (PLAVIX) 75 mg tablet Take 1 tablet (75 mg total) by mouth once daily.    fenofibrate (TRICOR) 145 MG tablet Take 1 tablet (145 mg total) by mouth once daily.    furosemide (LASIX) 20 MG tablet Take 2 tablets (40 mg total) by mouth once daily.    gabapentin (NEURONTIN) 300 MG capsule Take 300 mg by mouth 3 (three) times daily as needed (nerve pain).    hydrOXYzine (ATARAX) 50 MG tablet Take 1 tablet (50 mg total) by mouth 3 (three) times daily as needed for Anxiety.    isosorbide mononitrate (IMDUR) 30 MG 24 hr tablet Take 3 tablets (90 mg total) by mouth once daily.    nitroGLYCERIN (NITROSTAT) 0.4 MG SL tablet Place 1 tablet (0.4 mg total) under the tongue every 5 (five) minutes as needed for Chest pain.    pantoprazole (PROTONIX) 40 MG tablet Take 40 mg by mouth once daily.    prasugreL (EFFIENT) 10 mg Tab Take 1 tablet (10 mg total) by mouth once daily.    predniSONE (DELTASONE) 20 MG tablet 2 a day x 5 days then 1 a day x 5 days    ranolazine (RANEXA) 500 MG Tb12 Take 500 mg by mouth 2 (two) times daily.    rivaroxaban (XARELTO) 2.5 mg Tab Take 1 tablet (2.5 mg total) by mouth daily with dinner or evening meal.    sacubitriL-valsartan (ENTRESTO) 24-26 mg per tablet Take 1 tablet by mouth 2 (two) times daily.    tiotropium bromide (SPIRIVA RESPIMAT) 2.5 mcg/actuation inhaler Inhale 2 puffs into the lungs Daily. Controller    [DISCONTINUED] amLODIPine (NORVASC) 5 MG tablet Take 1 tablet (5 mg total) by mouth once daily.    [DISCONTINUED] lisinopriL (PRINIVIL,ZESTRIL) 20 MG tablet Take 1 tablet (20 mg total) by mouth once daily.    [DISCONTINUED] rosuvastatin (CRESTOR) 20 MG tablet Take 2 tablets (40 mg total) by mouth once daily.     Family History       Problem Relation (Age of Onset)    Heart disease Mother, Father    Lung cancer Brother    No Known Problems Daughter, Daughter, Daughter, Son          Tobacco Use    Smoking status: Every Day     Current packs/day:  1.00     Average packs/day: 1 pack/day for 50.0 years (50.0 ttl pk-yrs)     Types: Cigarettes    Smokeless tobacco: Never    Tobacco comments:     Currently smoke 1 ppd   Substance and Sexual Activity    Alcohol use: No    Drug use: Yes     Types: Marijuana     Comment: OCCASSIONALLY    Sexual activity: Yes     Partners: Female     Review of Systems   Constitutional:  Positive for activity change, appetite change, chills, diaphoresis, fatigue and fever. Unexpected weight change: 10-15lbs intentional weight loss.  HENT:  Positive for postnasal drip, rhinorrhea and sore throat. Negative for mouth sores and trouble swallowing.    Eyes:  Positive for visual disturbance.   Respiratory:  Positive for cough, shortness of breath and wheezing.    Cardiovascular:  Positive for chest pain (retrosternal, lasting 1 hour, alleviated by nitro,). Negative for leg swelling.   Gastrointestinal:  Positive for diarrhea. Negative for abdominal distention, abdominal pain, constipation and nausea.   Endocrine: Negative for cold intolerance and heat intolerance.   Genitourinary:  Positive for decreased urine volume. Negative for difficulty urinating, dysuria and testicular pain.   Musculoskeletal:  Positive for back pain (chronic) and myalgias. Negative for neck pain and neck stiffness.   Skin:  Positive for pallor.   Neurological:  Positive for weakness. Negative for tremors, light-headedness and headaches.   Psychiatric/Behavioral: Negative.  The patient is not nervous/anxious.      Objective:     Vital Signs (Most Recent):  Temp: 98.1 °F (36.7 °C) (09/12/23 0145)  Pulse: 73 (09/12/23 0145)  Resp: 17 (09/12/23 0145)  BP: (!) 175/80 (09/12/23 0145)  SpO2: 95 % (09/12/23 0145) Vital Signs (24h Range):  Temp:  [98.1 °F (36.7 °C)-99.7 °F (37.6 °C)] 98.1 °F (36.7 °C)  Pulse:  [54-73] 73  Resp:  [17-20] 17  SpO2:  [95 %-98 %] 95 %  BP: (116-175)/(52-80) 175/80     Weight: 88.6 kg (195 lb 5.2 oz)  Body mass index is 29.7 kg/m².     Physical  Exam  Constitutional:       General: He is not in acute distress.     Appearance: He is obese. He is ill-appearing. He is not toxic-appearing.   HENT:      Head: Normocephalic and atraumatic.      Right Ear: External ear normal.      Left Ear: External ear normal.      Nose: Congestion and rhinorrhea present.      Mouth/Throat:      Mouth: Mucous membranes are dry.   Eyes:      General: No scleral icterus.  Cardiovascular:      Rate and Rhythm: Normal rate and regular rhythm.      Pulses: Normal pulses.      Heart sounds: No murmur heard.     No friction rub.   Pulmonary:      Effort: Pulmonary effort is normal.      Breath sounds: Rhonchi present. No wheezing.      Comments: Saturating 99 on room air on examination, laying flat, no signs of orthopnea  Abdominal:      General: There is no distension.      Tenderness: There is no guarding.   Musculoskeletal:         General: No swelling or tenderness.      Cervical back: No rigidity.      Right lower leg: No edema.      Left lower leg: No edema.   Skin:     Capillary Refill: Capillary refill takes less than 2 seconds.      Coloration: Skin is pale.      Findings: No lesion.   Neurological:      General: No focal deficit present.      Mental Status: He is alert and oriented to person, place, and time.      Motor: Weakness present.   Psychiatric:         Mood and Affect: Mood normal.         Behavior: Behavior normal.         Thought Content: Thought content normal.                Significant Labs: All pertinent labs within the past 24 hours have been reviewed.  Recent Lab Results         09/12/23  0001   09/11/23  2355        Albumin 2.9         Alkaline Phosphatase 27         ALT 15         Anion Gap 14         Aniso Slight         AST 34         Baso # 0.01         Basophil % 0.3         BILIRUBIN TOTAL 0.7  Comment: For infants and newborns, interpretation of results should be based  on gestational age, weight and in agreement with  clinical  observations.    Premature Infant recommended reference ranges:  Up to 24 hours.............<8.0 mg/dL  Up to 48 hours............<12.0 mg/dL  3-5 days..................<15.0 mg/dL  6-29 days.................<15.0 mg/dL           BNP 1,006  Comment: Values of less than 100 pg/ml are consistent with non-CHF populations.         BUN 19         Mar/Echinocytes Moderate         Calcium 8.1         Chloride 108         CO2 19         Creatinine 1.3         Differential Method Automated         eGFR 57.3         Eos # 0.0         Eosinophil % 0.3         Fragmented Cells Occasional         Giant Platelets Present         Glucose 87         Gran # (ANC) 2.5         Gran % 71.3         Hematocrit 36.5         Hemoglobin 11.9         Immature Grans (Abs) 0.01  Comment: Mild elevation in immature granulocytes is non specific and   can be seen in a variety of conditions including stress response,   acute inflammation, trauma and pregnancy. Correlation with other   laboratory and clinical findings is essential.           Immature Granulocytes 0.3         Lymph # 0.6         Lymph % 15.9         Magnesium  1.4         MCH 30.1         MCHC 32.6         MCV 92         Mono # 0.4         Mono % 11.9         MPV 12.8         nRBC 0         Ovalocytes Occasional         Platelet Estimate Decreased         Platelets 80         Poikilocytosis Moderate         Poly Occasional         Potassium 3.0         PROTEIN TOTAL 5.4         RBC 3.96         RDW 17.1         SARS-CoV-2 RNA, Amplification, Qual   Positive  Comment: This test utilizes isothermal nucleic acid amplification technology   to   detect the SARS-CoV-2 RdRp nucleic acid segment. The analytical   sensitivity   (limit of detection) is 500 copies/swab.     A POSITIVE result is indicative of the presence of SARS-CoV-2 RNA;   clinical   correlation with patient history and other diagnostic information is   necessary to determine patient infection status.    A  NEGATIVE result means that SARS-CoV-2 nucleic acids are not present   above   the limit of detection. A NEGATIVE result should be treated as   presumptive.   It does not rule out the possibility of COVID-19 and should not be   the sole   basis for treatment decisions. If COVID-19 is strongly suspected   based on   clinical and exposure history, re-testing using an alternate   molecular assay   should be considered.     This test is only for use under the Food and Drug Administration s   Emergency   Use Authorization (EUA).     Commercial kits are provided by [employer]. Performance   characteristics of the EUA have been independently verified by   [facility] Department of Pathology and Laboratory Medicine.   _________________________________________________________________   The authorized Fact Sheet for Healthcare Providers and the authorized   Fact   Sheet for Patients of the ID NOW COVID-19 are available on the FDA   website:   https://www.fda.gov/media/568651/download   https://www.fda.gov/media/423424/download         Sodium 141         Troponin I 1.193  Comment: The reference interval for Troponin I represents the 99th percentile   cutoff   for our facility and is consistent with 3rd generation assay   performance.           WBC 3.53                 Significant Imaging: I have reviewed all pertinent imaging results/findings within the past 24 hours.  Assessment/Plan:     * COPD exacerbation    Patient with hx of COPD, still smoking one pack a day, on 4-5L Nasal cannula PRN without increase in O2 requirements who tested positive for covid with a home test on 9/7, here with worsening of SOB and diarrhea.    Given commodities which confound patient current clinical picture of COVID vs COPD exacerbation vs. CHF exacerbation patient would benefit from steroids either oral or IV.    -DUONEBS Q6 prn  -consider starting prednisone vs dexamethasone for covid  -consider starting CAP  coverage  -chest physiotherapy    COVID  Patient is identified as High risk for severe complications of COVID 19 based on COVID risk score of 8   Initiate standard COVID protocols; COVID-19 testing ,Infection Control notification  and isolation- respiratory, contact and droplet per protocol    Diagnostics: CBC, CMP and Portable CXR    Management: Maintain oxygen saturations 92-96% via Nasal Cannula  LPM and monitor with continuous/intermittent pulse oximetry. , Inhaled bronchodilators as needed for shortness of breath., Continuous cardiac monitoring. and Manage respiratory failure (O2 requirement >10LPM or needing NIPPV/Mechanical ventilation) and/or Pneumonia (active chest infiltrates) separately as described below.    Advance Care Planning  Current advance care plan has not been discussed with patient/family/POA and patient currently wishes Full Code.       -Consider starting Remdesivir/dex  -contact precautions  -ascorbic acid      Elevated troponin  Patient with elevated troponins admission.  Reports history of unstable angina treated by nitroglycerin.  Troponins on admission 1 point down trending to 1.07.  No ST elevation on EKG, suspect secondary to demand ischemia.  -given that troponins are down trending I will not consult Cardiology at this time      Acute on chronic respiratory failure with hypoxia  Patient with Hypercapnic Respiratory failure which is Chronic.  he is on home oxygen at 4-5 LPM. Supplemental oxygen was provided and noted-      .   Signs/symptoms of respiratory failure include- increased work of breathing. Contributing diagnoses includes - CHF and COPD Labs and images were reviewed. Patient Has recent ABG, which has been reviewed. Will treat underlying causes and adjust management of respiratory failure as follows    -O2 nasal canula PRN  -Sat goal of 89-93%        Unstable angina    Chest yesterday in a.m..  Chest pain resolved with nitroglycerin.  On admission elevated troponin.  Troponin  is down trending.  Patient reports history of recurrent angina, most of the time triggered by exertion and occasionally he will experience angina at rest.    Nicotine dependence, cigarettes, in remission  -nicotine patch    Mixed hyperlipidemia  Continue home statin and fibrate      Acute on chronic combined systolic and diastolic heart failure      - Most recent TTE on 6/2023 demonstrates: LVef 40%,   - EKG : NON SPECIFIC S AND t waveabnormalities  - Troponin 1.19 down trended to 1.07    - BNP 1000S and CXR with pulmonary edema/ cardiomegaly  -Reports adherence to lasix 20mg BID    On physical examination patient does not appear volume overload, no orthopnea present. Bilateral/panlobar ronchi and minimal wheezing auscultated. Do not appreciate rales (silenced by ronchi?)    -TTE to assess volume status  -Continue entresto  -continue carvedilol  -uptitrate lasix as needed                NSTEMI (non-ST elevated myocardial infarction)   73-year-old male with a past medical history of CAD with previous CABG and PCI (most recent 3/2020) and multiple cardiac risk factors presenting with chest pain, found to have NSTEMI with positve initial trop, now downtrending.   Most recent [facility] 2/14/22 with Ost LM to mid % stenosed, Ost RCA to prox % stenosed, mild diffuse disease in grafts to 1st ck and distal LAD, yet remain patent. LVEDP 35mmHg. No interventions were done at the time. He continues to smoke daily and has poor dietary compliance.. Currently hemodynamically stable and chest pain free.     Recommendations:  Continue DAPT (ASA, prasugrel)  CONTINUE HOME XARELTO  Continue high intensity statin + fenofibrate   -Continue Coreg   Continue Entrest        Thrombocytopenia, unspecified        Dyslipidemia    Continue home statin    Insomnia    Start melatonin      VTE Risk Mitigation (From admission, onward)           Ordered     IP VTE HIGH RISK PATIENT  Once         09/12/23 0449     Place sequential  compression device  Until discontinued         09/12/23 0449     Reason for No Pharmacological VTE Prophylaxis  Once        Question Answer Comment   Reasons: Already adequately anticoagulated on oral Anticoagulants    Reasons: Thrombocytopenia        09/12/23 0449                               Delfino Latham MD  Department of Hospital Medicine  [facility] - Cardiology Stepdown

## 2023-09-12 NOTE — ASSESSMENT & PLAN NOTE
73-year-old male with a past medical history of CAD with previous CABG and PCI (most recent 3/2020) and multiple cardiac risk factors presenting with chest pain, found to have NSTEMI with positve initial trop, now downtrending.   Most recent [facility] 2/14/22 with Ost LM to mid % stenosed, Ost RCA to prox % stenosed, mild diffuse disease in grafts to 1st ck and distal LAD, yet remain patent. LVEDP 35mmHg. No interventions were done at the time. He continues to smoke daily and has poor dietary compliance.. Currently hemodynamically stable and chest pain free.     Recommendations:   Continue DAPT (ASA, prasugrel)   CONTINUE HOME XARELTO   Continue high intensity statin + fenofibrate   -Continue Coreg    Continue Entrest

## 2023-09-12 NOTE — HPI
Mr. Taylor is a 73-year-old male with medical history of COPD on 4-5 L home O2 intermittently, HFrEF (40% with 6/2023), CAD (s/p PCI), PAD on xarelto, HTN, HLD, CKD3b, GERD, tobacco abuse, and obesity who presents to the ED with chief complaint of shortness of breath and wheezing. Reports that aprox one week ago he started feeling worsening SOB, states he did not had to increase O2  requirements. Other associated symptoms reported are increase in sputum production w/o hemoptysis, sputum white-yellow, diarrhea that started last Thursday described as semi-formed loose stool (3x episodes per day)  malaise and myalgias  located mainly on thighs.    Of note patient reports episode of chest pain yesterday in AM, states he has recurrent chest pains with exertion and occassionally at rest, chest pain was relived by nitro. Trops on admission 1.19 repeat downtrending. EKG w/o evidence of STEMI    Initial work up in the ED , Cbc notable for  leucopenia and thrombocytopenia, CMP remarkable for hypoK and hypoMg. CXR; Cardiomegaly with bilateral edema.

## 2023-09-12 NOTE — ED TRIAGE NOTES
"Lonnie Taylor, a 73 y.o. male presents to the ED w/ complaint of shortness of breath d/t COVID positive. Pt states that he was around his grandchild, and recently tested positive on last week.    Triage note:  Chief Complaint   Patient presents with    Shortness of Breath     Pt arrives c/o SOB with expiratory wheezing. Hx of COPD and recent Covid dx. Given albuterol en route.    Chest Pain     Review of patient's allergies indicates:   Allergen Reactions    Aggrastat concentrate [tirofiban] Shortness Of Breath and Other (See Comments)     Fever and chills    Aggrastat in sodium chloride [tirofiban-0.9% sodium chloride] Shortness Of Breath     fever    Brilinta [ticagrelor] Shortness Of Breath    Cymbalta [duloxetine] Nausea Only    Bupropion Other (See Comments)     "turns into zombie" withdrawn, not talking, outbursts  "turns into zombie", withdrawn, not talking, outbursts.     Past Medical History:   Diagnosis Date    CAD (coronary artery disease)     Dr Martinez    Carotid artery disease     Chronic kidney disease, stage III (moderate)     COPD (chronic obstructive pulmonary disease)     Depression     Dyslipidemia     Hepatic cyst     Hepatic cyst     High-density lipoprotein deficiency     HTN (hypertension)     Hx of colonic polyps     Insomnia     PVD (peripheral vascular disease)     stented    S/P AAA repair     Patient identifiers for Lonnie Taylor checked and correct.    LOC: The patient is awake, alert and aware of environment with an appropriate affect, the patient is oriented x 4 and speaking appropriately.    APPEARANCE: Patient resting comfortably and in no acute distress, patient is clean and well groomed, patient's clothing is properly fastened.    SKIN: The skin is warm and dry, color consistent with ethnicity, patient has normal skin turgor and moist mucus membranes, skin intact, no breakdown or bruising noted.    MUSCULOSKELETAL: Patient moving all extremities well, no obvious swelling or " deformities noted.    RESPIRATORY: Airway is open and patent, respirations are spontaneous and even, patient has a normal effort and rate. Pt states that he is SOB.    CARDIAC: Patient has a normal rate and rhythm, no periphreal edema noted, capillary refill < 3 seconds.    ABDOMEN: Soft and non tender to palpation, no distention noted. Patient denies any nausea, vomiting, diarrhea, or constipation.     NEUROLOGIC: Eyes open spontaneously, PERRL, behavior appropriate to situation, follows commands, facial expression symmetrical, bilateral hand grasp equal and even, purposeful motor response noted, normal sensation in all extremities.     HEENT: No abnormalities noted. White sclera and pupils equal round and reactive to light. Denies headache, dizziness.     : Pt voids independently, denies dysuria, hematuria, frequency.

## 2023-09-12 NOTE — NURSING
Home Oxygen Evaluation    Date Performed: 10/20/2022    1) Patient's Home O2 Sat on room air, while at rest: 97        If O2 sats on room air at rest are 88% or below, patient qualifies. No additional testing needed. Document N/A in steps 2 and 3. If 89% or above, complete steps 2.      2) Patient's O2 Sat on room air while exercisin        If O2 sats on room air while exercising remain 89% or above patient does not qualify, no further testing needed Document N/A in step 3. If O2 sats on room air while exercising are 88% or below, continue to step 3.      3) Patient's O2 Sat while exercising on O2: n/a at n/a LPM         (Must show improvement from #2 for patients to qualify)    If O2 sats improve on oxygen, patient qualifies for portable oxygen. If not, the patient does not qualify.

## 2023-09-12 NOTE — ED PROVIDER NOTES
"Encounter Date: 9/11/2023       History     Chief Complaint   Patient presents with    Shortness of Breath     Pt arrives c/o SOB with expiratory wheezing. Hx of COPD and recent Covid dx. Given albuterol en route.    Chest Pain     HPI    73-year-old male past medical history CAD, CAD, COPD, CHF presenting to the ER with fever and dyspnea.  Patient reports recently several of his family members tested COVID positive.  He presents today with worsening shortness of breath, as well as diarrhea.  Symptoms are moderate, worsening.  He is on home oxygen.  He denies any abdominal pain, syncope, dysuria, hematuria, or any trauma.        Review of patient's allergies indicates:   Allergen Reactions    Aggrastat concentrate [tirofiban] Shortness Of Breath and Other (See Comments)     Fever and chills    Aggrastat in sodium chloride [tirofiban-0.9% sodium chloride] Shortness Of Breath     fever    Brilinta [ticagrelor] Shortness Of Breath    Cymbalta [duloxetine] Nausea Only    Bupropion Other (See Comments)     "turns into zombie" withdrawn, not talking, outbursts  "turns into zombie", withdrawn, not talking, outbursts.     Past Medical History:   Diagnosis Date    CAD (coronary artery disease)     Dr Martinez    Carotid artery disease     Chronic kidney disease, stage III (moderate)     COPD (chronic obstructive pulmonary disease)     Depression     Dyslipidemia     Hepatic cyst     Hepatic cyst     High-density lipoprotein deficiency     HTN (hypertension)     Hx of colonic polyps     Insomnia     PVD (peripheral vascular disease)     stented    S/P AAA repair      Past Surgical History:   Procedure Laterality Date    ABDOMINAL AORTIC ANEURYSM REPAIR      ABDOMINAL AORTIC ANEURYSM REPAIR      ANGIOGRAM, CORONARY, WITH LEFT HEART CATHETERIZATION Left 2/14/2022    Procedure: Angiogram, Coronary, with Left Heart Cath;  Surgeon: Delfino Castellon MD;  Location: [facility] CATH LAB;  Service: Cardiology;  Laterality: Left;    " ANGIOGRAPHY OF LOWER EXTREMITY Left 10/23/2018    Procedure: Angiogram Extremity Unilateral;  Surgeon: Gregor Cunha MD;  Location: [facility] CATH;  Service: Cardiology;  Laterality: Left;  LLE intervention-Will start with 4/5 slender sheath left radial and take diagnostic angio of LLE to determine whether brachial access or tibial access will be required    CATARACT EXTRACTION W/ INTRAOCULAR LENS  IMPLANT, BILATERAL      CATARACT SX Bilateral     CATHETERIZATION OF BOTH LEFT AND RIGHT HEART N/A 7/10/2020    Procedure: CATHETERIZATION, HEART, BOTH LEFT AND RIGHT;  Surgeon: Gregor Cunha MD;  Location: [facility] CATH;  Service: Cardiology;  Laterality: N/A;  LHC + RHC +/- PCI -access left radial artery and left brachial vein    CHOLECYSTECTOMY      COLONOSCOPY N/A 10/7/2015    Procedure: COLONOSCOPY;  Surgeon: Genaro You MD;  Location: [facility] ENDO ([facility] FLR);  Service: Endoscopy;  Laterality: N/A;    CORONARY ANGIOPLASTY WITH STENT PLACEMENT      CORONARY ARTERY BYPASS GRAFT      CORONARY BYPASS GRAFT ANGIOGRAPHY  2/14/2022    Procedure: Bypass graft study;  Surgeon: Delfino Castellon MD;  Location: [facility] CATH LAB;  Service: Cardiology;;    HERNIA REPAIR      LAMINECTOMY      MAGNETIC RESONANCE IMAGING N/A 6/4/2021    Procedure: MRI (MAGNETIC RESONANCE IMAGING) LUMBAR SPINE;  Surgeon: [facility];  Location: [facility];  Service: General;  Laterality: N/A;  In MRI @ 9:10 per Krys, To follow WC RM1    PATELLA SURGERY      PERCUTANEOUS TRANSLUMINAL ANGIOPLASTY (PTA) OF PERIPHERAL VESSEL N/A 9/18/2018    Procedure: PTA, PERIPHERAL BLOOD VESSEL;  Surgeon: Gregor Cunha MD;  Location: [facility] CATH;  Service: Cardiology;  Laterality: N/A;  Site change:  right popliteal artery access using US guidance.    PERCUTANEOUS TRANSLUMINAL ANGIOPLASTY (PTA) OF PERIPHERAL VESSEL Left 7/25/2019    Procedure: PTA, PERIPHERAL VESSEL;  Surgeon: Gregor Cunha MD;  Location: [facility] CATH;  Service: Cardiology;  Laterality:  Left;    VASECTOMY       Family History   Problem Relation Age of Onset    Heart disease Mother     Heart disease Father     Lung cancer Brother     No Known Problems Daughter     No Known Problems Daughter     No Known Problems Daughter     No Known Problems Son      Social History     Tobacco Use    Smoking status: Every Day     Current packs/day: 1.00     Average packs/day: 1 pack/day for 50.0 years (50.0 ttl pk-yrs)     Types: Cigarettes    Smokeless tobacco: Never    Tobacco comments:     Currently smoke 1 ppd   Substance Use Topics    Alcohol use: No    Drug use: Yes     Types: Marijuana     Comment: OCCASSIONALLY     Review of Systems   Constitutional:  Negative for fever.   HENT:  Negative for sore throat.    Respiratory:  Positive for shortness of breath.    Cardiovascular:  Negative for chest pain.   Gastrointestinal:  Negative for nausea.   Genitourinary:  Negative for dysuria.   Musculoskeletal:  Negative for back pain.   Skin:  Negative for rash.   Neurological:  Positive for weakness.   Hematological:  Does not bruise/bleed easily.       Physical Exam     Initial Vitals   BP Pulse Resp Temp SpO2   09/11/23 2221 09/11/23 2221 09/11/23 2221 09/11/23 2222 09/11/23 2221   (!) 116/52 (!) 54 18 99.7 °F (37.6 °C) 96 %      MAP       --                Physical Exam    Nursing note and vitals reviewed.  Constitutional: He appears well-developed and well-nourished.   HENT:   Head: Normocephalic and atraumatic.   Eyes: Conjunctivae and EOM are normal.   Cardiovascular:  Normal rate and regular rhythm.           Pulmonary/Chest: He exhibits no tenderness.   Crackles right greater than left   Abdominal: Abdomen is soft. He exhibits no distension.   Musculoskeletal:         General: No edema. Normal range of motion.     Neurological: He is alert and oriented to person, place, and time.   Skin: Skin is warm and dry.   Psychiatric: He has a normal mood and affect. Thought content normal.         ED Course    Procedures  Labs Reviewed   CBC W/ AUTO DIFFERENTIAL - Abnormal; Notable for the following components:       Result Value    WBC 3.53 (*)     RBC 3.96 (*)     Hemoglobin 11.9 (*)     Hematocrit 36.5 (*)     RDW 17.1 (*)     Platelets 80 (*)     Lymph # 0.6 (*)     Lymph % 15.9 (*)     Platelet Estimate Decreased (*)     All other components within normal limits   COMPREHENSIVE METABOLIC PANEL - Abnormal; Notable for the following components:    Potassium 3.0 (*)     CO2 19 (*)     Calcium 8.1 (*)     Total Protein 5.4 (*)     Albumin 2.9 (*)     Alkaline Phosphatase 27 (*)     eGFR 57.3 (*)     All other components within normal limits   MAGNESIUM - Abnormal; Notable for the following components:    Magnesium 1.4 (*)     All other components within normal limits   TROPONIN I - Abnormal; Notable for the following components:    Troponin I 1.193 (*)     All other components within normal limits   B-TYPE NATRIURETIC PEPTIDE - Abnormal; Notable for the following components:    BNP 1,006 (*)     All other components within normal limits   SARS-COV-2 RNA AMPLIFICATION, QUAL - Abnormal; Notable for the following components:    SARS-CoV-2 RNA, Amplification, Qual Positive (*)     All other components within normal limits          Imaging Results              X-Ray Chest AP Portable (Final result)  Result time 09/12/23 01:06:39      Final result by Don Reeves MD (09/12/23 01:06:39)                   Impression:      Cardiomegaly with bilateral edema.    No significant change from prior study.      Electronically signed by: Don Reeves MD  Date:    09/12/2023  Time:    01:06               Narrative:    EXAMINATION:  XR CHEST AP PORTABLE    CLINICAL HISTORY:  dyspnea;    TECHNIQUE:  Single frontal view of the chest was performed.    COMPARISON:  06/04/2023.    FINDINGS:  Cardiomegaly with bilateral edema.  Sternotomy wires present.    Heart and lungs  appear unchanged when allowing for differences in technique  and positioning.                                       Medications   magnesium sulfate 2g in water 50mL IVPB (premix) (2 g Intravenous New Bag 9/12/23 0212)   methylPREDNISolone sodium succinate injection 125 mg (125 mg Intravenous Given 9/12/23 0001)   albuterol-ipratropium 2.5 mg-0.5 mg/3 mL nebulizer solution 3 mL (3 mLs Nebulization Given 9/12/23 0006)   potassium chloride SA CR tablet 40 mEq (40 mEq Oral Given 9/12/23 0206)   furosemide injection 80 mg (80 mg Intravenous Given 9/12/23 0209)     Medical Decision Making  73-year-old male presenting with dyspnea.  Vital signs stable on arrival, lung exam w/ notable crackles throughout.  EKG sinus ninfa, no STEMI.  Chest x-ray with bilateral infiltrates consistent with pulm edema.  Labs showing no leukocytosis, anemia, hypo kalemia, and hypomagnesemia.  He was given IV Mag and p.o. potassium.  Troponin positive, BNP 1000.  He was diuresed with Lasix, and initially given albuterol and steroids for possible COPD exacerbation. COVID+  Impression is of COVID positive, CHF exacerbation in addition along with NSTEMI.  Discussed with hospitalist who will admit.    Amount and/or Complexity of Data Reviewed  Labs: ordered.  Radiology: ordered.    Risk  Prescription drug management.  Decision regarding hospitalization.                               EKG   Rate 58   Sinus bradycardia   Left axis deviation   No STEMI    Clinical Impression:   Final diagnoses:  [R06.02] SOB (shortness of breath)  [R06.00] Dyspnea  [U07.1] COVID-19 (Primary)  [I21.4] NSTEMI (non-ST elevated myocardial infarction)  [R77.8] Elevated troponin  [I50.9] Acute on chronic congestive heart failure, unspecified heart failure type        ED Disposition Condition    Admit Stable                Kaden Barone MD  09/12/23 3351

## 2023-09-12 NOTE — ASSESSMENT & PLAN NOTE
Patient with Hypercapnic Respiratory failure which is Chronic.  he is on home oxygen at 4-5 LPM. Supplemental oxygen was provided and noted-      .   Signs/symptoms of respiratory failure include- increased work of breathing. Contributing diagnoses includes - CHF and COPD Labs and images were reviewed. Patient Has recent ABG, which has been reviewed. Will treat underlying causes and adjust management of respiratory failure as follows    -O2 nasal canula PRN  -Sat goal of 89-93%

## 2023-09-12 NOTE — ASSESSMENT & PLAN NOTE
Patient is identified as High risk for severe complications of COVID 19 based on COVID risk score of 8   Initiate standard COVID protocols; COVID-19 testing ,Infection Control notification  and isolation- respiratory, contact and droplet per protocol    Diagnostics: CBC, CMP and Portable CXR    Management: Maintain oxygen saturations 92-96% via Nasal Cannula  LPM and monitor with continuous/intermittent pulse oximetry. , Inhaled bronchodilators as needed for shortness of breath., Continuous cardiac monitoring. and Manage respiratory failure (O2 requirement >10LPM or needing NIPPV/Mechanical ventilation) and/or Pneumonia (active chest infiltrates) separately as described below.    Advance Care Planning  Current advance care plan has not been discussed with patient/family/POA and patient currently wishes Full Code.        -Consider starting Remdesivir/dex  -contact precautions  -ascorbic acid

## 2023-09-12 NOTE — ASSESSMENT & PLAN NOTE
Chest yesterday in a.m..  Chest pain resolved with nitroglycerin.  On admission elevated troponin.  Troponin is down trending.  Patient reports history of recurrent angina, most of the time triggered by exertion and occasionally he will experience angina at rest.

## 2023-09-12 NOTE — ASSESSMENT & PLAN NOTE
- Most recent TTE on 6/2023 demonstrates: LVef 40%,   - EKG : NON SPECIFIC S AND t waveabnormalities  - Troponin 1.19 down trended to 1.07    - BNP 1000S and CXR with pulmonary edema/ cardiomegaly  -Reports adherence to lasix 20mg BID    On physical examination patient does not appear volume overload, no orthopnea present. Bilateral/panlobar ronchi and minimal wheezing auscultated. Do not appreciate rales (silenced by slickchi?)    -TTE to assess volume status  -Continue entresto  -continue carvedilol  -uptitrate lasix as needed

## 2023-09-12 NOTE — ASSESSMENT & PLAN NOTE
Patient with hx of COPD, still smoking one pack a day, on 4-5L Nasal cannula PRN without increase in O2 requirements who tested positive for covid with a home test on 9/7, here with worsening of SOB and diarrhea.    Given commodities which confound patient current clinical picture of COVID vs COPD exacerbation vs. CHF exacerbation patient would benefit from steroids either oral or IV.    -DUONEBS Q6 prn  -consider starting prednisone vs dexamethasone for covid  -consider starting CAP coverage  -chest physiotherapy

## 2023-09-12 NOTE — ASSESSMENT & PLAN NOTE
Patient with elevated troponins admission.  Reports history of unstable angina treated by nitroglycerin.  Troponins on admission 1 point down trending to 1.07.  No ST elevation on EKG, suspect secondary to demand ischemia.  -given that troponins are down trending I will not consult Cardiology at this time

## 2023-09-13 PROBLEM — J96.21 ACUTE ON CHRONIC RESPIRATORY FAILURE WITH HYPOXIA: Status: RESOLVED | Noted: 2023-06-04 | Resolved: 2023-09-13

## 2023-09-13 PROBLEM — J44.1 COPD EXACERBATION: Status: RESOLVED | Noted: 2022-10-11 | Resolved: 2023-09-13

## 2023-09-13 PROBLEM — E87.6 HYPOKALEMIA: Status: ACTIVE | Noted: 2023-09-13

## 2023-09-13 LAB
ANION GAP SERPL CALC-SCNC: 15 MMOL/L (ref 8–16)
AV INDEX (PROSTH): 0.77
AV MEAN GRADIENT: 8 MMHG
AV PEAK GRADIENT: 14 MMHG
AV VALVE AREA BY VELOCITY RATIO: 4.31 CM²
AV VALVE AREA: 4.17 CM²
AV VELOCITY RATIO: 0.8
BASOPHILS # BLD AUTO: 0.02 K/UL (ref 0–0.2)
BASOPHILS NFR BLD: 0.3 % (ref 0–1.9)
BSA FOR ECHO PROCEDURE: 2.09 M2
BUN SERPL-MCNC: 32 MG/DL (ref 8–23)
CALCIUM SERPL-MCNC: 9.1 MG/DL (ref 8.7–10.5)
CHLORIDE SERPL-SCNC: 103 MMOL/L (ref 95–110)
CO2 SERPL-SCNC: 22 MMOL/L (ref 23–29)
CREAT SERPL-MCNC: 1.7 MG/DL (ref 0.5–1.4)
CV ECHO LV RWT: 0.27 CM
DIFFERENTIAL METHOD: ABNORMAL
DOP CALC AO PEAK VEL: 1.9 M/S
DOP CALC AO VTI: 37.03 CM
DOP CALC LVOT AREA: 5.4 CM2
DOP CALC LVOT DIAMETER: 2.62 CM
DOP CALC LVOT PEAK VEL: 1.52 M/S
DOP CALC LVOT STROKE VOLUME: 154.49 CM3
DOP CALCLVOT PEAK VEL VTI: 28.67 CM
E WAVE DECELERATION TIME: 211.24 MSEC
E/A RATIO: 2.3
E/E' RATIO: 13.47 M/S
ECHO LV POSTERIOR WALL: 0.84 CM (ref 0.6–1.1)
EOSINOPHIL # BLD AUTO: 0 K/UL (ref 0–0.5)
EOSINOPHIL NFR BLD: 0 % (ref 0–8)
ERYTHROCYTE [DISTWIDTH] IN BLOOD BY AUTOMATED COUNT: 17 % (ref 11.5–14.5)
EST. GFR  (NO RACE VARIABLE): 41.5 ML/MIN/1.73 M^2
FRACTIONAL SHORTENING: 11 % (ref 28–44)
GLUCOSE SERPL-MCNC: 96 MG/DL (ref 70–110)
HCT VFR BLD AUTO: 43.7 % (ref 40–54)
HGB BLD-MCNC: 13.9 G/DL (ref 14–18)
IMM GRANULOCYTES # BLD AUTO: 0.02 K/UL (ref 0–0.04)
IMM GRANULOCYTES NFR BLD AUTO: 0.3 % (ref 0–0.5)
INTERVENTRICULAR SEPTUM: 0.84 CM (ref 0.6–1.1)
LA MAJOR: 7.24 CM
LA MINOR: 7.31 CM
LA WIDTH: 4.52 CM
LEFT ATRIUM SIZE: 4.88 CM
LEFT ATRIUM VOLUME INDEX MOD: 43.1 ML/M2
LEFT ATRIUM VOLUME INDEX: 66.2 ML/M2
LEFT ATRIUM VOLUME MOD: 88.88 CM3
LEFT ATRIUM VOLUME: 136.4 CM3
LEFT INTERNAL DIMENSION IN SYSTOLE: 5.54 CM (ref 2.1–4)
LEFT VENTRICLE DIASTOLIC VOLUME INDEX: 93.69 ML/M2
LEFT VENTRICLE DIASTOLIC VOLUME: 193.01 ML
LEFT VENTRICLE MASS INDEX: 101 G/M2
LEFT VENTRICLE SYSTOLIC VOLUME INDEX: 72.7 ML/M2
LEFT VENTRICLE SYSTOLIC VOLUME: 149.75 ML
LEFT VENTRICULAR INTERNAL DIMENSION IN DIASTOLE: 6.19 CM (ref 3.5–6)
LEFT VENTRICULAR MASS: 208.82 G
LV LATERAL E/E' RATIO: 10.1 M/S
LV SEPTAL E/E' RATIO: 20.2 M/S
LYMPHOCYTES # BLD AUTO: 0.8 K/UL (ref 1–4.8)
LYMPHOCYTES NFR BLD: 11.6 % (ref 18–48)
MAGNESIUM SERPL-MCNC: 1.9 MG/DL (ref 1.6–2.6)
MCH RBC QN AUTO: 29.7 PG (ref 27–31)
MCHC RBC AUTO-ENTMCNC: 31.8 G/DL (ref 32–36)
MCV RBC AUTO: 93 FL (ref 82–98)
MONOCYTES # BLD AUTO: 0.6 K/UL (ref 0.3–1)
MONOCYTES NFR BLD: 8.4 % (ref 4–15)
MV PEAK A VEL: 0.44 M/S
MV PEAK E VEL: 1.01 M/S
NEUTROPHILS # BLD AUTO: 5.6 K/UL (ref 1.8–7.7)
NEUTROPHILS NFR BLD: 79.4 % (ref 38–73)
NRBC BLD-RTO: 0 /100 WBC
PHOSPHATE SERPL-MCNC: 2.1 MG/DL (ref 2.7–4.5)
PISA MRMAX VEL: 0.05 M/S
PISA TR MAX VEL: 2.63 M/S
PLATELET # BLD AUTO: 125 K/UL (ref 150–450)
PMV BLD AUTO: 13.3 FL (ref 9.2–12.9)
POTASSIUM SERPL-SCNC: 3.3 MMOL/L (ref 3.5–5.1)
RA MAJOR: 5.16 CM
RA PRESSURE ESTIMATED: 8 MMHG
RA WIDTH: 3.96 CM
RBC # BLD AUTO: 4.68 M/UL (ref 4.6–6.2)
RIGHT VENTRICULAR END-DIASTOLIC DIMENSION: 4.37 CM
RV TB RVSP: 11 MMHG
SINUS: 3.15 CM
SODIUM SERPL-SCNC: 140 MMOL/L (ref 136–145)
STJ: 3.09 CM
TDI LATERAL: 0.1 M/S
TDI SEPTAL: 0.05 M/S
TDI: 0.08 M/S
TR MAX PG: 28 MMHG
TRICUSPID ANNULAR PLANE SYSTOLIC EXCURSION: 1.91 CM
TROPONIN I SERPL DL<=0.01 NG/ML-MCNC: 1.03 NG/ML (ref 0–0.03)
TV REST PULMONARY ARTERY PRESSURE: 36 MMHG
WBC # BLD AUTO: 7.05 K/UL (ref 3.9–12.7)
Z-SCORE OF LEFT VENTRICULAR DIMENSION IN END DIASTOLE: -0.05
Z-SCORE OF LEFT VENTRICULAR DIMENSION IN END SYSTOLE: 2.96

## 2023-09-13 PROCEDURE — 94640 AIRWAY INHALATION TREATMENT: CPT | Mod: HCNC

## 2023-09-13 PROCEDURE — 94761 N-INVAS EAR/PLS OXIMETRY MLT: CPT | Mod: HCNC

## 2023-09-13 PROCEDURE — 27000221 HC OXYGEN, UP TO 24 HOURS: Mod: HCNC

## 2023-09-13 PROCEDURE — S4991 NICOTINE PATCH NONLEGEND: HCPCS | Mod: HCNC

## 2023-09-13 PROCEDURE — 84100 ASSAY OF PHOSPHORUS: CPT | Mod: HCNC

## 2023-09-13 PROCEDURE — 63600175 PHARM REV CODE 636 W HCPCS: Mod: JZ,TB,HCNC

## 2023-09-13 PROCEDURE — 25000003 PHARM REV CODE 250: Mod: HCNC

## 2023-09-13 PROCEDURE — 99233 SBSQ HOSP IP/OBS HIGH 50: CPT | Mod: HCNC,GC,, | Performed by: HOSPITALIST

## 2023-09-13 PROCEDURE — 36415 COLL VENOUS BLD VENIPUNCTURE: CPT | Mod: HCNC

## 2023-09-13 PROCEDURE — 27100098 HC SPACER: Mod: HCNC

## 2023-09-13 PROCEDURE — 85025 COMPLETE CBC W/AUTO DIFF WBC: CPT | Mod: HCNC

## 2023-09-13 PROCEDURE — 87070 CULTURE OTHR SPECIMN AEROBIC: CPT | Mod: HCNC

## 2023-09-13 PROCEDURE — 25500020 PHARM REV CODE 255: Mod: HCNC | Performed by: HOSPITALIST

## 2023-09-13 PROCEDURE — 84484 ASSAY OF TROPONIN QUANT: CPT | Mod: HCNC | Performed by: HOSPITALIST

## 2023-09-13 PROCEDURE — 83735 ASSAY OF MAGNESIUM: CPT | Mod: HCNC

## 2023-09-13 PROCEDURE — 80048 BASIC METABOLIC PNL TOTAL CA: CPT | Mod: HCNC

## 2023-09-13 PROCEDURE — 25000242 PHARM REV CODE 250 ALT 637 W/ HCPCS: Mod: HCNC

## 2023-09-13 PROCEDURE — 25000003 PHARM REV CODE 250: Mod: HCNC | Performed by: HOSPITALIST

## 2023-09-13 PROCEDURE — 27000207 HC ISOLATION: Mod: HCNC

## 2023-09-13 PROCEDURE — 87205 SMEAR GRAM STAIN: CPT | Mod: HCNC

## 2023-09-13 PROCEDURE — 99900035 HC TECH TIME PER 15 MIN (STAT): Mod: HCNC

## 2023-09-13 PROCEDURE — 99233 PR SUBSEQUENT HOSPITAL CARE,LEVL III: ICD-10-PCS | Mod: HCNC,GC,, | Performed by: HOSPITALIST

## 2023-09-13 PROCEDURE — 36415 COLL VENOUS BLD VENIPUNCTURE: CPT | Mod: HCNC | Performed by: HOSPITALIST

## 2023-09-13 PROCEDURE — 25000003 PHARM REV CODE 250: Mod: HCNC | Performed by: STUDENT IN AN ORGANIZED HEALTH CARE EDUCATION/TRAINING PROGRAM

## 2023-09-13 PROCEDURE — 20600001 HC STEP DOWN PRIVATE ROOM: Mod: HCNC

## 2023-09-13 RX ORDER — POTASSIUM CHLORIDE 750 MG/1
30 CAPSULE, EXTENDED RELEASE ORAL ONCE
Status: COMPLETED | OUTPATIENT
Start: 2023-09-13 | End: 2023-09-13

## 2023-09-13 RX ORDER — SODIUM,POTASSIUM PHOSPHATES 280-250MG
1 POWDER IN PACKET (EA) ORAL ONCE
Status: COMPLETED | OUTPATIENT
Start: 2023-09-13 | End: 2023-09-13

## 2023-09-13 RX ADMIN — PANTOPRAZOLE SODIUM 40 MG: 40 TABLET, DELAYED RELEASE ORAL at 08:09

## 2023-09-13 RX ADMIN — CARVEDILOL 6.25 MG: 6.25 TABLET, FILM COATED ORAL at 08:09

## 2023-09-13 RX ADMIN — Medication 1 PATCH: at 08:09

## 2023-09-13 RX ADMIN — FUROSEMIDE 40 MG: 40 TABLET ORAL at 08:09

## 2023-09-13 RX ADMIN — MUPIROCIN: 20 OINTMENT TOPICAL at 09:09

## 2023-09-13 RX ADMIN — ALBUTEROL SULFATE 2 PUFF: 108 INHALANT RESPIRATORY (INHALATION) at 10:09

## 2023-09-13 RX ADMIN — ALBUTEROL SULFATE 2 PUFF: 108 INHALANT RESPIRATORY (INHALATION) at 03:09

## 2023-09-13 RX ADMIN — REMDESIVIR 100 MG: 100 INJECTION, POWDER, LYOPHILIZED, FOR SOLUTION INTRAVENOUS at 09:09

## 2023-09-13 RX ADMIN — ASPIRIN 81 MG: 81 TABLET, COATED ORAL at 08:09

## 2023-09-13 RX ADMIN — OXYCODONE HYDROCHLORIDE AND ACETAMINOPHEN 500 MG: 500 TABLET ORAL at 09:09

## 2023-09-13 RX ADMIN — ALBUTEROL SULFATE 2 PUFF: 108 INHALANT RESPIRATORY (INHALATION) at 07:09

## 2023-09-13 RX ADMIN — CARVEDILOL 6.25 MG: 6.25 TABLET, FILM COATED ORAL at 09:09

## 2023-09-13 RX ADMIN — CLOPIDOGREL BISULFATE 75 MG: 75 TABLET ORAL at 08:09

## 2023-09-13 RX ADMIN — FENOFIBRATE 145 MG: 145 TABLET, FILM COATED ORAL at 08:09

## 2023-09-13 RX ADMIN — ALBUTEROL SULFATE 2 PUFF: 108 INHALANT RESPIRATORY (INHALATION) at 11:09

## 2023-09-13 RX ADMIN — POTASSIUM & SODIUM PHOSPHATES POWDER PACK 280-160-250 MG 1 PACKET: 280-160-250 PACK at 09:09

## 2023-09-13 RX ADMIN — SACUBITRIL AND VALSARTAN 1 TABLET: 24; 26 TABLET, FILM COATED ORAL at 08:09

## 2023-09-13 RX ADMIN — OXYCODONE HYDROCHLORIDE AND ACETAMINOPHEN 500 MG: 500 TABLET ORAL at 08:09

## 2023-09-13 RX ADMIN — ISOSORBIDE MONONITRATE 90 MG: 60 TABLET, EXTENDED RELEASE ORAL at 09:09

## 2023-09-13 RX ADMIN — RIVAROXABAN 2.5 MG: 2.5 TABLET, FILM COATED ORAL at 05:09

## 2023-09-13 RX ADMIN — TIOTROPIUM BROMIDE INHALATION SPRAY 2 PUFF: 3.12 SPRAY, METERED RESPIRATORY (INHALATION) at 03:09

## 2023-09-13 RX ADMIN — POTASSIUM CHLORIDE 30 MEQ: 10 CAPSULE, COATED, EXTENDED RELEASE ORAL at 09:09

## 2023-09-13 RX ADMIN — HUMAN ALBUMIN MICROSPHERES AND PERFLUTREN 0.11 MG: 10; .22 INJECTION, SOLUTION INTRAVENOUS at 02:09

## 2023-09-13 RX ADMIN — ATORVASTATIN CALCIUM 80 MG: 40 TABLET, FILM COATED ORAL at 08:09

## 2023-09-13 RX ADMIN — SACUBITRIL AND VALSARTAN 1 TABLET: 24; 26 TABLET, FILM COATED ORAL at 09:09

## 2023-09-13 NOTE — ASSESSMENT & PLAN NOTE
Elevated troponins on admission. Reports history of unstable angina treated by nitroglycerin.  Troponins on admission downtrending. No ST elevation on EKG, suspect secondary to demand ischemia.    - Cardiology not consulted due to downtrend

## 2023-09-13 NOTE — PLAN OF CARE
Problem: Adult Inpatient Plan of Care  Goal: Plan of Care Review  Outcome: Ongoing, Progressing  Goal: Patient-Specific Goal (Individualized)  Outcome: Ongoing, Progressing  Goal: Absence of Hospital-Acquired Illness or Injury  Outcome: Ongoing, Progressing  Intervention: Identify and Manage Fall Risk  Flowsheets (Taken 9/13/2023 1828)  Safety Promotion/Fall Prevention:   assistive device/personal item within reach   Fall Risk reviewed with patient/family   Fall Risk signage in place   side rails raised x 2   nonskid shoes/socks when out of bed   room near unit station  Intervention: Prevent Skin Injury  Flowsheets (Taken 9/13/2023 1828)  Body Position: position changed independently  Skin Protection: adhesive use limited  Intervention: Prevent and Manage VTE (Venous Thromboembolism) Risk  Flowsheets (Taken 9/13/2023 1828)  Activity Management:   Ambulated in room - L4   Ambulated to bathroom - L4   Up in chair - L3  VTE Prevention/Management: ambulation promoted  Range of Motion: active ROM (range of motion) encouraged     Problem: Fall Injury Risk  Goal: Absence of Fall and Fall-Related Injury  Outcome: Ongoing, Progressing  Intervention: Identify and Manage Contributors  Flowsheets (Taken 9/13/2023 1828)  Self-Care Promotion: independence encouraged  Medication Review/Management: medications reviewed  Intervention: Promote Injury-Free Environment  Flowsheets (Taken 9/13/2023 1828)  Safety Promotion/Fall Prevention:   assistive device/personal item within reach   Fall Risk reviewed with patient/family   Fall Risk signage in place   side rails raised x 2   nonskid shoes/socks when out of bed   room near unit station     Problem: Infection  Goal: Absence of Infection Signs and Symptoms  Outcome: Ongoing, Progressing  Intervention: Prevent or Manage Infection  Flowsheets (Taken 9/13/2023 1828)  Infection Management: aseptic technique maintained  Isolation Precautions:   precautions maintained   contact   airborne      Plan of care discussed with patient.  Patient ambulating independently, fall precautions in place. Continuing to encourage independent ambulation. Infection control precautions for both droplet and contact were maintained. 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 Patient has no complaints of pain. Discussed medications and care. Patient has no questions at this time. Will continue to monitor.

## 2023-09-13 NOTE — ASSESSMENT & PLAN NOTE
Chest pain morning prior to admission that resolved with nitroglycerin. Elevated troponin on admission, downtrending. Pt reports history of recurrent angina usually triggered by exertion but sometimes present at rest.    - isosorbide mononitrate daily, nitroglycerin prn

## 2023-09-13 NOTE — NURSING
ND notified and aware of patients change in mentation status. Patient was alert and oriented to self only at AM assessment. Night shift had also reported patient had a change in orientation to AM RN.

## 2023-09-13 NOTE — ASSESSMENT & PLAN NOTE
Patient is identified as High risk for severe complications of COVID 19 based on COVID risk score of 8   Initiate standard COVID protocols; COVID-19 testing ,Infection Control notification  and isolation- respiratory, contact and droplet per protocol    Diagnostics: CBC, CMP and Portable CXR    Management: Maintain oxygen saturations 92-96% via Nasal Cannula  LPM and monitor with continuous/intermittent pulse oximetry. , Inhaled bronchodilators as needed for shortness of breath., Continuous cardiac monitoring. and Manage respiratory failure (O2 requirement >10LPM or needing NIPPV/Mechanical ventilation) and/or Pneumonia (active chest infiltrates) separately as described below.    Advance Care Planning  Current advance care plan has not been discussed with patient/family/POA and patient currently wishes Full Code.        - Day 2/3 remdesivir  - Airborne, contact, droplet precautions  - Ascorbic acid

## 2023-09-13 NOTE — ASSESSMENT & PLAN NOTE
On admission, pt complaining of SoB. Most recent TTE (6/2023) demonstrates LV EF 40%. On admission, troponin elevated, now downtrending. BNP 1006. CXR showed cardiomegaly with bilateral edema. Nonspecific S and T wave abnormalities on EKG. Physical exam at admission less concerning for CHF exacerbation as pt did not appear volume overloaded, no orthopnea present, and pulmonary exam notable for ronchi but no wheezing or rales.    - TTE performed 9/12, pending formal read  - Continue entresto  - Continue carvedilol  - Continue lasix 40mg, uptitrate as needed

## 2023-09-13 NOTE — SUBJECTIVE & OBJECTIVE
Interval History: Delirium overnight. Said he has not been sleeping well. Oriented to person and time but not place. Does not know how he got here. No complaints of diarrhea today.    Review of Systems   Unable to perform ROS: Mental status change   Constitutional:  Unexpected weight change: 10-15 lbs unintentional weight loss.     Objective:     Vital Signs (Most Recent):  Temp: 97.9 °F (36.6 °C) (09/13/23 0812)  Pulse: 68 (09/13/23 0812)  Resp: 17 (09/13/23 0812)  BP: (!) 129/57 (09/13/23 0812)  SpO2: 95 % (09/13/23 0812) Vital Signs (24h Range):  Temp:  [97.3 °F (36.3 °C)-99.9 °F (37.7 °C)] 97.9 °F (36.6 °C)  Pulse:  [52-88] 68  Resp:  [16-22] 17  SpO2:  [93 %-99 %] 95 %  BP: (116-176)/(57-74) 129/57     Weight: 88.6 kg (195 lb 5.2 oz)  Body mass index is 28.03 kg/m².    Intake/Output Summary (Last 24 hours) at 9/13/2023 0905  Last data filed at 9/12/2023 1659  Gross per 24 hour   Intake 622 ml   Output 300 ml   Net 322 ml         Physical Exam  Constitutional:       General: He is not in acute distress.     Appearance: He is obese. He is ill-appearing. He is not toxic-appearing.   HENT:      Head: Normocephalic and atraumatic.      Right Ear: External ear normal.      Left Ear: External ear normal.      Nose: Congestion and rhinorrhea present.      Mouth/Throat:      Mouth: Mucous membranes are dry.   Eyes:      General: No scleral icterus.  Cardiovascular:      Rate and Rhythm: Normal rate and regular rhythm.      Pulses: Normal pulses.      Heart sounds: No murmur heard.     No friction rub.   Pulmonary:      Effort: Pulmonary effort is normal.      Breath sounds: Rhonchi present. No wheezing.   Abdominal:      General: There is no distension.      Tenderness: There is no guarding.   Musculoskeletal:         General: No swelling or tenderness.      Right lower leg: No edema.      Left lower leg: No edema.   Skin:     Capillary Refill: Capillary refill takes less than 2 seconds.      Coloration: Skin is not  pale.      Findings: No lesion.   Neurological:      General: No focal deficit present.      Mental Status: He is alert. He is disoriented.      Motor: Weakness present.   Psychiatric:         Mood and Affect: Mood normal.         Behavior: Behavior normal.         Thought Content: Thought content normal.             Significant Labs: All pertinent labs within the past 24 hours have been reviewed.    Significant Imaging: I have reviewed all pertinent imaging results/findings within the past 24 hours.

## 2023-09-13 NOTE — PROGRESS NOTES
[facility] - Cardiology [facility] Medicine  Progress Note    Patient Name: Lonnie Taylor  MRN: [id number]  Patient Class: IP- Inpatient   Admission Date: 9/11/2023  Length of Stay: 1 days  Attending Physician: Donal Holley MD  Primary Care Provider: Isiah You MD        Subjective:     Principal Problem:COVID        HPI:  Mr. Taylor is a 73-year-old male with medical history of COPD on 4-5 L home O2 intermittently, HFrEF (40% with 6/2023), CAD (s/p PCI), PAD on xarelto, HTN, HLD, CKD3b, GERD, tobacco abuse, and obesity who presents to the ED with chief complaint of shortness of breath and wheezing. Reports that aprox one week ago he started feeling worsening SOB, states he did not had to increase O2  requirements. Other associated symptoms reported are increase in sputum production w/o hemoptysis, sputum white-yellow, diarrhea that started last Thursday described as semi-formed loose stool (3x episodes per day)  malaise and myalgias  located mainly on thighs.    Of note patient reports episode of chest pain yesterday in AM, states he has recurrent chest pains with exertion and occassionally at rest, chest pain was relived by nitro. Trops on admission 1.19 repeat downtrending. EKG w/o evidence of STEMI    Initial work up in the ED , Cbc notable for  leucopenia and thrombocytopenia, CMP remarkable for hypoK and hypoMg. CXR; Cardiomegaly with bilateral edema.                        Overview/Hospital Course:  Patient started on Remdesivir for COVID infection and high risk with noted COPD and HF, continuing to use tobacco although reduced amount recently per patient.  No supplemental O2 required, steroids deferred.  Patient with complaints of diarrhea, but no diarrhea since admission. Stool studies discontinued.      Interval History: Delirium overnight. Said he has not been sleeping well. Oriented to person and time but not place. Does not know how he got here. No complaints of diarrhea  today.    Review of Systems   Unable to perform ROS: Mental status change   Constitutional:  Unexpected weight change: 10-15 lbs unintentional weight loss.     Objective:     Vital Signs (Most Recent):  Temp: 97.9 °F (36.6 °C) (09/13/23 0812)  Pulse: 68 (09/13/23 0812)  Resp: 17 (09/13/23 0812)  BP: (!) 129/57 (09/13/23 0812)  SpO2: 95 % (09/13/23 0812) Vital Signs (24h Range):  Temp:  [97.3 °F (36.3 °C)-99.9 °F (37.7 °C)] 97.9 °F (36.6 °C)  Pulse:  [52-88] 68  Resp:  [16-22] 17  SpO2:  [93 %-99 %] 95 %  BP: (116-176)/(57-74) 129/57     Weight: 88.6 kg (195 lb 5.2 oz)  Body mass index is 28.03 kg/m².    Intake/Output Summary (Last 24 hours) at 9/13/2023 0905  Last data filed at 9/12/2023 1659  Gross per 24 hour   Intake 622 ml   Output 300 ml   Net 322 ml         Physical Exam  Constitutional:       General: He is not in acute distress.     Appearance: He is obese. He is ill-appearing. He is not toxic-appearing.   HENT:      Head: Normocephalic and atraumatic.      Right Ear: External ear normal.      Left Ear: External ear normal.      Nose: Congestion and rhinorrhea present.      Mouth/Throat:      Mouth: Mucous membranes are dry.   Eyes:      General: No scleral icterus.  Cardiovascular:      Rate and Rhythm: Normal rate and regular rhythm.      Pulses: Normal pulses.      Heart sounds: No murmur heard.     No friction rub.   Pulmonary:      Effort: Pulmonary effort is normal.      Breath sounds: Rhonchi present. No wheezing.   Abdominal:      General: There is no distension.      Tenderness: There is no guarding.   Musculoskeletal:         General: No swelling or tenderness.      Right lower leg: No edema.      Left lower leg: No edema.   Skin:     Capillary Refill: Capillary refill takes less than 2 seconds.      Coloration: Skin is not pale.      Findings: No lesion.   Neurological:      General: No focal deficit present.      Mental Status: He is alert. He is disoriented.      Motor: Weakness present.    Psychiatric:         Mood and Affect: Mood normal.         Behavior: Behavior normal.         Thought Content: Thought content normal.             Significant Labs: All pertinent labs within the past 24 hours have been reviewed.    Significant Imaging: I have reviewed all pertinent imaging results/findings within the past 24 hours.      Assessment/Plan:      * COVID  Patient is identified as High risk for severe complications of COVID 19 based on COVID risk score of 8   Initiate standard COVID protocols; COVID-19 testing ,Infection Control notification  and isolation- respiratory, contact and droplet per protocol    Diagnostics: CBC, CMP and Portable CXR    Management: Maintain oxygen saturations 92-96% via Nasal Cannula  LPM and monitor with continuous/intermittent pulse oximetry. , Inhaled bronchodilators as needed for shortness of breath., Continuous cardiac monitoring. and Manage respiratory failure (O2 requirement >10LPM or needing NIPPV/Mechanical ventilation) and/or Pneumonia (active chest infiltrates) separately as described below.    Advance Care Planning  Current advance care plan has not been discussed with patient/family/POA and patient currently wishes Full Code.       - Day 2/3 remdesivir  - Airborne, contact, droplet precautions  - Ascorbic acid      Hypokalemia  K on admission 3.0. Repeat K 3.3.    - Daily BMPs to trend  - Potassium pills prn      Unstable angina  Chest pain morning prior to admission that resolved with nitroglycerin. Elevated troponin on admission, downtrending. Pt reports history of recurrent angina usually triggered by exertion but sometimes present at rest.    - isosorbide mononitrate daily, nitroglycerin prn    Nicotine dependence, cigarettes, in remission  - nicotine patch    Mixed hyperlipidemia  - Continue home statin and fibrate      Acute on chronic combined systolic and diastolic heart failure  On admission, pt complaining of SoB. Most recent TTE (6/2023) demonstrates LV  EF 40%. On admission, troponin elevated, now downtrending. BNP 1006. CXR showed cardiomegaly with bilateral edema. Nonspecific S and T wave abnormalities on EKG. Physical exam at admission less concerning for CHF exacerbation as pt did not appear volume overloaded, no orthopnea present, and pulmonary exam notable for ronchi but no wheezing or rales.    - TTE performed 9/12, pending formal read  - Continue entresto  - Continue carvedilol  - Continue lasix 40mg, uptitrate as needed      NSTEMI (non-ST elevated myocardial infarction)  73-year-old male with a past medical history of CAD with previous CABG and PCI (most recent 3/2020) and multiple cardiac risk factors found to have NSTEMI with positve initial trop, now downtrending.     Most recent C 2/14/22 with Ost LM to mid % stenosed, Ost RCA to prox % stenosed, mild diffuse disease in grafts to 1st ck and distal LAD, yet remain patent. LVEDP 35mmHg. No interventions were done at the time. He continues to smoke daily and has poor dietary compliance.    - Continue ASA  - Continue home xarelto  - Continue statin and fenofibrate  - Continue coreg  - Continue entresto      Thrombocytopenia, unspecified  - Improving, will trend in setting of acute illness      Elevated troponin  Elevated troponins on admission. Reports history of unstable angina treated by nitroglycerin.  Troponins on admission downtrending. No ST elevation on EKG, suspect secondary to demand ischemia.    - Cardiology not consulted due to downtrend      Dyslipidemia  - Continuing home statin    Insomnia  - melatonin prn    VTE Risk Mitigation (From admission, onward)         Ordered     IP VTE HIGH RISK PATIENT  Once         09/12/23 0449     Place sequential compression device  Until discontinued         09/12/23 0449     Reason for No Pharmacological VTE Prophylaxis  Once        Question Answer Comment   Reasons: Already adequately anticoagulated on oral Anticoagulants    Reasons:  Thrombocytopenia        09/12/23 0449                Discharge Planning   RICKY: 9/14/2023     Code Status: Full Code   Is the patient medically ready for discharge?: No    Reason for patient still in hospital (select all that apply): Patient trending condition and Treatment  Discharge Plan A: Home with family                  Dai Hernandez MD  Department of Hospital Medicine   [facility] - Cardiology Stepdown

## 2023-09-13 NOTE — ASSESSMENT & PLAN NOTE
73-year-old male with a past medical history of CAD with previous CABG and PCI (most recent 3/2020) and multiple cardiac risk factors found to have NSTEMI with positve initial trop, now downtrending.     Most recent C 2/14/22 with Ost LM to mid % stenosed, Ost RCA to prox % stenosed, mild diffuse disease in grafts to 1st ck and distal LAD, yet remain patent. LVEDP 35mmHg. No interventions were done at the time. He continues to smoke daily and has poor dietary compliance.    - Continue ASA  - Continue home xarelto  - Continue statin and fenofibrate  - Continue coreg  - Continue entresto

## 2023-09-13 NOTE — ASSESSMENT & PLAN NOTE
Patient with Hypercapnic Respiratory failure which is Chronic.  he is on home oxygen at 4-5 LPM. Supplemental oxygen was provided and noted-      .   Signs/symptoms of respiratory failure include- increased work of breathing. Contributing diagnoses includes - CHF and COPD Labs and images were reviewed. Patient Has recent ABG, which has been reviewed. Will treat underlying causes and adjust management of respiratory failure as follows    - O2 nasal canula PRN  - Sat goal of 89-93%

## 2023-09-14 VITALS
BODY MASS INDEX: 27.92 KG/M2 | DIASTOLIC BLOOD PRESSURE: 60 MMHG | TEMPERATURE: 99 F | RESPIRATION RATE: 20 BRPM | HEART RATE: 61 BPM | OXYGEN SATURATION: 95 % | HEIGHT: 70 IN | SYSTOLIC BLOOD PRESSURE: 132 MMHG | WEIGHT: 195 LBS

## 2023-09-14 DIAGNOSIS — U07.1 COVID-19 VIRUS DETECTED: ICD-10-CM

## 2023-09-14 PROBLEM — Z87.898 HISTORY OF CONFUSION: Status: ACTIVE | Noted: 2023-09-14

## 2023-09-14 LAB
ANION GAP SERPL CALC-SCNC: 14 MMOL/L (ref 8–16)
BASOPHILS # BLD AUTO: 0.02 K/UL (ref 0–0.2)
BASOPHILS NFR BLD: 0.5 % (ref 0–1.9)
BILIRUB UR QL STRIP: NEGATIVE
BUN SERPL-MCNC: 33 MG/DL (ref 8–23)
CALCIUM SERPL-MCNC: 8.6 MG/DL (ref 8.7–10.5)
CHLORIDE SERPL-SCNC: 103 MMOL/L (ref 95–110)
CLARITY UR REFRACT.AUTO: CLEAR
CO2 SERPL-SCNC: 24 MMOL/L (ref 23–29)
COLOR UR AUTO: YELLOW
CREAT SERPL-MCNC: 1.3 MG/DL (ref 0.5–1.4)
DIFFERENTIAL METHOD: ABNORMAL
EOSINOPHIL # BLD AUTO: 0 K/UL (ref 0–0.5)
EOSINOPHIL NFR BLD: 0.5 % (ref 0–8)
ERYTHROCYTE [DISTWIDTH] IN BLOOD BY AUTOMATED COUNT: 16.9 % (ref 11.5–14.5)
EST. GFR  (NO RACE VARIABLE): 57.3 ML/MIN/1.73 M^2
GLUCOSE SERPL-MCNC: 85 MG/DL (ref 70–110)
GLUCOSE UR QL STRIP: NEGATIVE
HCT VFR BLD AUTO: 41.2 % (ref 40–54)
HGB BLD-MCNC: 13.3 G/DL (ref 14–18)
HGB UR QL STRIP: NEGATIVE
IMM GRANULOCYTES # BLD AUTO: 0.01 K/UL (ref 0–0.04)
IMM GRANULOCYTES NFR BLD AUTO: 0.3 % (ref 0–0.5)
KETONES UR QL STRIP: NEGATIVE
LEUKOCYTE ESTERASE UR QL STRIP: NEGATIVE
LYMPHOCYTES # BLD AUTO: 1.2 K/UL (ref 1–4.8)
LYMPHOCYTES NFR BLD: 31.9 % (ref 18–48)
MAGNESIUM SERPL-MCNC: 1.8 MG/DL (ref 1.6–2.6)
MCH RBC QN AUTO: 29.5 PG (ref 27–31)
MCHC RBC AUTO-ENTMCNC: 32.3 G/DL (ref 32–36)
MCV RBC AUTO: 91 FL (ref 82–98)
MONOCYTES # BLD AUTO: 0.5 K/UL (ref 0.3–1)
MONOCYTES NFR BLD: 13.2 % (ref 4–15)
NEUTROPHILS # BLD AUTO: 2 K/UL (ref 1.8–7.7)
NEUTROPHILS NFR BLD: 53.6 % (ref 38–73)
NITRITE UR QL STRIP: NEGATIVE
NRBC BLD-RTO: 0 /100 WBC
PH UR STRIP: 5 [PH] (ref 5–8)
PHOSPHATE SERPL-MCNC: 3 MG/DL (ref 2.7–4.5)
PLATELET # BLD AUTO: 96 K/UL (ref 150–450)
PMV BLD AUTO: 12.1 FL (ref 9.2–12.9)
POTASSIUM SERPL-SCNC: 3 MMOL/L (ref 3.5–5.1)
PROT UR QL STRIP: NEGATIVE
RBC # BLD AUTO: 4.51 M/UL (ref 4.6–6.2)
SODIUM SERPL-SCNC: 141 MMOL/L (ref 136–145)
SP GR UR STRIP: 1.02 (ref 1–1.03)
URN SPEC COLLECT METH UR: NORMAL
WBC # BLD AUTO: 3.79 K/UL (ref 3.9–12.7)

## 2023-09-14 PROCEDURE — 80048 BASIC METABOLIC PNL TOTAL CA: CPT | Mod: HCNC | Performed by: STUDENT IN AN ORGANIZED HEALTH CARE EDUCATION/TRAINING PROGRAM

## 2023-09-14 PROCEDURE — 94640 AIRWAY INHALATION TREATMENT: CPT | Mod: HCNC

## 2023-09-14 PROCEDURE — 25000003 PHARM REV CODE 250: Mod: HCNC

## 2023-09-14 PROCEDURE — 94761 N-INVAS EAR/PLS OXIMETRY MLT: CPT | Mod: HCNC

## 2023-09-14 PROCEDURE — 84100 ASSAY OF PHOSPHORUS: CPT | Mod: HCNC

## 2023-09-14 PROCEDURE — 63600175 PHARM REV CODE 636 W HCPCS: Mod: JZ,TB,HCNC

## 2023-09-14 PROCEDURE — 94664 DEMO&/EVAL PT USE INHALER: CPT | Mod: HCNC

## 2023-09-14 PROCEDURE — 81003 URINALYSIS AUTO W/O SCOPE: CPT | Mod: HCNC

## 2023-09-14 PROCEDURE — 1111F DSCHRG MED/CURRENT MED MERGE: CPT | Mod: HCNC,CPTII,GC, | Performed by: HOSPITALIST

## 2023-09-14 PROCEDURE — 36415 COLL VENOUS BLD VENIPUNCTURE: CPT | Mod: HCNC

## 2023-09-14 PROCEDURE — 85025 COMPLETE CBC W/AUTO DIFF WBC: CPT | Mod: HCNC

## 2023-09-14 PROCEDURE — 1111F PR DISCHARGE MEDS RECONCILED W/ CURRENT OUTPATIENT MED LIST: ICD-10-PCS | Mod: HCNC,CPTII,GC, | Performed by: HOSPITALIST

## 2023-09-14 PROCEDURE — 99239 HOSP IP/OBS DSCHRG MGMT >30: CPT | Mod: HCNC,GC,, | Performed by: HOSPITALIST

## 2023-09-14 PROCEDURE — S4991 NICOTINE PATCH NONLEGEND: HCPCS | Mod: HCNC

## 2023-09-14 PROCEDURE — 83735 ASSAY OF MAGNESIUM: CPT | Mod: HCNC

## 2023-09-14 PROCEDURE — 99239 PR HOSPITAL DISCHARGE DAY,>30 MIN: ICD-10-PCS | Mod: HCNC,GC,, | Performed by: HOSPITALIST

## 2023-09-14 PROCEDURE — 99900035 HC TECH TIME PER 15 MIN (STAT): Mod: HCNC

## 2023-09-14 RX ORDER — POTASSIUM CHLORIDE 750 MG/1
30 CAPSULE, EXTENDED RELEASE ORAL
Status: DISCONTINUED | OUTPATIENT
Start: 2023-09-14 | End: 2023-09-14 | Stop reason: HOSPADM

## 2023-09-14 RX ORDER — BENZONATATE 100 MG/1
100 CAPSULE ORAL 3 TIMES DAILY PRN
Qty: 30 CAPSULE | Refills: 0 | Status: SHIPPED | OUTPATIENT
Start: 2023-09-14 | End: 2023-09-24

## 2023-09-14 RX ORDER — BENZONATATE 100 MG/1
100 CAPSULE ORAL 3 TIMES DAILY PRN
Qty: 30 CAPSULE | Refills: 0 | Status: SHIPPED | OUTPATIENT
Start: 2023-09-14 | End: 2023-09-14 | Stop reason: SDUPTHER

## 2023-09-14 RX ADMIN — ALBUTEROL SULFATE 2 PUFF: 108 INHALANT RESPIRATORY (INHALATION) at 12:09

## 2023-09-14 RX ADMIN — ISOSORBIDE MONONITRATE 90 MG: 60 TABLET, EXTENDED RELEASE ORAL at 10:09

## 2023-09-14 RX ADMIN — CARVEDILOL 6.25 MG: 6.25 TABLET, FILM COATED ORAL at 10:09

## 2023-09-14 RX ADMIN — Medication 1 PATCH: at 10:09

## 2023-09-14 RX ADMIN — ALBUTEROL SULFATE 2 PUFF: 108 INHALANT RESPIRATORY (INHALATION) at 07:09

## 2023-09-14 RX ADMIN — FENOFIBRATE 145 MG: 145 TABLET, FILM COATED ORAL at 10:09

## 2023-09-14 RX ADMIN — ATORVASTATIN CALCIUM 80 MG: 40 TABLET, FILM COATED ORAL at 10:09

## 2023-09-14 RX ADMIN — ASPIRIN 81 MG: 81 TABLET, COATED ORAL at 10:09

## 2023-09-14 RX ADMIN — FUROSEMIDE 40 MG: 40 TABLET ORAL at 10:09

## 2023-09-14 RX ADMIN — TIOTROPIUM BROMIDE INHALATION SPRAY 2 PUFF: 3.12 SPRAY, METERED RESPIRATORY (INHALATION) at 07:09

## 2023-09-14 RX ADMIN — PANTOPRAZOLE SODIUM 40 MG: 40 TABLET, DELAYED RELEASE ORAL at 10:09

## 2023-09-14 RX ADMIN — SACUBITRIL AND VALSARTAN 1 TABLET: 24; 26 TABLET, FILM COATED ORAL at 10:09

## 2023-09-14 RX ADMIN — CLOPIDOGREL BISULFATE 75 MG: 75 TABLET ORAL at 10:09

## 2023-09-14 RX ADMIN — REMDESIVIR 100 MG: 100 INJECTION, POWDER, LYOPHILIZED, FOR SOLUTION INTRAVENOUS at 10:09

## 2023-09-14 RX ADMIN — OXYCODONE HYDROCHLORIDE AND ACETAMINOPHEN 500 MG: 500 TABLET ORAL at 10:09

## 2023-09-14 RX ADMIN — POTASSIUM CHLORIDE 30 MEQ: 10 CAPSULE, COATED, EXTENDED RELEASE ORAL at 11:09

## 2023-09-14 RX ADMIN — ALPRAZOLAM 0.5 MG: 0.5 TABLET ORAL at 01:09

## 2023-09-14 NOTE — DISCHARGE SUMMARY
[facility] - Cardiology [facility] Medicine  Discharge Summary      Patient Name: Lonnie Taylor  MRN: [id number]  NICOLE: [id number]  Patient Class: IP- Inpatient  Admission Date: 9/11/2023  Hospital Length of Stay: 2 days  Discharge Date and Time:  09/14/2023 1:13 PM  Attending Physician: Ariadna Ford MD   Discharging Provider: Dai Hernandez MD  Primary Care Provider: Isiah You MD  [facility] Medicine Team: [facility] 3 Dai Hernandez MD  Primary Care Team: [facility] 3    HPI:   Mr. Taylor is a 73-year-old male with medical history of COPD on 4-5 L home O2 intermittently, HFrEF (40% with 6/2023), CAD (s/p PCI), PAD on xarelto, HTN, HLD, CKD3b, GERD, tobacco abuse, and obesity who presents to the ED with chief complaint of shortness of breath and wheezing. Reports that aprox one week ago he started feeling worsening SOB, states he did not had to increase O2  requirements. Other associated symptoms reported are increase in sputum production w/o hemoptysis, sputum white-yellow, diarrhea that started last Thursday described as semi-formed loose stool (3x episodes per day)  malaise and myalgias  located mainly on thighs.    Of note patient reports episode of chest pain yesterday in AM, states he has recurrent chest pains with exertion and occassionally at rest, chest pain was relived by nitro. Trops on admission 1.19 repeat downtrending. EKG w/o evidence of STEMI    Initial work up in the ED , Cbc notable for  leucopenia and thrombocytopenia, CMP remarkable for hypoK and hypoMg. CXR; Cardiomegaly with bilateral edema.                        * No surgery found *      Hospital Course:   Patient started on Remdesivir for COVID infection and high risk with noted COPD and HF, continuing to use tobacco although reduced amount recently per patient.  No supplemental O2 required, steroids deferred.  Patient with complaints of diarrhea, but no diarrhea since admission. Stool studies discontinued. Intermittent  periods of confusion and disorientation, seems baseline. Echo showed LV EF 35-40%. Pt has remained stable. VSS and wnl. Has not required supplemental O2 to maintain sats. Able to discharge home.     Vitals:  Vitals:    09/14/23 1155   BP: 132/60   Pulse: 61   Resp: 20   Temp: 98.5 °F (36.9 °C)     Physical Exam  Constitutional:       General: He is not in acute distress.     Appearance: Normal appearance. He is obese. He is not ill-appearing.   HENT:      Head: Normocephalic and atraumatic.      Right Ear: External ear normal.      Left Ear: External ear normal.      Nose: Congestion and rhinorrhea present.      Mouth/Throat:      Mouth: Mucous membranes are moist.   Eyes:      General: No scleral icterus.     Extraocular Movements: Extraocular movements intact.      Conjunctiva/sclera: Conjunctivae normal.   Cardiovascular:      Rate and Rhythm: Normal rate and regular rhythm.      Pulses: Normal pulses.      Heart sounds: No murmur heard.  Pulmonary:      Effort: Pulmonary effort is normal. No respiratory distress.      Breath sounds: Rhonchi present. No wheezing.   Abdominal:      General: Abdomen is flat. There is no distension.      Palpations: Abdomen is soft.      Tenderness: There is no abdominal tenderness.   Musculoskeletal:         General: No swelling or tenderness.      Right lower leg: No edema.      Left lower leg: No edema.   Skin:     General: Skin is dry.   Neurological:      General: No focal deficit present.      Mental Status: He is alert and oriented to person, place, and time.      Motor: Weakness present.   Psychiatric:         Mood and Affect: Mood normal.         Behavior: Behavior normal.           Goals of Care Treatment Preferences:  Code Status: Full Code      Consults:     No new Assessment & Plan notes have been filed under this hospital service since the last note was generated.  Service: Hospital Medicine    Final Active Diagnoses:    Diagnosis Date Noted POA    PRINCIPAL PROBLEM:   COVID [U07.1] 09/12/2023 Yes    History of confusion [Z87.898] 09/14/2023 Not Applicable    Hypokalemia [E87.6] 09/13/2023 Yes    Unstable angina [I20.0] 09/12/2023 Unknown     Chronic    Nicotine dependence, cigarettes, in remission [F17.211] 03/23/2023 Not Applicable    Acute on chronic combined systolic and diastolic heart failure [I50.43] 10/11/2022 Yes    Mixed hyperlipidemia [E78.2] 10/11/2022 Yes    NSTEMI (non-ST elevated myocardial infarction) [I21.4] 02/11/2022 Yes    Thrombocytopenia, unspecified [D69.6] 01/31/2020 Yes    Elevated troponin [R77.8] 12/02/2019 Yes    Dyslipidemia [E78.5] 09/18/2018 Yes    Insomnia [G47.00] 11/30/2012 Yes      Problems Resolved During this Admission:    Diagnosis Date Noted Date Resolved POA    Acute on chronic respiratory failure with hypoxia [J96.21] 06/04/2023 09/13/2023 Yes       Discharged Condition: stable    Disposition: Home or Self Care    Follow Up:    Patient Instructions:      Ambulatory referral/consult to Neurology   Standing Status: Future   Referral Priority: Routine Referral Type: Consultation   Referral Reason: Specialty Services Required   Requested Specialty: Neurology   Number of Visits Requested: 1       Significant Diagnostic Studies: Labs: All labs within the past 24 hours have been reviewed    Pending Diagnostic Studies:     Procedure Component Value Units Date/Time    CT Head Without Contrast [[id number]] Resulted: 09/14/23 1052    Order Status: Sent Lab Status: In process Updated: 09/14/23 1101    Sedimentation rate [[id number]] Collected: 09/12/23 0957    Order Status: Sent Lab Status: In process Updated: 09/12/23 0957    Specimen: Blood          Medications:  Reconciled Home Medications:      Medication List      START taking these medications    benzonatate 100 MG capsule  Commonly known as: TESSALON  Take 1 capsule (100 mg total) by mouth 3 (three) times daily as needed for Cough.        CONTINUE taking these medications    *  albuterol 2.5 mg /3 mL (0.083 %) nebulizer solution  Commonly known as: PROVENTIL  Take 3 mLs (2.5 mg total) by nebulization every 6 (six) hours as needed for Wheezing or Shortness of Breath.     * albuterol 90 mcg/actuation inhaler  Commonly known as: VENTOLIN HFA  Inhale 2 puffs into the lungs every 6 (six) hours as needed for Wheezing. Rescue     ALPRAZolam 0.5 MG tablet  Commonly known as: XANAX  Take 1 tablet (0.5 mg total) by mouth nightly as needed for Anxiety.     aspirin 81 MG EC tablet  Commonly known as: ECOTRIN  Take 1 tablet (81 mg total) by mouth once daily.     atorvastatin 80 MG tablet  Commonly known as: LIPITOR  Take 1 tablet (80 mg total) by mouth once daily.     carvediloL 6.25 MG tablet  Commonly known as: COREG  Take 1 tablet (6.25 mg total) by mouth 2 (two) times daily.     clopidogreL 75 mg tablet  Commonly known as: PLAVIX  Take 1 tablet (75 mg total) by mouth once daily.     ENTRESTO 24-26 mg per tablet  Generic drug: sacubitriL-valsartan  Take 1 tablet by mouth 2 (two) times daily.     fenofibrate 145 MG tablet  Commonly known as: TRICOR  Take 1 tablet (145 mg total) by mouth once daily.     furosemide 20 MG tablet  Commonly known as: LASIX  Take 2 tablets (40 mg total) by mouth once daily.     gabapentin 300 MG capsule  Commonly known as: NEURONTIN  Take 300 mg by mouth 3 (three) times daily as needed (nerve pain).     hydrOXYzine 50 MG tablet  Commonly known as: ATARAX  Take 1 tablet (50 mg total) by mouth 3 (three) times daily as needed for Anxiety.     isosorbide mononitrate 30 MG 24 hr tablet  Commonly known as: IMDUR  Take 3 tablets (90 mg total) by mouth once daily.     nitroGLYCERIN 0.4 MG SL tablet  Commonly known as: NITROSTAT  Place 1 tablet (0.4 mg total) under the tongue every 5 (five) minutes as needed for Chest pain.     pantoprazole 40 MG tablet  Commonly known as: PROTONIX  Take 40 mg by mouth once daily.     predniSONE 20 MG tablet  Commonly known as: DELTASONE  2 a day x  5 days then 1 a day x 5 days     ranolazine 500 MG Tb12  Commonly known as: RANEXA  Take 500 mg by mouth 2 (two) times daily.     tiotropium bromide 2.5 mcg/actuation inhaler  Commonly known as: SPIRIVA RESPIMAT  Inhale 2 puffs into the lungs Daily. Controller     XARELTO 2.5 mg Tab  Generic drug: rivaroxaban  Take 1 tablet (2.5 mg total) by mouth daily with dinner or evening meal.         * This list has 2 medication(s) that are the same as other medications prescribed for you. Read the directions carefully, and ask your doctor or other care provider to review them with you.                Indwelling Lines/Drains at time of discharge:   Lines/Drains/Airways     None                 Time spent on the discharge of patient: 45 minutes         Dai Hernandez MD  Department of Hospital Medicine  [facility] - Cardiology Stepdown

## 2023-09-14 NOTE — PLAN OF CARE
[facility] - Cardiology Stepdown  Discharge Final Note    Primary Care Provider: Isiah You MD    Expected Discharge Date: 9/14/2023    Final Discharge Note (most recent)       Final Note - 09/14/23 1108          Final Note    Assessment Type Final Discharge Note     Anticipated Discharge Disposition Home or Self Care     Hospital Resources/Appts/Education Provided Appointments scheduled and added to AVS                 Mamie Freire LMSW  [facility]  [id number]      Future Appointments   Date Time Provider Department Center   9/21/2023 10:00 AM Isiah You MD [facility]        artificial rupture

## 2023-09-14 NOTE — PROGRESS NOTES
Respiratory nebulizer treatment completed. No adverse reactions noted. Worked with pt on flutter valve and he states that he uses pep independently.

## 2023-09-15 ENCOUNTER — PATIENT OUTREACH (OUTPATIENT)
Dept: ADMINISTRATIVE | Facility: CLINIC | Age: 73
End: 2023-09-15
Payer: MEDICARE

## 2023-09-15 LAB
BACTERIA SPEC AEROBE CULT: NORMAL
BACTERIA SPEC AEROBE CULT: NORMAL
GRAM STN SPEC: NORMAL

## 2023-09-15 NOTE — PROGRESS NOTES
C3 nurse spoke with Lonnie Taylor ( wife)  for a TCC post hospital discharge follow up call. The patient has a scheduled HOSFU appointment with Isiah You MD on 09/21/2023 @ 1000.    Message sent to PCP staff

## 2023-09-17 LAB
BACTERIA BLD CULT: NORMAL
BACTERIA BLD CULT: NORMAL

## 2023-09-18 NOTE — PHYSICIAN QUERY
PT Name: Lonnie Taylor  MR #: [id number]     DOCUMENTATION CLARIFICATION     CDS: Katie Duarte RN, CCDS   Contact Information: [email or web address]    This form is a permanent document in the medical record.     Query Date: September 18, 2023    By submitting this query, we are merely seeking further clarification of documentation.  Please utilize your independent clinical judgment when addressing the question(s) below.  The Medical Record contains the following   Indicators   Supporting Clinical Findings Location in Medical Record   x Documentation of Respiratory Failure, ARDS ...chronic hypoxic respiratory failure  Acute on chronic respiratory failure with hypoxia  Hypercapnic Respiratory failure which is Chronic    ...No supplemental O2 required  Problems Resolved During this Admission:     Diagnosis Date Noted Date Resolved POA    Acute on chronic respiratory failure with hypoxia 06/04/2023 09/13/2023 Yes    9/12 H&P         9/14 Discharge Summary   x RR     O2 sat     O2 use 9/11 2221: R 18, SpO2 96% on Room Air  9/12 0846: R 20, SpO2 98% on Room Air  9/13 1138: R 19, SpO2 93% on 2L via Nasal Cannula  9/14 1155: R 20, SpO2 94% on Room Air Flowsheets   x Subjective Respiratory Signs/Symptoms Positive for cough, shortness of breath and wheezing.   9/12 H&P   x Objective Respiratory Signs/Symptoms Pulmonary:   Effort: Pulmonary effort is normal.   Breath sounds: Rhonchi present. No wheezing.   Comments: Saturating 99 on room air on examination, laying flat, no signs of orthopnea 9/12 H&P   x Home O2, Oxygen Dependence ...on 4-5 L home O2 intermittently 9/12 H&P   x Acute/Chronic Illness 73-year-old male with medical history of COPD on 4-5 L home O2 intermittently, HFrEF (40% with 6/2023), CAD (s/p PCI), PAD on xarelto, HTN, HLD, CKD3b, GERD, tobacco abuse, and obesity who presents to the ED with chief complaint of shortness of breath and wheezing. 9/12 H&P   x Treatment -O2 nasal canula PRN  -Sat goal of  89-93% 9/12 H&P    Other         The clinical guidelines noted are only a system guideline. It does not replace the providers clinical judgment.    [facility] Approved Diagnostic Criteria      Acute Respiratory Failure    Hypoxic: ABG pO2<60 mmHg or O2 sat of <91% on RA   AND/OR   Hypercapnic: ABG pCO2>50 mmHg with pH <7.35   AND   Respiratory symptoms documented (Subjective: SOB; Objective: Tachypnea, respiratory distress, increased work of breathing, unable to speak in complete sentences, labored breathing, use of accessory muscles, RR>26, cyanosis, dyspnea, wheezing, stridor, lethargy)      Chronic Respiratory Failure   Hypoxic: Continuous home oxygen    AND/OR   Hypercapnic: Normal pH with high CO2 (ex. COPD)      Acute on Chronic Respiratory Failure   Hypoxic: ABG pO2>10mmHg below baseline OR ABG pO2<60 mmHg OR SpO2<91% on usual home O2  OR O2>2L/min over baseline home O2   AND/OR   Hypercapnic: ABG pCO2>50 mmHg OR pCO2>10mmHg over baseline and pH <7.35   AND   Respiratory symptoms documented      Acute Respiratory Distress Syndrome (ARDS) - an acute, diffuse, inflammatory form of lung injury. Suspect with progressive dyspnea, hypoxemic respiratory failure, and bilateral alveolar infiltrates on chest imaging within 6 to 72 hours of an inciting event.   Acute Respiratory Distress - Generally describes less severe respiratory symptoms (tachypnea, in respiratory distress, increased work of breathing, unable to speak in complete sentences, labored breathing, use of accessory muscles, RR> 24, cyanosis, dyspnea, wheezing, stridor, lethargy) without sufficient measurements (pO2, SpO2, pH, and pCO2) to meet criteria for respiratory failure)   Acute Respiratory Insufficiency - Generally describes less severe respiratory symptoms and measurements (pO2, SpO2, pH, and pCO2) not meeting criteria for respiratory failure         Due to the conflicting clinical picture, please clarify the documented Respiratory  Failure.    If validated, please provide additional clinical support for the diagnosis.     [x    ] All types and acuities of Respiratory Failure have been ruled out   [    ] Chronic Respiratory Failure with Hypoxia diagnosis is confirmed and additional clinical support/decision-making indicators for the diagnosis include (please specify):_______________________________________________   [    ] Chronic Respiratory Failure with Hypercapnia diagnosis is confirmed and additional clinical support/decision-making indicators for the diagnosis include (please specify):_______________________________________________   [    ] Acute and (on) Chronic Respiratory Failure with Hypoxia diagnosis is confirmed and additional clinical support/decision-making indicators for the diagnosis include (please specify): _______________________________________________   [    ] Other clarification (please specify): ___________________     Please document in your progress notes daily for the duration of treatment until resolved and include in your discharge summary.     Reference:    PATEL Freire MD. (2020, March 13). Acute respiratory distress syndrome: Clinical features, diagnosis, and complications in adults ([id number] BENTON Chinchilla MD & [id number] TRENTON Lacy MD, Eds.). Retrieved November 13, 2020, from [email or web address] [email or web address]-[email or web address]-distress-syndrome-clinical-features-diagnosis-and-complications-in-adults?search=ards&source=search_result&selectedTitle=1~150&usage_type=default&display_rank=1  Form No. [id number]

## 2023-09-20 ENCOUNTER — TELEPHONE (OUTPATIENT)
Dept: FAMILY MEDICINE | Facility: CLINIC | Age: 73
End: 2023-09-20
Payer: MEDICARE

## 2023-09-20 NOTE — TELEPHONE ENCOUNTER
Per the patient's wife, Ginny, the patient will not be able to attend appointment on 09/21/23. Patient still has diarrhea; very weak; and is not eating or drinking enough. Patient states he does not have appetite. Ms. Gross is just getting over Covid. Wife would like to reschedule the appointment, but does not want to wait until 10/17/23. Please advise if we can schedule him sooner.

## 2023-09-26 DIAGNOSIS — I73.9 PVD (PERIPHERAL VASCULAR DISEASE): Chronic | ICD-10-CM

## 2023-09-26 DIAGNOSIS — R06.09 DOE (DYSPNEA ON EXERTION): Primary | ICD-10-CM

## 2023-09-26 PROBLEM — E66.01 SEVERE OBESITY (BMI 35.0-35.9 WITH COMORBIDITY): Status: ACTIVE | Noted: 2023-09-26

## 2023-09-26 PROBLEM — N25.81 SECONDARY HYPERPARATHYROIDISM OF RENAL ORIGIN: Status: ACTIVE | Noted: 2023-09-26

## 2023-09-26 NOTE — TELEPHONE ENCOUNTER
No care due was identified.  [facility] Embedded Care Due Messages. Reference number: [id number].   9/26/2023 1:24:24 PM CDT

## 2023-09-26 NOTE — TELEPHONE ENCOUNTER
----- Message from Sia Craig sent at 9/26/2023 12:12 PM CDT -----  Regarding: Mecca walls  [phone]  .Type: RX Refill Request    Who Called: Mecca walls    Have you contacted your pharmacy:    Refill or New Rx:    RX Name and Strength: nitroGLYCERIN (NITROSTAT) 0.4 MG SL tablet, ALPRAZolam (XANAX) 0.5 MG tabletp       Is this a 30 day or 90 day RX: 90 day     Preferred Pharmacy with phone number: ..  [id number] - [address] AT [facility] & [address] [address]  Phone: [phone] Fax: [phone]      Local or Mail Order: local    Would the patient rather a call back or a response via [facility]? Call back     Best Call Back Number: .[phone]

## 2023-09-27 NOTE — TELEPHONE ENCOUNTER
----- Message from Columba Davidson sent at 9/26/2023  3:55 PM CDT -----  .Type: Patient Call Back    Who called:wife -Ginny    What is the request in detail: requesting orders for home health, cant bathe , weak, can barely walk    Can the clinic reply by [facility]? call    Would the patient rather a call back or a response via [facility]? call    Best call back number:[phone]    Additional Information:

## 2023-09-28 RX ORDER — NITROGLYCERIN 0.4 MG/1
0.4 TABLET SUBLINGUAL EVERY 5 MIN PRN
Qty: 25 TABLET | Refills: 6 | Status: SHIPPED | OUTPATIENT
Start: 2023-09-28 | End: 2024-09-27

## 2023-09-28 RX ORDER — ALPRAZOLAM 0.5 MG/1
0.5 TABLET ORAL NIGHTLY PRN
Qty: 30 TABLET | Refills: 5 | Status: SHIPPED | OUTPATIENT
Start: 2023-09-28

## 2023-10-02 PROCEDURE — G0180 MD CERTIFICATION HHA PATIENT: HCPCS | Mod: ,,, | Performed by: INTERNAL MEDICINE

## 2023-10-02 PROCEDURE — G0180 PR HOME HEALTH MD CERTIFICATION: ICD-10-PCS | Mod: ,,, | Performed by: INTERNAL MEDICINE

## 2023-10-11 ENCOUNTER — TELEPHONE (OUTPATIENT)
Dept: FAMILY MEDICINE | Facility: CLINIC | Age: 73
End: 2023-10-11
Payer: MEDICARE

## 2023-10-11 DIAGNOSIS — I50.42 CHRONIC COMBINED SYSTOLIC AND DIASTOLIC HEART FAILURE: ICD-10-CM

## 2023-10-11 DIAGNOSIS — J44.1 COPD EXACERBATION: Primary | ICD-10-CM

## 2023-10-11 NOTE — TELEPHONE ENCOUNTER
----- Message from Rowan Carrasco sent at 10/11/2023  4:00 PM CDT -----  Regarding: Ginny walls [phone]  .Type: Patient Call Back    Who called:self Ginny wife     What is the request in detail: Ginny patient requesting a call back in regards to a new prescription for a nebulizer as soon as possible.    Can the clinic reply by [facility]? no    Would the patient rather a call back or a response via [facility]? Call back    Best call back number [phone]

## 2023-10-11 NOTE — TELEPHONE ENCOUNTER
Patient requested an order for the nebulizer machine. Ordered was placed, signed, and faxed to [facility] #[id number] Fax: [phone].

## 2023-10-12 ENCOUNTER — TELEPHONE (OUTPATIENT)
Dept: FAMILY MEDICINE | Facility: CLINIC | Age: 73
End: 2023-10-12
Payer: MEDICARE

## 2023-10-12 NOTE — TELEPHONE ENCOUNTER
----- Message from Heidi Leslie sent at 10/12/2023  9:52 AM CDT -----  Type: Patient Call Back    Who called:pt's wife lorenzo [phone]    What is the request in detail:calling to make sure nebulizer machine to go to [facility] [address]. Call pt     Can the clinic reply by [facility]?    Would the patient rather a call back or a response via [facility]? call    Best call back number:[phone] (home)       Additional Information:

## 2023-10-13 NOTE — PROGRESS NOTES
"Subjective:       Patient ID: Lonnie Taylor is a 70 y.o. male.    Vitals:  height is 5' 9" (1.753 m) and weight is 103.4 kg (228 lb). His oral temperature is 97 °F (36.1 °C). His blood pressure is 128/62 and his pulse is 63. His respiration is 19 and oxygen saturation is 96%.     Chief Complaint: URI    Patient to Urgent Care with signs and symptoms of COPD exacerbation    URI    This is a new problem. The current episode started 1 to 4 weeks ago (2-3 weeks). The problem has been unchanged. There has been no fever. Associated symptoms include congestion, coughing, headaches, sinus pain and wheezing. Pertinent negatives include no abdominal pain, chest pain, diarrhea, dysuria, ear pain, joint pain, joint swelling, nausea, neck pain, plugged ear sensation, rash, rhinorrhea, sneezing, sore throat, swollen glands or vomiting. Treatments tried: tesslon. The treatment provided no relief.       Constitution: Negative for chills, sweating, fatigue and fever.   HENT: Positive for congestion, postnasal drip, sinus pain and sinus pressure. Negative for ear pain, sore throat and voice change.    Neck: Negative for neck pain and painful lymph nodes.   Cardiovascular: Negative for chest pain.   Eyes: Negative for eye redness.   Respiratory: Positive for cough, sputum production, shortness of breath and wheezing. Negative for chest tightness, bloody sputum, COPD, stridor and asthma.    Gastrointestinal: Negative for abdominal pain, nausea, vomiting and diarrhea.   Genitourinary: Negative for dysuria.   Musculoskeletal: Negative for muscle ache.   Skin: Negative for rash.   Allergic/Immunologic: Negative for seasonal allergies, asthma and sneezing.   Neurological: Positive for headaches.   Hematologic/Lymphatic: Negative for swollen lymph nodes.       Objective:      Physical Exam   Constitutional: He is oriented to person, place, and time. He appears well-developed and well-nourished.   HENT:   Head: Normocephalic.   Right Ear: " External ear normal.   Left Ear: External ear normal.   Nose: Nose normal.   Mouth/Throat: Oropharynx is clear and moist.   Eyes: Pupils are equal, round, and reactive to light. Conjunctivae and EOM are normal.   Neck: Normal range of motion. Neck supple.   Cardiovascular: Normal rate, regular rhythm, normal heart sounds and intact distal pulses.   Pulmonary/Chest: Effort normal and breath sounds normal.   Neurological: He is alert and oriented to person, place, and time.   Skin: Skin is warm and dry. Capillary refill takes less than 2 seconds.   Psychiatric: He has a normal mood and affect. His behavior is normal. Judgment and thought content normal.         Assessment:       No diagnosis found.    Plan:         There are no diagnoses linked to this encounter.        Thalidomide Counseling: I discussed with the patient the risks of thalidomide including but not limited to birth defects, anxiety, weakness, chest pain, dizziness, cough and severe allergy.

## 2023-10-13 NOTE — TELEPHONE ENCOUNTER
Order was faxed on 10/12/2023 to [facility] [phone]. Notified wife, Ginny on 10/12/23 at 08:14 a.m..

## 2023-10-23 ENCOUNTER — LAB VISIT (OUTPATIENT)
Dept: LAB | Facility: HOSPITAL | Age: 73
End: 2023-10-23
Attending: INTERNAL MEDICINE
Payer: MEDICARE

## 2023-10-23 ENCOUNTER — OFFICE VISIT (OUTPATIENT)
Dept: FAMILY MEDICINE | Facility: CLINIC | Age: 73
End: 2023-10-23
Payer: MEDICARE

## 2023-10-23 VITALS
HEIGHT: 70 IN | BODY MASS INDEX: 27.17 KG/M2 | DIASTOLIC BLOOD PRESSURE: 52 MMHG | SYSTOLIC BLOOD PRESSURE: 122 MMHG | HEART RATE: 58 BPM | WEIGHT: 189.81 LBS | OXYGEN SATURATION: 97 % | TEMPERATURE: 98 F

## 2023-10-23 DIAGNOSIS — R79.89 ABNORMAL THYROID BLOOD TEST: ICD-10-CM

## 2023-10-23 DIAGNOSIS — E78.2 MIXED HYPERLIPIDEMIA: ICD-10-CM

## 2023-10-23 DIAGNOSIS — I50.42 CHRONIC COMBINED SYSTOLIC AND DIASTOLIC HEART FAILURE: ICD-10-CM

## 2023-10-23 DIAGNOSIS — E66.01 SEVERE OBESITY (BMI 35.0-35.9 WITH COMORBIDITY): ICD-10-CM

## 2023-10-23 DIAGNOSIS — Z00.00 ROUTINE MEDICAL EXAM: Primary | ICD-10-CM

## 2023-10-23 DIAGNOSIS — I10 ESSENTIAL HYPERTENSION: ICD-10-CM

## 2023-10-23 DIAGNOSIS — Z86.010 HISTORY OF COLONIC POLYPS: ICD-10-CM

## 2023-10-23 DIAGNOSIS — N40.0 BENIGN PROSTATIC HYPERPLASIA, UNSPECIFIED WHETHER LOWER URINARY TRACT SYMPTOMS PRESENT: ICD-10-CM

## 2023-10-23 DIAGNOSIS — J44.1 COPD EXACERBATION: ICD-10-CM

## 2023-10-23 DIAGNOSIS — R09.89 VASCULAR BRUIT: ICD-10-CM

## 2023-10-23 DIAGNOSIS — Z12.5 SCREENING FOR PROSTATE CANCER: ICD-10-CM

## 2023-10-23 DIAGNOSIS — D69.6 THROMBOCYTOPENIA, UNSPECIFIED: ICD-10-CM

## 2023-10-23 DIAGNOSIS — D61.818 PANCYTOPENIA: ICD-10-CM

## 2023-10-23 DIAGNOSIS — N25.81 SECONDARY HYPERPARATHYROIDISM OF RENAL ORIGIN: ICD-10-CM

## 2023-10-23 DIAGNOSIS — Z00.00 ROUTINE MEDICAL EXAM: ICD-10-CM

## 2023-10-23 DIAGNOSIS — N18.31 STAGE 3A CHRONIC KIDNEY DISEASE: ICD-10-CM

## 2023-10-23 DIAGNOSIS — J43.9 PULMONARY EMPHYSEMA, UNSPECIFIED EMPHYSEMA TYPE: ICD-10-CM

## 2023-10-23 DIAGNOSIS — I20.9 ANGINA PECTORIS: ICD-10-CM

## 2023-10-23 DIAGNOSIS — U07.1 COVID: ICD-10-CM

## 2023-10-23 DIAGNOSIS — N52.9 ERECTILE DYSFUNCTION, UNSPECIFIED ERECTILE DYSFUNCTION TYPE: ICD-10-CM

## 2023-10-23 LAB
ALBUMIN SERPL BCP-MCNC: 3.7 G/DL (ref 3.5–5.2)
ALP SERPL-CCNC: 35 U/L (ref 55–135)
ALT SERPL W/O P-5'-P-CCNC: 18 U/L (ref 10–44)
ANION GAP SERPL CALC-SCNC: 15 MMOL/L (ref 8–16)
AST SERPL-CCNC: 23 U/L (ref 10–40)
BASOPHILS # BLD AUTO: 0.05 K/UL (ref 0–0.2)
BASOPHILS NFR BLD: 0.7 % (ref 0–1.9)
BILIRUB SERPL-MCNC: 1.1 MG/DL (ref 0.1–1)
BUN SERPL-MCNC: 18 MG/DL (ref 8–23)
CALCIUM SERPL-MCNC: 9.5 MG/DL (ref 8.7–10.5)
CHLORIDE SERPL-SCNC: 104 MMOL/L (ref 95–110)
CO2 SERPL-SCNC: 24 MMOL/L (ref 23–29)
CREAT SERPL-MCNC: 1.4 MG/DL (ref 0.5–1.4)
DIFFERENTIAL METHOD: ABNORMAL
EOSINOPHIL # BLD AUTO: 0.2 K/UL (ref 0–0.5)
EOSINOPHIL NFR BLD: 2.4 % (ref 0–8)
ERYTHROCYTE [DISTWIDTH] IN BLOOD BY AUTOMATED COUNT: 17.6 % (ref 11.5–14.5)
EST. GFR  (NO RACE VARIABLE): 52.4 ML/MIN/1.73 M^2
GLUCOSE SERPL-MCNC: 68 MG/DL (ref 70–110)
HCT VFR BLD AUTO: 46.5 % (ref 40–54)
HGB BLD-MCNC: 14.4 G/DL (ref 14–18)
IMM GRANULOCYTES # BLD AUTO: 0.01 K/UL (ref 0–0.04)
IMM GRANULOCYTES NFR BLD AUTO: 0.1 % (ref 0–0.5)
LYMPHOCYTES # BLD AUTO: 1.8 K/UL (ref 1–4.8)
LYMPHOCYTES NFR BLD: 26.2 % (ref 18–48)
MCH RBC QN AUTO: 30.1 PG (ref 27–31)
MCHC RBC AUTO-ENTMCNC: 31 G/DL (ref 32–36)
MCV RBC AUTO: 97 FL (ref 82–98)
MONOCYTES # BLD AUTO: 0.5 K/UL (ref 0.3–1)
MONOCYTES NFR BLD: 7.2 % (ref 4–15)
NEUTROPHILS # BLD AUTO: 4.4 K/UL (ref 1.8–7.7)
NEUTROPHILS NFR BLD: 63.4 % (ref 38–73)
NRBC BLD-RTO: 0 /100 WBC
PLATELET # BLD AUTO: 183 K/UL (ref 150–450)
PMV BLD AUTO: 12.8 FL (ref 9.2–12.9)
POTASSIUM SERPL-SCNC: 3.8 MMOL/L (ref 3.5–5.1)
PROT SERPL-MCNC: 7.2 G/DL (ref 6–8.4)
RBC # BLD AUTO: 4.78 M/UL (ref 4.6–6.2)
SODIUM SERPL-SCNC: 143 MMOL/L (ref 136–145)
WBC # BLD AUTO: 6.98 K/UL (ref 3.9–12.7)

## 2023-10-23 PROCEDURE — 1159F PR MEDICATION LIST DOCUMENTED IN MEDICAL RECORD: ICD-10-PCS | Mod: HCNC,CPTII,S$GLB, | Performed by: INTERNAL MEDICINE

## 2023-10-23 PROCEDURE — 3288F PR FALLS RISK ASSESSMENT DOCUMENTED: ICD-10-PCS | Mod: HCNC,CPTII,S$GLB, | Performed by: INTERNAL MEDICINE

## 2023-10-23 PROCEDURE — 3288F FALL RISK ASSESSMENT DOCD: CPT | Mod: HCNC,CPTII,S$GLB, | Performed by: INTERNAL MEDICINE

## 2023-10-23 PROCEDURE — 3074F PR MOST RECENT SYSTOLIC BLOOD PRESSURE < 130 MM HG: ICD-10-PCS | Mod: HCNC,CPTII,S$GLB, | Performed by: INTERNAL MEDICINE

## 2023-10-23 PROCEDURE — 1101F PR PT FALLS ASSESS DOC 0-1 FALLS W/OUT INJ PAST YR: ICD-10-PCS | Mod: HCNC,CPTII,S$GLB, | Performed by: INTERNAL MEDICINE

## 2023-10-23 PROCEDURE — 1126F PR PAIN SEVERITY QUANTIFIED, NO PAIN PRESENT: ICD-10-PCS | Mod: HCNC,CPTII,S$GLB, | Performed by: INTERNAL MEDICINE

## 2023-10-23 PROCEDURE — 84443 ASSAY THYROID STIM HORMONE: CPT | Mod: HCNC | Performed by: INTERNAL MEDICINE

## 2023-10-23 PROCEDURE — 36415 COLL VENOUS BLD VENIPUNCTURE: CPT | Mod: HCNC,PO | Performed by: INTERNAL MEDICINE

## 2023-10-23 PROCEDURE — 99999 PR PBB SHADOW E&M-EST. PATIENT-LVL IV: ICD-10-PCS | Mod: PBBFAC,HCNC,, | Performed by: INTERNAL MEDICINE

## 2023-10-23 PROCEDURE — 85025 COMPLETE CBC W/AUTO DIFF WBC: CPT | Mod: HCNC | Performed by: INTERNAL MEDICINE

## 2023-10-23 PROCEDURE — 3074F SYST BP LT 130 MM HG: CPT | Mod: HCNC,CPTII,S$GLB, | Performed by: INTERNAL MEDICINE

## 2023-10-23 PROCEDURE — 99397 PER PM REEVAL EST PAT 65+ YR: CPT | Mod: HCNC,S$GLB,, | Performed by: INTERNAL MEDICINE

## 2023-10-23 PROCEDURE — 99397 PR PREVENTIVE VISIT,EST,65 & OVER: ICD-10-PCS | Mod: HCNC,S$GLB,, | Performed by: INTERNAL MEDICINE

## 2023-10-23 PROCEDURE — 1126F AMNT PAIN NOTED NONE PRSNT: CPT | Mod: HCNC,CPTII,S$GLB, | Performed by: INTERNAL MEDICINE

## 2023-10-23 PROCEDURE — 3078F DIAST BP <80 MM HG: CPT | Mod: HCNC,CPTII,S$GLB, | Performed by: INTERNAL MEDICINE

## 2023-10-23 PROCEDURE — 84439 ASSAY OF FREE THYROXINE: CPT | Mod: HCNC | Performed by: INTERNAL MEDICINE

## 2023-10-23 PROCEDURE — 99215 OFFICE O/P EST HI 40 MIN: CPT | Mod: 25,HCNC,S$GLB, | Performed by: INTERNAL MEDICINE

## 2023-10-23 PROCEDURE — 1101F PT FALLS ASSESS-DOCD LE1/YR: CPT | Mod: HCNC,CPTII,S$GLB, | Performed by: INTERNAL MEDICINE

## 2023-10-23 PROCEDURE — 4010F ACE/ARB THERAPY RXD/TAKEN: CPT | Mod: HCNC,CPTII,S$GLB, | Performed by: INTERNAL MEDICINE

## 2023-10-23 PROCEDURE — 1159F MED LIST DOCD IN RCRD: CPT | Mod: HCNC,CPTII,S$GLB, | Performed by: INTERNAL MEDICINE

## 2023-10-23 PROCEDURE — 84153 ASSAY OF PSA TOTAL: CPT | Mod: HCNC | Performed by: INTERNAL MEDICINE

## 2023-10-23 PROCEDURE — 3078F PR MOST RECENT DIASTOLIC BLOOD PRESSURE < 80 MM HG: ICD-10-PCS | Mod: HCNC,CPTII,S$GLB, | Performed by: INTERNAL MEDICINE

## 2023-10-23 PROCEDURE — 80053 COMPREHEN METABOLIC PANEL: CPT | Mod: HCNC | Performed by: INTERNAL MEDICINE

## 2023-10-23 PROCEDURE — 99999 PR PBB SHADOW E&M-EST. PATIENT-LVL IV: CPT | Mod: PBBFAC,HCNC,, | Performed by: INTERNAL MEDICINE

## 2023-10-23 PROCEDURE — 99215 PR OFFICE/OUTPT VISIT, EST, LEVL V, 40-54 MIN: ICD-10-PCS | Mod: 25,HCNC,S$GLB, | Performed by: INTERNAL MEDICINE

## 2023-10-23 PROCEDURE — 4010F PR ACE/ARB THEARPY RXD/TAKEN: ICD-10-PCS | Mod: HCNC,CPTII,S$GLB, | Performed by: INTERNAL MEDICINE

## 2023-10-23 RX ORDER — ISOSORBIDE MONONITRATE 30 MG/1
30 TABLET, EXTENDED RELEASE ORAL 3 TIMES DAILY
COMMUNITY
Start: 2023-09-28

## 2023-10-23 RX ORDER — VALSARTAN 80 MG/1
80 TABLET ORAL
COMMUNITY
Start: 2023-08-14

## 2023-10-23 RX ORDER — ISOSORBIDE MONONITRATE 30 MG/1
90 TABLET, EXTENDED RELEASE ORAL DAILY
Qty: 90 TABLET | Refills: 11 | Status: SHIPPED | OUTPATIENT
Start: 2023-10-23 | End: 2024-03-20

## 2023-10-23 RX ORDER — ALBUTEROL SULFATE 90 UG/1
2 AEROSOL, METERED RESPIRATORY (INHALATION) EVERY 6 HOURS PRN
Qty: 18 G | Refills: 12 | Status: SHIPPED | OUTPATIENT
Start: 2023-10-23 | End: 2023-11-13

## 2023-10-23 RX ORDER — PEAK FLOW METER
EACH MISCELLANEOUS
COMMUNITY
Start: 2023-10-12

## 2023-10-23 RX ORDER — PRASUGREL 10 MG/1
10 TABLET, FILM COATED ORAL DAILY
COMMUNITY
Start: 2023-09-28

## 2023-10-23 NOTE — TELEPHONE ENCOUNTER
----- Message from Kaylynn Hoff sent at 12/9/2019  8:31 AM CST -----  Contact: RHIANNON MENDOZA   Name of Who is Calling: RHIANNON MENDOZA       What is the request in detail: Patient is requesting a call back he states he was in the hospital with cardiac issues and he was advised to follow up with his pcp      Can the clinic reply by [facility]: no      What Number to Call Back if not in [facility]: [phone]                                     done
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
## 2023-10-24 LAB
COMPLEXED PSA SERPL-MCNC: 1.2 NG/ML (ref 0–4)
T4 FREE SERPL-MCNC: 1.19 NG/DL (ref 0.71–1.51)
TSH SERPL DL<=0.005 MIU/L-ACNC: 2.63 UIU/ML (ref 0.4–4)

## 2023-11-06 NOTE — TELEPHONE ENCOUNTER
No care due was identified.  [facility] Embedded Care Due Messages. Reference number: [id number].   11/06/2023 12:39:15 PM CST

## 2023-11-07 RX ORDER — ALBUTEROL SULFATE 0.83 MG/ML
SOLUTION RESPIRATORY (INHALATION)
Qty: 1080 ML | Refills: 3 | Status: SHIPPED | OUTPATIENT
Start: 2023-11-07

## 2023-11-07 NOTE — TELEPHONE ENCOUNTER
Refill Decision Note   Lonnie Taylor  is requesting a refill authorization.  Brief Assessment and Rationale for Refill:  Approve     Medication Therapy Plan:         Comments:     Note composed:1:36 AM 11/07/2023

## 2023-11-11 NOTE — TELEPHONE ENCOUNTER
No care due was identified.  [facility] Embedded Care Due Messages. Reference number: [id number].   11/11/2023 1:38:31 PM CST

## 2023-11-12 RX ORDER — ALBUTEROL SULFATE 90 UG/1
2 AEROSOL, METERED RESPIRATORY (INHALATION) EVERY 6 HOURS PRN
Qty: 18 G | Refills: 12 | OUTPATIENT
Start: 2023-11-12

## 2023-11-12 NOTE — TELEPHONE ENCOUNTER
Refill Decision Note   Lonnie Taylor  is requesting a refill authorization.  Brief Assessment and Rationale for Refill:  Quick Discontinue     Medication Therapy Plan:  Receipt confirmed by pharmacy (10/23/2023  2:45 PM CDT)      Comments:     Note composed:4:33 AM 11/12/2023

## 2023-11-12 NOTE — TELEPHONE ENCOUNTER
No care due was identified.  [facility] Embedded Care Due Messages. Reference number: [id number].   11/12/2023 11:41:16 AM CST

## 2023-11-13 RX ORDER — ALBUTEROL SULFATE 90 UG/1
2 AEROSOL, METERED RESPIRATORY (INHALATION) EVERY 6 HOURS PRN
Qty: 54 G | Refills: 3 | Status: SHIPPED | OUTPATIENT
Start: 2023-11-13

## 2023-11-13 NOTE — TELEPHONE ENCOUNTER
Refill Decision Note   Lonnie Taylor  is requesting a refill authorization.  Brief Assessment and Rationale for Refill:  Approve     Medication Therapy Plan:         Alert overridden per protocol: Yes   Comments:     Note composed:11:48 AM 11/13/2023

## 2023-12-18 PROBLEM — I21.4 NSTEMI (NON-ST ELEVATED MYOCARDIAL INFARCTION): Status: RESOLVED | Noted: 2022-02-11 | Resolved: 2023-12-18

## 2023-12-23 ENCOUNTER — EXTERNAL HOME HEALTH (OUTPATIENT)
Dept: HOME HEALTH SERVICES | Facility: HOSPITAL | Age: 73
End: 2023-12-23
Payer: MEDICARE

## 2024-02-13 NOTE — PROGRESS NOTES
C3 nurse spoke with Lonnie Taylor for a TCC post hospital discharge follow up call. The patient has a scheduled [facility] appointment with Dr. Lyn on 2/22/2022 @ 10:00 am.       Pt calling back for appt lost connection please call. Appt for Arden John.

## 2024-04-02 PROBLEM — I34.0 MITRAL REGURGITATION: Chronic | Status: ACTIVE | Noted: 2024-04-02

## 2024-04-02 PROBLEM — I35.1 AORTIC INSUFFICIENCY: Chronic | Status: ACTIVE | Noted: 2024-04-02

## 2024-04-15 NOTE — TELEPHONE ENCOUNTER
No care due was identified.  [facility] Embedded Care Due Messages. Reference number: [id number].   4/15/2024 4:50:07 PM CDT

## 2024-04-18 RX ORDER — ALPRAZOLAM 0.5 MG/1
0.5 TABLET ORAL NIGHTLY PRN
Qty: 30 TABLET | Refills: 5 | Status: SHIPPED | OUTPATIENT
Start: 2024-04-18

## 2024-04-24 PROBLEM — Z95.810 ICD (IMPLANTABLE CARDIOVERTER-DEFIBRILLATOR) IN PLACE: Status: ACTIVE | Noted: 2024-04-24

## 2024-04-24 PROBLEM — I25.5 ISCHEMIC CARDIOMYOPATHY: Status: ACTIVE | Noted: 2024-04-24

## 2024-04-24 PROBLEM — I45.9 INTRAVENTRICULAR CONDUCTION DELAY: Status: ACTIVE | Noted: 2024-04-24

## 2024-04-24 PROBLEM — Z95.1 HX OF CABG: Status: ACTIVE | Noted: 2024-04-24

## 2024-04-24 PROBLEM — I51.9 LEFT VENTRICULAR SYSTOLIC DYSFUNCTION (LVSD): Status: ACTIVE | Noted: 2024-04-24

## 2024-05-01 ENCOUNTER — TELEPHONE (OUTPATIENT)
Dept: PULMONOLOGY | Facility: CLINIC | Age: 74
End: 2024-05-01
Payer: MEDICARE

## 2024-05-01 NOTE — TELEPHONE ENCOUNTER
----- Message from Afia Lee sent at 5/1/2024  2:13 PM CDT -----  Regarding: Orders for PFT  Contact: [phone]  Type:  Needs Medical Advice    Who Called: Sherlyn velásquez/Melia   Would the patient rather a call back or a response via [facility]? Call  Best Call Back Number: [phone]  Additional Information: Call patient to schedule testing

## 2024-06-07 ENCOUNTER — TELEPHONE (OUTPATIENT)
Dept: PULMONOLOGY | Facility: CLINIC | Age: 74
End: 2024-06-07
Payer: MEDICARE

## 2024-06-07 NOTE — TELEPHONE ENCOUNTER
Spoke with patient wife, informed her that I have received pt message. Pt wife states that I can disregard message because some one just contacted her in regards to scheduling Pft.

## 2024-06-07 NOTE — TELEPHONE ENCOUNTER
----- Message from Anish Eller sent at 6/7/2024 10:16 AM CDT -----  Regarding: Appt  Contact: [phone]  Ginny/wife calling to schedule PFT with bronchodilator. Please call [phone]

## 2024-06-07 NOTE — TELEPHONE ENCOUNTER
----- Message from Ansih Eller sent at 6/7/2024 10:16 AM CDT -----  Regarding: Appt  Contact: [phone]  Ginny/wife calling to schedule PFT with bronchodilator. Please call [phone]

## 2024-07-05 ENCOUNTER — HOSPITAL ENCOUNTER (OUTPATIENT)
Dept: PULMONOLOGY | Facility: CLINIC | Age: 74
Discharge: HOME | End: 2024-07-05
Payer: MEDICARE

## 2024-07-05 DIAGNOSIS — I35.1 NONRHEUMATIC AORTIC VALVE INSUFFICIENCY: ICD-10-CM

## 2024-07-05 DIAGNOSIS — I34.0 NONRHEUMATIC MITRAL VALVE REGURGITATION: ICD-10-CM

## 2024-07-05 LAB
DLCO ADJ PRE: 9.92 ML/(MIN*MMHG) (ref 18.23–32.09)
DLCO SINGLE BREATH LLN: 18.23
DLCO SINGLE BREATH PRE REF: 39.4 %
DLCO SINGLE BREATH REF: 25.16
DLCOC SBVA LLN: 2.46
DLCOC SBVA PRE REF: 63 %
DLCOC SBVA REF: 3.63
DLCOC SINGLE BREATH LLN: 18.23
DLCOC SINGLE BREATH PRE REF: 39.4 %
DLCOC SINGLE BREATH REF: 25.16
DLCOCSBVAULN: 4.81
DLCOCSINGLEBREATHULN: 32.09
DLCOCSINGLEBREATHZSCORE: -3.62
DLCOSINGLEBREATHULN: 32.09
DLCOSINGLEBREATHZSCORE: -3.62
DLCOVA LLN: 2.46
DLCOVA PRE REF: 63 %
DLCOVA PRE: 2.29 ML/(MIN*MMHG*L) (ref 2.46–4.81)
DLCOVA REF: 3.63
DLCOVAULN: 4.81
DLVAADJ PRE: 2.29 ML/(MIN*MMHG*L) (ref 2.46–4.81)
ERVN2 LLN: -16449.04
ERVN2 PRE REF: 37.2 %
ERVN2 PRE: 0.36 L (ref -16449.04–16450.96)
ERVN2 REF: 0.96
ERVN2ULN: ABNORMAL
FEF 25 75 LLN: 0.84
FEF 25 75 PRE REF: 64.8 %
FEF 25 75 REF: 2.11
FEV05 LLN: 1.31
FEV05 REF: 2.45
FEV1 FVC LLN: 61
FEV1 FVC PRE REF: 92.4 %
FEV1 FVC REF: 75
FEV1 LLN: 2.04
FEV1 PRE REF: 73.6 %
FEV1 REF: 2.9
FEV1FVCZSCORE: -0.68
FEV1ZSCORE: -1.47
FRCN2 LLN: 2.71
FRCN2 PRE REF: 60.4 %
FRCN2 REF: 3.7
FRCN2ULN: 4.68
FVC LLN: 2.82
FVC PRE REF: 79.1 %
FVC REF: 3.88
FVCZSCORE: -1.27
ICN2REF: 2.96
IVC PRE: 3.02 L (ref 2.82–4.94)
IVC SINGLE BREATH LLN: 2.82
IVC SINGLE BREATH PRE REF: 77.8 %
IVC SINGLE BREATH REF: 3.88
IVCSINGLEBREATHULN: 4.94
LLN IC N2: -9999997.04
PEF LLN: 5.24
PEF PRE REF: 57 %
PEF REF: 7.49
PHYSICIAN COMMENT: ABNORMAL
PRE DLCO: 9.92 ML/(MIN*MMHG) (ref 18.23–32.09)
PRE FEF 25 75: 1.37 L/S (ref 0.84–3.95)
PRE FET 100: 6.09 SEC
PRE FEV05 REF: 66.2 %
PRE FEV1 FVC: 69.53 % (ref 60.95–88.11)
PRE FEV1: 2.13 L (ref 2.04–3.7)
PRE FEV5: 1.62 L (ref 1.31–3.58)
PRE FRC N2: 2.23 L (ref 2.71–4.68)
PRE FVC: 3.06 L (ref 2.82–4.94)
PRE IC N2: 2.71 L (ref -9999997.04–#######.####)
PRE PEF: 4.27 L/S (ref 5.24–9.74)
PRE REF IC N2: 91.7 %
RVN2 LLN: 2.06
RVN2 PRE REF: 68.6 %
RVN2 PRE: 1.88 L (ref 2.06–3.41)
RVN2 REF: 2.74
RVN2TLCN2 LLN: 34.62
RVN2TLCN2 PRE REF: 87.1 %
RVN2TLCN2 PRE: 37.98 % (ref 34.62–52.58)
RVN2TLCN2 REF: 43.6
RVN2TLCN2ULN: 52.58
RVN2ULN: 3.41
TLCN2 LLN: 5.77
TLCN2 PRE REF: 71.4 %
TLCN2 PRE: 4.94 L (ref 5.77–8.07)
TLCN2 REF: 6.92
TLCN2ULN: 8.07
TLCN2ZSCORE: -2.83
ULN IC N2: ABNORMAL
VA PRE: 4.33 L (ref 6.77–6.77)
VA SINGLE BREATH LLN: 6.77
VA SINGLE BREATH PRE REF: 63.9 %
VA SINGLE BREATH REF: 6.77
VASINGLEBREATHULN: 6.77
VCMAXN2 LLN: 2.82
VCMAXN2 PRE REF: 79.1 %
VCMAXN2 PRE: 3.06 L (ref 2.82–4.94)
VCMAXN2 REF: 3.88
VCMAXN2ULN: 4.94

## 2024-07-05 PROCEDURE — 94060 EVALUATION OF WHEEZING: CPT | Mod: S$GLB,,, | Performed by: INTERNAL MEDICINE

## 2024-07-05 PROCEDURE — 94726 PLETHYSMOGRAPHY LUNG VOLUMES: CPT | Mod: S$GLB,,, | Performed by: INTERNAL MEDICINE

## 2024-07-05 PROCEDURE — 94729 DIFFUSING CAPACITY: CPT | Mod: S$GLB,,, | Performed by: INTERNAL MEDICINE

## 2024-08-05 ENCOUNTER — PATIENT OUTREACH (OUTPATIENT)
Dept: ADMINISTRATIVE | Facility: HOSPITAL | Age: 74
End: 2024-08-05
Payer: MEDICARE

## 2024-08-08 ENCOUNTER — TELEPHONE (OUTPATIENT)
Dept: FAMILY MEDICINE | Facility: CLINIC | Age: 74
End: 2024-08-08
Payer: MEDICARE

## 2024-10-18 NOTE — TELEPHONE ENCOUNTER
Care Due:                  Date            Visit Type   Department     Provider  --------------------------------------------------------------------------------                                             [facility]                              ESTABLISHED   MED/ INTERNAL  Isiah Whitfield  Last Visit: 10-      PATIENT      MED/ PEDS      [place]  Next Visit: None Scheduled  None         None Found                                                            Last  Test          Frequency    Reason                     Performed    Due Date  --------------------------------------------------------------------------------    Office Visit  15 months..  nitroGLYCERIN............  10-   01-    [facility] Embedded Care Due Messages. Reference number: [id number].   10/18/2024 3:33:12 PM CDT

## 2024-10-20 RX ORDER — ALPRAZOLAM 0.5 MG/1
0.5 TABLET ORAL NIGHTLY PRN
Qty: 30 TABLET | Refills: 5 | Status: SHIPPED | OUTPATIENT
Start: 2024-10-20

## 2024-12-11 ENCOUNTER — LAB VISIT (OUTPATIENT)
Dept: LAB | Facility: HOSPITAL | Age: 74
End: 2024-12-11
Attending: INTERNAL MEDICINE
Payer: MEDICARE

## 2024-12-11 DIAGNOSIS — I50.22 CHRONIC SYSTOLIC HEART FAILURE: Primary | ICD-10-CM

## 2024-12-11 DIAGNOSIS — I50.22 CHRONIC SYSTOLIC HEART FAILURE: ICD-10-CM

## 2024-12-11 LAB
ANION GAP SERPL CALC-SCNC: 11 MMOL/L (ref 8–16)
BUN SERPL-MCNC: 29 MG/DL (ref 8–23)
CALCIUM SERPL-MCNC: 9.5 MG/DL (ref 8.7–10.5)
CHLORIDE SERPL-SCNC: 104 MMOL/L (ref 95–110)
CO2 SERPL-SCNC: 22 MMOL/L (ref 23–29)
CREAT SERPL-MCNC: 2.1 MG/DL (ref 0.5–1.4)
EST. GFR  (NO RACE VARIABLE): 32 ML/MIN/1.73 M^2
GLUCOSE SERPL-MCNC: 95 MG/DL (ref 70–110)
POTASSIUM SERPL-SCNC: 4.5 MMOL/L (ref 3.5–5.1)
SODIUM SERPL-SCNC: 137 MMOL/L (ref 136–145)

## 2024-12-11 PROCEDURE — 83880 ASSAY OF NATRIURETIC PEPTIDE: CPT | Performed by: INTERNAL MEDICINE

## 2024-12-11 PROCEDURE — 80048 BASIC METABOLIC PNL TOTAL CA: CPT | Performed by: INTERNAL MEDICINE

## 2024-12-12 LAB — BNP SERPL-MCNC: 108 PG/ML (ref 0–99)

## 2024-12-19 ENCOUNTER — TELEPHONE (OUTPATIENT)
Dept: FAMILY MEDICINE | Facility: CLINIC | Age: 74
End: 2024-12-19
Payer: MEDICARE

## 2024-12-19 NOTE — TELEPHONE ENCOUNTER
----- Message from Luisa sent at 12/19/2024  4:07 PM CST -----  Regarding: Parameds  Who called: Parameds    What is the request in detail: questionnaire was faxed over regarding pt's life insurance policy. A general health assessment form sent on 12/12/24. Rep is going to refax them today    Can the clinic reply by [facility]? No    Would the patient rather a call back or a response via [facility]? Call back    Best call back number:  fax [phone]    Additional Information:    Thank you.

## 2024-12-20 NOTE — TELEPHONE ENCOUNTER
I left a voice mail for Nancy stating the patient must be seen by Dr. You in order for the documents to be completed. The patient is scheduled for 01/14/24.

## 2024-12-20 NOTE — TELEPHONE ENCOUNTER
----- Message from Jenny sent at 12/20/2024 11:31 AM CST -----  Regarding: Parameds    Type: Patient Call Back     Who called:[employer]vasyl     What is the request in detail:Asking for a status update on forms that were sent over yesterday, they are stating it is urgent and needs to be addressed before the holidays. It is life ins forms     Can the clinic reply by [facility]? No     Would the patient rather a call back or a response via [facility]? Call back     Best call back number: [phone] ext [phone]   fax [phone]     Additional Information:     Thank you.

## 2025-01-14 PROBLEM — N18.32 CHRONIC KIDNEY DISEASE, STAGE 3B: Status: ACTIVE | Noted: 2025-01-14

## 2025-01-14 PROBLEM — J96.11 CHRONIC RESPIRATORY FAILURE WITH HYPOXIA: Status: ACTIVE | Noted: 2025-01-14

## 2025-01-14 PROBLEM — D61.818 PANCYTOPENIA: Status: ACTIVE | Noted: 2025-01-14

## 2025-01-28 ENCOUNTER — EXTERNAL HOME HEALTH (OUTPATIENT)
Dept: HOME HEALTH SERVICES | Facility: HOSPITAL | Age: 75
End: 2025-01-28
Payer: MEDICARE

## 2025-02-22 DIAGNOSIS — Z00.00 ENCOUNTER FOR MEDICARE ANNUAL WELLNESS EXAM: ICD-10-CM

## (undated) DEVICE — GUIDEWIRE SUPRA CORE 035 190CM

## (undated) DEVICE — PAD DEFIB CADENCE ADULT R2

## (undated) DEVICE — GUIDEWIRE STD .035X260CM ANG

## (undated) DEVICE — Device

## (undated) DEVICE — INFLATOR ENCORE 26 BLLN INFL

## (undated) DEVICE — SEE MEDLINE ITEM 156894

## (undated) DEVICE — SHEATH INTRODUCER 7FR 11CM

## (undated) DEVICE — GUIDEWIRE STF .035X180CM ANG

## (undated) DEVICE — KIT CUSTOM MANIFOLD

## (undated) DEVICE — OMNIPAQUE 350 200ML

## (undated) DEVICE — GUIDE VISTA 6FR JR 4

## (undated) DEVICE — SPIKE CONTRAST CONTROLLER

## (undated) DEVICE — SEE MEDLINE ITEM 157187

## (undated) DEVICE — GUIDE VISTA 6FR AL 2

## (undated) DEVICE — KIT GLIDESHEATH SLEND 7FR 10CM

## (undated) DEVICE — HEMOSTAT VASC BAND REG 24CM

## (undated) DEVICE — CATH DXTERITY AL-I 100CM 6FR

## (undated) DEVICE — KIT CO-PILOT

## (undated) DEVICE — SHEATH INTRODUCER 6FR 11CM

## (undated) DEVICE — CATH IMA INFINITI 4FRX100CM

## (undated) DEVICE — CATH JACKY RADIAL 5FR 100CM

## (undated) DEVICE — GUIDEWIRE STD .035X180CM ANG

## (undated) DEVICE — CATH DXTERITY JR40 100CM 6FR

## (undated) DEVICE — PROTECTION STATION PLUS

## (undated) DEVICE — CATH DXTERITY JL50 100CM 6FR